# Patient Record
Sex: FEMALE | Race: WHITE | NOT HISPANIC OR LATINO | Employment: FULL TIME | URBAN - METROPOLITAN AREA
[De-identification: names, ages, dates, MRNs, and addresses within clinical notes are randomized per-mention and may not be internally consistent; named-entity substitution may affect disease eponyms.]

---

## 2020-11-16 LAB
EXTERNAL ABO GROUPING: NORMAL
EXTERNAL ANTIBODY SCREEN: NEGATIVE
EXTERNAL HEPATITIS B SURFACE ANTIGEN: NEGATIVE
EXTERNAL HEPATITIS C AB: NEGATIVE
EXTERNAL RH FACTOR: POSITIVE
EXTERNAL RUBELLA QUALITATIVE: NORMAL
EXTERNAL SYPHILIS RPR SCREEN: NORMAL
HIV1 P24 AG SERPL QL IA: NEGATIVE

## 2021-04-29 ENCOUNTER — APPOINTMENT (OUTPATIENT)
Dept: GENERAL RADIOLOGY | Facility: HOSPITAL | Age: 36
End: 2021-04-29

## 2021-04-29 PROCEDURE — 73110 X-RAY EXAM OF WRIST: CPT | Performed by: GENERAL PRACTICE

## 2021-05-07 ENCOUNTER — TRANSCRIBE ORDERS (OUTPATIENT)
Dept: ADMINISTRATIVE | Facility: HOSPITAL | Age: 36
End: 2021-05-07

## 2021-05-07 DIAGNOSIS — D64.9 ANEMIA, UNSPECIFIED TYPE: Primary | ICD-10-CM

## 2021-05-10 PROBLEM — D64.9 ANEMIA: Status: ACTIVE | Noted: 2021-05-10

## 2021-05-12 ENCOUNTER — HOSPITAL ENCOUNTER (OUTPATIENT)
Dept: INFUSION THERAPY | Facility: HOSPITAL | Age: 36
Discharge: HOME | End: 2021-05-12
Admitting: OBSTETRICS & GYNECOLOGY

## 2021-05-12 VITALS
HEART RATE: 105 BPM | RESPIRATION RATE: 20 BRPM | DIASTOLIC BLOOD PRESSURE: 67 MMHG | OXYGEN SATURATION: 98 % | SYSTOLIC BLOOD PRESSURE: 135 MMHG | TEMPERATURE: 97.3 F

## 2021-05-12 DIAGNOSIS — D64.9 ANEMIA, UNSPECIFIED TYPE: Primary | ICD-10-CM

## 2021-05-12 PROCEDURE — 25010000002 IRON SUCROSE PER 1 MG: Performed by: OBSTETRICS & GYNECOLOGY

## 2021-05-12 PROCEDURE — 96366 THER/PROPH/DIAG IV INF ADDON: CPT

## 2021-05-12 PROCEDURE — 96365 THER/PROPH/DIAG IV INF INIT: CPT

## 2021-05-12 RX ADMIN — IRON SUCROSE 300 MG: 20 INJECTION, SOLUTION INTRAVENOUS at 08:04

## 2021-05-17 ENCOUNTER — HOSPITAL ENCOUNTER (OUTPATIENT)
Dept: INFUSION THERAPY | Facility: HOSPITAL | Age: 36
Discharge: HOME | End: 2021-05-17
Admitting: OBSTETRICS & GYNECOLOGY

## 2021-05-17 VITALS
OXYGEN SATURATION: 100 % | TEMPERATURE: 97.4 F | HEART RATE: 109 BPM | DIASTOLIC BLOOD PRESSURE: 77 MMHG | SYSTOLIC BLOOD PRESSURE: 120 MMHG

## 2021-05-17 DIAGNOSIS — D64.9 ANEMIA, UNSPECIFIED TYPE: Primary | ICD-10-CM

## 2021-05-17 PROCEDURE — 96366 THER/PROPH/DIAG IV INF ADDON: CPT

## 2021-05-17 PROCEDURE — 96365 THER/PROPH/DIAG IV INF INIT: CPT

## 2021-05-17 PROCEDURE — 25010000002 IRON SUCROSE PER 1 MG: Performed by: OBSTETRICS & GYNECOLOGY

## 2021-05-17 RX ADMIN — IRON SUCROSE 300 MG: 20 INJECTION, SOLUTION INTRAVENOUS at 10:13

## 2021-05-17 NOTE — PROGRESS NOTES
Pt tolerated infusion well.  Pt monitored for 30 min post infusion without complication.  Pt dc'ed ambulatory.

## 2021-05-27 ENCOUNTER — HOSPITAL ENCOUNTER (OUTPATIENT)
Dept: INFUSION THERAPY | Facility: HOSPITAL | Age: 36
Discharge: HOME | End: 2021-05-27
Admitting: OBSTETRICS & GYNECOLOGY

## 2021-05-27 VITALS
SYSTOLIC BLOOD PRESSURE: 127 MMHG | RESPIRATION RATE: 16 BRPM | TEMPERATURE: 98 F | OXYGEN SATURATION: 99 % | DIASTOLIC BLOOD PRESSURE: 83 MMHG | HEART RATE: 99 BPM

## 2021-05-27 DIAGNOSIS — D64.9 ANEMIA, UNSPECIFIED TYPE: Primary | ICD-10-CM

## 2021-05-27 PROCEDURE — 25010000002 IRON SUCROSE PER 1 MG: Performed by: OBSTETRICS & GYNECOLOGY

## 2021-05-27 PROCEDURE — 96365 THER/PROPH/DIAG IV INF INIT: CPT

## 2021-05-27 PROCEDURE — 96366 THER/PROPH/DIAG IV INF ADDON: CPT

## 2021-05-27 RX ADMIN — IRON SUCROSE 300 MG: 20 INJECTION, SOLUTION INTRAVENOUS at 10:11

## 2021-06-01 LAB — EXTERNAL GROUP B STREP ANTIGEN: NEGATIVE

## 2021-06-30 ENCOUNTER — HOSPITAL ENCOUNTER (OUTPATIENT)
Dept: LABOR AND DELIVERY | Facility: HOSPITAL | Age: 36
Discharge: HOME | End: 2021-06-30

## 2021-06-30 ENCOUNTER — HOSPITAL ENCOUNTER (INPATIENT)
Facility: HOSPITAL | Age: 36
LOS: 4 days | Discharge: HOME | End: 2021-07-04
Attending: OBSTETRICS & GYNECOLOGY | Admitting: OBSTETRICS & GYNECOLOGY

## 2021-06-30 ENCOUNTER — ANESTHESIA EVENT (OUTPATIENT)
Dept: LABOR AND DELIVERY | Facility: HOSPITAL | Age: 36
End: 2021-06-30

## 2021-06-30 ENCOUNTER — ANESTHESIA (OUTPATIENT)
Dept: LABOR AND DELIVERY | Facility: HOSPITAL | Age: 36
End: 2021-06-30

## 2021-06-30 DIAGNOSIS — Z34.90 PREGNANCY, UNSPECIFIED GESTATIONAL AGE: ICD-10-CM

## 2021-06-30 DIAGNOSIS — Z3A.40 40 WEEKS GESTATION OF PREGNANCY: Primary | ICD-10-CM

## 2021-06-30 LAB
ABO GROUP BLD: NORMAL
BASOPHILS # BLD AUTO: 0.02 10*3/MM3 (ref 0–0.2)
BASOPHILS NFR BLD AUTO: 0.1 % (ref 0–1.5)
BLD GP AB SCN SERPL QL: NEGATIVE
DEPRECATED RDW RBC AUTO: 48.2 FL (ref 37–54)
EOSINOPHIL # BLD AUTO: 0.12 10*3/MM3 (ref 0–0.4)
EOSINOPHIL NFR BLD AUTO: 0.8 % (ref 0.3–6.2)
ERYTHROCYTE [DISTWIDTH] IN BLOOD BY AUTOMATED COUNT: 14.5 % (ref 12.3–15.4)
HCT VFR BLD AUTO: 34.2 % (ref 34–46.6)
HGB BLD-MCNC: 11.4 G/DL (ref 12–15.9)
IMM GRANULOCYTES # BLD AUTO: 0.14 10*3/MM3 (ref 0–0.05)
IMM GRANULOCYTES NFR BLD AUTO: 0.9 % (ref 0–0.5)
LYMPHOCYTES # BLD AUTO: 1.98 10*3/MM3 (ref 0.7–3.1)
LYMPHOCYTES NFR BLD AUTO: 13.4 % (ref 19.6–45.3)
MCH RBC QN AUTO: 30.1 PG (ref 26.6–33)
MCHC RBC AUTO-ENTMCNC: 33.3 G/DL (ref 31.5–35.7)
MCV RBC AUTO: 90.2 FL (ref 79–97)
MONOCYTES # BLD AUTO: 1.01 10*3/MM3 (ref 0.1–0.9)
MONOCYTES NFR BLD AUTO: 6.8 % (ref 5–12)
NEUTROPHILS NFR BLD AUTO: 11.53 10*3/MM3 (ref 1.7–7)
NEUTROPHILS NFR BLD AUTO: 78 % (ref 42.7–76)
NRBC BLD AUTO-RTO: 0.1 /100 WBC (ref 0–0.2)
PLATELET # BLD AUTO: 282 10*3/MM3 (ref 140–450)
PMV BLD AUTO: 10.1 FL (ref 6–12)
RBC # BLD AUTO: 3.79 10*6/MM3 (ref 3.77–5.28)
RH BLD: POSITIVE
SARS-COV-2 RNA PNL SPEC NAA+PROBE: NOT DETECTED
T&S EXPIRATION DATE: NORMAL
WBC # BLD AUTO: 14.8 10*3/MM3 (ref 3.4–10.8)

## 2021-06-30 PROCEDURE — 85025 COMPLETE CBC W/AUTO DIFF WBC: CPT | Performed by: OBSTETRICS & GYNECOLOGY

## 2021-06-30 PROCEDURE — 87635 SARS-COV-2 COVID-19 AMP PRB: CPT | Performed by: OBSTETRICS & GYNECOLOGY

## 2021-06-30 PROCEDURE — 86901 BLOOD TYPING SEROLOGIC RH(D): CPT | Performed by: OBSTETRICS & GYNECOLOGY

## 2021-06-30 PROCEDURE — 86900 BLOOD TYPING SEROLOGIC ABO: CPT | Performed by: OBSTETRICS & GYNECOLOGY

## 2021-06-30 PROCEDURE — 86850 RBC ANTIBODY SCREEN: CPT | Performed by: OBSTETRICS & GYNECOLOGY

## 2021-06-30 RX ORDER — PROMETHAZINE HYDROCHLORIDE 25 MG/1
12.5 TABLET ORAL EVERY 6 HOURS PRN
Status: DISCONTINUED | OUTPATIENT
Start: 2021-06-30 | End: 2021-06-30

## 2021-06-30 RX ORDER — ONDANSETRON 2 MG/ML
4 INJECTION INTRAMUSCULAR; INTRAVENOUS ONCE AS NEEDED
Status: COMPLETED | OUTPATIENT
Start: 2021-06-30 | End: 2021-07-01

## 2021-06-30 RX ORDER — DIPHENHYDRAMINE HYDROCHLORIDE 50 MG/ML
25 INJECTION INTRAMUSCULAR; INTRAVENOUS NIGHTLY PRN
Status: DISCONTINUED | OUTPATIENT
Start: 2021-06-30 | End: 2021-07-01 | Stop reason: HOSPADM

## 2021-06-30 RX ORDER — DIPHENHYDRAMINE HYDROCHLORIDE 50 MG/ML
12.5 INJECTION INTRAMUSCULAR; INTRAVENOUS EVERY 8 HOURS PRN
Status: DISCONTINUED | OUTPATIENT
Start: 2021-06-30 | End: 2021-07-01 | Stop reason: HOSPADM

## 2021-06-30 RX ORDER — SODIUM CHLORIDE, SODIUM LACTATE, POTASSIUM CHLORIDE, CALCIUM CHLORIDE 600; 310; 30; 20 MG/100ML; MG/100ML; MG/100ML; MG/100ML
125 INJECTION, SOLUTION INTRAVENOUS CONTINUOUS
Status: DISCONTINUED | OUTPATIENT
Start: 2021-06-30 | End: 2021-06-30

## 2021-06-30 RX ORDER — OXYTOCIN-SODIUM CHLORIDE 0.9% IV SOLN 30 UNIT/500ML 30-0.9/5 UT/ML-%
2-20 SOLUTION INTRAVENOUS
Status: DISCONTINUED | OUTPATIENT
Start: 2021-06-30 | End: 2021-06-30

## 2021-06-30 RX ORDER — OXYTOCIN-SODIUM CHLORIDE 0.9% IV SOLN 30 UNIT/500ML 30-0.9/5 UT/ML-%
999 SOLUTION INTRAVENOUS ONCE
Status: DISCONTINUED | OUTPATIENT
Start: 2021-06-30 | End: 2021-06-30

## 2021-06-30 RX ORDER — SODIUM CHLORIDE 0.9 % (FLUSH) 0.9 %
3-10 SYRINGE (ML) INJECTION AS NEEDED
Status: DISCONTINUED | OUTPATIENT
Start: 2021-06-30 | End: 2021-07-01 | Stop reason: HOSPADM

## 2021-06-30 RX ORDER — TERBUTALINE SULFATE 1 MG/ML
0.25 INJECTION, SOLUTION SUBCUTANEOUS AS NEEDED
Status: DISCONTINUED | OUTPATIENT
Start: 2021-06-30 | End: 2021-07-01 | Stop reason: HOSPADM

## 2021-06-30 RX ORDER — DIPHENHYDRAMINE HCL 50 MG
50 CAPSULE ORAL NIGHTLY PRN
Status: DISCONTINUED | OUTPATIENT
Start: 2021-06-30 | End: 2021-07-01 | Stop reason: HOSPADM

## 2021-06-30 RX ORDER — ONDANSETRON 4 MG/1
4 TABLET, FILM COATED ORAL EVERY 6 HOURS PRN
Status: DISCONTINUED | OUTPATIENT
Start: 2021-06-30 | End: 2021-07-01 | Stop reason: HOSPADM

## 2021-06-30 RX ORDER — ACETAMINOPHEN 500 MG
1000 TABLET ORAL EVERY 4 HOURS PRN
Status: DISCONTINUED | OUTPATIENT
Start: 2021-06-30 | End: 2021-07-01 | Stop reason: HOSPADM

## 2021-06-30 RX ORDER — OXYTOCIN-SODIUM CHLORIDE 0.9% IV SOLN 30 UNIT/500ML 30-0.9/5 UT/ML-%
125 SOLUTION INTRAVENOUS CONTINUOUS PRN
Status: DISCONTINUED | OUTPATIENT
Start: 2021-06-30 | End: 2021-06-30

## 2021-06-30 RX ORDER — OXYTOCIN-SODIUM CHLORIDE 0.9% IV SOLN 30 UNIT/500ML 30-0.9/5 UT/ML-%
250 SOLUTION INTRAVENOUS CONTINUOUS PRN
Status: DISCONTINUED | OUTPATIENT
Start: 2021-06-30 | End: 2021-06-30

## 2021-06-30 RX ORDER — CEFAZOLIN SODIUM 2 G/100ML
2 INJECTION, SOLUTION INTRAVENOUS ONCE
Status: COMPLETED | OUTPATIENT
Start: 2021-06-30 | End: 2021-07-01

## 2021-06-30 RX ORDER — FAMOTIDINE 10 MG/ML
20 INJECTION, SOLUTION INTRAVENOUS 2 TIMES DAILY PRN
Status: DISCONTINUED | OUTPATIENT
Start: 2021-06-30 | End: 2021-06-30

## 2021-06-30 RX ORDER — ONDANSETRON 2 MG/ML
4 INJECTION INTRAMUSCULAR; INTRAVENOUS EVERY 6 HOURS PRN
Status: DISCONTINUED | OUTPATIENT
Start: 2021-06-30 | End: 2021-07-01 | Stop reason: HOSPADM

## 2021-06-30 RX ORDER — EPHEDRINE SULFATE 50 MG/ML
5 INJECTION, SOLUTION INTRAVENOUS AS NEEDED
Status: DISCONTINUED | OUTPATIENT
Start: 2021-06-30 | End: 2021-06-30

## 2021-06-30 RX ORDER — MAGNESIUM CARB/ALUMINUM HYDROX 105-160MG
30 TABLET,CHEWABLE ORAL ONCE
Status: DISCONTINUED | OUTPATIENT
Start: 2021-06-30 | End: 2021-06-30

## 2021-06-30 RX ORDER — FAMOTIDINE 20 MG/1
20 TABLET, FILM COATED ORAL 2 TIMES DAILY PRN
Status: DISCONTINUED | OUTPATIENT
Start: 2021-06-30 | End: 2021-06-30

## 2021-06-30 RX ORDER — DEXTROSE, SODIUM CHLORIDE, SODIUM LACTATE, POTASSIUM CHLORIDE, AND CALCIUM CHLORIDE 5; .6; .31; .03; .02 G/100ML; G/100ML; G/100ML; G/100ML; G/100ML
125 INJECTION, SOLUTION INTRAVENOUS CONTINUOUS
Status: DISCONTINUED | OUTPATIENT
Start: 2021-06-30 | End: 2021-06-30

## 2021-06-30 RX ORDER — SODIUM CHLORIDE, SODIUM LACTATE, POTASSIUM CHLORIDE, CALCIUM CHLORIDE 600; 310; 30; 20 MG/100ML; MG/100ML; MG/100ML; MG/100ML
125 INJECTION, SOLUTION INTRAVENOUS CONTINUOUS
Status: DISCONTINUED | OUTPATIENT
Start: 2021-06-30 | End: 2021-07-01

## 2021-06-30 RX ORDER — BUTORPHANOL TARTRATE 1 MG/ML
1 INJECTION, SOLUTION INTRAMUSCULAR; INTRAVENOUS
Status: DISCONTINUED | OUTPATIENT
Start: 2021-06-30 | End: 2021-07-01 | Stop reason: HOSPADM

## 2021-06-30 RX ORDER — FAMOTIDINE 10 MG/ML
20 INJECTION, SOLUTION INTRAVENOUS ONCE AS NEEDED
Status: COMPLETED | OUTPATIENT
Start: 2021-06-30 | End: 2021-07-01

## 2021-06-30 RX ORDER — SODIUM CHLORIDE 0.9 % (FLUSH) 0.9 %
3 SYRINGE (ML) INJECTION EVERY 12 HOURS SCHEDULED
Status: DISCONTINUED | OUTPATIENT
Start: 2021-06-30 | End: 2021-07-01 | Stop reason: HOSPADM

## 2021-06-30 RX ORDER — LIDOCAINE HYDROCHLORIDE 10 MG/ML
5 INJECTION, SOLUTION EPIDURAL; INFILTRATION; INTRACAUDAL; PERINEURAL AS NEEDED
Status: DISCONTINUED | OUTPATIENT
Start: 2021-06-30 | End: 2021-07-01 | Stop reason: HOSPADM

## 2021-06-30 RX ORDER — PROMETHAZINE HYDROCHLORIDE 12.5 MG/1
12.5 SUPPOSITORY RECTAL EVERY 6 HOURS PRN
Status: DISCONTINUED | OUTPATIENT
Start: 2021-06-30 | End: 2021-06-30

## 2021-06-30 RX ADMIN — SODIUM CHLORIDE, POTASSIUM CHLORIDE, SODIUM LACTATE AND CALCIUM CHLORIDE 125 ML/HR: 600; 310; 30; 20 INJECTION, SOLUTION INTRAVENOUS at 16:42

## 2021-07-01 PROBLEM — O36.60X0 MACROSOMIA AFFECTING MANAGEMENT OF MOTHER: Status: ACTIVE | Noted: 2021-07-01

## 2021-07-01 LAB
ATMOSPHERIC PRESS: 750.5 MMHG
BASE EXCESS BLDCOA CALC-SCNC: -6 MMOL/L
BDY SITE: ABNORMAL
GAS FLOW AIRWAY: 2 LPM
HCO3 BLDCOA-SCNC: 20.9 MMOL/L (ref 22–28)
MODALITY: ABNORMAL
NOTE: ABNORMAL
PCO2 BLDCOA: 45 MMHG (ref 43–63)
PH BLDCOA: 7.28 PH UNITS (ref 7.18–7.34)
PO2 BLDCOA: 20.1 MMHG (ref 12–26)
SAO2 % BLDCOA: 26.1 % (ref 92–99)

## 2021-07-01 PROCEDURE — 25010000002 FENTANYL CITRATE (PF) 50 MCG/ML SOLUTION: Performed by: NURSE ANESTHETIST, CERTIFIED REGISTERED

## 2021-07-01 PROCEDURE — 25010000003 CEFAZOLIN IN DEXTROSE 2-4 GM/100ML-% SOLUTION: Performed by: OBSTETRICS & GYNECOLOGY

## 2021-07-01 PROCEDURE — 25010000002 ROPIVACAINE PER 1 MG: Performed by: STUDENT IN AN ORGANIZED HEALTH CARE EDUCATION/TRAINING PROGRAM

## 2021-07-01 PROCEDURE — 82803 BLOOD GASES ANY COMBINATION: CPT

## 2021-07-01 PROCEDURE — 25010000002 FENTANYL CITRATE (PF) 50 MCG/ML SOLUTION: Performed by: STUDENT IN AN ORGANIZED HEALTH CARE EDUCATION/TRAINING PROGRAM

## 2021-07-01 PROCEDURE — 25010000002 MIDAZOLAM PER 1 MG: Performed by: NURSE ANESTHETIST, CERTIFIED REGISTERED

## 2021-07-01 PROCEDURE — 25010000002 PROPOFOL 10 MG/ML EMULSION: Performed by: NURSE ANESTHETIST, CERTIFIED REGISTERED

## 2021-07-01 PROCEDURE — 25010000002 DIPHENHYDRAMINE PER 50 MG: Performed by: NURSE ANESTHETIST, CERTIFIED REGISTERED

## 2021-07-01 PROCEDURE — 25010000002 KETOROLAC TROMETHAMINE PER 15 MG: Performed by: OBSTETRICS & GYNECOLOGY

## 2021-07-01 PROCEDURE — 25010000002 ONDANSETRON PER 1 MG: Performed by: ANESTHESIOLOGY

## 2021-07-01 PROCEDURE — 25010000002 ONDANSETRON PER 1 MG: Performed by: NURSE ANESTHETIST, CERTIFIED REGISTERED

## 2021-07-01 PROCEDURE — 25010000002 MORPHINE PER 10 MG: Performed by: STUDENT IN AN ORGANIZED HEALTH CARE EDUCATION/TRAINING PROGRAM

## 2021-07-01 PROCEDURE — 88307 TISSUE EXAM BY PATHOLOGIST: CPT

## 2021-07-01 RX ORDER — ONDANSETRON 4 MG/1
4 TABLET, FILM COATED ORAL EVERY 8 HOURS PRN
Status: DISCONTINUED | OUTPATIENT
Start: 2021-07-01 | End: 2021-07-04 | Stop reason: HOSPADM

## 2021-07-01 RX ORDER — LANOLIN
CREAM (ML) TOPICAL AS NEEDED
Status: DISCONTINUED | OUTPATIENT
Start: 2021-07-01 | End: 2021-07-04 | Stop reason: HOSPADM

## 2021-07-01 RX ORDER — ONDANSETRON 4 MG/1
4 TABLET, FILM COATED ORAL EVERY 6 HOURS PRN
Status: DISCONTINUED | OUTPATIENT
Start: 2021-07-01 | End: 2021-07-01 | Stop reason: HOSPADM

## 2021-07-01 RX ORDER — ONDANSETRON 2 MG/ML
4 INJECTION INTRAMUSCULAR; INTRAVENOUS ONCE AS NEEDED
Status: DISCONTINUED | OUTPATIENT
Start: 2021-07-01 | End: 2021-07-01 | Stop reason: HOSPADM

## 2021-07-01 RX ORDER — SODIUM CHLORIDE 0.9 % (FLUSH) 0.9 %
3 SYRINGE (ML) INJECTION EVERY 12 HOURS SCHEDULED
Status: DISCONTINUED | OUTPATIENT
Start: 2021-07-01 | End: 2021-07-03

## 2021-07-01 RX ORDER — PHYTONADIONE 1 MG/.5ML
INJECTION, EMULSION INTRAMUSCULAR; INTRAVENOUS; SUBCUTANEOUS
Status: DISPENSED
Start: 2021-07-01 | End: 2021-07-01

## 2021-07-01 RX ORDER — PROMETHAZINE HYDROCHLORIDE 25 MG/1
25 TABLET ORAL EVERY 6 HOURS PRN
Status: DISCONTINUED | OUTPATIENT
Start: 2021-07-01 | End: 2021-07-04 | Stop reason: HOSPADM

## 2021-07-01 RX ORDER — ACETAMINOPHEN 500 MG
1000 TABLET ORAL ONCE
Status: DISCONTINUED | OUTPATIENT
Start: 2021-07-01 | End: 2021-07-01 | Stop reason: HOSPADM

## 2021-07-01 RX ORDER — LANOLIN
CREAM (ML) TOPICAL
Status: DISCONTINUED | OUTPATIENT
Start: 2021-07-01 | End: 2021-07-04 | Stop reason: HOSPADM

## 2021-07-01 RX ORDER — ACETAMINOPHEN 325 MG/1
650 TABLET ORAL ONCE
Status: COMPLETED | OUTPATIENT
Start: 2021-07-01 | End: 2021-07-01

## 2021-07-01 RX ORDER — PROPOFOL 10 MG/ML
VIAL (ML) INTRAVENOUS AS NEEDED
Status: DISCONTINUED | OUTPATIENT
Start: 2021-07-01 | End: 2021-07-01 | Stop reason: SURG

## 2021-07-01 RX ORDER — ONDANSETRON 2 MG/ML
INJECTION INTRAMUSCULAR; INTRAVENOUS AS NEEDED
Status: DISCONTINUED | OUTPATIENT
Start: 2021-07-01 | End: 2021-07-01 | Stop reason: SURG

## 2021-07-01 RX ORDER — KETOROLAC TROMETHAMINE 30 MG/ML
30 INJECTION, SOLUTION INTRAMUSCULAR; INTRAVENOUS EVERY 8 HOURS
Status: DISCONTINUED | OUTPATIENT
Start: 2021-07-01 | End: 2021-07-02

## 2021-07-01 RX ORDER — DOCUSATE SODIUM 100 MG/1
100 CAPSULE, LIQUID FILLED ORAL 2 TIMES DAILY PRN
Status: DISCONTINUED | OUTPATIENT
Start: 2021-07-01 | End: 2021-07-04 | Stop reason: SDUPTHER

## 2021-07-01 RX ORDER — ONDANSETRON 2 MG/ML
4 INJECTION INTRAMUSCULAR; INTRAVENOUS EVERY 6 HOURS PRN
Status: DISCONTINUED | OUTPATIENT
Start: 2021-07-01 | End: 2021-07-01 | Stop reason: HOSPADM

## 2021-07-01 RX ORDER — DIPHENHYDRAMINE HYDROCHLORIDE 50 MG/ML
25 INJECTION INTRAMUSCULAR; INTRAVENOUS NIGHTLY PRN
Status: DISCONTINUED | OUTPATIENT
Start: 2021-07-01 | End: 2021-07-01 | Stop reason: HOSPADM

## 2021-07-01 RX ORDER — OXYTOCIN-SODIUM CHLORIDE 0.9% IV SOLN 30 UNIT/500ML 30-0.9/5 UT/ML-%
999 SOLUTION INTRAVENOUS ONCE
Status: DISCONTINUED | OUTPATIENT
Start: 2021-07-01 | End: 2021-07-01 | Stop reason: HOSPADM

## 2021-07-01 RX ORDER — MIDAZOLAM HYDROCHLORIDE 1 MG/ML
INJECTION INTRAMUSCULAR; INTRAVENOUS AS NEEDED
Status: DISCONTINUED | OUTPATIENT
Start: 2021-07-01 | End: 2021-07-01 | Stop reason: SURG

## 2021-07-01 RX ORDER — DIPHENHYDRAMINE HCL 25 MG
25 CAPSULE ORAL EVERY 4 HOURS PRN
Status: DISCONTINUED | OUTPATIENT
Start: 2021-07-01 | End: 2021-07-04 | Stop reason: HOSPADM

## 2021-07-01 RX ORDER — FAMOTIDINE 10 MG/ML
20 INJECTION, SOLUTION INTRAVENOUS ONCE AS NEEDED
Status: DISCONTINUED | OUTPATIENT
Start: 2021-07-01 | End: 2021-07-01 | Stop reason: HOSPADM

## 2021-07-01 RX ORDER — PROMETHAZINE HYDROCHLORIDE 12.5 MG/1
12.5 SUPPOSITORY RECTAL EVERY 6 HOURS PRN
Status: DISCONTINUED | OUTPATIENT
Start: 2021-07-01 | End: 2021-07-04 | Stop reason: HOSPADM

## 2021-07-01 RX ORDER — DIPHENHYDRAMINE HYDROCHLORIDE 50 MG/ML
INJECTION INTRAMUSCULAR; INTRAVENOUS AS NEEDED
Status: DISCONTINUED | OUTPATIENT
Start: 2021-07-01 | End: 2021-07-01 | Stop reason: SURG

## 2021-07-01 RX ORDER — MISOPROSTOL 200 UG/1
800 TABLET ORAL AS NEEDED
Status: DISCONTINUED | OUTPATIENT
Start: 2021-07-01 | End: 2021-07-01 | Stop reason: HOSPADM

## 2021-07-01 RX ORDER — DIPHENHYDRAMINE HCL 25 MG
25 CAPSULE ORAL NIGHTLY PRN
Status: DISCONTINUED | OUTPATIENT
Start: 2021-07-01 | End: 2021-07-01 | Stop reason: HOSPADM

## 2021-07-01 RX ORDER — OXYCODONE AND ACETAMINOPHEN 10; 325 MG/1; MG/1
1 TABLET ORAL EVERY 4 HOURS PRN
Status: DISCONTINUED | OUTPATIENT
Start: 2021-07-01 | End: 2021-07-04 | Stop reason: HOSPADM

## 2021-07-01 RX ORDER — OXYCODONE HYDROCHLORIDE AND ACETAMINOPHEN 5; 325 MG/1; MG/1
1 TABLET ORAL EVERY 4 HOURS PRN
Status: DISCONTINUED | OUTPATIENT
Start: 2021-07-01 | End: 2021-07-04 | Stop reason: HOSPADM

## 2021-07-01 RX ORDER — OXYCODONE AND ACETAMINOPHEN 10; 325 MG/1; MG/1
1 TABLET ORAL EVERY 4 HOURS PRN
Status: DISCONTINUED | OUTPATIENT
Start: 2021-07-01 | End: 2021-07-01 | Stop reason: HOSPADM

## 2021-07-01 RX ORDER — MORPHINE SULFATE 1 MG/ML
INJECTION, SOLUTION EPIDURAL; INTRATHECAL; INTRAVENOUS
Status: COMPLETED | OUTPATIENT
Start: 2021-07-01 | End: 2021-07-01

## 2021-07-01 RX ORDER — NALBUPHINE HCL 10 MG/ML
10 AMPUL (ML) INJECTION
Status: DISCONTINUED | OUTPATIENT
Start: 2021-07-01 | End: 2021-07-01 | Stop reason: HOSPADM

## 2021-07-01 RX ORDER — FENTANYL CITRATE 50 UG/ML
INJECTION, SOLUTION INTRAMUSCULAR; INTRAVENOUS
Status: COMPLETED | OUTPATIENT
Start: 2021-07-01 | End: 2021-07-01

## 2021-07-01 RX ORDER — SODIUM CHLORIDE, SODIUM LACTATE, POTASSIUM CHLORIDE, CALCIUM CHLORIDE 600; 310; 30; 20 MG/100ML; MG/100ML; MG/100ML; MG/100ML
125 INJECTION, SOLUTION INTRAVENOUS CONTINUOUS
Status: DISCONTINUED | OUTPATIENT
Start: 2021-07-01 | End: 2021-07-04 | Stop reason: HOSPADM

## 2021-07-01 RX ORDER — PROMETHAZINE HYDROCHLORIDE 25 MG/1
12.5 TABLET ORAL EVERY 6 HOURS PRN
Status: DISCONTINUED | OUTPATIENT
Start: 2021-07-01 | End: 2021-07-01 | Stop reason: HOSPADM

## 2021-07-01 RX ORDER — CARBOPROST TROMETHAMINE 250 UG/ML
250 INJECTION, SOLUTION INTRAMUSCULAR AS NEEDED
Status: DISCONTINUED | OUTPATIENT
Start: 2021-07-01 | End: 2021-07-01 | Stop reason: HOSPADM

## 2021-07-01 RX ORDER — ERYTHROMYCIN 5 MG/G
OINTMENT OPHTHALMIC
Status: DISPENSED
Start: 2021-07-01 | End: 2021-07-01

## 2021-07-01 RX ORDER — HYDROCORTISONE 25 MG/G
CREAM TOPICAL 3 TIMES DAILY PRN
Status: DISCONTINUED | OUTPATIENT
Start: 2021-07-01 | End: 2021-07-04 | Stop reason: HOSPADM

## 2021-07-01 RX ORDER — HYDROMORPHONE HYDROCHLORIDE 1 MG/ML
0.5 INJECTION, SOLUTION INTRAMUSCULAR; INTRAVENOUS; SUBCUTANEOUS
Status: DISCONTINUED | OUTPATIENT
Start: 2021-07-01 | End: 2021-07-01 | Stop reason: HOSPADM

## 2021-07-01 RX ORDER — PROMETHAZINE HYDROCHLORIDE 12.5 MG/1
12.5 SUPPOSITORY RECTAL EVERY 6 HOURS PRN
Status: DISCONTINUED | OUTPATIENT
Start: 2021-07-01 | End: 2021-07-01 | Stop reason: HOSPADM

## 2021-07-01 RX ORDER — ROPIVACAINE HYDROCHLORIDE 2 MG/ML
INJECTION, SOLUTION EPIDURAL; INFILTRATION; PERINEURAL
Status: DISCONTINUED | OUTPATIENT
Start: 2021-07-01 | End: 2021-07-01 | Stop reason: SURG

## 2021-07-01 RX ORDER — OXYTOCIN-SODIUM CHLORIDE 0.9% IV SOLN 30 UNIT/500ML 30-0.9/5 UT/ML-%
250 SOLUTION INTRAVENOUS CONTINUOUS PRN
Status: DISCONTINUED | OUTPATIENT
Start: 2021-07-01 | End: 2021-07-01 | Stop reason: HOSPADM

## 2021-07-01 RX ORDER — BUPIVACAINE HYDROCHLORIDE 7.5 MG/ML
INJECTION, SOLUTION EPIDURAL; RETROBULBAR
Status: COMPLETED | OUTPATIENT
Start: 2021-07-01 | End: 2021-07-01

## 2021-07-01 RX ORDER — FENTANYL CITRATE 50 UG/ML
INJECTION, SOLUTION INTRAMUSCULAR; INTRAVENOUS AS NEEDED
Status: DISCONTINUED | OUTPATIENT
Start: 2021-07-01 | End: 2021-07-01 | Stop reason: SURG

## 2021-07-01 RX ORDER — SIMETHICONE 80 MG
80 TABLET,CHEWABLE ORAL 4 TIMES DAILY PRN
Status: DISCONTINUED | OUTPATIENT
Start: 2021-07-01 | End: 2021-07-04 | Stop reason: HOSPADM

## 2021-07-01 RX ORDER — ONDANSETRON 2 MG/ML
4 INJECTION INTRAMUSCULAR; INTRAVENOUS EVERY 8 HOURS PRN
Status: DISCONTINUED | OUTPATIENT
Start: 2021-07-01 | End: 2021-07-04 | Stop reason: HOSPADM

## 2021-07-01 RX ORDER — HYDROXYZINE 50 MG/1
50 TABLET, FILM COATED ORAL EVERY 6 HOURS PRN
Status: DISCONTINUED | OUTPATIENT
Start: 2021-07-01 | End: 2021-07-04 | Stop reason: HOSPADM

## 2021-07-01 RX ORDER — OXYTOCIN-SODIUM CHLORIDE 0.9% IV SOLN 30 UNIT/500ML 30-0.9/5 UT/ML-%
SOLUTION INTRAVENOUS
Status: COMPLETED
Start: 2021-07-01 | End: 2021-07-01

## 2021-07-01 RX ORDER — KETAMINE HYDROCHLORIDE 10 MG/ML
INJECTION INTRAMUSCULAR; INTRAVENOUS AS NEEDED
Status: DISCONTINUED | OUTPATIENT
Start: 2021-07-01 | End: 2021-07-01 | Stop reason: SURG

## 2021-07-01 RX ORDER — SODIUM CHLORIDE 0.9 % (FLUSH) 0.9 %
3-10 SYRINGE (ML) INJECTION AS NEEDED
Status: DISCONTINUED | OUTPATIENT
Start: 2021-07-01 | End: 2021-07-03

## 2021-07-01 RX ORDER — OXYTOCIN-SODIUM CHLORIDE 0.9% IV SOLN 30 UNIT/500ML 30-0.9/5 UT/ML-%
125 SOLUTION INTRAVENOUS CONTINUOUS PRN
Status: DISCONTINUED | OUTPATIENT
Start: 2021-07-01 | End: 2021-07-01 | Stop reason: HOSPADM

## 2021-07-01 RX ORDER — IBUPROFEN 800 MG/1
800 TABLET ORAL EVERY 8 HOURS PRN
Status: DISCONTINUED | OUTPATIENT
Start: 2021-07-01 | End: 2021-07-04 | Stop reason: HOSPADM

## 2021-07-01 RX ORDER — FAMOTIDINE 20 MG/1
20 TABLET, FILM COATED ORAL ONCE AS NEEDED
Status: DISCONTINUED | OUTPATIENT
Start: 2021-07-01 | End: 2021-07-01 | Stop reason: HOSPADM

## 2021-07-01 RX ORDER — METHYLERGONOVINE MALEATE 0.2 MG/ML
200 INJECTION INTRAVENOUS AS NEEDED
Status: DISCONTINUED | OUTPATIENT
Start: 2021-07-01 | End: 2021-07-01 | Stop reason: HOSPADM

## 2021-07-01 RX ADMIN — KETOROLAC TROMETHAMINE 30 MG: 30 INJECTION, SOLUTION INTRAMUSCULAR at 21:41

## 2021-07-01 RX ADMIN — PROPOFOL 20 MG: 10 INJECTION, EMULSION INTRAVENOUS at 08:59

## 2021-07-01 RX ADMIN — SODIUM CHLORIDE, POTASSIUM CHLORIDE, SODIUM LACTATE AND CALCIUM CHLORIDE 125 ML/HR: 600; 310; 30; 20 INJECTION, SOLUTION INTRAVENOUS at 09:58

## 2021-07-01 RX ADMIN — KETAMINE HYDROCHLORIDE 10 MG: 10 INJECTION INTRAMUSCULAR; INTRAVENOUS at 08:53

## 2021-07-01 RX ADMIN — OXYTOCIN 999 MILLI-UNITS/MIN: 10 INJECTION, SOLUTION INTRAMUSCULAR; INTRAVENOUS at 08:35

## 2021-07-01 RX ADMIN — ONDANSETRON 4 MG: 2 INJECTION INTRAMUSCULAR; INTRAVENOUS at 06:52

## 2021-07-01 RX ADMIN — MORPHINE SULFATE 2 MG: 1 INJECTION, SOLUTION EPIDURAL; INTRATHECAL; INTRAVENOUS at 08:58

## 2021-07-01 RX ADMIN — MORPHINE SULFATE 100 MCG: 1 INJECTION, SOLUTION EPIDURAL; INTRATHECAL; INTRAVENOUS at 08:03

## 2021-07-01 RX ADMIN — SODIUM CHLORIDE, POTASSIUM CHLORIDE, SODIUM LACTATE AND CALCIUM CHLORIDE: 600; 310; 30; 20 INJECTION, SOLUTION INTRAVENOUS at 07:26

## 2021-07-01 RX ADMIN — ROPIVACAINE HYDROCHLORIDE 60 ML: 2 INJECTION, SOLUTION EPIDURAL; INFILTRATION at 09:39

## 2021-07-01 RX ADMIN — DIPHENHYDRAMINE HYDROCHLORIDE 25 MG: 50 INJECTION INTRAMUSCULAR; INTRAVENOUS at 08:53

## 2021-07-01 RX ADMIN — FENTANYL CITRATE 35 MCG: 50 INJECTION INTRAMUSCULAR; INTRAVENOUS at 08:46

## 2021-07-01 RX ADMIN — FENTANYL CITRATE 25 MCG: 50 INJECTION INTRAMUSCULAR; INTRAVENOUS at 08:42

## 2021-07-01 RX ADMIN — MORPHINE SULFATE 2 MG: 1 INJECTION, SOLUTION EPIDURAL; INTRATHECAL; INTRAVENOUS at 09:02

## 2021-07-01 RX ADMIN — ACETAMINOPHEN 650 MG: 325 TABLET, FILM COATED ORAL at 02:59

## 2021-07-01 RX ADMIN — CEFAZOLIN SODIUM 2 G: 2 INJECTION, SOLUTION INTRAVENOUS at 06:52

## 2021-07-01 RX ADMIN — ONDANSETRON HYDROCHLORIDE 4 MG: 2 SOLUTION INTRAMUSCULAR; INTRAVENOUS at 09:04

## 2021-07-01 RX ADMIN — FENTANYL CITRATE 15 MCG: 50 INJECTION INTRAMUSCULAR; INTRAVENOUS at 08:03

## 2021-07-01 RX ADMIN — FENTANYL CITRATE 25 MCG: 50 INJECTION INTRAMUSCULAR; INTRAVENOUS at 08:45

## 2021-07-01 RX ADMIN — MORPHINE SULFATE 2 MG: 1 INJECTION, SOLUTION EPIDURAL; INTRATHECAL; INTRAVENOUS at 09:23

## 2021-07-01 RX ADMIN — FAMOTIDINE 20 MG: 10 INJECTION INTRAVENOUS at 06:52

## 2021-07-01 RX ADMIN — KETOROLAC TROMETHAMINE 30 MG: 30 INJECTION, SOLUTION INTRAMUSCULAR at 11:49

## 2021-07-01 RX ADMIN — MIDAZOLAM 2 MG: 1 INJECTION INTRAMUSCULAR; INTRAVENOUS at 08:41

## 2021-07-01 RX ADMIN — SODIUM CHLORIDE, POTASSIUM CHLORIDE, SODIUM LACTATE AND CALCIUM CHLORIDE 1000 ML: 600; 310; 30; 20 INJECTION, SOLUTION INTRAVENOUS at 07:00

## 2021-07-01 RX ADMIN — OXYTOCIN-SODIUM CHLORIDE 0.9% IV SOLN 30 UNIT/500ML 30 UNITS: 30-0.9/5 SOLUTION at 10:03

## 2021-07-01 RX ADMIN — SODIUM CHLORIDE, POTASSIUM CHLORIDE, SODIUM LACTATE AND CALCIUM CHLORIDE: 600; 310; 30; 20 INJECTION, SOLUTION INTRAVENOUS at 08:54

## 2021-07-01 RX ADMIN — KETAMINE HYDROCHLORIDE 10 MG: 10 INJECTION INTRAMUSCULAR; INTRAVENOUS at 08:45

## 2021-07-01 RX ADMIN — OXYTOCIN 30 UNITS: 10 INJECTION, SOLUTION INTRAMUSCULAR; INTRAVENOUS at 10:03

## 2021-07-01 RX ADMIN — BUPIVACAINE HYDROCHLORIDE 1.4 ML: 7.5 INJECTION, SOLUTION EPIDURAL; RETROBULBAR at 08:03

## 2021-07-01 NOTE — LACTATION NOTE
This note was copied from a baby's chart.  Mother called for latch assist. She reports that last two feeding have been for 30 min on the right, getting tender on that side, mother cannot latch on left. Assisted with football position, demonstrated deep latch technique and hand expression. Infant able to latch with nutritive suckle, mother denies pain or tenderness. Discussed ways to keep infant roused for feeding. Encouraged to call for assist as needed.

## 2021-07-01 NOTE — ADDENDUM NOTE
Addendum  created 07/01/21 1107 by Jona Gramajo MD    Attestation recorded in Intraprocedure, Intraprocedure Attestations filed

## 2021-07-01 NOTE — PLAN OF CARE
Goal Outcome Evaluation:  Plan of Care Reviewed With: patient        Progress: improving  Outcome Summary: Pt delivered of baby via primary c/s.  VSS.  Fundus firm at u/u; scant rubra.  F/C in place and diuresing well.  Plan to transfer to mom/baby.

## 2021-07-01 NOTE — ANESTHESIA PROCEDURE NOTES
Peripheral Block      Patient reassessed immediately prior to procedure    Patient location during procedure: OR  Start time: 7/1/2021 9:25 AM  Stop time: 7/1/2021 9:30 AM  Reason for block: at surgeon's request and post-op pain management  Performed by  Anesthesiologist: Jona Gramajo MD  Preanesthetic Checklist  Completed: patient identified, IV checked, site marked, risks and benefits discussed, surgical consent, monitors and equipment checked, pre-op evaluation and timeout performed  Prep:  Pt Position: supine  Sterile barriers:cap, gloves, sterile barriers and mask  Prep: ChloraPrep  Patient monitoring: blood pressure monitoring, continuous pulse oximetry and EKG  Procedure  Sedation:yes  Performed under: local infiltration  Guidance:ultrasound guided  ULTRASOUND INTERPRETATION. Using ultrasound guidance a 21 G gauge needle was placed in close proximity to the nerve, at which point, under ultrasound guidance anesthetic was injected in the area of the nerve and spread of the anesthesia was seen on ultrasound in close proximity thereto.  There were no abnormalities seen on ultrasound; a digital image was taken; and the patient tolerated the procedure with no complications. Images:still images not obtained    Laterality:Bilateral  Block Type:TAP  Injection Technique:single-shot  Needle Type:echogenic and Tuohy  Needle Gauge:21 G  Resistance on Injection: none    Medications Used: ropivacaine (NAROPIN) 0.2 % injection, 60 mL      Post Assessment  Injection Assessment: negative aspiration for heme, no paresthesia on injection and incremental injection  Patient Tolerance:comfortable throughout block  Complications:no  Additional Notes  Ultrasound guidance used for viewing muscle layers/transverse abdominis plane, and disbursement of local anesthetic within plane.

## 2021-07-01 NOTE — ANESTHESIA PROCEDURE NOTES
Spinal Block      Performed By  Anesthesiologist: Jona Gramajo MD  CRNA: Cady Garnica CRNA  Preanesthetic Checklist  Completed: patient identified, IV checked, site marked, risks and benefits discussed, surgical consent, monitors and equipment checked, pre-op evaluation and timeout performed  Spinal Block Prep:  Patient Position:sitting  Sterile Tech:cap, gloves and sterile barriers  Prep:Chloraprep  Patient Monitoring:EKG, continuous pulse oximetry and blood pressure monitoring  Spinal Block Procedure  Approach:midline  Location:L2-L3  Needle Type:Pencan  Needle Gauge:24 G  Placement of Spinal needle event:cerebrospinal fluid aspirated  Paresthesia: transient  Fluid Appearance:clear  Medications: fentaNYL citrate (PF) (SUBLIMAZE) injection, 15 mcg  Morphine PF injection, 100 mcg  bupivacaine PF (MARCAINE) 0.75 % injection, 1.4 mL  Med Administered at 7/1/2021 8:03 AM   Post Assessment  Patient Tolerance:patient tolerated the procedure well with no apparent complications  Complications no  Additional Notes  Multiple attempts at spinal at different levels; pt transiently complaining of pain in right leg; readjusted as needed; csf aspirated before during and end during injecftion

## 2021-07-01 NOTE — LACTATION NOTE
Lactation Consult Note    Evaluation Completed: 2021 15:25 EDT  Patient Name: Annita Johnson  :  1985  MRN:  [id number]     REFERRAL  INFORMATION:                          Date of Referral: 21   Person Making Referral: lactation consultant  Maternal Reason for Referral: breastfeeding currently, no prior breastfeeding experience  Infant Reason for Referral: other (see comments) (LGA)    DELIVERY HISTORY:        Skin to skin initiation date/time: 2021  9:48 AM   Skin to skin end date/time:           MATERNAL ASSESSMENT:     Breast Shape: round, pendulous (21)  Breast Density: soft (21)  Areola: elastic (21)  Nipples: everted (21)                INFANT ASSESSMENT:  Information for the patient's :  Priscilla Johnson [[id number]]   No past medical history on file.                                                                                                     MATERNAL INFANT FEEDING:     Maternal Emotional State: receptive (21)  Infant Positioning: cradle (21)   Signs of Milk Transfer: audible swallow, deep jaw excursions noted, suck/swallow ratio, transfer present (21)  Pain with Feeding: no (21)           Milk Ejection Reflex: present (21)  Comfort Measures Following Feeding: air-drying encouraged, breast cream/oil applied, expressed milk applied, soap use discouraged (21)        Latch Assistance: none needed (21)                               EQUIPMENT TYPE:  Breast Pump Type: double electric, personal (21)  Breast Pump Flange Type: hard (21)  Breast Pump Flange Size: 24 mm (21)                        BREAST PUMPING:          LACTATION REFERRALS:

## 2021-07-01 NOTE — NON STRESS TEST
Annita Johnson, a  at 40w0d with an FRACISCO of 2021, Date entered prior to episode creation, was seen at [facility] LABOR DELIVERY for a nonstress test.    Chief Complaint   Patient presents with   • Scheduled Induction      at 40+0wks arrives for dee elective IOL for term. Pt reports +FM and denies LOF, VB, ctxs, pain. Abdomen palpates soft       Patient Active Problem List   Diagnosis   • Anemia   • Pregnancy       Start Time:   Stop Time:

## 2021-07-01 NOTE — NON STRESS TEST
Annita Johnson, a  at 40w0d with an FRACISCO of 2021, Date entered prior to episode creation, was seen at [facility] LABOR DELIVERY for a nonstress test.    Chief Complaint   Patient presents with   • Scheduled Induction      at 40+0wks arrives for dee elective IOL for term. Pt reports +FM and denies LOF, VB, ctxs, pain. Abdomen palpates soft       Patient Active Problem List   Diagnosis   • Anemia   • Pregnancy       Start Time:   Stop Time:

## 2021-07-01 NOTE — ANESTHESIA PREPROCEDURE EVALUATION
Anesthesia Evaluation     Patient summary reviewed and Nursing notes reviewed   no history of anesthetic complications:  NPO Solid Status: > 8 hours  NPO Liquid Status: > 2 hours           Airway   Mallampati: II  TM distance: >3 FB  Neck ROM: full  No difficulty expected  Dental - normal exam     Pulmonary    Cardiovascular   Exercise tolerance: good (4-7 METS)        Neuro/Psych  GI/Hepatic/Renal/Endo    (+)   renal disease stones,     Musculoskeletal     Abdominal    Substance History      OB/GYN    (+) Pregnant,         Other                        Anesthesia Plan    ASA 2     spinal   (40w1d)  intravenous induction     Anesthetic plan, all risks, benefits, and alternatives have been provided, discussed and informed consent has been obtained with: patient.    Plan discussed with CRNA.

## 2021-07-01 NOTE — ANESTHESIA POSTPROCEDURE EVALUATION
Patient: nAnita Johnson    Procedure Summary     Date: 21 Room / Location:  [facility]  /  [facility]    Anesthesia Start: 734 Anesthesia Stop: 932    Procedure:  SECTION PRIMARY (N/A Abdomen) Diagnosis:     Surgeons: Zoe Dawson MD Provider: Jona Gramajo MD    Anesthesia Type: spinal ASA Status: 2          Anesthesia Type: spinal    Vitals  Vitals Value Taken Time   /80 21 1006   Temp     Pulse 96 21 1010   Resp 18 21 0945   SpO2 99 % 21 1010   Vitals shown include unvalidated device data.        Post Anesthesia Care and Evaluation    Patient location during evaluation: bedside  Patient participation: complete - patient participated  Level of consciousness: awake and alert  Pain management: adequate  Airway patency: patent  Anesthetic complications: No anesthetic complications  PONV Status: controlled  Cardiovascular status: blood pressure returned to baseline and acceptable  Respiratory status: acceptable  Hydration status: acceptable

## 2021-07-01 NOTE — H&P
.[facility]  Obstetric History and Physical    Chief Complaint   Patient presents with   • Scheduled Induction      at 40+0wks arrives for dee elective IOL for term. Pt reports +FM and denies LOF, VB, ctxs, pain. Abdomen palpates soft       Subjective     Patient is a 35 y.o. female  currently at 40w1d, who presents for pit induction.     Yesterday in office with efw 4400g with ac>99%. Primary csection was recommended yesterday by dr alamo but she was not interested at that time.    Nl 1hr     PROBLEM LIST    Pregnancy      Past OB History:       OB History    Para Term  AB Living   1 0 0 0 0 0   SAB TAB Ectopic Molar Multiple Live Births   0 0 0 0 0 0      # Outcome Date GA Lbr Vinicio/2nd Weight Sex Delivery Anes PTL Lv   1 Current                Past Medical History: Past Medical History:   Diagnosis Date   • Anemia    • Kidney stone    • Kidney stones       Past Surgical History Past Surgical History:   Procedure Laterality Date   • CYSTOSCOPY BLADDER STONE LITHOTRIPSY        Family History: No family history on file.   Social History:  reports that she has quit smoking. She started smoking about 4 months ago. She has never used smokeless tobacco.   reports previous alcohol use.   reports no history of drug use.    Allergies:     Patient has no known allergies.       Objective       Vital Signs Range for the last 24 hours  Temperature: Temp:  [98.3 °F (36.8 °C)-98.9 °F (37.2 °C)] 98.9 °F (37.2 °C)   Temp Source: Temp src: Oral   BP: BP: (121-143)/(75-85) 126/82   Pulse: Heart Rate:  [] 92   Respirations: Resp:  [17-18] 18                   Physical Examination:     General :  Alert in NAD  Abdomen: Gravid, nontender        Cervix: Exam by:     Dilation:     Effacement:     Station:         Fetal Heart Rate Assessment   Method: Fetal HR Assessment Method: external   Beats/min: Fetal HR (beats/min): 150   Baseline: Fetal Heart Baseline Rate: normal range   Varibility: Fetal HR  Variability: moderate (amplitude range 6 to 25 bpm)   Accels: Fetal HR Accelerations: greater than/equal to 15 bpm, lasting at least 15 seconds   Decels: Fetal HR Decelerations: absent   Tracing Category:       Uterine Assessment   Method: Method: palpation, external tocotransducer   Frequency (min): Contraction Frequency (Minutes): 3-4 (pt not feeling)   Ctx Count in 10 min:     Duration:     Intensity: Contraction Intensity: mild by palpation   Intensity by IUPC:     Resting Tone: Uterine Resting Tone: soft by palpation   Resting Tone by IUPC:     Jay Units:         leopolds- 9-10 lbs      Assessment/Plan       Assessment:  1.  Intrauterine pregnancy at 40w1d weeks gestation with reassuring fetal status.    2.  Suspect macrosomia- efw 4400g u/s yesterday. Extensive d/w pt/fob risk of shoulder dystocia. Very robert discussions about risks to baby and perineum with vag delivery. D/w risks/options primary csection. After long discussion, they want primary csection.     Ate dinner so would be middle of night to wait 8 hrs so will schedule in am    Plan:   Plan of care has been reviewed with patient and family,.   All questions answered.          Office prenatal reviewed        Zoe Dawson MD  7/1/2021  00:22 EDT

## 2021-07-01 NOTE — OP NOTE
[facility]   Section Operative Note    Patient Name: Annita Johnson  :  1985  MRN:  [id number]      Date of procedure:  2021        Pre-Operative Dx:   1.  IUP at 40w1d weeks   2. Macrosomia         Postoperative Dx:  Same        Procedure: , Low Transverse primary     Surgeon: Zoe Dawson MD      Assistant: MD scott     Anesthesia: Spinal    EBL:  ml     Specimens: Art ph/ placenta         Findings:                           Amniotic Fluid clear  Placenta Intact, 3 VC  Uterus normal  Tubes and ovaries normal bilaterally              Infant:           Gender: Viable male  infant     Weight: 4650 g (10 lb 4 oz)     Apgars: 9  @ 1 minute /     9  @ 5 minutes                 Indication for C/Section:     Macrosomia          Us efw 4400g. Recommended primary csection        Procedure Details:  The patient was taken to the operating room where spinal anesthesia was found to be adequate. She was prepped and draped in the usual sterile manner in the dorsal supine position with leftward tilt. A Pfannenstiel incision was made and carried down to the fascia. The fascia was incised in the mid-line and extended laterally  with Ashley scissors. The fascia was grasped and elevated and  from the underlying rectus muscles sharply. The peritoneum was identified and entered. Peritoneal incision was extended superiorly and inferiorly with good visualization of the bladder. The bladder flap was created sharply. The bladder blade was reinserted. The  lower uterine segment was incised in a transverse fashion and extended laterally.   The head was elevated and delivered through the incision. The remainder of the infant was delivered without difficulty. The umbilical cord was clamped and cut after delayed cord clamping and the infant was handed off the field. Cord ph and cord bloods were obtained. The placenta was removed intact and appeared normal. The uterine incision was inspected and  found to be without extensions. Uterine incision was closed in 2 layers with O monocryl . Second layer closure was  imbricating the first incorporating bladder flap peritoneum. The pelvic side walls were irrigated and cleared of all clot and debris. Adnexal anatomy was normal. The uterine incision was inspected and noted to be hemostatic. Rectus muscles were reapproximated  with 0 vicryl incorporating peritoneum. Subfacial area irrigated and made hemostatic.   The fascia was closed with 0 PDS in running continuous fashion. The subcutaneous tissue was irrigated and made hemostatic with bovie and closed with 3-0 vicryl. The skin was closed in subcuticular fashion with 4-0 Monocryl.   Instrument, sponge, and needle counts were correct prior the abdominal closure and at the conclusion of the case. Mother  to recovery room in stable condition.     After delivery of baby,started feeling a lot of pain/pressure during csection. Gave iv meds to help with pain.               Complications:     None          Antibiotics: cefazolin (Ancef)         Was much more uncomfortable during csection than normal. S/p post op tap block for post op pain relief        Pregnancy    Macrosomia affecting management of mother         Zoe Dawson MD  7/1/2021  09:30 EDT

## 2021-07-02 LAB
DEPRECATED RDW RBC AUTO: 44.6 FL (ref 37–54)
EOSINOPHIL # BLD MANUAL: 0.17 10*3/MM3 (ref 0–0.4)
EOSINOPHIL NFR BLD MANUAL: 1 % (ref 0.3–6.2)
ERYTHROCYTE [DISTWIDTH] IN BLOOD BY AUTOMATED COUNT: 14.2 % (ref 12.3–15.4)
HCT VFR BLD AUTO: 23.7 % (ref 34–46.6)
HGB BLD-MCNC: 8.1 G/DL (ref 12–15.9)
LYMPHOCYTES # BLD MANUAL: 1.17 10*3/MM3 (ref 0.7–3.1)
LYMPHOCYTES NFR BLD MANUAL: 3 % (ref 5–12)
LYMPHOCYTES NFR BLD MANUAL: 7 % (ref 19.6–45.3)
MCH RBC QN AUTO: 29.9 PG (ref 26.6–33)
MCHC RBC AUTO-ENTMCNC: 34.2 G/DL (ref 31.5–35.7)
MCV RBC AUTO: 87.5 FL (ref 79–97)
MONOCYTES # BLD AUTO: 0.5 10*3/MM3 (ref 0.1–0.9)
NEUTROPHILS # BLD AUTO: 14.89 10*3/MM3 (ref 1.7–7)
NEUTROPHILS NFR BLD MANUAL: 89 % (ref 42.7–76)
NRBC BLD AUTO-RTO: 0 /100 WBC (ref 0–0.2)
PLAT MORPH BLD: NORMAL
PLATELET # BLD AUTO: 263 10*3/MM3 (ref 140–450)
PMV BLD AUTO: 10.1 FL (ref 6–12)
RBC # BLD AUTO: 2.71 10*6/MM3 (ref 3.77–5.28)
RBC MORPH BLD: NORMAL
WBC # BLD AUTO: 16.73 10*3/MM3 (ref 3.4–10.8)
WBC MORPH BLD: NORMAL

## 2021-07-02 PROCEDURE — 85007 BL SMEAR W/DIFF WBC COUNT: CPT | Performed by: OBSTETRICS & GYNECOLOGY

## 2021-07-02 PROCEDURE — 85025 COMPLETE CBC W/AUTO DIFF WBC: CPT | Performed by: OBSTETRICS & GYNECOLOGY

## 2021-07-02 PROCEDURE — 25010000002 KETOROLAC TROMETHAMINE PER 15 MG: Performed by: OBSTETRICS & GYNECOLOGY

## 2021-07-02 RX ORDER — KETOROLAC TROMETHAMINE 15 MG/ML
30 INJECTION, SOLUTION INTRAMUSCULAR; INTRAVENOUS EVERY 8 HOURS
Status: DISPENSED | OUTPATIENT
Start: 2021-07-02 | End: 2021-07-03

## 2021-07-02 RX ADMIN — OXYCODONE HYDROCHLORIDE AND ACETAMINOPHEN 1 TABLET: 10; 325 TABLET ORAL at 01:17

## 2021-07-02 RX ADMIN — KETOROLAC TROMETHAMINE 30 MG: 15 INJECTION, SOLUTION INTRAMUSCULAR; INTRAVENOUS at 15:14

## 2021-07-02 RX ADMIN — OXYCODONE HYDROCHLORIDE AND ACETAMINOPHEN 1 TABLET: 10; 325 TABLET ORAL at 05:29

## 2021-07-02 RX ADMIN — OXYCODONE HYDROCHLORIDE AND ACETAMINOPHEN 1 TABLET: 10; 325 TABLET ORAL at 15:14

## 2021-07-02 RX ADMIN — SIMETHICONE 80 MG: 80 TABLET, CHEWABLE ORAL at 08:27

## 2021-07-02 RX ADMIN — DOCUSATE SODIUM 100 MG: 100 CAPSULE, LIQUID FILLED ORAL at 20:13

## 2021-07-02 RX ADMIN — OXYCODONE HYDROCHLORIDE AND ACETAMINOPHEN 1 TABLET: 10; 325 TABLET ORAL at 10:31

## 2021-07-02 RX ADMIN — KETOROLAC TROMETHAMINE 30 MG: 15 INJECTION, SOLUTION INTRAMUSCULAR; INTRAVENOUS at 05:29

## 2021-07-02 RX ADMIN — DOCUSATE SODIUM 100 MG: 100 CAPSULE, LIQUID FILLED ORAL at 08:27

## 2021-07-02 RX ADMIN — OXYCODONE HYDROCHLORIDE AND ACETAMINOPHEN 1 TABLET: 10; 325 TABLET ORAL at 20:13

## 2021-07-02 NOTE — PROGRESS NOTES
[facility]   PROGRESS NOTE    Patient Name: Annita Johnson  :  1985  MRN:  [id number]      Post-Op Day 1 S/P    Delivered a male infant.  Subjective     Patient reports:    Pain is well controlled. Voiding and ambulating without difficulty.    Tolerating po. Lochia normal.       The patient plans to breastfeed.         Objective       Vitals: Vital Signs Range for the last 24 hours  Temperature: Temp:  [97.6 °F (36.4 °C)-99.1 °F (37.3 °C)] 97.6 °F (36.4 °C)   Temp Source: Temp src: Oral   BP: BP: (108-137)/(68-81) 112/72   Pulse: Heart Rate:  [] 96   Respirations: Resp:  [16-21] 16         Intake/Output Summary (Last 24 hours) at 2021 1054  Last data filed at 2021 0706  Gross per 24 hour   Intake 3320 ml   Output 4475 ml   Net -1155 ml                                              Physical Exam     General Alert and awake, in NAD      CV Regular rate and rhythm      Lungs clear to auscultation bilaterally        Abdomen Soft, non-distended, fundus firm,  1 below umbilicus, appropriately tender      Incision  Dressing clean, dry and intact.      Extremities  tr edema, calves NT               LABS: Results from last 7 days   Lab Units 21  0629 21  1642   WBC 10*3/mm3 16.73* 14.80*   HEMOGLOBIN g/dL 8.1* 11.4*   HEMATOCRIT % 23.7* 34.2   PLATELETS 10*3/mm3 263 282         Prenatal labs results reviewed:  Yes   Rubella:  Non-immune  Rh Status:    RH type   Date Value Ref Range Status   2021 Positive  Final                       Assessment/Plan   : 1. POD 1 S/P C/S: Hemodynamically stable.  Doing well.  Continue routine care.    Desires circ for baby boy-- d/w        Pregnancy    Macrosomia affecting management of mother          Lee Ann Bhat, APRN  2021  10:54 EDT

## 2021-07-02 NOTE — LACTATION NOTE
This note was copied from a baby's chart.  Checked on Mom. She is on phone, spoke with Dad.  Baby has been nursing well and wanted to talk to them about the pump but Dad reports Mom doesn't want to use it at present. Explained that all staff know pump operation and cleaning of it if they decided to use.

## 2021-07-02 NOTE — LACTATION NOTE
Lactation Consult Note  RN reports low BGM. Assisted with latching baby in football and needed a NS the first time with breastmilk visible in shield and baby sleepy after the feeding.. Then baby's BGM was checked again while Mom was pumping with HGP and BGM was low, so relatched baby. Did not need the NS second time. Baby nursed x 10 minutes on left then started falling asleep and switched him to right breast. BGM checked again and was 54. Baby is still nursing on right breast. Mom will call when she is ready for further education on HGP after she gets some rest.  Discussed STS, feeding cues, baby' output, BGM's, nurse every 3 hrs or on demand and pumping. Also explained pumping every 3 hrs, feeding all EBM every 3 hrs if baby is sleepy and not nursing well.    Evaluation Completed: 2021 11:56 EDT  Patient Name: Annita Johnson  :  1985  MRN:  [id number]     REFERRAL  INFORMATION:                          Date of Referral: 21   Person Making Referral: nurse  Maternal Reason for Referral: breastfeeding currently  Infant Reason for Referral: hypoglycemia    DELIVERY HISTORY:        Skin to skin initiation date/time: 2021  9:48 AM   Skin to skin end date/time:           MATERNAL ASSESSMENT:  Breast Size Issue: none (21 1154)  Breast Shape: Bilateral:, pendulous (21 1154)  Breast Density: Bilateral:, soft (21 1154)  Areola: Bilateral:, elastic (21 1154)  Nipples: Bilateral:, everted (21 1154)                INFANT ASSESSMENT:  Information for the patient's :  Priscilla Johnson [[id number]]   No past medical history on file.     Feeding Readiness Cues: energy for feeding (21 1133)      Feeding Tolerance/Success: arousal required (21 1133)                              Breastfeeding: breastfeeding, bilateral (21 1133)   Infant Positioning: clutch/football (21 1133)   Breastfeeding Time, Left (min): 10 (21 1133)      Effective  Latch During Feeding: yes (21)   Suck/Swallow Coordination: present (21)   Signs of Milk Transfer: suck/swallow ratio (21)       Latch: 2-->grasps breast, tongue down, lips flanged, rhythmic sucking (21)   Audible Swallowin-->a few with stimulation (21)   Type of Nipple: 2-->everted (after stimulation) (21)   Comfort (Breast/Nipple): 2-->soft/nontender (21)   Hold (Positioning): 0-->full assist (staff holds infant at breast) (21)   Latch Score: 7 (21)     Infant-Driven Feeding Scales - Readiness: Alert once handled. Some rooting or takes pacifier. Adequate tone. (21)   Infant-Driven Feeding Scales - Quality: Nipples with a strong coordinated SSB but fatigues with progression. (21)            MATERNAL INFANT FEEDING:     Maternal Emotional State: receptive (21 115)  Infant Positioning: clutch/football (21 115)   Signs of Milk Transfer: suck/swallow ratio (21)  Pain with Feeding: no (21)           Milk Ejection Reflex: present (21 115)           Latch Assistance: full assistance needed (21)                               EQUIPMENT TYPE:  Breast Pump Type: double electric, hospital grade (21 115)  Breast Pump Flange Type: hard (21 115)  Breast Pump Flange Size: 24 mm (21 115)                        BREAST PUMPING:  Breast Pumping Interventions: frequent pumping encouraged, post-feed pumping encouraged (21 115)  Breast Pumping: double electric breast pump utilized (21 115)    LACTATION REFERRALS:  Lactation Referrals: outpatient lactation program (21 115)

## 2021-07-03 RX ADMIN — OXYCODONE HYDROCHLORIDE AND ACETAMINOPHEN 1 TABLET: 10; 325 TABLET ORAL at 17:10

## 2021-07-03 RX ADMIN — OXYCODONE HYDROCHLORIDE AND ACETAMINOPHEN 1 TABLET: 10; 325 TABLET ORAL at 00:09

## 2021-07-03 RX ADMIN — MUPIROCIN 1 APPLICATION: 20 OINTMENT TOPICAL at 21:15

## 2021-07-03 RX ADMIN — OXYCODONE HYDROCHLORIDE AND ACETAMINOPHEN 1 TABLET: 10; 325 TABLET ORAL at 08:40

## 2021-07-03 RX ADMIN — OXYCODONE HYDROCHLORIDE AND ACETAMINOPHEN 1 TABLET: 10; 325 TABLET ORAL at 04:20

## 2021-07-03 RX ADMIN — IBUPROFEN 800 MG: 800 TABLET, FILM COATED ORAL at 12:32

## 2021-07-03 RX ADMIN — DOCUSATE SODIUM 100 MG: 100 CAPSULE, LIQUID FILLED ORAL at 20:54

## 2021-07-03 RX ADMIN — OXYCODONE HYDROCHLORIDE AND ACETAMINOPHEN 1 TABLET: 10; 325 TABLET ORAL at 20:53

## 2021-07-03 RX ADMIN — OXYCODONE HYDROCHLORIDE AND ACETAMINOPHEN 1 TABLET: 10; 325 TABLET ORAL at 13:08

## 2021-07-03 RX ADMIN — IBUPROFEN 800 MG: 800 TABLET, FILM COATED ORAL at 20:54

## 2021-07-03 RX ADMIN — IBUPROFEN 800 MG: 800 TABLET, FILM COATED ORAL at 00:09

## 2021-07-03 NOTE — PROGRESS NOTES
[facility]   PROGRESS NOTE    Patient Name: Annita Johnson  :  1985  MRN:  [id number]      Post-Op 2 S/P   Subjective     Patient reports:   Doing well. Pain well controlled. Tolerating regular diet and having flatus.    Lochia normal.       Objective       Vitals: Vital Signs Range for the last 24 hours  Temperature: Temp:  [97.7 °F (36.5 °C)-98.2 °F (36.8 °C)] 97.8 °F (36.6 °C)       BP: BP: (115-126)/(72-84) 118/72   Pulse: Heart Rate:  [] 92   Respirations: Resp:  [16-20] 16                                         Physical Exam     General Alert       Abdomen Soft, non-distended, fundus firm,appropriately tender      Incision  Intact, no erythema or exudate      Extremities Calves NT bilaterally                Assessment/Plan  :   POD 2  -  Doing well.  Continue usual care.           Pregnancy    Macrosomia affecting management of mother               Shahida Kay MD  7/3/2021  11:38 EDT

## 2021-07-03 NOTE — PLAN OF CARE
Goal Outcome Evaluation:  Plan of Care Reviewed With: patient      VS stable. Fundus firm. No excessive lochia. No clots. Abd incision healing and approximated. Ambulates in room. Ambulation encouraged. Voids without difficulty. Pain controlled with prn po meds. + bonding with . Working with lactation.

## 2021-07-04 VITALS
RESPIRATION RATE: 16 BRPM | DIASTOLIC BLOOD PRESSURE: 84 MMHG | HEIGHT: 64 IN | OXYGEN SATURATION: 96 % | TEMPERATURE: 99.1 F | SYSTOLIC BLOOD PRESSURE: 123 MMHG | WEIGHT: 220 LBS | HEART RATE: 91 BPM | BODY MASS INDEX: 37.56 KG/M2

## 2021-07-04 PROBLEM — Z34.90 PREGNANCY: Status: RESOLVED | Noted: 2021-06-30 | Resolved: 2021-07-04

## 2021-07-04 PROBLEM — O36.60X0 MACROSOMIA AFFECTING MANAGEMENT OF MOTHER: Status: RESOLVED | Noted: 2021-07-01 | Resolved: 2021-07-04

## 2021-07-04 PROCEDURE — 90707 MMR VACCINE SC: CPT | Performed by: NURSE PRACTITIONER

## 2021-07-04 PROCEDURE — 90471 IMMUNIZATION ADMIN: CPT | Performed by: NURSE PRACTITIONER

## 2021-07-04 PROCEDURE — 25010000002 MEASLES, MUMPS & RUBELLA VAC RECONSTITUTED SOLUTION: Performed by: NURSE PRACTITIONER

## 2021-07-04 RX ORDER — DOCUSATE SODIUM 100 MG/1
100 CAPSULE, LIQUID FILLED ORAL 2 TIMES DAILY PRN
Status: DISCONTINUED | OUTPATIENT
Start: 2021-07-04 | End: 2021-07-04 | Stop reason: HOSPADM

## 2021-07-04 RX ORDER — OXYCODONE HYDROCHLORIDE AND ACETAMINOPHEN 5; 325 MG/1; MG/1
1 TABLET ORAL EVERY 4 HOURS PRN
Qty: 30 TABLET | Refills: 0 | Status: SHIPPED | OUTPATIENT
Start: 2021-07-04 | End: 2021-07-08

## 2021-07-04 RX ORDER — IBUPROFEN 800 MG/1
800 TABLET ORAL EVERY 8 HOURS PRN
Qty: 50 TABLET | Refills: 3 | Status: SHIPPED | OUTPATIENT
Start: 2021-07-04

## 2021-07-04 RX ADMIN — OXYCODONE HYDROCHLORIDE AND ACETAMINOPHEN 1 TABLET: 10; 325 TABLET ORAL at 02:41

## 2021-07-04 RX ADMIN — OXYCODONE AND ACETAMINOPHEN 1 TABLET: 5; 325 TABLET ORAL at 08:51

## 2021-07-04 RX ADMIN — IBUPROFEN 800 MG: 800 TABLET, FILM COATED ORAL at 08:51

## 2021-07-04 RX ADMIN — MEASLES, MUMPS, AND RUBELLA VIRUS VACCINE LIVE 0.5 ML: 1000; 12500; 1000 INJECTION, POWDER, LYOPHILIZED, FOR SUSPENSION SUBCUTANEOUS at 11:44

## 2021-07-04 RX ADMIN — DOCUSATE SODIUM 100 MG: 100 CAPSULE, LIQUID FILLED ORAL at 08:51

## 2021-07-04 NOTE — DISCHARGE SUMMARY
[facility]   PROGRESS NOTE    Patient Name: Annita Johnson  :  1985  MRN:  [id number]      Post-Op Day 3 S/P   Subjective     Patient reports:    Doing well - ready for discharge. Pain well controlled. Tolerating po and having flatus. Voiding and ambulating without difficulty. Lochia normal.     Objective       Vitals: Vital Signs Range for the last 24 hours  Temperature: Temp:  [98.1 °F (36.7 °C)-99.1 °F (37.3 °C)] 99.1 °F (37.3 °C)       BP: BP: (113-133)/(72-84) 123/84   Pulse: Heart Rate:  [] 91                                                         Physical  Exam     Gen: Alert and awake    Abdomen: Soft, ND,  Fundus firm with minimal tenderness    Incision : Intact, without erythema or exudate    Extremities: Calves NT bilaterally               Assessment/Plan ]   Assessment:  POD 3  -  Doing well. Stable for discharge.     Plan:    Instructions reviewed - no driving for 2 weeks and no heavy lifting for 6 weeks, pelvic rest for 6 weeks.   Follow up in 6 weeks.   Rx on chart.   Circ'd baby this morning.      * No active hospital problems. *            Khalida Schofield MD  2021  09:36 EDT

## 2021-07-04 NOTE — LACTATION NOTE
This note was copied from a baby's chart.  Mother reports that infant can latch and suckle well, but quickly pops off the breast, either falling asleep or becoming fussy. Assisted mother with hand expression/latching, after several attempts infant becoming very frustrated and will not retain latch. Utilized Ribbon system while latching on right side in cross cradle hold, infant retained good strong latch and took 53ml formula. Mother's right nipple intact and not painful, reports that left side is painful and bleeding, feels she got some damage from the pump. Will review pump use later when mother feeling better, beginning to have some pain now. Encouraged mother to call when ready for assist.

## 2021-07-04 NOTE — LACTATION NOTE
This note was copied from a baby's chart.  Assisted mother with latch on right side, utilized SNS to good effect. Discussed use/cleaning of SNS system with parents. Went over HGP use, 24 flange fit appears appropriate, encouraged mother to pump left side every feeding if only latching to right, and both side at least 3-4 times daily in addition to latching. Mother denies pain with pumping now. Encouraged continues stimulation with pump and attempts to latch.

## 2021-07-04 NOTE — PLAN OF CARE
Goal Outcome Evaluation:              Outcome Summary: Stale. Pain controlled with po meds. Breast and SNS feedings. pt and  with numerous questions regarding care of infant and pt. questions answered. no c/o or concerns voiced at present.

## 2021-07-24 ENCOUNTER — TELEPHONE (OUTPATIENT)
Dept: LACTATION | Facility: HOSPITAL | Age: 36
End: 2021-07-24

## 2021-07-24 NOTE — TELEPHONE ENCOUNTER
D/c follow up call: mother reports that breastfeeding is going much better, mostly feeding at the breast and not needing to use SNS. She did have a personal lactation consultant come to see her at home 1 week post discharge. No concerns at this time, encouraged to follow up as needed.

## 2021-08-02 ENCOUNTER — LAB (OUTPATIENT)
Dept: LAB | Facility: HOSPITAL | Age: 36
End: 2021-08-02

## 2021-08-02 ENCOUNTER — TRANSCRIBE ORDERS (OUTPATIENT)
Dept: ADMINISTRATIVE | Facility: HOSPITAL | Age: 36
End: 2021-08-02

## 2021-08-02 DIAGNOSIS — L60.0 INGROWING NAIL: ICD-10-CM

## 2021-08-02 DIAGNOSIS — L60.0 INGROWING NAIL: Primary | ICD-10-CM

## 2021-08-02 LAB
ANION GAP SERPL CALCULATED.3IONS-SCNC: 11.3 MMOL/L (ref 5–15)
BUN SERPL-MCNC: 16 MG/DL (ref 6–20)
BUN/CREAT SERPL: 20.8 (ref 7–25)
CALCIUM SPEC-SCNC: 9.4 MG/DL (ref 8.6–10.5)
CHLORIDE SERPL-SCNC: 104 MMOL/L (ref 98–107)
CO2 SERPL-SCNC: 23.7 MMOL/L (ref 22–29)
CREAT SERPL-MCNC: 0.77 MG/DL (ref 0.57–1)
DEPRECATED RDW RBC AUTO: 41.9 FL (ref 37–54)
ERYTHROCYTE [DISTWIDTH] IN BLOOD BY AUTOMATED COUNT: 13.4 % (ref 12.3–15.4)
GFR SERPL CREATININE-BSD FRML MDRD: 85 ML/MIN/1.73
GLUCOSE SERPL-MCNC: 101 MG/DL (ref 65–99)
HCT VFR BLD AUTO: 35.5 % (ref 34–46.6)
HGB BLD-MCNC: 11.8 G/DL (ref 12–15.9)
MCH RBC QN AUTO: 28.9 PG (ref 26.6–33)
MCHC RBC AUTO-ENTMCNC: 33.2 G/DL (ref 31.5–35.7)
MCV RBC AUTO: 87 FL (ref 79–97)
PLATELET # BLD AUTO: 313 10*3/MM3 (ref 140–450)
PMV BLD AUTO: 8.9 FL (ref 6–12)
POTASSIUM SERPL-SCNC: 4.7 MMOL/L (ref 3.5–5.2)
RBC # BLD AUTO: 4.08 10*6/MM3 (ref 3.77–5.28)
SODIUM SERPL-SCNC: 139 MMOL/L (ref 136–145)
WBC # BLD AUTO: 7.95 10*3/MM3 (ref 3.4–10.8)

## 2021-08-02 PROCEDURE — 85027 COMPLETE CBC AUTOMATED: CPT

## 2021-08-02 PROCEDURE — 36415 COLL VENOUS BLD VENIPUNCTURE: CPT

## 2021-08-02 PROCEDURE — 80048 BASIC METABOLIC PNL TOTAL CA: CPT

## 2021-08-23 ENCOUNTER — TELEPHONE (OUTPATIENT)
Dept: LACTATION | Facility: HOSPITAL | Age: 36
End: 2021-08-23

## 2023-05-26 ENCOUNTER — OFFICE VISIT (OUTPATIENT)
Dept: INTERNAL MEDICINE | Facility: CLINIC | Age: 38
End: 2023-05-26
Payer: COMMERCIAL

## 2023-05-26 VITALS
RESPIRATION RATE: 18 BRPM | OXYGEN SATURATION: 98 % | BODY MASS INDEX: 32.68 KG/M2 | WEIGHT: 191.4 LBS | TEMPERATURE: 98.7 F | DIASTOLIC BLOOD PRESSURE: 78 MMHG | HEIGHT: 64 IN | SYSTOLIC BLOOD PRESSURE: 110 MMHG | HEART RATE: 93 BPM

## 2023-05-26 DIAGNOSIS — Z13.220 SCREENING FOR LIPID DISORDERS: ICD-10-CM

## 2023-05-26 DIAGNOSIS — Z76.89 ENCOUNTER TO ESTABLISH CARE: Primary | ICD-10-CM

## 2023-05-26 DIAGNOSIS — M54.12 CERVICAL RADICULOPATHY: ICD-10-CM

## 2023-05-26 DIAGNOSIS — Z01.89 ROUTINE LAB DRAW: ICD-10-CM

## 2023-05-26 LAB
ALBUMIN SERPL-MCNC: 4.7 G/DL (ref 3.5–5.2)
ALBUMIN/GLOB SERPL: 1.5 G/DL
ALP SERPL-CCNC: 108 U/L (ref 39–117)
ALT SERPL W P-5'-P-CCNC: 37 U/L (ref 1–33)
ANION GAP SERPL CALCULATED.3IONS-SCNC: 11 MMOL/L (ref 5–15)
AST SERPL-CCNC: 27 U/L (ref 1–32)
BASOPHILS # BLD AUTO: 0.05 10*3/MM3 (ref 0–0.2)
BASOPHILS NFR BLD AUTO: 0.4 % (ref 0–1.5)
BILIRUB SERPL-MCNC: 0.2 MG/DL (ref 0–1.2)
BUN SERPL-MCNC: 14 MG/DL (ref 6–20)
BUN/CREAT SERPL: 19.2 (ref 7–25)
CALCIUM SPEC-SCNC: 9.9 MG/DL (ref 8.6–10.5)
CHLORIDE SERPL-SCNC: 104 MMOL/L (ref 98–107)
CHOLEST SERPL-MCNC: 198 MG/DL (ref 0–200)
CO2 SERPL-SCNC: 23 MMOL/L (ref 22–29)
CREAT SERPL-MCNC: 0.73 MG/DL (ref 0.57–1)
DEPRECATED RDW RBC AUTO: 44.3 FL (ref 37–54)
EGFRCR SERPLBLD CKD-EPI 2021: 109.5 ML/MIN/1.73
EOSINOPHIL # BLD AUTO: 0.2 10*3/MM3 (ref 0–0.4)
EOSINOPHIL NFR BLD AUTO: 1.8 % (ref 0.3–6.2)
ERYTHROCYTE [DISTWIDTH] IN BLOOD BY AUTOMATED COUNT: 14.3 % (ref 12.3–15.4)
GLOBULIN UR ELPH-MCNC: 3.2 GM/DL
GLUCOSE SERPL-MCNC: 87 MG/DL (ref 65–99)
HCT VFR BLD AUTO: 40 % (ref 34–46.6)
HDLC SERPL-MCNC: 37 MG/DL (ref 40–60)
HGB BLD-MCNC: 13.7 G/DL (ref 12–15.9)
IMM GRANULOCYTES # BLD AUTO: 0.03 10*3/MM3 (ref 0–0.05)
IMM GRANULOCYTES NFR BLD AUTO: 0.3 % (ref 0–0.5)
LDLC SERPL CALC-MCNC: 143 MG/DL (ref 0–100)
LDLC/HDLC SERPL: 3.82 {RATIO}
LYMPHOCYTES # BLD AUTO: 2.74 10*3/MM3 (ref 0.7–3.1)
LYMPHOCYTES NFR BLD AUTO: 24.4 % (ref 19.6–45.3)
MCH RBC QN AUTO: 29.3 PG (ref 26.6–33)
MCHC RBC AUTO-ENTMCNC: 34.3 G/DL (ref 31.5–35.7)
MCV RBC AUTO: 85.7 FL (ref 79–97)
MONOCYTES # BLD AUTO: 0.93 10*3/MM3 (ref 0.1–0.9)
MONOCYTES NFR BLD AUTO: 8.3 % (ref 5–12)
NEUTROPHILS NFR BLD AUTO: 64.8 % (ref 42.7–76)
NEUTROPHILS NFR BLD AUTO: 7.29 10*3/MM3 (ref 1.7–7)
NRBC BLD AUTO-RTO: 0 /100 WBC (ref 0–0.2)
PLATELET # BLD AUTO: 365 10*3/MM3 (ref 140–450)
PMV BLD AUTO: 11.4 FL (ref 6–12)
POTASSIUM SERPL-SCNC: 5 MMOL/L (ref 3.5–5.2)
PROT SERPL-MCNC: 7.9 G/DL (ref 6–8.5)
RBC # BLD AUTO: 4.67 10*6/MM3 (ref 3.77–5.28)
SODIUM SERPL-SCNC: 138 MMOL/L (ref 136–145)
TRIGL SERPL-MCNC: 98 MG/DL (ref 0–150)
TSH SERPL DL<=0.05 MIU/L-ACNC: 1.29 UIU/ML (ref 0.27–4.2)
VLDLC SERPL-MCNC: 18 MG/DL (ref 5–40)
WBC NRBC COR # BLD: 11.24 10*3/MM3 (ref 3.4–10.8)

## 2023-05-26 PROCEDURE — 80061 LIPID PANEL: CPT

## 2023-05-26 PROCEDURE — 80050 GENERAL HEALTH PANEL: CPT

## 2023-05-26 RX ORDER — METHYLPREDNISOLONE 4 MG/1
TABLET ORAL
Qty: 21 EACH | Refills: 0 | Status: SHIPPED | OUTPATIENT
Start: 2023-05-26 | End: 2023-05-31

## 2023-05-26 RX ORDER — CYCLOBENZAPRINE HCL 10 MG
10 TABLET ORAL 3 TIMES DAILY PRN
Qty: 30 TABLET | Refills: 0 | Status: SHIPPED | OUTPATIENT
Start: 2023-05-26

## 2023-05-26 NOTE — ASSESSMENT & PLAN NOTE
patient states she went out of town in January. She states she had too much in her hands, carrying luggage, she states she turned and then since then she has had significant neck pain.   She went to chiropractor 2 months ago and then PT. She is not getting any relief. She states that her neck is just stiff.  She states that now the pain is going down her arm.  She noticed weakness with lifting anything weighted with her left arm.  Will get C spine xray and will most likely need MRI.  Start medrol dosepak, muscle relaxers and antiinflammatories.

## 2023-05-26 NOTE — PROGRESS NOTES
"Chief Complaint  Establish Care (Pt states that she is being seen to establish care with Lela. ) and Neck Pain (Pt states that she is having neck pain. She is seeing PT that is helping a little, she states that she has been them 3 times. She states that it happened around the end of January.  She states that the pain does run down her left arm. )    History of Present Illness  SUBJECTIVE  Annita Johnson presents to [facility] to establish care.    Patient would like to discuss her neck pain.    Tobacco use-4-5 cigs/day  Alcohol use-occasional  Pap Smear-[employer]. Counseled.    Patient denies any chest pain, soa, palpitations, nausea, vomiting, diarrhea, issues with bowel/bladder function.    , 1 son.         Past Medical History:   Diagnosis Date   • Anemia    • Kidney stone    • Kidney stones       History reviewed. No pertinent family history.   Past Surgical History:   Procedure Laterality Date   •  SECTION N/A 2021    Procedure:  SECTION PRIMARY;  Surgeon: Zoe Dawson MD;  Location: [facility] DELIVERY;  Service: Obstetrics/Gynecology;  Laterality: N/A;   • CYSTOSCOPY BLADDER STONE LITHOTRIPSY          Current Outpatient Medications:   •  cyclobenzaprine (FLEXERIL) 10 MG tablet, Take 1 tablet by mouth 3 (Three) Times a Day As Needed for Muscle Spasms., Disp: 30 tablet, Rfl: 0  •  diclofenac (VOLTAREN) 50 MG EC tablet, Take 1 tablet by mouth 2 (Two) Times a Day As Needed (pain)., Disp: 60 tablet, Rfl: 0  •  methylPREDNISolone (MEDROL) 4 MG dose pack, Take as directed on package instructions., Disp: 21 each, Rfl: 0    OBJECTIVE  Vital Signs:   /78 (BP Location: Right arm)   Pulse 93   Temp 98.7 °F (37.1 °C) (Temporal)   Resp 18   Ht 162.6 cm (64.02\")   Wt 86.8 kg (191 lb 6.4 oz)   LMP 2023 (Within Days)   SpO2 98%   Breastfeeding No   BMI 32.84 kg/m²    Estimated body mass index is 32.84 kg/m² as calculated from the " "following:    Height as of this encounter: 162.6 cm (64.02\").    Weight as of this encounter: 86.8 kg (191 lb 6.4 oz).     Wt Readings from Last 3 Encounters:   05/26/23 86.8 kg (191 lb 6.4 oz)   05/12/23 83.9 kg (185 lb)   06/30/21 99.8 kg (220 lb)     BP Readings from Last 3 Encounters:   05/26/23 110/78   05/12/23 121/81   07/04/21 123/84       Physical Exam  Vitals and nursing note reviewed.   Constitutional:       Appearance: Normal appearance.   HENT:      Head: Normocephalic.   Eyes:      Extraocular Movements: Extraocular movements intact.      Conjunctiva/sclera: Conjunctivae normal.   Cardiovascular:      Rate and Rhythm: Normal rate and regular rhythm.      Heart sounds: Normal heart sounds. No murmur heard.  Pulmonary:      Effort: Pulmonary effort is normal.      Breath sounds: Normal breath sounds. No wheezing or rales.   Abdominal:      General: Bowel sounds are normal.      Palpations: Abdomen is soft.      Tenderness: There is no abdominal tenderness. There is no guarding.   Musculoskeletal:         General: No swelling.      Cervical back: Tenderness present. Pain with movement and muscular tenderness present. Decreased range of motion.   Skin:     General: Skin is warm and dry.   Neurological:      General: No focal deficit present.      Mental Status: She is alert. Mental status is at baseline.      Motor: No weakness.   Psychiatric:         Mood and Affect: Mood normal.         Thought Content: Thought content normal.          Result Review      No Images in the past 120 days found..     The above data has been reviewed by LÁZARO Fonseca 05/26/2023 11:11 EDT.          Patient Care Team:  Provider, No Known as PCP - General      ASSESSMENT & PLAN    Diagnoses and all orders for this visit:    1. Encounter to establish care (Primary)    2. Cervical radiculopathy  Assessment & Plan:  patient states she went out of town in January. She states she had too much in her hands, carrying luggage, she " states she turned and then since then she has had significant neck pain.   She went to chiropractor 2 months ago and then PT. She is not getting any relief. She states that her neck is just stiff.  She states that now the pain is going down her arm.  She noticed weakness with lifting anything weighted with her left arm.  Will get C spine xray and will most likely need MRI.  Start medrol dosepak, muscle relaxers and antiinflammatories.     Orders:  -     XR Spine Cervical Complete 4 or 5 View; Future  -     MRI Cervical Spine Without Contrast; Future  -     methylPREDNISolone (MEDROL) 4 MG dose pack; Take as directed on package instructions.  Dispense: 21 each; Refill: 0  -     diclofenac (VOLTAREN) 50 MG EC tablet; Take 1 tablet by mouth 2 (Two) Times a Day As Needed (pain).  Dispense: 60 tablet; Refill: 0  -     cyclobenzaprine (FLEXERIL) 10 MG tablet; Take 1 tablet by mouth 3 (Three) Times a Day As Needed for Muscle Spasms.  Dispense: 30 tablet; Refill: 0    3. Routine lab draw  -     CBC & Differential; Future  -     Comprehensive Metabolic Panel; Future  -     Urinalysis With Microscopic - Urine, Clean Catch; Future  -     TSH Rfx On Abnormal To Free T4; Future  -     TSH Rfx On Abnormal To Free T4  -     CBC & Differential  -     Comprehensive Metabolic Panel    4. Screening for lipid disorders  -     Lipid Panel; Future  -     Lipid Panel       Tobacco Use: High Risk   • Smoking Tobacco Use: Some Days   • Smokeless Tobacco Use: Never   • Passive Exposure: Not on file       Follow Up     Return in about 3 months (around 8/26/2023) for Annual physical.    Please note that portions of this note were completed with a voice recognition program.    Patient was given instructions and counseling regarding her condition or for health maintenance advice. Please see specific information pulled into the AVS if appropriate.   I have reviewed information obtained and documented by others and I have confirmed the accuracy of  this documented note.    Lela Vanegas, APRN

## 2023-05-30 ENCOUNTER — TELEPHONE (OUTPATIENT)
Dept: INTERNAL MEDICINE | Facility: CLINIC | Age: 38
End: 2023-05-30

## 2023-05-30 NOTE — TELEPHONE ENCOUNTER
Call pt lv to call us back    Scheduled patient for MRI cervical spine at [facility] on 6/6/2023 at 3:00pm arrival time 2:45pm, no prep needed.    Ok for hub to read

## 2023-05-30 NOTE — TELEPHONE ENCOUNTER
Caller: Annita Johnson    Relationship to patient: Self    Best call back number: [phone]    Patient is needing:   PATIENT IS CALLING TO LET PROVIDER KNOW THAT SHE HAS NOT SEEN MUCH IMPROVEMENT AT ALL SINCE SHE TOOK HER MUSCLE RELAXER. SHE IS UNSURE IF SHE NEEDS TO TRY SOMETHING ELSE FOR PAIN OR IF SHE NEEDS TO FINISH. PLEASE CALL TO ADVISE.

## 2023-05-30 NOTE — TELEPHONE ENCOUNTER
Pt called stating she is taking and the steroid and MRI is scheduled for 06/26, Pt is going to call scheduling and try to get in sooner.

## 2023-06-01 ENCOUNTER — LAB (OUTPATIENT)
Dept: INTERNAL MEDICINE | Facility: CLINIC | Age: 38
End: 2023-06-01

## 2023-06-01 ENCOUNTER — TELEPHONE (OUTPATIENT)
Dept: INTERNAL MEDICINE | Facility: CLINIC | Age: 38
End: 2023-06-01

## 2023-06-01 DIAGNOSIS — R93.7 ABNORMAL X-RAY OF CERVICAL SPINE: ICD-10-CM

## 2023-06-01 DIAGNOSIS — M54.12 CERVICAL RADICULOPATHY: Primary | ICD-10-CM

## 2023-06-01 DIAGNOSIS — Z01.89 ROUTINE LAB DRAW: ICD-10-CM

## 2023-06-01 LAB
BACTERIA UR QL AUTO: NORMAL /HPF
BILIRUB UR QL STRIP: NEGATIVE
CLARITY UR: CLEAR
COLOR UR: YELLOW
GLUCOSE UR STRIP-MCNC: NEGATIVE MG/DL
HGB UR QL STRIP.AUTO: NEGATIVE
HYALINE CASTS UR QL AUTO: NORMAL /LPF
KETONES UR QL STRIP: NEGATIVE
LEUKOCYTE ESTERASE UR QL STRIP.AUTO: NEGATIVE
NITRITE UR QL STRIP: NEGATIVE
PH UR STRIP.AUTO: 6 [PH] (ref 5–8)
PROT UR QL STRIP: NEGATIVE
RBC # UR STRIP: NORMAL /HPF
REF LAB TEST METHOD: NORMAL
SP GR UR STRIP: 1.01 (ref 1–1.03)
SQUAMOUS #/AREA URNS HPF: NORMAL /HPF
UROBILINOGEN UR QL STRIP: NORMAL
WBC # UR STRIP: NORMAL /HPF

## 2023-06-01 PROCEDURE — 84155 ASSAY OF PROTEIN SERUM: CPT

## 2023-06-01 PROCEDURE — 84165 PROTEIN E-PHORESIS SERUM: CPT

## 2023-06-01 PROCEDURE — 36415 COLL VENOUS BLD VENIPUNCTURE: CPT

## 2023-06-01 PROCEDURE — 81001 URINALYSIS AUTO W/SCOPE: CPT

## 2023-06-01 RX ORDER — HYDROCODONE BITARTRATE AND ACETAMINOPHEN 5; 325 MG/1; MG/1
1 TABLET ORAL EVERY 6 HOURS PRN
Qty: 20 TABLET | Refills: 0 | Status: SHIPPED | OUTPATIENT
Start: 2023-06-01

## 2023-06-01 RX ORDER — PANTOPRAZOLE SODIUM 40 MG/1
40 TABLET, DELAYED RELEASE ORAL DAILY
Qty: 90 TABLET | Refills: 1 | Status: SHIPPED | OUTPATIENT
Start: 2023-06-01

## 2023-06-01 RX ORDER — DEXAMETHASONE 4 MG/1
4 TABLET ORAL 3 TIMES DAILY
Qty: 30 TABLET | Refills: 0 | Status: SHIPPED | OUTPATIENT
Start: 2023-06-01

## 2023-06-01 NOTE — TELEPHONE ENCOUNTER
Caller: Alex Annita    Relationship: Self    Best call back number: [phone]    What is the best time to reach you: ANY    Who are you requesting to speak with (clinical staff, provider,  specific staff member): CLINICAL STAFF    What was the call regarding: PATIENT CALLED STATING THAT HER NEW MEDICATION IS NOT WORKING AT ALL AND SHE FEELS WORSE THAN BEFORE. SHE WOULD LIKE TO SPEAK TO A NURSE AS SOON AS POSSIBLE.

## 2023-06-01 NOTE — ASSESSMENT & PLAN NOTE
Cervical spine x-ray shows lucency of the posterior elements of C3-C4 and C5.  We will get a stat MRI of the cervical spine with and without contrast.  I discussed with the patient.  We will also start her on steroid, GI prophylaxis, anti-inflammatories and Norco.

## 2023-06-02 ENCOUNTER — TELEPHONE (OUTPATIENT)
Dept: INTERNAL MEDICINE | Facility: CLINIC | Age: 38
End: 2023-06-02

## 2023-06-02 DIAGNOSIS — M89.9 BONE LESION: ICD-10-CM

## 2023-06-02 DIAGNOSIS — R93.7 ABNORMAL X-RAY OF CERVICAL SPINE: Primary | ICD-10-CM

## 2023-06-02 DIAGNOSIS — S12.9XXA CLOSED FRACTURE OF CERVICAL VERTEBRA, UNSPECIFIED CERVICAL VERTEBRAL LEVEL, INITIAL ENCOUNTER: ICD-10-CM

## 2023-06-02 DIAGNOSIS — S14.109A: ICD-10-CM

## 2023-06-02 DIAGNOSIS — S12.9XXA: ICD-10-CM

## 2023-06-02 DIAGNOSIS — D49.2 CERVICAL SPINE TUMOR: Primary | ICD-10-CM

## 2023-06-02 LAB
ALBUMIN SERPL ELPH-MCNC: 3.9 G/DL (ref 2.9–4.4)
ALBUMIN/GLOB SERPL: 1 {RATIO} (ref 0.7–1.7)
ALPHA1 GLOB SERPL ELPH-MCNC: 0.2 G/DL (ref 0–0.4)
ALPHA2 GLOB SERPL ELPH-MCNC: 1 G/DL (ref 0.4–1)
B-GLOBULIN SERPL ELPH-MCNC: 1.2 G/DL (ref 0.7–1.3)
GAMMA GLOB SERPL ELPH-MCNC: 1.4 G/DL (ref 0.4–1.8)
GLOBULIN SER CALC-MCNC: 3.9 G/DL (ref 2.2–3.9)
LABORATORY COMMENT REPORT: NORMAL
M PROTEIN SERPL ELPH-MCNC: NORMAL G/DL
PROT PATTERN SERPL ELPH-IMP: NORMAL
PROT SERPL-MCNC: 7.8 G/DL (ref 6–8.5)

## 2023-06-02 NOTE — TELEPHONE ENCOUNTER
PATIENT STATES THAT SHE NO LONGER NEEDS AN APPOINTMENT FOR AN MRI. THAT SHE HAS ONE SCHEDULED FOR TODAY.

## 2023-06-02 NOTE — TELEPHONE ENCOUNTER
Caller: Annita Johnson    Relationship to patient: Self    Best call back number: [phone]    Chief complaint: N/A    Type of visit: MRI    Requested date: ASAP    If rescheduling, when is the original appointment: N/A    Additional notes:PATIENT STATED THAT SHE WAS TOLD THE MRI NEEDED TO BE SOONER. NO NOTE AS TO WHEN THIS WAS BEING SCHEDULED ON HER CHART. PLEASE CALL PATIENT AND ADVISE SHE WILL NEED TO PLAN ACCORDING.     I WAS UNABLE TO WARM TRANSFER THIS CALL.

## 2023-06-02 NOTE — ASSESSMENT & PLAN NOTE
Spoke with radiologist and neurosurgery due to c-spine MRI. She has C5 spinal fracture that is stable.   Needs metastatic work up  Discussed with patient. She was instructed to go to the ER if she develops weakness or worsening pain. Neck brace ordered. Patient instructed on avoiding any strenuous activity, lifting, driving, etc.

## 2023-06-04 RX ORDER — OXYCODONE AND ACETAMINOPHEN 10; 325 MG/1; MG/1
TABLET ORAL
Qty: 60 TABLET | Refills: 0 | Status: SHIPPED | OUTPATIENT
Start: 2023-06-04

## 2023-06-05 ENCOUNTER — TRANSCRIBE ORDERS (OUTPATIENT)
Dept: ADMINISTRATIVE | Facility: HOSPITAL | Age: 38
End: 2023-06-05
Payer: COMMERCIAL

## 2023-06-05 DIAGNOSIS — Z12.31 SCREENING MAMMOGRAM, ENCOUNTER FOR: Primary | ICD-10-CM

## 2023-06-06 ENCOUNTER — PATIENT OUTREACH (OUTPATIENT)
Dept: ONCOLOGY | Facility: HOSPITAL | Age: 38
End: 2023-06-06
Payer: COMMERCIAL

## 2023-06-06 ENCOUNTER — HOSPITAL ENCOUNTER (OUTPATIENT)
Dept: MAMMOGRAPHY | Facility: HOSPITAL | Age: 38
Discharge: HOME | End: 2023-06-06
Payer: COMMERCIAL

## 2023-06-06 ENCOUNTER — APPOINTMENT (OUTPATIENT)
Dept: MAMMOGRAPHY | Facility: HOSPITAL | Age: 38
End: 2023-06-06
Payer: COMMERCIAL

## 2023-06-06 ENCOUNTER — HOSPITAL ENCOUNTER (OUTPATIENT)
Dept: ULTRASOUND IMAGING | Facility: HOSPITAL | Age: 38
Discharge: HOME | End: 2023-06-06
Payer: COMMERCIAL

## 2023-06-06 ENCOUNTER — HOSPITAL ENCOUNTER (OUTPATIENT)
Dept: CT IMAGING | Facility: HOSPITAL | Age: 38
Discharge: HOME | End: 2023-06-06
Payer: COMMERCIAL

## 2023-06-06 ENCOUNTER — TELEPHONE (OUTPATIENT)
Dept: INTERNAL MEDICINE | Facility: CLINIC | Age: 38
End: 2023-06-06

## 2023-06-06 DIAGNOSIS — N63.0 BREAST MASS IN FEMALE: ICD-10-CM

## 2023-06-06 DIAGNOSIS — S14.109A: ICD-10-CM

## 2023-06-06 DIAGNOSIS — R93.7 ABNORMAL X-RAY OF CERVICAL SPINE: ICD-10-CM

## 2023-06-06 DIAGNOSIS — S12.9XXA: ICD-10-CM

## 2023-06-06 DIAGNOSIS — S12.9XXA CLOSED FRACTURE OF CERVICAL VERTEBRA, UNSPECIFIED CERVICAL VERTEBRAL LEVEL, INITIAL ENCOUNTER: ICD-10-CM

## 2023-06-06 DIAGNOSIS — R92.1 BREAST CALCIFICATIONS: ICD-10-CM

## 2023-06-06 DIAGNOSIS — M89.9 LYTIC BONE LESIONS ON XRAY: ICD-10-CM

## 2023-06-06 DIAGNOSIS — N63.12 MASS OF UPPER INNER QUADRANT OF RIGHT BREAST: Primary | ICD-10-CM

## 2023-06-06 PROCEDURE — 74177 CT ABD & PELVIS W/CONTRAST: CPT

## 2023-06-06 PROCEDURE — 88360 TUMOR IMMUNOHISTOCHEM/MANUAL: CPT

## 2023-06-06 PROCEDURE — 25510000001 IOPAMIDOL PER 1 ML

## 2023-06-06 PROCEDURE — G0279 TOMOSYNTHESIS, MAMMO: HCPCS

## 2023-06-06 PROCEDURE — 0 LIDOCAINE 1 % SOLUTION

## 2023-06-06 PROCEDURE — 71260 CT THORAX DX C+: CPT

## 2023-06-06 PROCEDURE — 88305 TISSUE EXAM BY PATHOLOGIST: CPT

## 2023-06-06 PROCEDURE — A4648 IMPLANTABLE TISSUE MARKER: HCPCS

## 2023-06-06 PROCEDURE — 77066 DX MAMMO INCL CAD BI: CPT

## 2023-06-06 PROCEDURE — 88342 IMHCHEM/IMCYTCHM 1ST ANTB: CPT

## 2023-06-06 PROCEDURE — 76642 ULTRASOUND BREAST LIMITED: CPT

## 2023-06-06 RX ORDER — LIDOCAINE HYDROCHLORIDE 10 MG/ML
10 INJECTION, SOLUTION INFILTRATION; PERINEURAL ONCE
Status: COMPLETED | OUTPATIENT
Start: 2023-06-06 | End: 2023-06-06

## 2023-06-06 RX ORDER — LIDOCAINE HYDROCHLORIDE AND EPINEPHRINE 10; 10 MG/ML; UG/ML
10 INJECTION, SOLUTION INFILTRATION; PERINEURAL ONCE
Status: COMPLETED | OUTPATIENT
Start: 2023-06-06 | End: 2023-06-06

## 2023-06-06 RX ADMIN — LIDOCAINE HYDROCHLORIDE 10 ML: 10 INJECTION, SOLUTION INFILTRATION; PERINEURAL at 15:31

## 2023-06-06 RX ADMIN — LIDOCAINE HYDROCHLORIDE,EPINEPHRINE BITARTRATE 20 ML: 10; .01 INJECTION, SOLUTION INFILTRATION; PERINEURAL at 15:31

## 2023-06-06 RX ADMIN — IOPAMIDOL 100 ML: 755 INJECTION, SOLUTION INTRAVENOUS at 08:39

## 2023-06-06 NOTE — H&P
I first saw patient as a new patient on May 26, 2023.  She came to me with complaints of neck pain.  Prior to establishing with me she was going to physical therapy and was not getting any relief.  I ordered her to have a x-ray done and that was completed at [facility] and there was possible underlying lytic lesions.  We pursued a stat MRI of the cervical spine that was completed June 2..  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  She had a pathological fracture of the C5.  I attempted to get a PET/CT but they were a couple weeks out.  I also attempted to get patient in with oncology but did not have a PET/CT or definitive diagnosis yet at the time.  I ordered a stat CT chest abdomen pelvis which was completed today.  CT scan did show a 27 mm spiculated mass in the upper inner right breast concerning for breast cancer.  There were also multiple lytic skeletal metastasis on CT of chest abdomen and pelvis.  I called the patient and discussed the findings with her.  Discussed with Luna Gaitan at [facility] and they were able to get her diagnostic mammogram with ultrasound scheduled for today.  I went ahead and placed referral to general surgery as well as oncology referral is still active.  I am working on getting patient a hard neck brace.  Right now she is in a soft neck brace.  She does have follow-up appoint with neurosurgery in a couple of days as well.  Patient noted to go to the emergency department with any worsening pain, weakness or numbness.

## 2023-06-07 ENCOUNTER — PATIENT OUTREACH (OUTPATIENT)
Dept: ONCOLOGY | Facility: HOSPITAL | Age: 38
End: 2023-06-07
Payer: COMMERCIAL

## 2023-06-07 ENCOUNTER — TELEPHONE (OUTPATIENT)
Dept: NEUROSURGERY | Facility: CLINIC | Age: 38
End: 2023-06-07
Payer: COMMERCIAL

## 2023-06-07 LAB
CYTO UR: NORMAL
LAB AP CASE REPORT: NORMAL
LAB AP CLINICAL INFORMATION: NORMAL
LAB AP DIAGNOSIS COMMENT: NORMAL
LAB AP SPECIAL STAINS: NORMAL
LAB AP SYNOPTIC CHECKLIST: NORMAL
PATH REPORT.FINAL DX SPEC: NORMAL
PATH REPORT.GROSS SPEC: NORMAL

## 2023-06-07 NOTE — TELEPHONE ENCOUNTER
Luna, nurse navigator with Lela Vanegas's office, called to state they have scheduled patient for several appointments tomorrow (oncology/rad onc & general surgery). They are cancelling her appointment with you tomorrow 6-8-23, and want to know if you need to see patient after her current workup? Do we need to reschedule the appointment with you at this time?

## 2023-06-08 ENCOUNTER — DOCUMENTATION (OUTPATIENT)
Dept: ONCOLOGY | Facility: HOSPITAL | Age: 38
End: 2023-06-08
Payer: COMMERCIAL

## 2023-06-08 ENCOUNTER — OFFICE VISIT (OUTPATIENT)
Dept: OTHER | Facility: HOSPITAL | Age: 38
End: 2023-06-08
Payer: COMMERCIAL

## 2023-06-08 ENCOUNTER — CONSULT (OUTPATIENT)
Dept: ONCOLOGY | Facility: HOSPITAL | Age: 38
End: 2023-06-08
Payer: COMMERCIAL

## 2023-06-08 ENCOUNTER — LAB (OUTPATIENT)
Dept: ONCOLOGY | Facility: HOSPITAL | Age: 38
End: 2023-06-08
Payer: COMMERCIAL

## 2023-06-08 ENCOUNTER — CONSULT (OUTPATIENT)
Dept: RADIATION ONCOLOGY | Facility: HOSPITAL | Age: 38
End: 2023-06-08
Payer: COMMERCIAL

## 2023-06-08 VITALS
OXYGEN SATURATION: 96 % | BODY MASS INDEX: 34.42 KG/M2 | TEMPERATURE: 98.6 F | HEART RATE: 86 BPM | WEIGHT: 200.62 LBS | SYSTOLIC BLOOD PRESSURE: 128 MMHG | DIASTOLIC BLOOD PRESSURE: 77 MMHG | RESPIRATION RATE: 16 BRPM

## 2023-06-08 VITALS — WEIGHT: 199 LBS | RESPIRATION RATE: 16 BRPM | HEIGHT: 64 IN | BODY MASS INDEX: 33.97 KG/M2

## 2023-06-08 VITALS
BODY MASS INDEX: 33.99 KG/M2 | RESPIRATION RATE: 16 BRPM | HEIGHT: 64 IN | WEIGHT: 199.08 LBS | DIASTOLIC BLOOD PRESSURE: 77 MMHG | SYSTOLIC BLOOD PRESSURE: 128 MMHG | OXYGEN SATURATION: 96 % | TEMPERATURE: 98.6 F | HEART RATE: 86 BPM

## 2023-06-08 DIAGNOSIS — C50.211 MALIGNANT NEOPLASM OF UPPER-INNER QUADRANT OF RIGHT BREAST IN FEMALE, ESTROGEN RECEPTOR POSITIVE: ICD-10-CM

## 2023-06-08 DIAGNOSIS — Z17.0 MALIGNANT NEOPLASM OF UPPER-INNER QUADRANT OF RIGHT BREAST IN FEMALE, ESTROGEN RECEPTOR POSITIVE: ICD-10-CM

## 2023-06-08 DIAGNOSIS — Z17.0 MALIGNANT NEOPLASM OF UPPER-INNER QUADRANT OF RIGHT BREAST IN FEMALE, ESTROGEN RECEPTOR POSITIVE: Primary | ICD-10-CM

## 2023-06-08 DIAGNOSIS — S12.9XXA: Primary | ICD-10-CM

## 2023-06-08 DIAGNOSIS — S14.109A: Primary | ICD-10-CM

## 2023-06-08 DIAGNOSIS — N63.12 MASS OF UPPER INNER QUADRANT OF RIGHT BREAST: ICD-10-CM

## 2023-06-08 DIAGNOSIS — C79.51 METASTASIS TO BONE: ICD-10-CM

## 2023-06-08 DIAGNOSIS — C50.211 MALIGNANT NEOPLASM OF UPPER-INNER QUADRANT OF RIGHT BREAST IN FEMALE, ESTROGEN RECEPTOR POSITIVE: Primary | ICD-10-CM

## 2023-06-08 PROBLEM — N13.2 URETERAL STONE WITH HYDRONEPHROSIS: Status: ACTIVE | Noted: 2017-04-20

## 2023-06-08 LAB
ALBUMIN SERPL-MCNC: 4.2 G/DL (ref 3.5–5.2)
ALBUMIN/GLOB SERPL: 1.4 G/DL
ALP SERPL-CCNC: 100 U/L (ref 39–117)
ALT SERPL W P-5'-P-CCNC: 134 U/L (ref 1–33)
ANION GAP SERPL CALCULATED.3IONS-SCNC: 12.5 MMOL/L (ref 5–15)
AST SERPL-CCNC: 40 U/L (ref 1–32)
BASOPHILS # BLD AUTO: 0.14 10*3/MM3 (ref 0–0.2)
BASOPHILS NFR BLD AUTO: 0.5 % (ref 0–1.5)
BILIRUB SERPL-MCNC: 0.3 MG/DL (ref 0–1.2)
BUN SERPL-MCNC: 19 MG/DL (ref 6–20)
BUN/CREAT SERPL: 26 (ref 7–25)
CALCIUM SPEC-SCNC: 9.3 MG/DL (ref 8.6–10.5)
CHLORIDE SERPL-SCNC: 100 MMOL/L (ref 98–107)
CO2 SERPL-SCNC: 24.5 MMOL/L (ref 22–29)
CREAT SERPL-MCNC: 0.73 MG/DL (ref 0.57–1)
DEPRECATED RDW RBC AUTO: 50.4 FL (ref 37–54)
EGFRCR SERPLBLD CKD-EPI 2021: 109.5 ML/MIN/1.73
EOSINOPHIL # BLD AUTO: 0 10*3/MM3 (ref 0–0.4)
EOSINOPHIL NFR BLD AUTO: 0 % (ref 0.3–6.2)
ERYTHROCYTE [DISTWIDTH] IN BLOOD BY AUTOMATED COUNT: 15.2 % (ref 12.3–15.4)
GLOBULIN UR ELPH-MCNC: 2.9 GM/DL
GLUCOSE SERPL-MCNC: 152 MG/DL (ref 65–99)
HCT VFR BLD AUTO: 43.5 % (ref 34–46.6)
HGB BLD-MCNC: 14.2 G/DL (ref 12–15.9)
IMM GRANULOCYTES # BLD AUTO: 1.37 10*3/MM3 (ref 0–0.05)
IMM GRANULOCYTES NFR BLD AUTO: 5 % (ref 0–0.5)
LYMPHOCYTES # BLD AUTO: 3.61 10*3/MM3 (ref 0.7–3.1)
LYMPHOCYTES NFR BLD AUTO: 13.3 % (ref 19.6–45.3)
MAGNESIUM SERPL-MCNC: 2.1 MG/DL (ref 1.6–2.6)
MCH RBC QN AUTO: 29.6 PG (ref 26.6–33)
MCHC RBC AUTO-ENTMCNC: 32.6 G/DL (ref 31.5–35.7)
MCV RBC AUTO: 90.8 FL (ref 79–97)
MONOCYTES # BLD AUTO: 1.57 10*3/MM3 (ref 0.1–0.9)
MONOCYTES NFR BLD AUTO: 5.8 % (ref 5–12)
NEUTROPHILS NFR BLD AUTO: 20.53 10*3/MM3 (ref 1.7–7)
NEUTROPHILS NFR BLD AUTO: 75.4 % (ref 42.7–76)
NRBC BLD AUTO-RTO: 0 /100 WBC (ref 0–0.2)
PHOSPHATE SERPL-MCNC: 3.2 MG/DL (ref 2.5–4.5)
PLATELET # BLD AUTO: 461 10*3/MM3 (ref 140–450)
PMV BLD AUTO: 9.7 FL (ref 6–12)
POTASSIUM SERPL-SCNC: 4.6 MMOL/L (ref 3.5–5.2)
PROT SERPL-MCNC: 7.1 G/DL (ref 6–8.5)
RBC # BLD AUTO: 4.79 10*6/MM3 (ref 3.77–5.28)
RBC MORPH BLD: NORMAL
SMALL PLATELETS BLD QL SMEAR: NORMAL
SODIUM SERPL-SCNC: 137 MMOL/L (ref 136–145)
WBC MORPH BLD: NORMAL
WBC NRBC COR # BLD: 27.22 10*3/MM3 (ref 3.4–10.8)

## 2023-06-08 PROCEDURE — G0463 HOSPITAL OUTPT CLINIC VISIT: HCPCS | Performed by: INTERNAL MEDICINE

## 2023-06-08 PROCEDURE — 85025 COMPLETE CBC W/AUTO DIFF WBC: CPT

## 2023-06-08 PROCEDURE — 36415 COLL VENOUS BLD VENIPUNCTURE: CPT

## 2023-06-08 PROCEDURE — 80053 COMPREHEN METABOLIC PANEL: CPT

## 2023-06-08 PROCEDURE — 85007 BL SMEAR W/DIFF WBC COUNT: CPT

## 2023-06-08 PROCEDURE — G0463 HOSPITAL OUTPT CLINIC VISIT: HCPCS | Performed by: RADIOLOGY

## 2023-06-08 PROCEDURE — 84100 ASSAY OF PHOSPHORUS: CPT

## 2023-06-08 PROCEDURE — 83735 ASSAY OF MAGNESIUM: CPT

## 2023-06-08 NOTE — PROGRESS NOTES
Not seen by me- was only seen by oncology after discussion with them - precharting had been done but in multidisciplinary clinic decision was made that only oncology would see patient today  This document has been electronically signed by Tejal Redmond MD  June 8, 2023 12:37 EDT

## 2023-06-08 NOTE — PROGRESS NOTES
Chief Complaint  Breast Cancer    [place] Lela, LÁZARO  Lela Vanegas, LÁZARO    Subjective          Annita Johnson presents to [facility] for evaluation of recently diagnosed breast cancer.  Patient notes that she has been having neck and upper back pain for the last several months.  She initially thought that she strained her muscles.  She was seen by chiropractic and physical therapy with no improvement in fact the pain worsened.  She also started having radicular symptoms down her arms.  This prompted evaluation by her PCP including x-rays of the C-spine and subsequent MRI demonstrating multiple lytic lesions involving C2-C5, C7 and T1 as well as T4 and T5.  The C5 lesion has compression fracture with 3 mm retropulsion of the posterior vertebral body and severe central canal stenosis.  She has been placed in a hard cervical collar.  She denies other unusual aches or pains, masses or adenopathy.  She states she has been in good health.  She denies skin changes to the breast, masses, nipple retraction, discharge or bleeding.  She had CT of the chest, abdomen, pelvis demonstrating a 3 cm mass in the right upper inner breast as well as multiple lytic lesions including the spine and pelvis.  There were no solid organ abnormalities.  The breast mass was biopsied and positive for invasive ductal carcinoma with associated DCIS.  Patient reports a normal menstrual cycle and is up-to-date on her GYN exams.  She has one 2-year-old son.  She is a smoker-4 to 5 cigarettes/day but is actively trying to quit.  Patient reports her great-grandmother had breast cancer but there is no other cancer in the family of which she is aware.  Because of the new cancer, she is here for evaluation.    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, estrogen receptor positive   6/8/2023 Initial Diagnosis    Malignant neoplasm of upper-inner quadrant of  Monitor: The patient is to obtain a yearly mammogram.  Evaluation: Mammogram ordered, refer to orders.  Assessment/Treatment:  Discussed recommendations for targeted population  Implication of screening discussed  Patient decided to proceed with screening  Mammogram ordered, refer to orders.     right breast in female, estrogen receptor positive     6/8/2023 Cancer Staged    Staging form: Breast, AJCC 8th Edition  - Clinical: Stage IV (cT2, cN0, cM1, ER+, DE+, HER2-) - Signed by Perry Garcia MD on 6/8/2023     6/15/2023 -  Chemotherapy    OP SUPPORTIVE BREAST Leuprolide 3.75 MG Q28D       6/15/2023 -  Chemotherapy    OP SUPPORTIVE Denosumab (Xgeva) Q28D       6/15/2023 -  Chemotherapy    OP BREAST Letrozole / Ribociclib       Metastasis to bone   6/8/2023 Initial Diagnosis    Metastasis to bone     6/15/2023 -  Chemotherapy    OP SUPPORTIVE Denosumab (Xgeva) Q28D           Review of Systems   Constitutional:  Positive for fatigue. Negative for appetite change, diaphoresis, fever, unexpected weight gain and unexpected weight loss.   HENT:  Negative for hearing loss, sore throat and voice change.    Eyes:  Negative for blurred vision, double vision, pain, redness and visual disturbance.   Respiratory:  Negative for cough, shortness of breath and wheezing.    Cardiovascular:  Negative for chest pain, palpitations and leg swelling.   Endocrine: Negative for cold intolerance, heat intolerance, polydipsia and polyuria.   Genitourinary:  Negative for decreased urine volume, difficulty urinating, frequency and urinary incontinence.   Musculoskeletal:  Negative for arthralgias, back pain, joint swelling and myalgias.   Skin:  Negative for color change, rash, skin lesions and wound.   Neurological:  Negative for dizziness, seizures, numbness and headache.   Hematological:  Negative for adenopathy. Does not bruise/bleed easily.   Psychiatric/Behavioral:  Negative for depressed mood. The patient is not nervous/anxious.    Current Outpatient Medications on File Prior to Visit   Medication Sig Dispense Refill    cyclobenzaprine (FLEXERIL) 10 MG tablet Take 1 tablet by mouth 3 (Three) Times a Day As Needed for Muscle Spasms. 30 tablet 0    dexamethasone (DECADRON) 4 MG tablet Take 1 tablet by mouth 3 (Three) Times a  Day. 30 tablet 0    oxyCODONE-acetaminophen (Percocet)  MG per tablet Take one every 4-6 hrs as needed for bone pain and fracture 60 tablet 0    pantoprazole (PROTONIX) 40 MG EC tablet Take 1 tablet by mouth Daily. 90 tablet 1    diclofenac (VOLTAREN) 50 MG EC tablet Take 1 tablet by mouth 2 (Two) Times a Day As Needed (pain). 60 tablet 0    HYDROcodone-acetaminophen (Norco) 5-325 MG per tablet Take 1 tablet by mouth Every 6 (Six) Hours As Needed for Moderate Pain. 20 tablet 0     No current facility-administered medications on file prior to visit.       No Known Allergies  Past Medical History:   Diagnosis Date    Anemia     Breast cancer     Cancer, metastatic to bone     Kidney stone     Kidney stones     Metastasis to bone 2023     Past Surgical History:   Procedure Laterality Date    BREAST BIOPSY       SECTION N/A 2021    Procedure:  SECTION PRIMARY;  Surgeon: Zoe Dawson MD;  Location: [facility] DELIVERY;  Service: Obstetrics/Gynecology;  Laterality: N/A;    CYSTOSCOPY BLADDER STONE LITHOTRIPSY       Social History     Socioeconomic History    Marital status:      Spouse name: Janet   Tobacco Use    Smoking status: Some Days     Packs/day: 0.25     Years: 10.00     Pack years: 2.50     Types: Cigarettes     Start date: 2021     Passive exposure: Current    Smokeless tobacco: Never   Vaping Use    Vaping Use: Never used   Substance and Sexual Activity    Alcohol use: Yes     Alcohol/week: 6.0 standard drinks     Types: 3 Glasses of wine, 3 Drinks containing 0.5 oz of alcohol per week    Drug use: Not Currently     Types: Marijuana     Comment: for pain control    Sexual activity: Yes     Partners: Male     Birth control/protection: Same-sex partner     Family History   Problem Relation Age of Onset    Mental illness Mother     Hypertension Father     Alcohol abuse Father     Ovarian cancer Paternal Aunt     Breast cancer Maternal Great-Grandmother   "      Objective   Physical Exam  Vitals reviewed. Exam conducted with a chaperone present.   Constitutional:       General: She is not in acute distress.     Appearance: Normal appearance.   HENT:      Head: Normocephalic and atraumatic.   Eyes:      Conjunctiva/sclera: Conjunctivae normal.      Pupils: Pupils are equal, round, and reactive to light.   Neck:      Comments: Hard cervical collar in place  Cardiovascular:      Rate and Rhythm: Normal rate and regular rhythm.      Heart sounds: Normal heart sounds. No murmur heard.    No gallop.   Pulmonary:      Effort: Pulmonary effort is normal.      Breath sounds: Normal breath sounds.   Chest:   Breasts:     Right: Mass (See diagram) present.      Left: Normal.       Abdominal:      General: Abdomen is flat. Bowel sounds are normal. There is no distension.      Palpations: Abdomen is soft.      Tenderness: There is no abdominal tenderness.      Comments: No HSM or masses   Musculoskeletal:      Right lower leg: No edema.      Left lower leg: No edema.   Lymphadenopathy:      Upper Body:      Right upper body: No supraclavicular or axillary adenopathy.      Left upper body: No supraclavicular or axillary adenopathy.   Neurological:      Mental Status: She is alert and oriented to person, place, and time.   Psychiatric:         Mood and Affect: Mood normal.         Behavior: Behavior normal.       Vitals:    06/08/23 1410   BP: 128/77   Pulse: 86   Resp: 16   Temp: 98.6 °F (37 °C)   TempSrc: Temporal   SpO2: 96%   Weight: 90.3 kg (199 lb 1.2 oz)   Height: 162.6 cm (64.02\")   PainSc: 0-No pain               PHQ-9 Total Score:                    Result Review :   The following data was reviewed by: Perry Garcia MD on 06/08/2023:  Lab Results   Component Value Date    HGB 13.7 05/26/2023    HCT 40.0 05/26/2023    MCV 85.7 05/26/2023     05/26/2023    WBC 11.24 (H) 05/26/2023    NEUTROABS 7.29 (H) 05/26/2023    LYMPHSABS 2.74 05/26/2023    MONOSABS 0.93 (H) " 05/26/2023    EOSABS 0.20 05/26/2023    BASOSABS 0.05 05/26/2023     Lab Results   Component Value Date    GLUCOSE 87 05/26/2023    BUN 14 05/26/2023    CREATININE 0.73 05/26/2023     05/26/2023    K 5.0 05/26/2023     05/26/2023    CO2 23.0 05/26/2023    CALCIUM 9.9 05/26/2023    PROTEINTOT 7.9 05/26/2023    ALBUMIN 3.9 06/01/2023    BILITOT 0.2 05/26/2023    ALKPHOS 108 05/26/2023    AST 27 05/26/2023    ALT 37 (H) 05/26/2023     Lab Results   Component Value Date    TSH 1.290 05/26/2023     No results found for: IRON, LABIRON, TRANSFERRIN, TIBC  No results found for: LDH, FERRITIN, [id number], FOLATE  No results found for: PSA, CEA, AFP, ,     Data reviewed : Records from Anders Vanegas, PCP reviewed.  Radiation oncology note reviewed from Dr. Schwartz.  Pathology report reviewed.  CT chest, abdomen, pelvis reviewed.  MRI cervical spine reviewed.  X-ray cervical spine reviewed.     Assessment and Plan    Diagnoses and all orders for this visit:    1. Malignant neoplasm of upper-inner quadrant of right breast in female, estrogen receptor positive (Primary)  Assessment & Plan:  Invasive ductal carcinoma of the right breast.  Biopsy-proven.  This is a new diagnosis.  She has metastatic disease on imaging including the spine and pelvis.  She has an upcoming PET scan scheduled.  I will also order MRI of the brain given the cervical involvement.  She has hormone receptor positive, HER2 negative.  We discussed that this is unfortunately not curable but can be treated to offer prolongation of survival as well as decreasing symptoms.  I will request next generation sequencing on her pathology specimen.  Given her young age I will also order genetic testing.  Based on NCCN guidelines, I would recommend antihormone therapy along with CK4 6 inhibitor.  She is premenopausal and we discussed ovarian suppression with leuprolide injection given monthly.  I would recommend letrozole 2.5 mg daily along with  ribociclib 600 mg on days 1-20 1 out of 28.  This will be given until intolerance or progression.  Side effects discussed including vasomotor symptoms, arthralgias, myalgias, changes in bone density, nausea, vomiting, diarrhea, irregular heartbeat, liver dysfunction, kidney dysfunction, low blood counts etc.  Written teaching information provided.  I will plan to start her leuprolide injection as soon as possible with letrozole shortly thereafter.  I will hold the ribociclib until after her surgical evaluation.  I did discuss [facility] clinical trials but she is not interested in travel at this time.  I will plan to see her back in 2 weeks for ongoing treatment.  We discussed that smoking is associated with breast cancer and smoking cessation was recommended.  Patient is actively working toward that goal.      Orders:  -     MRI Brain With & Without Contrast; Future  -     CBC & Differential; Future  -     Comprehensive Metabolic Panel; Future  -     Magnesium; Future  -     Phosphorus; Future  -     ECG 12 Lead; Future  -     ECG 12 Lead; Future    2. Metastasis to bone  Assessment & Plan:  Patient has diffuse metastatic involvement in the bones including the spine and pelvis.  She has a fracture of the C5 vertebral body.  She is in a hard cervical collar and has upcoming evaluation with neurosurgery.  I discussed the case with Dr. Schwartz, radiation oncology who saw her earlier today.  He will plan radiation to the area once the cervical spine has been stabilized.  I discussed the addition of denosumab to her regimen which is an injection given monthly to help decrease the incidence of skeletal events including fracture.  This is given in conjunction with her anticancer therapy.  Side effects discussed including injection site reactions, allergic reactions, decreases in the electrolytes including calcium, magnesium, phosphorus as well as osteonecrosis of the jaw.  Written teaching information provided.   I will defer her initial injection until she has been evaluated by neurosurgery.          I spent 77 minutes caring for Annita on this date of service. This time includes time spent by me in the following activities:preparing for the visit, reviewing tests, obtaining and/or reviewing a separately obtained history, performing a medically appropriate examination and/or evaluation , counseling and educating the patient/family/caregiver, ordering medications, tests, or procedures, referring and communicating with other health care professionals , documenting information in the medical record, independently interpreting results and communicating that information with the patient/family/caregiver, and care coordination    Patient Follow Up: 2 w    Patient was given instructions and counseling regarding her condition or for health maintenance advice. Please see specific information pulled into the AVS if appropriate.     Perry Garcia MD    6/8/2023

## 2023-06-08 NOTE — ASSESSMENT & PLAN NOTE
Invasive ductal carcinoma of the right breast.  Biopsy-proven.  This is a new diagnosis.  She has metastatic disease on imaging including the spine and pelvis.  She has an upcoming PET scan scheduled.  I will also order MRI of the brain given the cervical involvement.  She has hormone receptor positive, HER2 negative.  We discussed that this is unfortunately not curable but can be treated to offer prolongation of survival as well as decreasing symptoms.  I will request next generation sequencing on her pathology specimen.  Given her young age I will also order genetic testing.  Based on NCCN guidelines, I would recommend antihormone therapy along with CK4 6 inhibitor.  She is premenopausal and we discussed ovarian suppression with leuprolide injection given monthly.  I would recommend letrozole 2.5 mg daily along with ribociclib 600 mg on days 1-20 1 out of 28.  This will be given until intolerance or progression.  Side effects discussed including vasomotor symptoms, arthralgias, myalgias, changes in bone density, nausea, vomiting, diarrhea, irregular heartbeat, liver dysfunction, kidney dysfunction, low blood counts etc.  Written teaching information provided.  I will plan to start her leuprolide injection as soon as possible with letrozole shortly thereafter.  I will hold the ribociclib until after her surgical evaluation.  [facility] did discuss [facility] but she is not interested in travel at this time.  I will plan to see her back in 2 weeks for ongoing treatment.  We discussed that smoking is associated with breast cancer and smoking cessation was recommended.  Patient is actively working toward that goal.

## 2023-06-08 NOTE — PROGRESS NOTES
Reason for Referral: Rounding with new patients in [facility]    Diagnosis: Breast Cancer    Distress Score: 8 indicated for pain, sleep, fatigue, tobacco use, substance use, worry or anxiety, loss or change of physical abilities, worry or anxiety, sadness or depression, fear, taking care of others, , and treatment decisions     Location of Distress Screening: [facility]    PHQ Score: 0    Current Treatment Plan: Continuing to develop     Previous Cancer TX: Patient stated this is her first cancer diagnosis.    Mental Status: Patient was very pleasant and easy to engage. Patient was appropriately tearful. Patient was forward thinking. Patient denied any behavioral health treatment. Patient was able to match her feelings to her situation. Patient was able to knowledge the need for estate planning to protect her two year old son. Patient stated she was overwhelmed by all the doctor visits and scans to determine her treatment. OSW provided emotional support for patient and her spouse through active listening, empathizing, normalizing and validating her thoughts and feelings.     Mental Health Treatment: Patient stated she has no history of mental health treatment or concerns. Patient did not disclose any thoughts of suicide or homicide.     Substance Use/Tobacco Use: Patient stated she is using marijuana, alcohol, and tobacco and will be actively working to do better. Patient acknowledged the marijuana has increased her anxiety at times. OSW expressed appreciation for patient's honesty during this psychosocial needs assessment.     Spirituality: Patient stated she expressed her spirituality through music, nature, and videos online.    Hobbies: Patient stated she enjoys talking on the phone,  is hoping their moving in the next month to a new home will help distract her.     Support systems: Patient stated she has her  who is present with her today, her parents, brothers  "and sisters, neighbors, and friends.    Residential status/Who lives in the home: Patient lives with her  and 2 year old son.     Transportation: Patient's  stated the family plans to rotate who takes her to treatment. Patient's  reassured OSW the patient access to transportation is never going to be the issue.     Financial Concerns: Patient stated she has sufficient income and no concerns for how her insurance will cover her health care cost.     Home Care Needs: None identified at this time. Patient stated she is able to bath and care for herself at this time.     Advanced Directive/Living Will: None on file    Insurance: [employer] [facility] PPO     Resources/Referrals: OSW provided her business card and an overview of oncology social work services. Patient stated she does not know of any needs or barriers to care. OSW encouraged her to reach out if anything changed. OSW reviewed FMLA with patient's spouse. Patient's spouse was knowledgeable regarding intermittent FMLA. Patient and her  plan to contact an  to complete estate planning to protect their son. OSW reviewed coping strategies and [employer] steven to provide guided relaxation techniques to control her anxiety. OSW encouraged patient to try to reduce her use of substances, alcohol, and tobacco. Patient declined a referral to behavioral health treatment. Patient stated she just \"want to go home and not have to deal with this until my next appointment. I have been to doctors all day today. I just want to get some rest.\"              "

## 2023-06-08 NOTE — PROGRESS NOTES
New Patient Office Visit      Encounter Date: 06/08/2023   Patient Name: Annita Johnson  YOB: 1985   Medical Record Number: [id number]   Primary Diagnosis: Closed fracture of cervical vertebra with spinal cord lesion, initial encounter [S14.109A, S12.9XXA]       Chief Complaint:    Chief Complaint   Patient presents with    Consult       History of Present Illness: Annita Johnson is a 37-year-old female with apparent stage IV breast cancer with bony metastatic disease who is seen in consultation regarding recently diagnosed cervical spine metastases.  Ms. Johnson has been experiencing neck pain since January 2023.  This pain has become progressive.  She was ultimately evaluated with an MRI of the cervical spine revealing a central compression fracture of C5 with up to 3 mm of retropulsion of the posterior vertebral body.  In conjunction with involvement of the posterior elements at this level there was severe central canal stenosis with complete effacement of CSF surrounding the cord at the C5 level.  Metastases of the dorsal posterior elements were seen at C2, C3, C4 and C5.  There were likely additional metastases at C7, T1, T4 and T5.  Ms. Johnson reports some occasional numbness and tingling in her hands and feet but denies any decrease in motor strength.  She reports essentially normal bowel movements and urination but is somewhat constipated recently after taking Norco for neck pain.  CT scan of the chest on 6/6/2023 revealed a 27 mm spiculated mass in the upper slightly inner right breast concerning for breast carcinoma.  Mammography and ultrasound performed on 6/6/2023 revealed a 2 cm irregular, infiltrative and hypervascular mass at the 1 o'clock position where multifocal and multicentric pleomorphic calcifications were appreciated.  Pathology from biopsy of the right breast 1 o'clock position lesion revealed invasive ductal carcinoma, grade 2, ER positive/UT positive, HER2/reena  negative with Ki-67 of 20%.    Subjective          Review of Systems: Review of Systems   Constitutional:  Negative for appetite change, fatigue (2/10) and unexpected weight change.   HENT:  Negative for ear pain, hearing loss, mouth sores, sore throat, tinnitus and trouble swallowing.    Eyes:  Negative for visual disturbance.   Respiratory:  Negative for cough and shortness of breath.    Cardiovascular:  Negative for chest pain, palpitations and leg swelling.   Gastrointestinal:  Positive for constipation. Negative for abdominal pain, anal bleeding, blood in stool, diarrhea and nausea.   Endocrine: Positive for heat intolerance (states she is always hot).   Genitourinary:  Negative for difficulty urinating, dysuria, frequency and urgency.   Musculoskeletal:  Positive for back pain, neck pain and neck stiffness. Negative for arthralgias.   Skin:  Negative for color change and rash.   Allergic/Immunologic: Negative.    Neurological:  Positive for numbness (occasional numbness in hands in feet when ambulating). Negative for dizziness and headaches.   Hematological: Negative.    Psychiatric/Behavioral:  Negative for sleep disturbance.      Past Medical History:   Past Medical History:   Diagnosis Date    Anemia     Breast cancer     Cancer, metastatic to bone     Kidney stone     Kidney stones        Past Surgical History:   Past Surgical History:   Procedure Laterality Date    BREAST BIOPSY       SECTION N/A 2021    Procedure:  SECTION PRIMARY;  Surgeon: Zoe Dawson MD;  Location: [facility] LABOR DELIVERY;  Service: Obstetrics/Gynecology;  Laterality: N/A;    CYSTOSCOPY BLADDER STONE LITHOTRIPSY         Family History:   Family History   Problem Relation Age of Onset    Ovarian cancer Paternal Aunt     Breast cancer Maternal Great-Grandmother        Social History:   Social History     Socioeconomic History    Marital status:      Spouse name: Janet   Tobacco Use    Smoking status:  Some Days     Packs/day: 0.25     Years: 10.00     Pack years: 2.50     Types: Cigarettes     Start date: 2/1/2021    Smokeless tobacco: Never   Vaping Use    Vaping Use: Never used   Substance and Sexual Activity    Alcohol use: Yes     Alcohol/week: 6.0 standard drinks     Types: 3 Glasses of wine, 3 Drinks containing 0.5 oz of alcohol per week    Drug use: Not Currently     Types: Marijuana     Comment: for pain control    Sexual activity: Yes     Partners: Male     Birth control/protection: Same-sex partner       Medications:     Current Outpatient Medications:     cyclobenzaprine (FLEXERIL) 10 MG tablet, Take 1 tablet by mouth 3 (Three) Times a Day As Needed for Muscle Spasms., Disp: 30 tablet, Rfl: 0    dexamethasone (DECADRON) 4 MG tablet, Take 1 tablet by mouth 3 (Three) Times a Day., Disp: 30 tablet, Rfl: 0    oxyCODONE-acetaminophen (Percocet)  MG per tablet, Take one every 4-6 hrs as needed for bone pain and fracture, Disp: 60 tablet, Rfl: 0    pantoprazole (PROTONIX) 40 MG EC tablet, Take 1 tablet by mouth Daily., Disp: 90 tablet, Rfl: 1    diclofenac (VOLTAREN) 50 MG EC tablet, Take 1 tablet by mouth 2 (Two) Times a Day As Needed (pain). (Patient not taking: Reported on 6/8/2023), Disp: 60 tablet, Rfl: 0    HYDROcodone-acetaminophen (Norco) 5-325 MG per tablet, Take 1 tablet by mouth Every 6 (Six) Hours As Needed for Moderate Pain. (Patient not taking: Reported on 6/8/2023), Disp: 20 tablet, Rfl: 0    Allergies:   No Known Allergies    Pain: (on a scale of 0-10)   Pain Score    06/08/23 0937   PainSc:   9       Advanced Care Plan: N   Quality of Life: 80 - Restricted Physical Activity    Objective     Physical Exam:   Vital Signs:   Vitals:    06/08/23 0937   BP: 128/77   Pulse: 86   Resp: 16   Temp: 98.6 °F (37 °C)   TempSrc: Temporal   SpO2: 96%   Weight: 91 kg (200 lb 9.9 oz)   PainSc:   9     Body mass index is 34.42 kg/m².     Physical Exam  Constitutional:       General: She is not in acute  distress.     Appearance: Normal appearance. She is normal weight. She is not ill-appearing or toxic-appearing.   Pulmonary:      Effort: Pulmonary effort is normal. No respiratory distress.   Skin:     General: Skin is warm and dry.   Neurological:      General: No focal deficit present.      Mental Status: She is alert and oriented to person, place, and time. Mental status is at baseline.      Cranial Nerves: No cranial nerve deficit.      Sensory: No sensory deficit.      Motor: No weakness.      Gait: Gait normal.      Comments: Strength 5/5 in all four extremities   Psychiatric:         Mood and Affect: Mood normal.         Behavior: Behavior normal.         Judgment: Judgment normal.       Results:   Radiographs: CT Chest With Contrast Diagnostic    Result Date: 6/6/2023    1. 27 mm spiculated mass in the upper slightly inner right breast concerning for breast carcinoma.  Recommend correlation with diagnostic mammogram and ultrasound. 2. Multiple lytic skeletal metastasis most pronounced in the cervical and upper thoracic spine where there are C5 and T1 compression fractures.     KATH BOBO MD       Electronically Signed and Approved By: KATH BOBO MD on 6/06/2023 at 9:09             CT Abdomen Pelvis With Contrast    Result Date: 6/6/2023    1. Multiple lytic skeletal metastasis without pathologic fracture. 2. No acute abdominal or pelvic abnormality.     KATH BOBO MD       Electronically Signed and Approved By: KATH BOBO MD on 6/06/2023 at 9:12             Mammo Post Device Placement Right    Result Date: 6/6/2023   Technically successful ultrasound guided core biopsy of the right breast. The patient tolerated the procedure well without immediate complication. Specimens have been sent to pathology for further analysis.  Addendum will be dictated upon receiving final pathology for concordance and recommendations.     Kay Orozco M.D.       Electronically Signed and Approved By: Kay  Ernesto REID on 6/06/2023 at 16:14             US Breast Right Limited    Result Date: 6/6/2023   Right breast:  2 cm irregular, infiltrative and hypervascular mass at the 1 o'clock position 5 cm from the nipple is highly suspicious.  Recommend ultrasound-guided core biopsy.  The patient has been scheduled for ultrasound-guided core biopsy on today's date.  Multifocal and multicentric pleomorphic calcifications within the right breast as detailed above.  A representative sampling under stereotactic biopsy is recommended however, due to reported pathologic cervical fractures , stereotactic biopsy will be deferred at this time.  This was discussed with Dr. Vanegas on 6/6/2023 at 1505 hours.  Left breast:  Benign mammogram.  Recommend routine left breast screening.  RECOMMENDATION(S):  ULTRASOUND-GUIDED CORE BIOPSY: RIGHT BREAST   BIRADS:  DIAGNOSTIC CATEGORY 5--HIGHLY SUGGESTIVE FOR MALIGNANCY.   BREAST COMPOSITION: Heterogeneously dense,which may obscure small masses.  PLEASE NOTE:  A NORMAL MAMMOGRAM DOES NOT EXCLUDE THE POSSIBILITY OF BREAST CANCER. ANY CLINICALLY SUSPICIOUS PALPABLE LUMP SHOULD BE BIOPSIED.      Kay Orozco M.D.       Electronically Signed and Approved By: Kay Orozco M.D. on 6/06/2023 at 15:28             Mammo Diagnostic Digital Tomosynthesis Bilateral With CAD    Result Date: 6/6/2023   Right breast:  2 cm irregular, infiltrative and hypervascular mass at the 1 o'clock position 5 cm from the nipple is highly suspicious.  Recommend ultrasound-guided core biopsy.  The patient has been scheduled for ultrasound-guided core biopsy on today's date.  Multifocal and multicentric pleomorphic calcifications within the right breast as detailed above.  A representative sampling under stereotactic biopsy is recommended however, due to reported pathologic cervical fractures , stereotactic biopsy will be deferred at this time.  This was discussed with Dr. Vanegas on 6/6/2023 at 1505 hours.  Left  breast:  Benign mammogram.  Recommend routine left breast screening.  RECOMMENDATION(S):  ULTRASOUND-GUIDED CORE BIOPSY: RIGHT BREAST   BIRADS:  DIAGNOSTIC CATEGORY 5--HIGHLY SUGGESTIVE FOR MALIGNANCY.   BREAST COMPOSITION: Heterogeneously dense,which may obscure small masses.  PLEASE NOTE:  A NORMAL MAMMOGRAM DOES NOT EXCLUDE THE POSSIBILITY OF BREAST CANCER. ANY CLINICALLY SUSPICIOUS PALPABLE LUMP SHOULD BE BIOPSIED.      Kay Orozco M.D.       Electronically Signed and Approved By: Kay Orozco M.D. on 6/06/2023 at 15:28             US Guided Breast Biopsy With & Without Device initial    Result Date: 6/6/2023   Technically successful ultrasound guided core biopsy of the right breast. The patient tolerated the procedure well without immediate complication. Specimens have been sent to pathology for further analysis.  Addendum will be dictated upon receiving final pathology for concordance and recommendations.     Kay Orozco M.D.       Electronically Signed and Approved By: Kay Orozco M.D. on 6/06/2023 at 16:14            I personally reviewed the CT scan of the chest with contrast performed on 6/6/2023.  The pertinent findings are as above.      Pathology: I personally reviewed the pathology report from the procedure performed on 6/2/2023.  The pertinent findings are as above in HPI.    Labs:   WBC   Date Value Ref Range Status   05/26/2023 11.24 (H) 3.40 - 10.80 10*3/mm3 Final     Hemoglobin   Date Value Ref Range Status   05/26/2023 13.7 12.0 - 15.9 g/dL Final     Hematocrit   Date Value Ref Range Status   05/26/2023 40.0 34.0 - 46.6 % Final     Platelets   Date Value Ref Range Status   05/26/2023 365 140 - 450 10*3/mm3 Final       Assessment / Plan          Assessment/Plan:   Annita Johnson is a 37-year-old female with apparent stage IV ER positive/NJ positive, HER2/reena negative breast cancer.  She has osseous metastatic disease apparent as cervical spine disease with severe central  canal stenosis at the level of C5.  She has no apparent focal neurologic deficits.  ECOG 1    I discussed the clinical, radiographic and pathologic findings today with  and Mrs. Mcgarry.  I explained the role of radiotherapy in the palliation of painful bony metastatic disease.  I recommended prompt evaluation by a neurosurgeon regarding stabilization of the cervical spine.  I explained that without surgical intervention she is at high risk for progression of disease with neurologic compromise.  I explained this in plain terms and both  and Mrs. Johnson voiced their understanding.  I will arrange for prompt neurosurgical evaluation in [facility].  I will see Ms. Johnson in follow-up in 2 weeks.      Leoncio Schwartz MD  Radiation Oncology  [facility]    This document has been signed by Leoncio Schwartz MD on June 8, 2023 11:14 EDT

## 2023-06-08 NOTE — ASSESSMENT & PLAN NOTE
Patient has diffuse metastatic involvement in the bones including the spine and pelvis.  She has a fracture of the C5 vertebral body.  She is in a hard cervical collar and has upcoming evaluation with neurosurgery.  I discussed the case with Dr. Schwartz, radiation oncology who saw her earlier today.  He will plan radiation to the area once the cervical spine has been stabilized.  I discussed the addition of denosumab to her regimen which is an injection given monthly to help decrease the incidence of skeletal events including fracture.  This is given in conjunction with her anticancer therapy.  Side effects discussed including injection site reactions, allergic reactions, decreases in the electrolytes including calcium, magnesium, phosphorus as well as osteonecrosis of the jaw.  Written teaching information provided.  I will defer her initial injection until she has been evaluated by neurosurgery.

## 2023-06-08 NOTE — TELEPHONE ENCOUNTER
Discussed with Dr. Schwartz. Out of town starting next week. Feel patient may need very large surgery. Sent select imaging to Dr. Camara in [place] who is happy to see.

## 2023-06-09 ENCOUNTER — PATIENT OUTREACH (OUTPATIENT)
Dept: ONCOLOGY | Facility: HOSPITAL | Age: 38
End: 2023-06-09
Payer: COMMERCIAL

## 2023-06-09 ENCOUNTER — LAB (OUTPATIENT)
Dept: ONCOLOGY | Facility: HOSPITAL | Age: 38
End: 2023-06-09
Payer: COMMERCIAL

## 2023-06-09 ENCOUNTER — HOSPITAL ENCOUNTER (OUTPATIENT)
Dept: ONCOLOGY | Facility: HOSPITAL | Age: 38
Discharge: HOME | End: 2023-06-09
Payer: COMMERCIAL

## 2023-06-09 VITALS
RESPIRATION RATE: 18 BRPM | TEMPERATURE: 98.6 F | DIASTOLIC BLOOD PRESSURE: 78 MMHG | SYSTOLIC BLOOD PRESSURE: 114 MMHG | HEART RATE: 80 BPM | OXYGEN SATURATION: 100 %

## 2023-06-09 DIAGNOSIS — G47.01 INSOMNIA DUE TO MEDICAL CONDITION: Primary | ICD-10-CM

## 2023-06-09 DIAGNOSIS — C50.211 MALIGNANT NEOPLASM OF UPPER-INNER QUADRANT OF RIGHT BREAST IN FEMALE, ESTROGEN RECEPTOR POSITIVE: ICD-10-CM

## 2023-06-09 DIAGNOSIS — M54.12 CERVICAL RADICULOPATHY: ICD-10-CM

## 2023-06-09 DIAGNOSIS — C50.211 MALIGNANT NEOPLASM OF UPPER-INNER QUADRANT OF RIGHT BREAST IN FEMALE, ESTROGEN RECEPTOR POSITIVE: Primary | ICD-10-CM

## 2023-06-09 DIAGNOSIS — Z17.0 MALIGNANT NEOPLASM OF UPPER-INNER QUADRANT OF RIGHT BREAST IN FEMALE, ESTROGEN RECEPTOR POSITIVE: ICD-10-CM

## 2023-06-09 DIAGNOSIS — Z17.0 MALIGNANT NEOPLASM OF UPPER-INNER QUADRANT OF RIGHT BREAST IN FEMALE, ESTROGEN RECEPTOR POSITIVE: Primary | ICD-10-CM

## 2023-06-09 DIAGNOSIS — M84.58XA: ICD-10-CM

## 2023-06-09 LAB — B-HCG UR QL: NEGATIVE

## 2023-06-09 PROCEDURE — 81025 URINE PREGNANCY TEST: CPT

## 2023-06-09 PROCEDURE — 96402 CHEMO HORMON ANTINEOPL SQ/IM: CPT

## 2023-06-09 PROCEDURE — 25010000002 LEUPROLIDE PER 3.75 MG: Performed by: INTERNAL MEDICINE

## 2023-06-09 RX ORDER — HYDROXYZINE HYDROCHLORIDE 25 MG/1
25 TABLET, FILM COATED ORAL
Qty: 30 TABLET | Refills: 2 | Status: SHIPPED | OUTPATIENT
Start: 2023-06-09

## 2023-06-09 RX ORDER — CYCLOBENZAPRINE HCL 10 MG
10 TABLET ORAL 3 TIMES DAILY PRN
Qty: 90 TABLET | Refills: 5 | Status: SHIPPED | OUTPATIENT
Start: 2023-06-09

## 2023-06-09 RX ADMIN — LEUPROLIDE ACETATE 3.75 MG: KIT at 15:17

## 2023-06-12 ENCOUNTER — SPECIALTY PHARMACY (OUTPATIENT)
Dept: PHARMACY | Facility: HOSPITAL | Age: 38
End: 2023-06-12
Payer: COMMERCIAL

## 2023-06-12 ENCOUNTER — HOSPITAL ENCOUNTER (OUTPATIENT)
Dept: CARDIOLOGY | Facility: HOSPITAL | Age: 38
Discharge: HOME | End: 2023-06-12
Payer: COMMERCIAL

## 2023-06-12 ENCOUNTER — HOSPITAL ENCOUNTER (OUTPATIENT)
Dept: MRI IMAGING | Facility: HOSPITAL | Age: 38
Discharge: HOME | End: 2023-06-12
Payer: COMMERCIAL

## 2023-06-12 DIAGNOSIS — Z17.0 MALIGNANT NEOPLASM OF UPPER-INNER QUADRANT OF RIGHT BREAST IN FEMALE, ESTROGEN RECEPTOR POSITIVE: Primary | ICD-10-CM

## 2023-06-12 DIAGNOSIS — C50.211 MALIGNANT NEOPLASM OF UPPER-INNER QUADRANT OF RIGHT BREAST IN FEMALE, ESTROGEN RECEPTOR POSITIVE: Primary | ICD-10-CM

## 2023-06-12 DIAGNOSIS — Z17.0 MALIGNANT NEOPLASM OF UPPER-INNER QUADRANT OF RIGHT BREAST IN FEMALE, ESTROGEN RECEPTOR POSITIVE: ICD-10-CM

## 2023-06-12 DIAGNOSIS — C50.211 MALIGNANT NEOPLASM OF UPPER-INNER QUADRANT OF RIGHT BREAST IN FEMALE, ESTROGEN RECEPTOR POSITIVE: ICD-10-CM

## 2023-06-12 LAB — QT INTERVAL: 377 MS

## 2023-06-12 PROCEDURE — 93005 ELECTROCARDIOGRAM TRACING: CPT | Performed by: INTERNAL MEDICINE

## 2023-06-12 PROCEDURE — A9577 INJ MULTIHANCE: HCPCS | Performed by: INTERNAL MEDICINE

## 2023-06-12 PROCEDURE — 0 GADOBENATE DIMEGLUMINE 529 MG/ML SOLUTION: Performed by: INTERNAL MEDICINE

## 2023-06-12 PROCEDURE — 70553 MRI BRAIN STEM W/O & W/DYE: CPT

## 2023-06-12 RX ORDER — ONDANSETRON HYDROCHLORIDE 8 MG/1
8 TABLET, FILM COATED ORAL 3 TIMES DAILY PRN
Qty: 30 TABLET | Refills: 5 | Status: SHIPPED | OUTPATIENT
Start: 2023-06-12

## 2023-06-12 RX ORDER — LETROZOLE 2.5 MG/1
2.5 TABLET, FILM COATED ORAL DAILY
Qty: 30 TABLET | Refills: 11 | Status: SHIPPED | OUTPATIENT
Start: 2023-06-12

## 2023-06-12 RX ADMIN — GADOBENATE DIMEGLUMINE 19 ML: 529 INJECTION, SOLUTION INTRAVENOUS at 08:31

## 2023-06-12 NOTE — PROGRESS NOTES
Oral Chemotherapy - Double Check    Drug: Kisqali (ribociclib)  Strength: 200mg tablets  Directions: Take 3 tablets (600mg) by mouth daily on days 1-21, then off for 7 days in a 28-day cycle  QTY: 63  RF: 11    Released to pharmacy: [facility]    Completed independent double check on medication order/RX.    Ann Cowden Mayer, PharmD, [facility]  Oncology Clinical Pharmacist  Phone [phone]    6/12/2023  14:55 EDT

## 2023-06-12 NOTE — PROGRESS NOTES
Oral Chemotherapy - New Referral    Received a referral from Dr. Garcia    Treatment Plan: Femara (letrozole) and Kisqali (ribociclib)  Start date of treatment planned for:  After stabilization of cervical fracture  Indication: HR+ HER2- breast cancer with bone mets  Relevant past treatments: newly diagnosed  Is the therapy appropriate based on treatment guidelines and FDA labeling?: yes  Therapeutic Goals: Continue treatment until progression or intolerable toxicity  Patient can self-administer oral medications: Yes    Drug-Drug Interactions: The current medication list was reviewed and there are no relevant drug-drug interactions with specialty medications.  Patient is on several medications with CNS depressant effects including, cyclobenzaprine, hydroxyzine, hydrocodone, and oxycodone. Will discuss during education.  Medication Allergies: The patient has NKDA  Review of Labs/Dose Adjustments: The patient's most recent labs were reviewed and are appropriate to start treatment at this dose  Patient has elevated AST/ALT, no dose adjustment recommended at Grade 2   WBC and Plts also elevated  6/12 EKG: Qtc 423  Denosumab ordered    A prescription was released to [facility] specialty pharmacy for   Drug: Kisqali (ribociclib)  Strength: 200 mg  Directions: Take 3 tablets by mouth daily ON days 1-21, OFF for 7 days of each 28 day cycle  Quantity: 63  Refills: 11    Pharmacy education is not yet scheduled., CCA and consent will be signed at that time.    Farida Bianchi, PharmD, [facility]  Oncology Clinical Pharmacist  6/12/2023  14:32 EDT

## 2023-06-13 ENCOUNTER — SPECIALTY PHARMACY (OUTPATIENT)
Dept: PHARMACY | Facility: HOSPITAL | Age: 38
End: 2023-06-13
Payer: COMMERCIAL

## 2023-06-14 ENCOUNTER — TELEPHONE (OUTPATIENT)
Dept: INTERNAL MEDICINE | Facility: CLINIC | Age: 38
End: 2023-06-14
Payer: COMMERCIAL

## 2023-06-14 ENCOUNTER — PATIENT OUTREACH (OUTPATIENT)
Dept: ONCOLOGY | Facility: HOSPITAL | Age: 38
End: 2023-06-14
Payer: COMMERCIAL

## 2023-06-14 NOTE — TELEPHONE ENCOUNTER
Nurse Navigator, Luna called regarding patient. Stated the patient saw the neurosurgeon yesterday and didn't care for him. Also, stated that a copy of the MRI from [facility] wasn't sent for review to the neurosurgeon and that the CD would be delivered today. Luna states they wanted to admit her yesterday. Not sure why this didn't happen. Called and spoke with patient. She is in a lot of pain and doesn't feel like anyone is trying to help her. I called the neurosurgeon's office and had to leave a message for a return call. Patient saw the neurosurgeon yesterday but wasn't told what to do next. She is at home in bed, has a brace on her neck. We got disconnected several times. I called her back each time and advised patient I had put in a call to the neurosurgeon's office and I was trying to help her but I had to wait for them to call me back. She's upset and said she would take herself to the ER to be admitted.

## 2023-06-15 ENCOUNTER — PATIENT OUTREACH (OUTPATIENT)
Dept: ONCOLOGY | Facility: HOSPITAL | Age: 38
End: 2023-06-15
Payer: COMMERCIAL

## 2023-06-16 ENCOUNTER — PATIENT OUTREACH (OUTPATIENT)
Dept: ONCOLOGY | Facility: HOSPITAL | Age: 38
End: 2023-06-16
Payer: COMMERCIAL

## 2023-06-19 ENCOUNTER — TELEPHONE (OUTPATIENT)
Dept: INTERNAL MEDICINE | Facility: CLINIC | Age: 38
End: 2023-06-19
Payer: COMMERCIAL

## 2023-06-20 ENCOUNTER — TELEPHONE (OUTPATIENT)
Dept: INTERNAL MEDICINE | Facility: CLINIC | Age: 38
End: 2023-06-20

## 2023-06-20 NOTE — TELEPHONE ENCOUNTER
Caller: Annita Johnson    Relationship to patient: Self    Best call back number: [phone]    Patient is needing: PATIENT CALLED IN AND IS REQUESTING A CALL BACK FROM RAFA. SHE STATED SHE JUST HAD A FEW QUESTIONS FOR HER AND WOULD LIKE A CALL BACK PLEASE

## 2023-06-22 PROBLEM — C50.919 BREAST CANCER: Status: ACTIVE | Noted: 2023-06-14

## 2023-06-22 PROBLEM — D49.2 CERVICAL SPINE TUMOR: Status: ACTIVE | Noted: 2023-06-14

## 2023-06-22 PROBLEM — C50.919 BREAST CANCER: Status: RESOLVED | Noted: 2023-06-14 | Resolved: 2023-06-22

## 2023-06-27 ENCOUNTER — TELEPHONE (OUTPATIENT)
Dept: ONCOLOGY | Facility: HOSPITAL | Age: 38
End: 2023-06-27

## 2023-06-27 NOTE — TELEPHONE ENCOUNTER
Caller: [employer]    Best call back number: [phone] OPTION 1    What is the best time to reach you: ANYTIME    Who are you requesting to speak with (clinical staff, provider,  specific staff member): CLINICAL     Do you know the name of the person who called: WALDEMAR    What was the call regarding: WALDEMAR CALLING FROM [facility] REGARDING THE MEDICAL RECORDS THAT WERE SENT TO HER. THEY NEED THE DISCHARGE DATED FOR THE PROCEDURE ON 6/6/2023 BIOPSY OF THE RIGHT BREAST.    CALL HER TO DISCUSS

## 2023-06-28 ENCOUNTER — TELEPHONE (OUTPATIENT)
Dept: INTERNAL MEDICINE | Facility: CLINIC | Age: 38
End: 2023-06-28

## 2023-06-28 ENCOUNTER — TELEPHONE (OUTPATIENT)
Dept: ONCOLOGY | Facility: OTHER | Age: 38
End: 2023-06-28

## 2023-06-28 NOTE — TELEPHONE ENCOUNTER
FYI FOR THE PATIENT:     VAISHALI A  FOR ERICA JUST REACHING OUT TO LEAVE HER CONTACT INFORMATION TO GIVE TO KEVAN IF SHE NEEDS TO REACH HER FOR ANY HEALTH CARE NEEDS.    CALL HER BACK [phone] EXT. [phone]

## 2023-06-28 NOTE — TELEPHONE ENCOUNTER
Caller: Annita Johnson    Relationship to patient: Self    Best call back number: [phone]    Requested date: AS SOON AS POSSIBLE    If rescheduling, when is the original appointment: 06/29/2023    Additional notes: PATIENT STATED THAT CAT SCAN GOT CANCELLED SINCE THEY DID NOT HAVE ORDER FOR IT. SHE IS NEEDING CALL TO RESCHEDULE THIS AND HAVE THE ORDER SUBMITTED

## 2023-06-30 NOTE — TELEPHONE ENCOUNTER
I am working on this. Waiting for a signature from Lela on the order. Everything is ready to be faxed.

## 2023-07-03 ENCOUNTER — HOSPITAL ENCOUNTER (OUTPATIENT)
Dept: RADIATION ONCOLOGY | Facility: HOSPITAL | Age: 38
Setting detail: RADIATION/ONCOLOGY SERIES
End: 2023-07-03
Payer: COMMERCIAL

## 2023-07-05 PROBLEM — C79.51 SECONDARY MALIGNANT NEOPLASM OF BONE: Status: ACTIVE | Noted: 2023-07-05

## 2023-07-05 PROCEDURE — 77263 THER RADIOLOGY TX PLNG CPLX: CPT | Performed by: RADIOLOGY

## 2023-07-06 PROCEDURE — 77334 RADIATION TREATMENT AID(S): CPT | Performed by: RADIOLOGY

## 2023-07-06 PROCEDURE — 77290 THER RAD SIMULAJ FIELD CPLX: CPT | Performed by: RADIOLOGY

## 2023-07-10 PROBLEM — F41.8 ANXIETY ABOUT HEALTH: Status: ACTIVE | Noted: 2023-07-10

## 2023-07-10 PROBLEM — R45.89 ANXIETY ABOUT HEALTH: Status: ACTIVE | Noted: 2023-07-10

## 2023-07-10 LAB
CYTO UR: NORMAL
LAB AP CASE REPORT: NORMAL
LAB AP CLINICAL INFORMATION: NORMAL
LAB AP DIAGNOSIS COMMENT: NORMAL
LAB AP SPECIAL STAINS: NORMAL
LAB AP SYNOPTIC CHECKLIST: NORMAL
PATH REPORT.ADDENDUM SPEC: NORMAL
PATH REPORT.FINAL DX SPEC: NORMAL
PATH REPORT.GROSS SPEC: NORMAL

## 2023-07-17 PROCEDURE — 77334 RADIATION TREATMENT AID(S): CPT | Performed by: RADIOLOGY

## 2023-07-17 PROCEDURE — 77300 RADIATION THERAPY DOSE PLAN: CPT | Performed by: RADIOLOGY

## 2023-07-17 PROCEDURE — 77295 3-D RADIOTHERAPY PLAN: CPT | Performed by: RADIOLOGY

## 2023-07-18 PROCEDURE — 77280 THER RAD SIMULAJ FIELD SMPL: CPT | Performed by: RADIOLOGY

## 2023-07-18 PROCEDURE — G6002 STEREOSCOPIC X-RAY GUIDANCE: HCPCS | Performed by: RADIOLOGY

## 2023-07-18 PROCEDURE — 77412 RADIATION TX DELIVERY LVL 3: CPT | Performed by: RADIOLOGY

## 2023-07-18 PROCEDURE — 77387 GUIDANCE FOR RADJ TX DLVR: CPT | Performed by: RADIOLOGY

## 2023-07-18 PROCEDURE — 77427 RADIATION TX MANAGEMENT X5: CPT | Performed by: RADIOLOGY

## 2023-07-19 PROCEDURE — 77412 RADIATION TX DELIVERY LVL 3: CPT | Performed by: RADIOLOGY

## 2023-07-19 PROCEDURE — G6002 STEREOSCOPIC X-RAY GUIDANCE: HCPCS | Performed by: RADIOLOGY

## 2023-07-19 PROCEDURE — 77387 GUIDANCE FOR RADJ TX DLVR: CPT | Performed by: RADIOLOGY

## 2023-07-20 PROCEDURE — 77387 GUIDANCE FOR RADJ TX DLVR: CPT | Performed by: RADIOLOGY

## 2023-07-20 PROCEDURE — G6002 STEREOSCOPIC X-RAY GUIDANCE: HCPCS | Performed by: RADIOLOGY

## 2023-07-20 PROCEDURE — 77412 RADIATION TX DELIVERY LVL 3: CPT | Performed by: RADIOLOGY

## 2023-07-21 PROCEDURE — G6002 STEREOSCOPIC X-RAY GUIDANCE: HCPCS | Performed by: RADIOLOGY

## 2023-07-21 PROCEDURE — 77387 GUIDANCE FOR RADJ TX DLVR: CPT | Performed by: RADIOLOGY

## 2023-07-21 PROCEDURE — 77412 RADIATION TX DELIVERY LVL 3: CPT | Performed by: RADIOLOGY

## 2023-07-24 ENCOUNTER — HOSPITAL ENCOUNTER (OUTPATIENT)
Dept: RADIATION ONCOLOGY | Facility: HOSPITAL | Age: 38
Discharge: HOME | End: 2023-07-24
Payer: COMMERCIAL

## 2023-07-24 ENCOUNTER — DOCUMENTATION (OUTPATIENT)
Dept: RADIATION ONCOLOGY | Facility: HOSPITAL | Age: 38
End: 2023-07-24
Payer: COMMERCIAL

## 2023-07-24 LAB
RAD ONC ARIA COURSE ID: NORMAL
RAD ONC ARIA COURSE INTENT: NORMAL
RAD ONC ARIA COURSE LAST TREATMENT DATE: NORMAL
RAD ONC ARIA COURSE START DATE: NORMAL
RAD ONC ARIA COURSE TREATMENT ELAPSED DAYS: 6
RAD ONC ARIA FIRST TREATMENT DATE: NORMAL
RAD ONC ARIA PLAN FRACTIONS TREATED TO DATE: 5
RAD ONC ARIA PLAN ID: NORMAL
RAD ONC ARIA PLAN PRESCRIBED DOSE PER FRACTION: 3 GY
RAD ONC ARIA PLAN PRIMARY REFERENCE POINT: NORMAL
RAD ONC ARIA PLAN TOTAL FRACTIONS PRESCRIBED: 10
RAD ONC ARIA PLAN TOTAL PRESCRIBED DOSE: 3000 CGY
RAD ONC ARIA REFERENCE POINT DOSAGE GIVEN TO DATE: 15 GY
RAD ONC ARIA REFERENCE POINT ID: NORMAL
RAD ONC ARIA REFERENCE POINT SESSION DOSAGE GIVEN: 3 GY

## 2023-07-24 PROCEDURE — 77336 RADIATION PHYSICS CONSULT: CPT | Performed by: RADIOLOGY

## 2023-07-24 PROCEDURE — G6002 STEREOSCOPIC X-RAY GUIDANCE: HCPCS | Performed by: RADIOLOGY

## 2023-07-24 PROCEDURE — 77412 RADIATION TX DELIVERY LVL 3: CPT | Performed by: RADIOLOGY

## 2023-07-24 PROCEDURE — 77387 GUIDANCE FOR RADJ TX DLVR: CPT | Performed by: RADIOLOGY

## 2023-07-24 NOTE — PROGRESS NOTES
Diagnosis: Metastatic breast cancer    Reason for Referral: Gas assistance    Content of Visit: Mrs. Johnson presented to [facility] office this afternoon to request gas assistance. My colleague, Karina PARNELL, [facility] [place] [facility] and Adrienne [facility]ir[facility]. [facility] provided Mrs. Johnson with a $15 gas card today through our Foundation while these other applications are pending. Advised she may receive up to 5 gas cards through our Foundation throughout the year and she v/u and expressed gratitude. [facility] support remains available.    Resources/Referrals Provided: $15 gas card

## 2023-07-25 ENCOUNTER — HOSPITAL ENCOUNTER (OUTPATIENT)
Dept: RADIATION ONCOLOGY | Facility: HOSPITAL | Age: 38
Discharge: HOME | End: 2023-07-25
Payer: COMMERCIAL

## 2023-07-25 ENCOUNTER — HOSPITAL ENCOUNTER (OUTPATIENT)
Dept: PET IMAGING | Facility: HOSPITAL | Age: 38
Discharge: HOME | End: 2023-07-25
Payer: COMMERCIAL

## 2023-07-25 ENCOUNTER — TELEMEDICINE (OUTPATIENT)
Dept: INTERNAL MEDICINE | Facility: CLINIC | Age: 38
End: 2023-07-25
Payer: COMMERCIAL

## 2023-07-25 DIAGNOSIS — S12.9XXA: ICD-10-CM

## 2023-07-25 DIAGNOSIS — S14.109A: ICD-10-CM

## 2023-07-25 DIAGNOSIS — F41.8 ANXIETY ABOUT HEALTH: Primary | ICD-10-CM

## 2023-07-25 DIAGNOSIS — L60.0 INGROWN TOENAIL: ICD-10-CM

## 2023-07-25 DIAGNOSIS — R93.7 ABNORMAL X-RAY OF CERVICAL SPINE: ICD-10-CM

## 2023-07-25 DIAGNOSIS — S12.9XXA CLOSED FRACTURE OF CERVICAL VERTEBRA, UNSPECIFIED CERVICAL VERTEBRAL LEVEL, INITIAL ENCOUNTER: ICD-10-CM

## 2023-07-25 LAB
RAD ONC ARIA COURSE ID: NORMAL
RAD ONC ARIA COURSE INTENT: NORMAL
RAD ONC ARIA COURSE LAST TREATMENT DATE: NORMAL
RAD ONC ARIA COURSE START DATE: NORMAL
RAD ONC ARIA COURSE TREATMENT ELAPSED DAYS: 7
RAD ONC ARIA FIRST TREATMENT DATE: NORMAL
RAD ONC ARIA PLAN FRACTIONS TREATED TO DATE: 6
RAD ONC ARIA PLAN ID: NORMAL
RAD ONC ARIA PLAN PRESCRIBED DOSE PER FRACTION: 3 GY
RAD ONC ARIA PLAN PRIMARY REFERENCE POINT: NORMAL
RAD ONC ARIA PLAN TOTAL FRACTIONS PRESCRIBED: 10
RAD ONC ARIA PLAN TOTAL PRESCRIBED DOSE: 3000 CGY
RAD ONC ARIA REFERENCE POINT DOSAGE GIVEN TO DATE: 18 GY
RAD ONC ARIA REFERENCE POINT ID: NORMAL
RAD ONC ARIA REFERENCE POINT SESSION DOSAGE GIVEN: 3 GY

## 2023-07-25 PROCEDURE — 99213 OFFICE O/P EST LOW 20 MIN: CPT

## 2023-07-25 PROCEDURE — 77387 GUIDANCE FOR RADJ TX DLVR: CPT | Performed by: RADIOLOGY

## 2023-07-25 PROCEDURE — 78815 PET IMAGE W/CT SKULL-THIGH: CPT

## 2023-07-25 PROCEDURE — 77412 RADIATION TX DELIVERY LVL 3: CPT | Performed by: RADIOLOGY

## 2023-07-25 PROCEDURE — A9552 F18 FDG: HCPCS

## 2023-07-25 PROCEDURE — 0 FLUDEOXYGLUCOSE F18 SOLUTION

## 2023-07-25 PROCEDURE — 77427 RADIATION TX MANAGEMENT X5: CPT | Performed by: RADIOLOGY

## 2023-07-25 PROCEDURE — G6002 STEREOSCOPIC X-RAY GUIDANCE: HCPCS | Performed by: RADIOLOGY

## 2023-07-25 RX ORDER — SULFAMETHOXAZOLE AND TRIMETHOPRIM 800; 160 MG/1; MG/1
1 TABLET ORAL 2 TIMES DAILY
Qty: 20 TABLET | Refills: 0 | Status: SHIPPED | OUTPATIENT
Start: 2023-07-25 | End: 2023-08-01

## 2023-07-25 RX ADMIN — FLUDEOXYGLUCOSE F18 1 DOSE: 300 INJECTION INTRAVENOUS at 08:46

## 2023-07-25 NOTE — ASSESSMENT & PLAN NOTE
She states her big toe has been inflamed and tender since the weekend. She states that it seems to have some pus coming out.  No known injury.  She denies any fever, chills or body aches.  States that she has not podiatrist but she would like to see someone here as she is unable to drive right now.  We will go ahead and put her on antibiotics while we get her in with podiatry.  Also recommend Epsom salt soaks.  Patient to let us know with any worsening symptoms.

## 2023-07-25 NOTE — PROGRESS NOTES
Mode of Visit: Video  Location of patient: home  You have chosen to receive care through a telehealth visit.  Does the patient consent to use a video/audio connection for your medical care today? Yes  The visit included audio and video interaction. No technical issues occurred during this visit.     Chief Complaint  Nail Problem (39 yo female has an ingrown toenail that's infected and inflamed. The site is oozing pus.)    Subjective          Annita Johnson presents to [facility]  History of Present Illness  With c  She does go see her neurosurgeon today.  She is hoping to get out of the neck brace.  She is doing well overall.  She is in good spirits.  Objective   Vital Signs:   There were no vitals taken for this visit.    Physical Exam   Constitutional: She appears well-developed and well-nourished.   HENT:   Head: Normocephalic.   Eyes: EOM are normal.   Neck: Neck normal appearance.  Pulmonary/Chest: Effort normal.  No respiratory distress.  Neurological: She is alert.   Skin: Skin is warm and dry.   Psychiatric: She has a normal mood and affect.   Result Review :                 Assessment and Plan    Diagnoses and all orders for this visit:    1. Anxiety about health (Primary)  Assessment & Plan:  She has not had a significant improvement since starting the Lexapro.  She feels like she is at a good dose.  She does not feel near as overwhelmed.  She is taking the Klonopin as needed, mostly prior to her radiation.  We will continue Lexapro 10 mg today.  Continue clonazepam as needed.  Report any worsening symptoms.      2. Ingrown toenail  Assessment & Plan:  She states her big toe has been inflamed and tender since the weekend. She states that it seems to have some pus coming out.  No known injury.  She denies any fever, chills or body aches.  States that she has not podiatrist but she would like to see someone here as she is unable to drive right now.  We will go ahead and  put her on antibiotics while we get her in with podiatry.  Also recommend Epsom salt soaks.  Patient to let us know with any worsening symptoms.    Orders:  -     Ambulatory Referral to Podiatry  -     sulfamethoxazole-trimethoprim (Bactrim DS) 800-160 MG per tablet; Take 1 tablet by mouth 2 (Two) Times a Day for 7 days.  Dispense: 20 tablet; Refill: 0        Follow Up   Return if symptoms worsen or fail to improve.  Patient was given instructions and counseling regarding her condition or for health maintenance advice. Please see specific information pulled into the AVS if appropriate.

## 2023-07-25 NOTE — ASSESSMENT & PLAN NOTE
She has not had a significant improvement since starting the Lexapro.  She feels like she is at a good dose.  She does not feel near as overwhelmed.  She is taking the Klonopin as needed, mostly prior to her radiation.  We will continue Lexapro 10 mg today.  Continue clonazepam as needed.  Report any worsening symptoms.

## 2023-07-27 ENCOUNTER — HOSPITAL ENCOUNTER (OUTPATIENT)
Dept: RADIATION ONCOLOGY | Facility: HOSPITAL | Age: 38
Discharge: HOME | End: 2023-07-27
Payer: COMMERCIAL

## 2023-07-27 ENCOUNTER — HOSPITAL ENCOUNTER (OUTPATIENT)
Dept: RADIATION ONCOLOGY | Facility: HOSPITAL | Age: 38
Discharge: HOME | End: 2023-07-27

## 2023-07-27 ENCOUNTER — TELEPHONE (OUTPATIENT)
Dept: RADIATION ONCOLOGY | Facility: HOSPITAL | Age: 38
End: 2023-07-27
Payer: COMMERCIAL

## 2023-07-27 VITALS
TEMPERATURE: 98.1 F | OXYGEN SATURATION: 98 % | DIASTOLIC BLOOD PRESSURE: 73 MMHG | BODY MASS INDEX: 33.09 KG/M2 | SYSTOLIC BLOOD PRESSURE: 117 MMHG | WEIGHT: 186.73 LBS | HEART RATE: 111 BPM

## 2023-07-27 DIAGNOSIS — C79.51 METASTASIS TO BONE: ICD-10-CM

## 2023-07-27 DIAGNOSIS — J02.9 SORE THROAT: ICD-10-CM

## 2023-07-27 DIAGNOSIS — C79.51 SECONDARY MALIGNANT NEOPLASM OF BONE: Primary | ICD-10-CM

## 2023-07-27 DIAGNOSIS — J02.9 SORE THROAT: Primary | ICD-10-CM

## 2023-07-27 DIAGNOSIS — C79.51 METASTASIS TO BONE: Primary | ICD-10-CM

## 2023-07-27 LAB
RAD ONC ARIA COURSE ID: NORMAL
RAD ONC ARIA COURSE INTENT: NORMAL
RAD ONC ARIA COURSE LAST TREATMENT DATE: NORMAL
RAD ONC ARIA COURSE START DATE: NORMAL
RAD ONC ARIA COURSE TREATMENT ELAPSED DAYS: 9
RAD ONC ARIA FIRST TREATMENT DATE: NORMAL
RAD ONC ARIA PLAN FRACTIONS TREATED TO DATE: 7
RAD ONC ARIA PLAN ID: NORMAL
RAD ONC ARIA PLAN PRESCRIBED DOSE PER FRACTION: 3 GY
RAD ONC ARIA PLAN PRIMARY REFERENCE POINT: NORMAL
RAD ONC ARIA PLAN TOTAL FRACTIONS PRESCRIBED: 10
RAD ONC ARIA PLAN TOTAL PRESCRIBED DOSE: 3000 CGY
RAD ONC ARIA REFERENCE POINT DOSAGE GIVEN TO DATE: 21 GY
RAD ONC ARIA REFERENCE POINT ID: NORMAL
RAD ONC ARIA REFERENCE POINT SESSION DOSAGE GIVEN: 3 GY

## 2023-07-27 PROCEDURE — 77412 RADIATION TX DELIVERY LVL 3: CPT | Performed by: RADIOLOGY

## 2023-07-27 PROCEDURE — G6002 STEREOSCOPIC X-RAY GUIDANCE: HCPCS | Performed by: RADIOLOGY

## 2023-07-27 PROCEDURE — 77387 GUIDANCE FOR RADJ TX DLVR: CPT | Performed by: RADIOLOGY

## 2023-07-27 NOTE — PROGRESS NOTES
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    She is now experiencing esophagitis and dry mouth from radiotherapy.  This is responsive to Benadryl for a brief time    Subjective      Review of Systems: Review of Systems   Constitutional:  Positive for fatigue (7/10). Negative for appetite change and unexpected weight change.   HENT:  Positive for mouth sores (feels like has a sore on the back of tongue), sore throat (sore throat started a few days ago.  Rating a 8/10 on the pain scale) and trouble swallowing (due to sore throat).         C/o dry mouth   Altered taste   Eyes:  Negative for visual disturbance.   Respiratory:  Negative for cough and shortness of breath.    Cardiovascular:  Negative for chest pain, palpitations and leg swelling.   Gastrointestinal:  Positive for nausea (occasional). Negative for anal bleeding, blood in stool, constipation (due to recent surgery, taking stool softener) and diarrhea.   Endocrine: Positive for heat intolerance.   Genitourinary:  Negative for difficulty urinating, dysuria, frequency, hematuria and urgency.   Musculoskeletal:  Positive for back pain and neck pain (controlled with medication).   Skin:  Negative for color change and rash.   Allergic/Immunologic: Negative.    Neurological:  Positive for weakness and numbness (left thigh numb since surgery). Negative for dizziness and headaches.   Hematological:  Negative.    Psychiatric/Behavioral:  Positive for sleep disturbance (trouble sleeping, can't get comfortable).      The following portions of the patient's history were reviewed and updated as appropriate: allergies, current medications, past family history, past medical history, past social history, past surgical history and problem list.    Medications:     Current Outpatient Medications:     Calcium Carbonate-Vitamin D 600-10 MG-MCG per tablet, Take 1 tablet by mouth 2 (Two) Times a Day., Disp: 60 tablet, Rfl: 5    Cholecalciferol (Vitamin D3) 50 MCG (2000 UT) tablet, Take 1 tablet by mouth Daily., Disp: , Rfl:     clonazePAM (KlonoPIN) 0.5 MG tablet, Take 1 tablet by mouth 2 (Two) Times a Day As Needed for Anxiety., Disp: 20 tablet, Rfl: 0    cyclobenzaprine (FLEXERIL) 10 MG tablet, Take 1 tablet by mouth 3 (Three) Times a Day As Needed for Muscle Spasms., Disp: 90 tablet, Rfl: 5    escitalopram (Lexapro) 10 MG tablet, Take 1 tablet by mouth Daily., Disp: 30 tablet, Rfl: 2    hydrOXYzine (ATARAX) 25 MG tablet, Take 1 tablet by mouth every night at bedtime., Disp: 30 tablet, Rfl: 2    letrozole (FEMARA) 2.5 MG tablet, Take 1 tablet by mouth Daily., Disp: 30 tablet, Rfl: 5    methylPREDNISolone (MEDROL) 4 MG dose pack, TAKE 6 TABLETS ON DAY 1 AS DIRECTED ON PACKAGE AND DECREASE BY 1 TAB EACH DAY FOR A TOTAL OF 6 DAYS, Disp: , Rfl:     Nystatin-Diphenhydramine-Hydrocortisone-Lidocaine, Take 5 mL by mouth Every 3 (Three) Hours As Needed (swish and swallow 5 ml every 3 hours as needed for sore throat). Swish and swallow every 3 hours prn pain, Disp: 300 mL, Rfl: 0    ondansetron (ZOFRAN) 8 MG tablet, Take 1 tablet by mouth 3 (Three) Times a Day As Needed for Nausea or Vomiting., Disp: 30 tablet, Rfl: 5    oxyCODONE-acetaminophen (PERCOCET) 7.5-325 MG per tablet, TAKE 1-2 TABS BY MOUTH EVERY 4 HOURS AS NEEDED FOR PAIN, Disp: , Rfl:     pantoprazole (PROTONIX) 40 MG EC tablet, Take 1 tablet by mouth Daily., Disp: 90  tablet, Rfl: 1    sennosides-docusate (PERICOLACE) 8.6-50 MG per tablet, Take 1 tablet by mouth., Disp: , Rfl:     sulfamethoxazole-trimethoprim (Bactrim DS) 800-160 MG per tablet, Take 1 tablet by mouth 2 (Two) Times a Day for 7 days., Disp: 20 tablet, Rfl: 0    TiZANidine (ZANAFLEX) 4 MG capsule, Take 1 capsule by mouth., Disp: , Rfl:     vitamin D (ERGOCALCIFEROL) 1.25 MG (82996 UT) capsule capsule, Take 1 capsule by mouth Every 7 (Seven) Days., Disp: , Rfl:     Allergies:   No Known Allergies    ECOG: (2) Ambulatory and capable of self care, unable to carry out work activity, up and about > 50% or waking hours  Quality of Life: 80 - Restricted Physical Activity     Objective     Physical Exam:   Vital Signs:   Vitals:    07/27/23 1338   BP: 117/73   Pulse: 111   Temp: 98.1 °F (36.7 °C)   TempSrc: Temporal   SpO2: 98%   Weight: 84.7 kg (186 lb 11.7 oz)   PainSc:   8   PainLoc: Throat     Body mass index is 33.09 kg/m².     Physical Exam  Constitutional:       General: She is not in acute distress.     Appearance: Normal appearance. She is normal weight. She is not toxic-appearing.   HENT:      Head: Normocephalic.   Pulmonary:      Effort: Pulmonary effort is normal. No respiratory distress.   Abdominal:      General: Abdomen is flat. There is no distension.   Skin:     General: Skin is warm and dry.   Neurological:      General: No focal deficit present.      Mental Status: She is alert and oriented to person, place, and time.      Cranial Nerves: No cranial nerve deficit.      Gait: Gait normal.   Psychiatric:         Mood and Affect: Mood normal.         Behavior: Behavior normal.         Judgment: Judgment normal.       Radiographs: CT Chest With Contrast Diagnostic    Result Date: 6/6/2023    1. 27 mm spiculated mass in the upper slightly inner right breast concerning for breast carcinoma.  Recommend correlation with diagnostic mammogram and ultrasound. 2. Multiple lytic skeletal metastasis most pronounced  in the cervical and upper thoracic spine where there are C5 and T1 compression fractures.     KATH BOBO MD       Electronically Signed and Approved By: KATH BOBO MD on 6/06/2023 at 9:09             MRI Brain With & Without Contrast    Result Date: 6/12/2023   No acute intracranial abnormality.  No evidence of intracranial metastatic disease.      KATH BOBO MD       Electronically Signed and Approved By: KATH BOBO MD on 6/12/2023 at 8:57             CT Abdomen Pelvis With Contrast    Result Date: 6/6/2023    1. Multiple lytic skeletal metastasis without pathologic fracture. 2. No acute abdominal or pelvic abnormality.     KATH BOBO MD       Electronically Signed and Approved By: KATH BOBO MD on 6/06/2023 at 9:12             Mammo Post Device Placement Right    Addendum Date: 6/8/2023    ADDENDUM:  Pathology result from biopsy performed by Dr. 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  Suspicious calcifications in the right breast are highly concerning for multicentric disease, although stereotactic biopsy was deferred due to pathologic cervical spine fractures.  Recommend oncologic/surgical management.    ERYN WALLACE MD       Electronically Signed and Approved By: ERYN WALLACE MD on 6/08/2023 at 15:09             Result Date: 6/8/2023   Technically successful ultrasound guided core biopsy of the right breast. The patient tolerated the procedure well without immediate complication. Specimens have been sent to pathology for further analysis.  Addendum will be dictated upon receiving final pathology for concordance and recommendations.     Kay Orozco M.D.       Electronically Signed and Approved By: Kay Orozco M.D. on 6/06/2023 at 16:14             NM PET/CT Skull Base to Mid Thigh    Result Date: 7/25/2023    1. FDG  avid metastatic lesions in the cervical spine, thoracic spine and pelvis in keeping with known metastatic breast malignancy.  Some lesions appear to have increased sclerosis compared to prior CT exam and may represent a component of treatment related changes. 2. No suspicious FDG avid lymph nodes or solid organ mass. 1.    LEONARDO BELLA MD       Electronically Signed and Approved By: LEONARDO BELLA MD on 7/25/2023 at 14:44             XR C Spine Ap & Lat    Result Date: 6/21/2023  Chief Complaint: Cervical pain (neck) · Date of Exam: 6/13/23 · Procedure Performed: XR C SPINE AP & LAT · Performing MD: Macario Redman MD · Impression: AP and Lat upright C-spine films in office today were independently reviewed and show wedge deformity and disc collapse at C5-6 and erosion of spinous process C3, C4 and C5. Interpreted By: Macario Redman MD    US Breast Right Limited    Result Date: 6/6/2023   Right breast:  2 cm irregular, infiltrative and hypervascular mass at the 1 o'clock position 5 cm from the nipple is highly suspicious.  Recommend ultrasound-guided core biopsy.  The patient has been scheduled for ultrasound-guided core biopsy on today's date.  Multifocal and multicentric pleomorphic calcifications within the right breast as detailed above.  A representative sampling under stereotactic biopsy is recommended however, due to reported pathologic cervical fractures , stereotactic biopsy will be deferred at this time.  This was discussed with Dr. Vanegas on 6/6/2023 at 1505 hours.  Left breast:  Benign mammogram.  Recommend routine left breast screening.  RECOMMENDATION(S):  ULTRASOUND-GUIDED CORE BIOPSY: RIGHT BREAST   BIRADS:  DIAGNOSTIC CATEGORY 5--HIGHLY SUGGESTIVE FOR MALIGNANCY.   BREAST COMPOSITION: Heterogeneously dense,which may obscure small masses.  PLEASE NOTE:  A NORMAL MAMMOGRAM DOES NOT EXCLUDE THE POSSIBILITY OF BREAST CANCER. ANY CLINICALLY SUSPICIOUS PALPABLE LUMP SHOULD BE BIOPSIED.      Kay  Ernesto REID       Electronically Signed and Approved By: Kay Orozco M.D. on 6/06/2023 at 15:28             Mammo Diagnostic Digital Tomosynthesis Bilateral With CAD    Result Date: 6/6/2023   Right breast:  2 cm irregular, infiltrative and hypervascular mass at the 1 o'clock position 5 cm from the nipple is highly suspicious.  Recommend ultrasound-guided core biopsy.  The patient has been scheduled for ultrasound-guided core biopsy on today's date.  Multifocal and multicentric pleomorphic calcifications within the right breast as detailed above.  A representative sampling under stereotactic biopsy is recommended however, due to reported pathologic cervical fractures , stereotactic biopsy will be deferred at this time.  This was discussed with Dr. Vanegas on 6/6/2023 at 1505 hours.  Left breast:  Benign mammogram.  Recommend routine left breast screening.  RECOMMENDATION(S):  ULTRASOUND-GUIDED CORE BIOPSY: RIGHT BREAST   BIRADS:  DIAGNOSTIC CATEGORY 5--HIGHLY SUGGESTIVE FOR MALIGNANCY.   BREAST COMPOSITION: Heterogeneously dense,which may obscure small masses.  PLEASE NOTE:  A NORMAL MAMMOGRAM DOES NOT EXCLUDE THE POSSIBILITY OF BREAST CANCER. ANY CLINICALLY SUSPICIOUS PALPABLE LUMP SHOULD BE BIOPSIED.      Kay Orozco M.D.       Electronically Signed and Approved By: Kay Orozco M.D. on 6/06/2023 at 15:28             US Guided Breast Biopsy With & Without Device initial    Addendum Date: 6/8/2023    ADDENDUM:  Pathology result from biopsy performed by Dr. 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  Suspicious calcifications in the right breast are highly concerning for multicentric disease, although stereotactic biopsy was deferred due to pathologic cervical spine fractures.  Recommend oncologic/surgical management.    ERYN WALLACE MD        Electronically Signed and Approved By: ERYN WALLACE MD on 6/08/2023 at 15:09             Result Date: 6/8/2023   Technically successful ultrasound guided core biopsy of the right breast. The patient tolerated the procedure well without immediate complication. Specimens have been sent to pathology for further analysis.  Addendum will be dictated upon receiving final pathology for concordance and recommendations.     Kay Orozco M.D.       Electronically Signed and Approved By: Kay Orozco M.D. on 6/06/2023 at 16:14                 Assessment / Plan      I have reviewed treatment setup notes, checked and approved the daily guidance images. I reviewed dose delivery, treatment parameters and deemed them appropriate. We plan to continue radiation therapy as prescribed.     Discussed expectation that esophagitis will progress.  Will hold treatment tomorrow and resume on 7/31/2023 in order to allow some improvement in esophagitis as Ms. Johnson takes a trip to [place].  Shalini's Magic potion prescribed    Leoncio Schwartz MD  Radiation Oncology  [facility]    This document has been signed by Leoncio Schwartz MD on July 27, 2023 14:16 EDT

## 2023-07-27 NOTE — TELEPHONE ENCOUNTER
Pt calling this morning stating that her throat is sore and the sides of her tongue feel swollen.  Pt denies any shortness of breath or trouble breathing.  I instructed pt to go to ER if her tongue and throat feel swollen, she states that it isn't that bad to where she thinks she needs to go to the ER.  I instructed pt to take benadryl po, she v/u and states that she will take a dose of benadryl for this.  Pt is due to be seen in our office today by Dr. Schwartz.  She will take benadryl and she knows to go to the ER if her symptoms do not improve or get any worse.  Pt also states that a family member that she was around this weekend has been diagnosed with Flu B; she is concerned that these symptoms could be related to that.

## 2023-07-30 ENCOUNTER — TELEPHONE (OUTPATIENT)
Dept: ONCOLOGY | Facility: HOSPITAL | Age: 38
End: 2023-07-30
Payer: COMMERCIAL

## 2023-07-30 NOTE — TELEPHONE ENCOUNTER
Patient called [facility]  who connected patient to me, patient reports she is currently in [facility] and reports pain in her mouth that is preventing her from eating as much as she would like, she reports using Shalini's Magic mouthwash prescribed Dr. Schwartz last week and states this has not improved her symptoms.  Advised patient to go to nearest ER for evaluation so someone could examine her throat as well as perform infectious work-up.  Patient reports she will be going to the ER there shortly.  Patient knows to call back if she has other questions or concerns.    Please note that portions of this note were completed with a voice recognition program.    Electronically signed by Thad Mark MD, 07/30/23, 10:10 AM EDT.

## 2023-07-31 ENCOUNTER — TELEPHONE (OUTPATIENT)
Dept: RADIATION ONCOLOGY | Facility: HOSPITAL | Age: 38
End: 2023-07-31
Payer: COMMERCIAL

## 2023-08-01 ENCOUNTER — HOSPITAL ENCOUNTER (OUTPATIENT)
Dept: RADIATION ONCOLOGY | Facility: HOSPITAL | Age: 38
Setting detail: RADIATION/ONCOLOGY SERIES
End: 2023-08-01
Payer: COMMERCIAL

## 2023-08-01 ENCOUNTER — HOSPITAL ENCOUNTER (OUTPATIENT)
Dept: RADIATION ONCOLOGY | Facility: HOSPITAL | Age: 38
Discharge: HOME | End: 2023-08-01

## 2023-08-01 ENCOUNTER — HOSPITAL ENCOUNTER (OUTPATIENT)
Dept: RADIATION ONCOLOGY | Facility: HOSPITAL | Age: 38
Discharge: HOME | End: 2023-08-01
Payer: COMMERCIAL

## 2023-08-01 VITALS
RESPIRATION RATE: 16 BRPM | BODY MASS INDEX: 32.46 KG/M2 | WEIGHT: 183.2 LBS | HEART RATE: 91 BPM | DIASTOLIC BLOOD PRESSURE: 78 MMHG | TEMPERATURE: 97.3 F | SYSTOLIC BLOOD PRESSURE: 120 MMHG | OXYGEN SATURATION: 98 %

## 2023-08-01 DIAGNOSIS — C79.51 SECONDARY MALIGNANT NEOPLASM OF BONE: Primary | ICD-10-CM

## 2023-08-01 DIAGNOSIS — J02.9 SORE THROAT: Primary | ICD-10-CM

## 2023-08-01 LAB
RAD ONC ARIA COURSE ID: NORMAL
RAD ONC ARIA COURSE INTENT: NORMAL
RAD ONC ARIA COURSE LAST TREATMENT DATE: NORMAL
RAD ONC ARIA COURSE START DATE: NORMAL
RAD ONC ARIA COURSE TREATMENT ELAPSED DAYS: 14
RAD ONC ARIA FIRST TREATMENT DATE: NORMAL
RAD ONC ARIA PLAN FRACTIONS TREATED TO DATE: 8
RAD ONC ARIA PLAN ID: NORMAL
RAD ONC ARIA PLAN PRESCRIBED DOSE PER FRACTION: 3 GY
RAD ONC ARIA PLAN PRIMARY REFERENCE POINT: NORMAL
RAD ONC ARIA PLAN TOTAL FRACTIONS PRESCRIBED: 10
RAD ONC ARIA PLAN TOTAL PRESCRIBED DOSE: 3000 CGY
RAD ONC ARIA REFERENCE POINT DOSAGE GIVEN TO DATE: 24 GY
RAD ONC ARIA REFERENCE POINT ID: NORMAL
RAD ONC ARIA REFERENCE POINT SESSION DOSAGE GIVEN: 3 GY

## 2023-08-01 PROCEDURE — 77387 GUIDANCE FOR RADJ TX DLVR: CPT | Performed by: RADIOLOGY

## 2023-08-01 PROCEDURE — 77412 RADIATION TX DELIVERY LVL 3: CPT | Performed by: RADIOLOGY

## 2023-08-01 PROCEDURE — G6002 STEREOSCOPIC X-RAY GUIDANCE: HCPCS | Performed by: RADIOLOGY

## 2023-08-01 RX ORDER — BENZONATATE 100 MG/1
100 CAPSULE ORAL 3 TIMES DAILY PRN
Qty: 40 CAPSULE | Refills: 1 | Status: SHIPPED | OUTPATIENT
Start: 2023-08-01

## 2023-08-02 ENCOUNTER — HOSPITAL ENCOUNTER (OUTPATIENT)
Dept: RADIATION ONCOLOGY | Facility: HOSPITAL | Age: 38
Discharge: HOME | End: 2023-08-02
Payer: COMMERCIAL

## 2023-08-02 DIAGNOSIS — J02.9 SORE THROAT: ICD-10-CM

## 2023-08-02 DIAGNOSIS — C79.51 METASTASIS TO BONE: ICD-10-CM

## 2023-08-02 LAB
RAD ONC ARIA COURSE ID: NORMAL
RAD ONC ARIA COURSE INTENT: NORMAL
RAD ONC ARIA COURSE LAST TREATMENT DATE: NORMAL
RAD ONC ARIA COURSE START DATE: NORMAL
RAD ONC ARIA COURSE TREATMENT ELAPSED DAYS: 15
RAD ONC ARIA FIRST TREATMENT DATE: NORMAL
RAD ONC ARIA PLAN FRACTIONS TREATED TO DATE: 9
RAD ONC ARIA PLAN ID: NORMAL
RAD ONC ARIA PLAN PRESCRIBED DOSE PER FRACTION: 3 GY
RAD ONC ARIA PLAN PRIMARY REFERENCE POINT: NORMAL
RAD ONC ARIA PLAN TOTAL FRACTIONS PRESCRIBED: 10
RAD ONC ARIA PLAN TOTAL PRESCRIBED DOSE: 3000 CGY
RAD ONC ARIA REFERENCE POINT DOSAGE GIVEN TO DATE: 27 GY
RAD ONC ARIA REFERENCE POINT ID: NORMAL
RAD ONC ARIA REFERENCE POINT SESSION DOSAGE GIVEN: 3 GY

## 2023-08-02 PROCEDURE — G6002 STEREOSCOPIC X-RAY GUIDANCE: HCPCS | Performed by: RADIOLOGY

## 2023-08-02 PROCEDURE — 77387 GUIDANCE FOR RADJ TX DLVR: CPT | Performed by: RADIOLOGY

## 2023-08-02 PROCEDURE — 77412 RADIATION TX DELIVERY LVL 3: CPT | Performed by: RADIOLOGY

## 2023-08-03 ENCOUNTER — HOSPITAL ENCOUNTER (OUTPATIENT)
Dept: RADIATION ONCOLOGY | Facility: HOSPITAL | Age: 38
Discharge: HOME | End: 2023-08-03
Payer: COMMERCIAL

## 2023-08-03 ENCOUNTER — HOSPITAL ENCOUNTER (OUTPATIENT)
Dept: ONCOLOGY | Facility: HOSPITAL | Age: 38
Discharge: HOME | End: 2023-08-03
Admitting: INTERNAL MEDICINE
Payer: COMMERCIAL

## 2023-08-03 ENCOUNTER — OFFICE VISIT (OUTPATIENT)
Dept: ONCOLOGY | Facility: HOSPITAL | Age: 38
End: 2023-08-03
Payer: COMMERCIAL

## 2023-08-03 VITALS
BODY MASS INDEX: 32.89 KG/M2 | HEIGHT: 63 IN | RESPIRATION RATE: 16 BRPM | OXYGEN SATURATION: 98 % | SYSTOLIC BLOOD PRESSURE: 114 MMHG | TEMPERATURE: 97.8 F | HEART RATE: 87 BPM | DIASTOLIC BLOOD PRESSURE: 67 MMHG | WEIGHT: 185.63 LBS

## 2023-08-03 VITALS
BODY MASS INDEX: 32.89 KG/M2 | OXYGEN SATURATION: 98 % | HEIGHT: 63 IN | WEIGHT: 185.63 LBS | TEMPERATURE: 97.8 F | DIASTOLIC BLOOD PRESSURE: 67 MMHG | SYSTOLIC BLOOD PRESSURE: 114 MMHG | RESPIRATION RATE: 16 BRPM | HEART RATE: 87 BPM

## 2023-08-03 DIAGNOSIS — C79.51 METASTASIS TO BONE: ICD-10-CM

## 2023-08-03 DIAGNOSIS — J02.9 SORE THROAT: ICD-10-CM

## 2023-08-03 DIAGNOSIS — C50.211 MALIGNANT NEOPLASM OF UPPER-INNER QUADRANT OF RIGHT BREAST IN FEMALE, ESTROGEN RECEPTOR POSITIVE: Primary | ICD-10-CM

## 2023-08-03 DIAGNOSIS — Z17.0 MALIGNANT NEOPLASM OF UPPER-INNER QUADRANT OF RIGHT BREAST IN FEMALE, ESTROGEN RECEPTOR POSITIVE: Primary | ICD-10-CM

## 2023-08-03 LAB
ALBUMIN SERPL-MCNC: 4.3 G/DL (ref 3.5–5.2)
ALBUMIN/GLOB SERPL: 1.3 G/DL
ALP SERPL-CCNC: 122 U/L (ref 39–117)
ALT SERPL W P-5'-P-CCNC: 41 U/L (ref 1–33)
ANION GAP SERPL CALCULATED.3IONS-SCNC: 11.7 MMOL/L (ref 5–15)
AST SERPL-CCNC: 24 U/L (ref 1–32)
BASOPHILS # BLD AUTO: 0.04 10*3/MM3 (ref 0–0.2)
BASOPHILS NFR BLD AUTO: 0.5 % (ref 0–1.5)
BILIRUB SERPL-MCNC: 0.2 MG/DL (ref 0–1.2)
BUN SERPL-MCNC: 15 MG/DL (ref 6–20)
BUN/CREAT SERPL: 21.1 (ref 7–25)
CALCIUM SPEC-SCNC: 9.5 MG/DL (ref 8.6–10.5)
CHLORIDE SERPL-SCNC: 101 MMOL/L (ref 98–107)
CO2 SERPL-SCNC: 25.3 MMOL/L (ref 22–29)
CREAT SERPL-MCNC: 0.71 MG/DL (ref 0.57–1)
DEPRECATED RDW RBC AUTO: 50.7 FL (ref 37–54)
EGFRCR SERPLBLD CKD-EPI 2021: 112.5 ML/MIN/1.73
EOSINOPHIL # BLD AUTO: 0.22 10*3/MM3 (ref 0–0.4)
EOSINOPHIL NFR BLD AUTO: 3 % (ref 0.3–6.2)
ERYTHROCYTE [DISTWIDTH] IN BLOOD BY AUTOMATED COUNT: 15.4 % (ref 12.3–15.4)
GLOBULIN UR ELPH-MCNC: 3.2 GM/DL
GLUCOSE SERPL-MCNC: 91 MG/DL (ref 65–99)
HCT VFR BLD AUTO: 35.5 % (ref 34–46.6)
HGB BLD-MCNC: 11.3 G/DL (ref 12–15.9)
IMM GRANULOCYTES # BLD AUTO: 0.02 10*3/MM3 (ref 0–0.05)
IMM GRANULOCYTES NFR BLD AUTO: 0.3 % (ref 0–0.5)
LYMPHOCYTES # BLD AUTO: 0.72 10*3/MM3 (ref 0.7–3.1)
LYMPHOCYTES NFR BLD AUTO: 9.9 % (ref 19.6–45.3)
MCH RBC QN AUTO: 28.9 PG (ref 26.6–33)
MCHC RBC AUTO-ENTMCNC: 31.8 G/DL (ref 31.5–35.7)
MCV RBC AUTO: 90.8 FL (ref 79–97)
MONOCYTES # BLD AUTO: 0.86 10*3/MM3 (ref 0.1–0.9)
MONOCYTES NFR BLD AUTO: 11.8 % (ref 5–12)
NEUTROPHILS NFR BLD AUTO: 5.42 10*3/MM3 (ref 1.7–7)
NEUTROPHILS NFR BLD AUTO: 74.5 % (ref 42.7–76)
NRBC BLD AUTO-RTO: 0 /100 WBC (ref 0–0.2)
PLATELET # BLD AUTO: 323 10*3/MM3 (ref 140–450)
PMV BLD AUTO: 9.3 FL (ref 6–12)
POTASSIUM SERPL-SCNC: 4.4 MMOL/L (ref 3.5–5.2)
PROT SERPL-MCNC: 7.5 G/DL (ref 6–8.5)
RAD ONC ARIA COURSE ID: NORMAL
RAD ONC ARIA COURSE INTENT: NORMAL
RAD ONC ARIA COURSE LAST TREATMENT DATE: NORMAL
RAD ONC ARIA COURSE START DATE: NORMAL
RAD ONC ARIA COURSE TREATMENT ELAPSED DAYS: 16
RAD ONC ARIA FIRST TREATMENT DATE: NORMAL
RAD ONC ARIA PLAN FRACTIONS TREATED TO DATE: 10
RAD ONC ARIA PLAN ID: NORMAL
RAD ONC ARIA PLAN PRESCRIBED DOSE PER FRACTION: 3 GY
RAD ONC ARIA PLAN PRIMARY REFERENCE POINT: NORMAL
RAD ONC ARIA PLAN TOTAL FRACTIONS PRESCRIBED: 10
RAD ONC ARIA PLAN TOTAL PRESCRIBED DOSE: 3000 CGY
RAD ONC ARIA REFERENCE POINT DOSAGE GIVEN TO DATE: 30 GY
RAD ONC ARIA REFERENCE POINT ID: NORMAL
RAD ONC ARIA REFERENCE POINT SESSION DOSAGE GIVEN: 3 GY
RBC # BLD AUTO: 3.91 10*6/MM3 (ref 3.77–5.28)
SODIUM SERPL-SCNC: 138 MMOL/L (ref 136–145)
WBC NRBC COR # BLD: 7.28 10*3/MM3 (ref 3.4–10.8)

## 2023-08-03 PROCEDURE — 80053 COMPREHEN METABOLIC PANEL: CPT | Performed by: INTERNAL MEDICINE

## 2023-08-03 PROCEDURE — 77387 GUIDANCE FOR RADJ TX DLVR: CPT | Performed by: RADIOLOGY

## 2023-08-03 PROCEDURE — 96375 TX/PRO/DX INJ NEW DRUG ADDON: CPT

## 2023-08-03 PROCEDURE — G6002 STEREOSCOPIC X-RAY GUIDANCE: HCPCS | Performed by: RADIOLOGY

## 2023-08-03 PROCEDURE — 85025 COMPLETE CBC W/AUTO DIFF WBC: CPT | Performed by: INTERNAL MEDICINE

## 2023-08-03 PROCEDURE — 25010000002 DIPHENHYDRAMINE PER 50 MG: Performed by: INTERNAL MEDICINE

## 2023-08-03 PROCEDURE — 77336 RADIATION PHYSICS CONSULT: CPT | Performed by: RADIOLOGY

## 2023-08-03 PROCEDURE — 96413 CHEMO IV INFUSION 1 HR: CPT

## 2023-08-03 PROCEDURE — 25010000002 TRASTUZUMAB PER 10 MG: Performed by: INTERNAL MEDICINE

## 2023-08-03 PROCEDURE — 77412 RADIATION TX DELIVERY LVL 3: CPT | Performed by: RADIOLOGY

## 2023-08-03 RX ORDER — SODIUM CHLORIDE 9 MG/ML
250 INJECTION, SOLUTION INTRAVENOUS ONCE
OUTPATIENT
Start: 2023-08-24

## 2023-08-03 RX ORDER — ACETAMINOPHEN 325 MG/1
650 TABLET ORAL ONCE
OUTPATIENT
Start: 2023-08-24

## 2023-08-03 RX ORDER — SODIUM CHLORIDE 9 MG/ML
250 INJECTION, SOLUTION INTRAVENOUS ONCE
Status: COMPLETED | OUTPATIENT
Start: 2023-08-03 | End: 2023-08-03

## 2023-08-03 RX ORDER — HYDROXYZINE 50 MG/1
50 TABLET, FILM COATED ORAL
Qty: 90 TABLET | Refills: 1 | Status: SHIPPED | OUTPATIENT
Start: 2023-08-03

## 2023-08-03 RX ORDER — ACETAMINOPHEN 325 MG/1
650 TABLET ORAL ONCE
Status: COMPLETED | OUTPATIENT
Start: 2023-08-03 | End: 2023-08-03

## 2023-08-03 RX ADMIN — TRASTUZUMAB 530 MG: 150 INJECTION, POWDER, LYOPHILIZED, FOR SOLUTION INTRAVENOUS at 11:23

## 2023-08-03 RX ADMIN — DIPHENHYDRAMINE HYDROCHLORIDE 25 MG: 50 INJECTION, SOLUTION INTRAMUSCULAR; INTRAVENOUS at 11:02

## 2023-08-03 RX ADMIN — ACETAMINOPHEN 650 MG: 325 TABLET ORAL at 11:01

## 2023-08-03 RX ADMIN — SODIUM CHLORIDE 250 ML: 9 INJECTION, SOLUTION INTRAVENOUS at 10:57

## 2023-08-04 ENCOUNTER — HOSPITAL ENCOUNTER (OUTPATIENT)
Dept: ONCOLOGY | Facility: HOSPITAL | Age: 38
Discharge: HOME | End: 2023-08-04
Admitting: INTERNAL MEDICINE
Payer: COMMERCIAL

## 2023-08-04 VITALS
DIASTOLIC BLOOD PRESSURE: 80 MMHG | OXYGEN SATURATION: 99 % | HEART RATE: 112 BPM | RESPIRATION RATE: 18 BRPM | TEMPERATURE: 97.2 F | SYSTOLIC BLOOD PRESSURE: 117 MMHG

## 2023-08-04 DIAGNOSIS — Z17.0 MALIGNANT NEOPLASM OF UPPER-INNER QUADRANT OF RIGHT BREAST IN FEMALE, ESTROGEN RECEPTOR POSITIVE: Primary | ICD-10-CM

## 2023-08-04 DIAGNOSIS — C50.211 MALIGNANT NEOPLASM OF UPPER-INNER QUADRANT OF RIGHT BREAST IN FEMALE, ESTROGEN RECEPTOR POSITIVE: Primary | ICD-10-CM

## 2023-08-04 PROCEDURE — 25010000002 LEUPROLIDE PER 3.75 MG: Performed by: INTERNAL MEDICINE

## 2023-08-04 PROCEDURE — 96402 CHEMO HORMON ANTINEOPL SQ/IM: CPT

## 2023-08-04 RX ADMIN — LEUPROLIDE ACETATE 3.75 MG: KIT at 13:58

## 2023-08-10 ENCOUNTER — OFFICE VISIT (OUTPATIENT)
Dept: PODIATRY | Facility: CLINIC | Age: 38
End: 2023-08-10
Payer: COMMERCIAL

## 2023-08-10 VITALS
WEIGHT: 176 LBS | DIASTOLIC BLOOD PRESSURE: 82 MMHG | OXYGEN SATURATION: 96 % | SYSTOLIC BLOOD PRESSURE: 132 MMHG | HEART RATE: 120 BPM | BODY MASS INDEX: 31.18 KG/M2 | HEIGHT: 63 IN | TEMPERATURE: 97.8 F

## 2023-08-10 DIAGNOSIS — M79.671 FOOT PAIN, RIGHT: ICD-10-CM

## 2023-08-10 DIAGNOSIS — L60.8 PINCER NAIL DEFORMITY: ICD-10-CM

## 2023-08-10 DIAGNOSIS — L60.0 ONYCHOCRYPTOSIS: Primary | ICD-10-CM

## 2023-08-10 DIAGNOSIS — L03.031 PARONYCHIA OF TOE OF RIGHT FOOT: ICD-10-CM

## 2023-08-10 RX ORDER — OXYCODONE AND ACETAMINOPHEN 10; 325 MG/1; MG/1
TABLET ORAL
COMMUNITY
Start: 2023-08-06

## 2023-08-10 NOTE — PROGRESS NOTES
[facility]    Today's Date: 08/10/23    Patient Name: Annita Johnson  MRN: [id number]  CSN: [id number]  PCP: Lela Vanegas APRN  Referring Provider: Lela Vanegas APRN    SUBJECTIVE     Chief Complaint   Patient presents with    Right Foot - Ingrown Toenail, Establish Care     Onset redness and drainage a few weeks ago  called PCP RX antibiotics and referred here      HPI: Annita Johnson, a 38 y.o.female, comes to clinic.    New, Established, New Problem: New  Location: Lateral borders of right first toe  Duration:   Over the past few weeks  Onset:  Gradual  Nature:  sore with palpation.  Stable, worsening, improving:   Patient states the redness has improved with p.o. antibiotics.  Aggravating factors:  Pain with shoe gear and ambulation.    Patient currently being treated for breast cancer with bone metastasis.    Patient denies any fevers, chills, nausea, vomiting, shortness of breath, nor any other constitutional signs nor symptoms.       Past Medical History:   Diagnosis Date    Anemia     Breast cancer     Cancer, metastatic to bone     Kidney stone     Kidney stones     Metastasis to bone 2023     Past Surgical History:   Procedure Laterality Date    BREAST BIOPSY       SECTION N/A 2021    Procedure:  SECTION PRIMARY;  Surgeon: Zoe Dawson MD;  Location: [facility] LABOR DELIVERY;  Service: Obstetrics/Gynecology;  Laterality: N/A;    CYSTOSCOPY BLADDER STONE LITHOTRIPSY       Family History   Problem Relation Age of Onset    Mental illness Mother     Hypertension Father     Alcohol abuse Father     Ovarian cancer Paternal Aunt     Breast cancer Maternal Great-Grandmother      Social History     Socioeconomic History    Marital status:      Spouse name: Janet   Tobacco Use    Smoking status: Some Days     Packs/day: 0.25     Years: 10.00     Pack years: 2.50     Types: Cigarettes     Start date: 2021     Passive exposure: Current     Smokeless tobacco: Never   Vaping Use    Vaping Use: Never used   Substance and Sexual Activity    Alcohol use: Yes     Alcohol/week: 6.0 standard drinks     Types: 3 Glasses of wine, 3 Drinks containing 0.5 oz of alcohol per week    Drug use: Not Currently     Types: Marijuana     Comment: for pain control    Sexual activity: Yes     Partners: Male     Birth control/protection: Same-sex partner     No Known Allergies  Current Outpatient Medications   Medication Sig Dispense Refill    benzonatate (Tessalon Perles) 100 MG capsule Take 1 capsule by mouth 3 (Three) Times a Day As Needed for Cough. 40 capsule 1    Calcium Carbonate-Vitamin D 600-10 MG-MCG per tablet Take 1 tablet by mouth 2 (Two) Times a Day. 60 tablet 5    Calcium Citrate 333 MG tablet Take 333 mg by mouth 3 (Three) Times a Day.      Cholecalciferol (Vitamin D3) 50 MCG (2000 UT) tablet Take 1 tablet by mouth Daily.      clonazePAM (KlonoPIN) 0.5 MG tablet Take 1 tablet by mouth 2 (Two) Times a Day As Needed for Anxiety. 20 tablet 0    cyclobenzaprine (FLEXERIL) 10 MG tablet Take 1 tablet by mouth 3 (Three) Times a Day As Needed for Muscle Spasms. 90 tablet 5    escitalopram (Lexapro) 10 MG tablet Take 1 tablet by mouth Daily. 30 tablet 2    hydrOXYzine (ATARAX) 50 MG tablet Take 1 tablet by mouth every night at bedtime. 90 tablet 1    letrozole (FEMARA) 2.5 MG tablet Take 1 tablet by mouth Daily. 30 tablet 5    methylPREDNISolone (MEDROL) 4 MG dose pack TAKE 6 TABLETS ON DAY 1 AS DIRECTED ON PACKAGE AND DECREASE BY 1 TAB EACH DAY FOR A TOTAL OF 6 DAYS      nystatin (MYCOSTATIN) 100,000 unit/mL suspension       Nystatin-Diphenhydramine-Hydrocortisone-Lidocaine Take 5 mL by mouth Every 3 (Three) Hours As Needed (swish and swallow 5 ml every 3 hours as needed for sore throat). Swish and swallow every 3 hours prn pain 300 mL 0    ondansetron (ZOFRAN) 8 MG tablet Take 1 tablet by mouth 3 (Three) Times a Day As Needed for Nausea or Vomiting. 30 tablet 5     oxyCODONE-acetaminophen (PERCOCET)  MG per tablet TAKE 1 TABLET BY MOUTH EVERY 4 HOURS AS NEEDED FOR PAIN FOR UP TO 7 DAYS. MAX DAILY AMOUNT:6 TABLETS      oxyCODONE-acetaminophen (PERCOCET) 7.5-325 MG per tablet TAKE 1-2 TABS BY MOUTH EVERY 4 HOURS AS NEEDED FOR PAIN      pantoprazole (PROTONIX) 40 MG EC tablet Take 1 tablet by mouth Daily. 90 tablet 1    ribociclib succinate 200 MG tablet therapy pack tablet       sennosides-docusate (PERICOLACE) 8.6-50 MG per tablet Take 1 tablet by mouth.      TiZANidine (ZANAFLEX) 4 MG capsule Take 1 capsule by mouth.      vitamin D (ERGOCALCIFEROL) 1.25 MG (47236 UT) capsule capsule Take 1 capsule by mouth Every 7 (Seven) Days.       No current facility-administered medications for this visit.     Review of Systems   Constitutional: Negative.    Skin:         Painful Right in-grown toenail   All other systems reviewed and are negative.    OBJECTIVE     Vitals:    08/10/23 1110   BP: 132/82   Pulse: 120   Temp: 97.8 øF (36.6 øC)   SpO2: 96%       PHYSICAL EXAM     Foot/Ankle Exam    GENERAL  Appearance:  appears stated age  Orientation:  AAOx3  Affect:  appropriate  Gait:  unimpaired  Assistance:  independent  Right shoe gear: casual shoe  Left shoe gear: casual shoe    VASCULAR     Right Foot Vascularity   Normal vascular exam    Dorsalis pedis:  2+  Posterior tibial:  2+  Skin temperature:  warm  Edema grading:  None  CFT:  < 3 seconds  Pedal hair growth:  Present  Varicosities:  none     Left Foot Vascularity   Normal vascular exam    Dorsalis pedis:  2+  Posterior tibial:  2+  Skin temperature:  warm  Edema grading:  None  CFT:  < 3 seconds  Pedal hair growth:  Present  Varicosities:  none     NEUROLOGIC     Right Foot Neurologic   Normal sensation    Light touch sensation: normal  Vibratory sensation: normal  Hot/Cold sensation: normal     Left Foot Neurologic   Normal sensation    Light touch sensation: normal  Vibratory sensation: normal  Hot/Cold sensation:   normal    MUSCULOSKELETAL     Right Foot Musculoskeletal   Tenderness:  toe 1 tenderness      MUSCLE STRENGTH     Right Foot Muscle Strength   Foot dorsiflexion:  4  Foot plantar flexion:  4  Foot inversion:  4  Foot eversion:  4     Left Foot Muscle Strength   Foot dorsiflexion:  4  Foot plantar flexion:  4  Foot inversion:  4  Foot eversion:  4    RANGE OF MOTION     Right Foot Range of Motion   Foot and ankle ROM within normal limits       Left Foot Range of Motion   Foot and ankle ROM within normal limits      DERMATOLOGIC      Right Foot Dermatologic   Skin  Right foot skin is intact.   Nails  1.  Positive for ingrown toenail and pincer. (Medial and lateral borders)  2.  Positive for pincer nail.  3.  Positive for pincer.  4.  Positive for pincer.  5.  Positive for pincer.  Nails comment:  Right 1st bilateral nail borders     Left Foot Dermatologic   Skin  Left foot skin is intact.   Nails  1.  Positive for pincer.  2.  Positive for pincer.  3.  Positive for pincer.  4.  Positive for pincer.  5.  Positive for pincer.    ASSESSMENT/PLAN     Diagnoses and all orders for this visit:    1. Onychocryptosis (Primary)    2. Paronychia of toe of right foot    3. Foot pain, right    4. Pincer nail deformity    Other orders  -     [facility]ED - LABS      Comprehensive lower extremity examination and evaluation was performed.    Discussed findings and treatment plan including risks, benefits, and treatment options with patient in detail. Patient agreed with treatment plan.    Phenol and Alcohol Chemical Matrixectomy Procedure - This procedure is indicated for onychocryptosis of the right first medial and lateral nail border(s). Indications, risks and benefits and alternative treatments have been discussed with this patient who has agreed to this procedure. The area was sterilely prepped with a povidone-iodine solution. The affected area was locally anesthetized with 3 ml, of lidocaine 1%. The offending nail plate was  completely excised.  Next 3 applications of 89% phenol were applied to the matrix area x 30 seconds followed by irrigation with copious amounts of isopropyl alcohol.  A sterile dressing was applied. The patient tolerated the procedure well.     Patient was given post procedure care instructions.  The patient states understanding and agreement with this plan.    An After Visit Summary was printed and given to the patient at discharge, including (if requested) any available informative/educational handouts regarding diagnosis, treatment, or medications. All questions were answered to patient/family satisfaction. Should symptoms fail to improve or worsen they agree to call or return to clinic or to go to the Emergency Department. Discussed the importance of following up with any needed screening tests/labs/specialist appointments and any requested follow-up recommended by me today. Importance of maintaining follow-up discussed and patient accepts that missed appointments can delay diagnosis and potentially lead to worsening of conditions.    Return for Post-Procedure., or sooner if acute issues arise.    This document has been electronically signed by Jose Cruz Mcdonough DPM on August 10, 2023 12:58 EDT

## 2023-08-10 NOTE — LETTER
August 10, 2023       No Recipients    Patient: Annita Johnson   YOB: 1985   Date of Visit: 8/10/2023       Dear LÁZARO Fonseca:    Thank you for referring Annita Johnson to me for evaluation. Below are the relevant portions of my assessment and plan of care.    Encounter Diagnosis and Orders:  Diagnoses and all orders for this visit:    1. Onychocryptosis (Primary)    2. Paronychia of toe of right foot    3. Foot pain, right    4. Pincer nail deformity    Other orders  -     [facility] - LABS        If you have questions, please do not hesitate to call me. I look forward to following Annita along with you.         Sincerely,        Jose Cruz Mcdonough DPM        CC:   No Recipients

## 2023-08-18 ENCOUNTER — OFFICE VISIT (OUTPATIENT)
Dept: RADIATION ONCOLOGY | Facility: HOSPITAL | Age: 38
End: 2023-08-18
Payer: COMMERCIAL

## 2023-08-18 VITALS
BODY MASS INDEX: 32.89 KG/M2 | TEMPERATURE: 97.7 F | HEART RATE: 85 BPM | OXYGEN SATURATION: 98 % | WEIGHT: 185.63 LBS | SYSTOLIC BLOOD PRESSURE: 120 MMHG | DIASTOLIC BLOOD PRESSURE: 72 MMHG

## 2023-08-18 DIAGNOSIS — C79.51 SECONDARY MALIGNANT NEOPLASM OF BONE: Primary | ICD-10-CM

## 2023-08-18 NOTE — PROGRESS NOTES
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  She has experienced marked improvement of esophagitis and is now eating foods of all consistencies.  Her energy level is back to normal and she is occasionally working some jobs.  She has some persistent lower extremity numbness that has not progressed.    Subjective      Review of Systems: Review of Systems   Constitutional:  Positive for fatigue (7/10). Negative for appetite change and unexpected weight change.   HENT:  Positive for sore throat (states this is about 95% better; just occasionally.), trouble swallowing (states that this is just occasional, this is improving) and voice change (Ras[place]). Negative for hearing loss and mouth sores.         C/o dry mouth  and Altered taste; states that this is getting worse      Eyes:  Negative for visual disturbance.   Respiratory:  Positive for cough (Noted at night, non productive.). Negative for shortness of breath.    Cardiovascular:  Negative for chest pain, palpitations and leg swelling.   Gastrointestinal:  Negative for anal bleeding, blood in stool, constipation, diarrhea and nausea.   Endocrine: Positive for heat intolerance (since starting letrazole).   Genitourinary:  Negative for difficulty urinating, dysuria, frequency, hematuria  and urgency.   Musculoskeletal:  Positive for back pain (Lower back, occasional, ongoing) and neck stiffness. Negative for neck pain.   Skin:  Negative for color change and rash.   Allergic/Immunologic: Negative.    Neurological:  Positive for numbness (left thigh numb since surgery). Negative for dizziness, weakness and headaches.   Psychiatric/Behavioral:  Positive for sleep disturbance (trouble sleeping, can't get comfortable.  Improved).      The following portions of the patient's history were reviewed and updated as appropriate: allergies, current medications, past family history, past medical history, past social history, past surgical history and problem list.    Medications:     Current Outpatient Medications:     benzonatate (Tessalon Perles) 100 MG capsule, Take 1 capsule by mouth 3 (Three) Times a Day As Needed for Cough., Disp: 40 capsule, Rfl: 1    Calcium Carbonate-Vitamin D 600-10 MG-MCG per tablet, Take 1 tablet by mouth 2 (Two) Times a Day., Disp: 60 tablet, Rfl: 5    clonazePAM (KlonoPIN) 0.5 MG tablet, Take 1 tablet by mouth 2 (Two) Times a Day As Needed for Anxiety., Disp: 20 tablet, Rfl: 0    cyclobenzaprine (FLEXERIL) 10 MG tablet, Take 1 tablet by mouth 3 (Three) Times a Day As Needed for Muscle Spasms., Disp: 90 tablet, Rfl: 5    escitalopram (Lexapro) 10 MG tablet, Take 1 tablet by mouth Daily., Disp: 30 tablet, Rfl: 2    hydrOXYzine (ATARAX) 50 MG tablet, Take 1 tablet by mouth every night at bedtime., Disp: 90 tablet, Rfl: 1    letrozole (FEMARA) 2.5 MG tablet, Take 1 tablet by mouth Daily., Disp: 30 tablet, Rfl: 5    ondansetron (ZOFRAN) 8 MG tablet, Take 1 tablet by mouth 3 (Three) Times a Day As Needed for Nausea or Vomiting., Disp: 30 tablet, Rfl: 5    oxyCODONE-acetaminophen (PERCOCET)  MG per tablet, TAKE 1 TABLET BY MOUTH EVERY 4 HOURS AS NEEDED FOR PAIN FOR UP TO 7 DAYS. MAX DAILY AMOUNT:6 TABLETS, Disp: , Rfl:     oxyCODONE-acetaminophen (PERCOCET) 7.5-325 MG per tablet,  TAKE 1-2 TABS BY MOUTH EVERY 4 HOURS AS NEEDED FOR PAIN, Disp: , Rfl:     pantoprazole (PROTONIX) 40 MG EC tablet, Take 1 tablet by mouth Daily., Disp: 90 tablet, Rfl: 1    sennosides-docusate (PERICOLACE) 8.6-50 MG per tablet, Take 1 tablet by mouth., Disp: , Rfl:     Calcium Citrate 333 MG tablet, Take 333 mg by mouth 3 (Three) Times a Day. (Patient not taking: Reported on 8/18/2023), Disp: , Rfl:     Cholecalciferol (Vitamin D3) 50 MCG (2000 UT) tablet, Take 1 tablet by mouth Daily. (Patient not taking: Reported on 8/18/2023), Disp: , Rfl:     methylPREDNISolone (MEDROL) 4 MG dose pack, TAKE 6 TABLETS ON DAY 1 AS DIRECTED ON PACKAGE AND DECREASE BY 1 TAB EACH DAY FOR A TOTAL OF 6 DAYS (Patient not taking: Reported on 8/18/2023), Disp: , Rfl:     nystatin (MYCOSTATIN) 100,000 unit/mL suspension, , Disp: , Rfl:     Nystatin-Diphenhydramine-Hydrocortisone-Lidocaine, Take 5 mL by mouth Every 3 (Three) Hours As Needed (swish and swallow 5 ml every 3 hours as needed for sore throat). Swish and swallow every 3 hours prn pain (Patient not taking: Reported on 8/18/2023), Disp: 300 mL, Rfl: 0    ribociclib succinate 200 MG tablet therapy pack tablet, , Disp: , Rfl:     TiZANidine (ZANAFLEX) 4 MG capsule, Take 1 capsule by mouth. (Patient not taking: Reported on 8/18/2023), Disp: , Rfl:     vitamin D (ERGOCALCIFEROL) 1.25 MG (00340 UT) capsule capsule, Take 1 capsule by mouth Every 7 (Seven) Days. (Patient not taking: Reported on 8/18/2023), Disp: , Rfl:     Allergies:   No Known Allergies    ECOG: (0) Fully active, able to carry on all predisease performance without restriction  Quality of Life: 100 - Full Activity     Objective     Physical Exam:   Vital Signs:   Vitals:    08/18/23 1442   BP: 120/72   Pulse: 85   Temp: 97.7 øF (36.5 øC)   TempSrc: Temporal   SpO2: 98%   Weight: 84.2 kg (185 lb 10 oz)   PainSc:   4   PainLoc: Back     Body mass index is 32.89 kg/mý.     Physical Exam  Constitutional:       General: She is  not in acute distress.     Appearance: Normal appearance. She is not ill-appearing, toxic-appearing or diaphoretic.   HENT:      Head: Normocephalic and atraumatic.   Pulmonary:      Effort: Pulmonary effort is normal. No respiratory distress.   Skin:     General: Skin is warm and dry.   Neurological:      General: No focal deficit present.      Mental Status: She is alert and oriented to person, place, and time.      Cranial Nerves: No cranial nerve deficit.      Gait: Gait normal.   Psychiatric:         Mood and Affect: Mood normal.         Behavior: Behavior normal.         Judgment: Judgment normal.       Radiographs: CT Chest With Contrast Diagnostic    Result Date: 6/6/2023    1. 27 mm spiculated mass in the upper slightly inner right breast concerning for breast carcinoma.  Recommend correlation with diagnostic mammogram and ultrasound. 2. Multiple lytic skeletal metastasis most pronounced in the cervical and upper thoracic spine where there are C5 and T1 compression fractures.     KATH BOBO MD       Electronically Signed and Approved By: KATH BOBO MD on 6/06/2023 at 9:09             MRI Brain With & Without Contrast    Result Date: 6/12/2023   No acute intracranial abnormality.  No evidence of intracranial metastatic disease.      KATH BOBO MD       Electronically Signed and Approved By: KATH BOBO MD on 6/12/2023 at 8:57             CT Abdomen Pelvis With Contrast    Result Date: 6/6/2023    1. Multiple lytic skeletal metastasis without pathologic fracture. 2. No acute abdominal or pelvic abnormality.     KATH BOBO MD       Electronically Signed and Approved By: KATH BOBO MD on 6/06/2023 at 9:12             Mammo Post Device Placement Right    Addendum Date: 6/8/2023    ADDENDUM:  Pathology result from biopsy performed by Dr. Orozco is available and shows the following: Invasive carcinoma of no special type (ductal) Intermediate grade DCIS  This result is concordant with  imaging finding of suspicious solid mass.  Suspicious calcifications in the right breast are highly concerning for multicentric disease, although stereotactic biopsy was deferred due to pathologic cervical spine fractures.  Recommend oncologic/surgical management.    ERYN WALLACE MD       Electronically Signed and Approved By: ERYN WALLACE MD on 6/08/2023 at 15:09             Result Date: 6/8/2023   Technically successful ultrasound guided core biopsy of the right breast. The patient tolerated the procedure well without immediate complication. Specimens have been sent to pathology for further analysis.  Addendum will be dictated upon receiving final pathology for concordance and recommendations.     Kay Orozco M.D.       Electronically Signed and Approved By: Kay Orozco M.D. on 6/06/2023 at 16:14             NM PET/CT Skull Base to Mid Thigh    Result Date: 7/25/2023    1. FDG avid metastatic lesions in the cervical spine, thoracic spine and pelvis in keeping with known metastatic breast malignancy.  Some lesions appear to have increased sclerosis compared to prior CT exam and may represent a component of treatment related changes. 2. No suspicious FDG avid lymph nodes or solid organ mass. 1.    LEONARDO BLELA MD       Electronically Signed and Approved By: LEONARDO BELLA MD on 7/25/2023 at 14:44             XR C Spine Ap & Lat    Result Date: 6/21/2023  Chief Complaint: Cervical pain (neck) ú Date of Exam: 6/13/23 ú Procedure Performed: XR C SPINE AP & LAT ú Performing MD: Macario Redman MD ú Impression: AP and Lat upright C-spine films in office today were independently reviewed and show wedge deformity and disc collapse at C5-6 and erosion of spinous process C3, C4 and C5. Interpreted By: Macario Redman MD    US Breast Right Limited    Result Date: 6/6/2023   Right breast:  2 cm irregular, infiltrative and hypervascular mass at the 1 o'clock position 5 cm from the nipple is highly  suspicious.  Recommend ultrasound-guided core biopsy.  The patient has been scheduled for ultrasound-guided core biopsy on today's date.  Multifocal and multicentric pleomorphic calcifications within the right breast as detailed above.  A representative sampling under stereotactic biopsy is recommended however, due to reported pathologic cervical fractures , stereotactic biopsy will be deferred at this time.  This was discussed with Dr. Vanegas on 6/6/2023 at 1505 hours.  Left breast:  Benign mammogram.  Recommend routine left breast screening.  RECOMMENDATION(S):  ULTRASOUND-GUIDED CORE BIOPSY: RIGHT BREAST   BIRADS:  DIAGNOSTIC CATEGORY 5--HIGHLY SUGGESTIVE FOR MALIGNANCY.   BREAST COMPOSITION: Heterogeneously dense,which may obscure small masses.  PLEASE NOTE:  A NORMAL MAMMOGRAM DOES NOT EXCLUDE THE POSSIBILITY OF BREAST CANCER. ANY CLINICALLY SUSPICIOUS PALPABLE LUMP SHOULD BE BIOPSIED.      Kay Orozco M.D.       Electronically Signed and Approved By: Kay Orozco M.D. on 6/06/2023 at 15:28             Mammo Diagnostic Digital Tomosynthesis Bilateral With CAD    Result Date: 6/6/2023   Right breast:  2 cm irregular, infiltrative and hypervascular mass at the 1 o'clock position 5 cm from the nipple is highly suspicious.  Recommend ultrasound-guided core biopsy.  The patient has been scheduled for ultrasound-guided core biopsy on today's date.  Multifocal and multicentric pleomorphic calcifications within the right breast as detailed above.  A representative sampling under stereotactic biopsy is recommended however, due to reported pathologic cervical fractures , stereotactic biopsy will be deferred at this time.  This was discussed with Dr. Vanegas on 6/6/2023 at 1505 hours.  Left breast:  Benign mammogram.  Recommend routine left breast screening.  RECOMMENDATION(S):  ULTRASOUND-GUIDED CORE BIOPSY: RIGHT BREAST   BIRADS:  DIAGNOSTIC CATEGORY 5--HIGHLY SUGGESTIVE FOR MALIGNANCY.   BREAST COMPOSITION:  Heterogeneously dense,which may obscure small masses.  PLEASE NOTE:  A NORMAL MAMMOGRAM DOES NOT EXCLUDE THE POSSIBILITY OF BREAST CANCER. ANY CLINICALLY SUSPICIOUS PALPABLE LUMP SHOULD BE BIOPSIED.      Kay Orozco M.D.       Electronically Signed and Approved By: Kay Orozco M.D. on 6/06/2023 at 15:28             US Guided Breast Biopsy With & Without Device initial    Addendum Date: 6/8/2023    ADDENDUM:  Pathology result from biopsy performed by Dr. 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  Suspicious calcifications in the right breast are highly concerning for multicentric disease, although stereotactic biopsy was deferred due to pathologic cervical spine fractures.  Recommend oncologic/surgical management.    ERYN WALLACE MD       Electronically Signed and Approved By: ERYN WALLACE MD on 6/08/2023 at 15:09             Result Date: 6/8/2023   Technically successful ultrasound guided core biopsy of the right breast. The patient tolerated the procedure well without immediate complication. Specimens have been sent to pathology for further analysis.  Addendum will be dictated upon receiving final pathology for concordance and recommendations.     Kay Orozco M.D.       Electronically Signed and Approved By: Kay Orozco M.D. on 6/06/2023 at 16:14                 Assessment / Plan          Assessment/Plan:   Annita Johnson is a 38-year-old female with apparent stage IV ER positive/TN positive, HER2/reena negative breast cancer. She has osseous metastatic disease apparent as cervical spine disease with severe central canal stenosis at the level of C5.  She is now status post posterior cervical decompression and fusion with instrumentation of C2 to T3.  She received subsequent external beam radiotherapy to the cervical and thoracic spine.   ECOG 0      We reviewed her PET/CT and I discussed the risk for progression of disease as well as the risk for progression of disease resulting in fractures of long bones.  I advised her to contact me if she has any worsening of pain at any site.  I will see Ms. Johnson in follow-up in 6 months.  She was encouraged to contact me in the interim with any questions or concerns regarding her care.    Leoncio Schwartz MD  Radiation Oncology  [facility]    This document has been signed by Leoncio Schwartz MD on August 18, 2023 15:43 EDT

## 2023-08-24 ENCOUNTER — OFFICE VISIT (OUTPATIENT)
Dept: ONCOLOGY | Facility: HOSPITAL | Age: 38
End: 2023-08-24
Payer: COMMERCIAL

## 2023-08-24 ENCOUNTER — HOSPITAL ENCOUNTER (OUTPATIENT)
Dept: ONCOLOGY | Facility: HOSPITAL | Age: 38
Discharge: HOME | End: 2023-08-24
Admitting: INTERNAL MEDICINE
Payer: COMMERCIAL

## 2023-08-24 VITALS
WEIGHT: 181 LBS | DIASTOLIC BLOOD PRESSURE: 77 MMHG | TEMPERATURE: 98.4 F | BODY MASS INDEX: 32.07 KG/M2 | SYSTOLIC BLOOD PRESSURE: 122 MMHG | HEART RATE: 113 BPM | OXYGEN SATURATION: 97 % | RESPIRATION RATE: 16 BRPM

## 2023-08-24 VITALS — BODY MASS INDEX: 32.07 KG/M2 | HEIGHT: 63 IN | WEIGHT: 181 LBS

## 2023-08-24 DIAGNOSIS — C50.211 MALIGNANT NEOPLASM OF UPPER-INNER QUADRANT OF RIGHT BREAST IN FEMALE, ESTROGEN RECEPTOR POSITIVE: Primary | ICD-10-CM

## 2023-08-24 DIAGNOSIS — C50.211 MALIGNANT NEOPLASM OF UPPER-INNER QUADRANT OF RIGHT BREAST IN FEMALE, ESTROGEN RECEPTOR POSITIVE: ICD-10-CM

## 2023-08-24 DIAGNOSIS — Z17.0 MALIGNANT NEOPLASM OF UPPER-INNER QUADRANT OF RIGHT BREAST IN FEMALE, ESTROGEN RECEPTOR POSITIVE: ICD-10-CM

## 2023-08-24 DIAGNOSIS — Z17.0 MALIGNANT NEOPLASM OF UPPER-INNER QUADRANT OF RIGHT BREAST IN FEMALE, ESTROGEN RECEPTOR POSITIVE: Primary | ICD-10-CM

## 2023-08-24 DIAGNOSIS — C79.51 METASTASIS TO BONE: ICD-10-CM

## 2023-08-24 DIAGNOSIS — C79.51 METASTASIS TO BONE: Primary | ICD-10-CM

## 2023-08-24 LAB
ALBUMIN SERPL-MCNC: 4.6 G/DL (ref 3.5–5.2)
ALBUMIN/GLOB SERPL: 1.5 G/DL
ALP SERPL-CCNC: 158 U/L (ref 39–117)
ALT SERPL W P-5'-P-CCNC: 66 U/L (ref 1–33)
ANION GAP SERPL CALCULATED.3IONS-SCNC: 11.2 MMOL/L (ref 5–15)
AST SERPL-CCNC: 42 U/L (ref 1–32)
BASOPHILS # BLD AUTO: 0.01 10*3/MM3 (ref 0–0.2)
BASOPHILS NFR BLD AUTO: 0.2 % (ref 0–1.5)
BILIRUB SERPL-MCNC: 0.5 MG/DL (ref 0–1.2)
BUN SERPL-MCNC: 7 MG/DL (ref 6–20)
BUN/CREAT SERPL: 9.9 (ref 7–25)
CALCIUM SPEC-SCNC: 10.1 MG/DL (ref 8.6–10.5)
CHLORIDE SERPL-SCNC: 101 MMOL/L (ref 98–107)
CO2 SERPL-SCNC: 27.8 MMOL/L (ref 22–29)
CREAT SERPL-MCNC: 0.71 MG/DL (ref 0.57–1)
DEPRECATED RDW RBC AUTO: 51 FL (ref 37–54)
EGFRCR SERPLBLD CKD-EPI 2021: 112.5 ML/MIN/1.73
EOSINOPHIL # BLD AUTO: 0.14 10*3/MM3 (ref 0–0.4)
EOSINOPHIL NFR BLD AUTO: 2.6 % (ref 0.3–6.2)
ERYTHROCYTE [DISTWIDTH] IN BLOOD BY AUTOMATED COUNT: 14.9 % (ref 12.3–15.4)
GLOBULIN UR ELPH-MCNC: 3.1 GM/DL
GLUCOSE SERPL-MCNC: 93 MG/DL (ref 65–99)
HCT VFR BLD AUTO: 39.6 % (ref 34–46.6)
HGB BLD-MCNC: 12.3 G/DL (ref 12–15.9)
IMM GRANULOCYTES # BLD AUTO: 0 10*3/MM3 (ref 0–0.05)
IMM GRANULOCYTES NFR BLD AUTO: 0 % (ref 0–0.5)
LYMPHOCYTES # BLD AUTO: 0.71 10*3/MM3 (ref 0.7–3.1)
LYMPHOCYTES NFR BLD AUTO: 13.1 % (ref 19.6–45.3)
MAGNESIUM SERPL-MCNC: 2.2 MG/DL (ref 1.6–2.6)
MCH RBC QN AUTO: 28.3 PG (ref 26.6–33)
MCHC RBC AUTO-ENTMCNC: 31.1 G/DL (ref 31.5–35.7)
MCV RBC AUTO: 91.2 FL (ref 79–97)
MONOCYTES # BLD AUTO: 0.55 10*3/MM3 (ref 0.1–0.9)
MONOCYTES NFR BLD AUTO: 10.2 % (ref 5–12)
NEUTROPHILS NFR BLD AUTO: 4 10*3/MM3 (ref 1.7–7)
NEUTROPHILS NFR BLD AUTO: 73.9 % (ref 42.7–76)
PHOSPHATE SERPL-MCNC: 3.8 MG/DL (ref 2.5–4.5)
PLATELET # BLD AUTO: 336 10*3/MM3 (ref 140–450)
PMV BLD AUTO: 9.5 FL (ref 6–12)
POTASSIUM SERPL-SCNC: 4.5 MMOL/L (ref 3.5–5.2)
PROT SERPL-MCNC: 7.7 G/DL (ref 6–8.5)
RBC # BLD AUTO: 4.34 10*6/MM3 (ref 3.77–5.28)
SODIUM SERPL-SCNC: 140 MMOL/L (ref 136–145)
WBC NRBC COR # BLD: 5.41 10*3/MM3 (ref 3.4–10.8)

## 2023-08-24 PROCEDURE — 96375 TX/PRO/DX INJ NEW DRUG ADDON: CPT

## 2023-08-24 PROCEDURE — 25010000002 DENOSUMAB 120 MG/1.7ML SOLUTION: Performed by: INTERNAL MEDICINE

## 2023-08-24 PROCEDURE — 96413 CHEMO IV INFUSION 1 HR: CPT

## 2023-08-24 PROCEDURE — 84100 ASSAY OF PHOSPHORUS: CPT | Performed by: INTERNAL MEDICINE

## 2023-08-24 PROCEDURE — 25010000002 TRASTUZUMAB PER 10 MG: Performed by: INTERNAL MEDICINE

## 2023-08-24 PROCEDURE — 83735 ASSAY OF MAGNESIUM: CPT | Performed by: INTERNAL MEDICINE

## 2023-08-24 PROCEDURE — 85025 COMPLETE CBC W/AUTO DIFF WBC: CPT | Performed by: INTERNAL MEDICINE

## 2023-08-24 PROCEDURE — 80053 COMPREHEN METABOLIC PANEL: CPT | Performed by: INTERNAL MEDICINE

## 2023-08-24 PROCEDURE — 25010000002 DIPHENHYDRAMINE PER 50 MG: Performed by: INTERNAL MEDICINE

## 2023-08-24 PROCEDURE — 96372 THER/PROPH/DIAG INJ SC/IM: CPT

## 2023-08-24 RX ORDER — SODIUM CHLORIDE 9 MG/ML
250 INJECTION, SOLUTION INTRAVENOUS ONCE
Status: COMPLETED | OUTPATIENT
Start: 2023-08-24 | End: 2023-08-24

## 2023-08-24 RX ORDER — ACETAMINOPHEN 325 MG/1
650 TABLET ORAL ONCE
Status: COMPLETED | OUTPATIENT
Start: 2023-08-24 | End: 2023-08-24

## 2023-08-24 RX ORDER — SODIUM FLUORIDE 6.1 MG/ML
PASTE, DENTIFRICE DENTAL SEE ADMIN INSTRUCTIONS
COMMUNITY
Start: 2023-08-14

## 2023-08-24 RX ADMIN — SODIUM CHLORIDE 250 ML: 9 INJECTION, SOLUTION INTRAVENOUS at 11:18

## 2023-08-24 RX ADMIN — TRASTUZUMAB 530 MG: 150 INJECTION, POWDER, LYOPHILIZED, FOR SOLUTION INTRAVENOUS at 11:38

## 2023-08-24 RX ADMIN — DENOSUMAB 120 MG: 120 INJECTION SUBCUTANEOUS at 12:06

## 2023-08-24 RX ADMIN — ACETAMINOPHEN 650 MG: 325 TABLET ORAL at 11:19

## 2023-08-24 RX ADMIN — DIPHENHYDRAMINE HYDROCHLORIDE 25 MG: 50 INJECTION, SOLUTION INTRAMUSCULAR; INTRAVENOUS at 11:19

## 2023-08-24 NOTE — PROGRESS NOTES
Chief Complaint  Chemotherapy    Lela Vanegas, APRN  Romina, Lela, APRN    Subjective          Annita Johnson presents to [facility] for ongoing treatment of her metastatic breast cancer.  She is on letrozole, trastuzumab, leuprolide for ovarian suppression.  She recently finished radiation to the neck.  She had some radiation esophagitis but that has now resolved.  She is feeling much better.  She notes some tingling down the arm and reported this to her neurosurgeon-she was told this is related to her cervical spine surgery healing and to monitor.  She denies new masses or adenopathy.  No unusual aches or pains.  She notes adequate energy level and is working part-time.  She notes good appetite.    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, estrogen receptor positive   6/8/2023 Initial Diagnosis    Malignant neoplasm of upper-inner quadrant of right breast in female, estrogen receptor positive     6/8/2023 Cancer Staged    Staging form: Breast, AJCC 8th Edition  - Clinical: Stage IV (cT2, cN0, cM1, ER+, OK+, HER2-) - Signed by Perry Garcia MD on 6/8/2023 6/9/2023 -  Chemotherapy    OP SUPPORTIVE BREAST Leuprolide 3.75 MG Q28D       6/15/2023 - 6/15/2023 Chemotherapy    OP BREAST Letrozole / Ribociclib       7/13/2023 -  Chemotherapy    OP BREAST Letrozole / Trastuzumab       8/24/2023 -  Chemotherapy    OP SUPPORTIVE Denosumab (Xgeva) Q28D       Metastasis to bone   6/8/2023 Initial Diagnosis    Metastasis to bone     8/24/2023 -  Chemotherapy    OP SUPPORTIVE Denosumab (Xgeva) Q28D           Review of Systems   Constitutional:  Positive for fatigue. Negative for appetite change, diaphoresis, fever, unexpected weight gain and unexpected weight loss.   HENT:  Negative for hearing loss, mouth sores, sore throat, swollen glands, trouble swallowing and voice change.    Eyes:  Negative for blurred vision.    Respiratory:  Negative for cough, shortness of breath and wheezing.    Cardiovascular:  Negative for chest pain and palpitations.   Gastrointestinal:  Negative for abdominal pain, blood in stool, constipation, diarrhea, nausea and vomiting.   Endocrine: Negative for cold intolerance and heat intolerance.   Genitourinary:  Negative for difficulty urinating, dysuria, frequency, hematuria and urinary incontinence.   Musculoskeletal:  Negative for arthralgias, back pain and myalgias.   Skin:  Negative for rash, skin lesions and wound.   Neurological:  Positive for numbness (TINGLING RIGHT ARM). Negative for dizziness, seizures, weakness and headache.   Hematological:  Does not bruise/bleed easily.   Psychiatric/Behavioral:  Negative for depressed mood. The patient is not nervous/anxious.    Current Outpatient Medications on File Prior to Visit   Medication Sig Dispense Refill    benzonatate (Tessalon Perles) 100 MG capsule Take 1 capsule by mouth 3 (Three) Times a Day As Needed for Cough. 40 capsule 1    Calcium Carbonate-Vitamin D 600-10 MG-MCG per tablet Take 1 tablet by mouth 2 (Two) Times a Day. 60 tablet 5    clonazePAM (KlonoPIN) 0.5 MG tablet Take 1 tablet by mouth 2 (Two) Times a Day As Needed for Anxiety. 20 tablet 0    cyclobenzaprine (FLEXERIL) 10 MG tablet Take 1 tablet by mouth 3 (Three) Times a Day As Needed for Muscle Spasms. 90 tablet 5    escitalopram (Lexapro) 10 MG tablet Take 1 tablet by mouth Daily. 30 tablet 2    hydrOXYzine (ATARAX) 50 MG tablet Take 1 tablet by mouth every night at bedtime. 90 tablet 1    letrozole (FEMARA) 2.5 MG tablet Take 1 tablet by mouth Daily. 30 tablet 5    methylPREDNISolone (MEDROL) 4 MG dose pack       nystatin (MYCOSTATIN) 100,000 unit/mL suspension       Nystatin-Diphenhydramine-Hydrocortisone-Lidocaine Take 5 mL by mouth Every 3 (Three) Hours As Needed (swish and swallow 5 ml every 3 hours as needed for sore throat). Swish and swallow every 3 hours prn pain 300 mL  0    ondansetron (ZOFRAN) 8 MG tablet Take 1 tablet by mouth 3 (Three) Times a Day As Needed for Nausea or Vomiting. 30 tablet 5    oxyCODONE-acetaminophen (PERCOCET)  MG per tablet TAKE 1 TABLET BY MOUTH EVERY 4 HOURS AS NEEDED FOR PAIN FOR UP TO 7 DAYS. MAX DAILY AMOUNT:6 TABLETS      oxyCODONE-acetaminophen (PERCOCET) 7.5-325 MG per tablet TAKE 1-2 TABS BY MOUTH EVERY 4 HOURS AS NEEDED FOR PAIN      pantoprazole (PROTONIX) 40 MG EC tablet Take 1 tablet by mouth Daily. 90 tablet 1    ribociclib succinate 200 MG tablet therapy pack tablet       sennosides-docusate (PERICOLACE) 8.6-50 MG per tablet Take 1 tablet by mouth.      Sodium Fluoride 5000 PPM 1.1 % gel See Admin Instructions.      TiZANidine (ZANAFLEX) 4 MG capsule Take 1 capsule by mouth.      vitamin D (ERGOCALCIFEROL) 1.25 MG (18770 UT) capsule capsule Take 1 capsule by mouth Every 7 (Seven) Days.      Calcium Citrate 333 MG tablet Take 333 mg by mouth 3 (Three) Times a Day. (Patient not taking: Reported on 8/18/2023)      Cholecalciferol (Vitamin D3) 50 MCG (2000 UT) tablet Take 1 tablet by mouth Daily. (Patient not taking: Reported on 8/18/2023)       Current Facility-Administered Medications on File Prior to Visit   Medication Dose Route Frequency Provider Last Rate Last Admin    [COMPLETED] acetaminophen (TYLENOL) tablet 650 mg  650 mg Oral Once Juana Vinson MD PhD   650 mg at 08/24/23 1119    [COMPLETED] denosumab (XGEVA) injection 120 mg  120 mg Subcutaneous Once Juana Vinson MD PhD   120 mg at 08/24/23 1206    [COMPLETED] diphenhydrAMINE (BENADRYL) IVPB 25 mg  25 mg Intravenous Once Juana Vinson MD PhD   Stopped at 08/24/23 1131    [COMPLETED] sodium chloride 0.9 % infusion 250 mL  250 mL Intravenous Once Juana Vinson MD PhD   Stopped at 08/24/23 1215    [COMPLETED] Trastuzumab (HERCEPTIN) 530 mg in sodium chloride 0.9 % 300.2 mL chemo IVPB  6 mg/kg (Treatment Plan Recorded) Intravenous Once Juana Vinson MD PhD    Stopped at 23 1208       No Known Allergies  Past Medical History:   Diagnosis Date    Anemia     Breast cancer     Cancer, metastatic to bone     Kidney stone     Kidney stones     Metastasis to bone 2023     Past Surgical History:   Procedure Laterality Date    BREAST BIOPSY       SECTION N/A 2021    Procedure:  SECTION PRIMARY;  Surgeon: Zoe Dawson MD;  Location: [facility] LABOR DELIVERY;  Service: Obstetrics/Gynecology;  Laterality: N/A;    CYSTOSCOPY BLADDER STONE LITHOTRIPSY       Social History     Socioeconomic History    Marital status:      Spouse name: Janet   Tobacco Use    Smoking status: Some Days     Packs/day: 0.25     Years: 10.00     Pack years: 2.50     Types: Cigarettes     Start date: 2021     Passive exposure: Current    Smokeless tobacco: Never   Vaping Use    Vaping Use: Never used   Substance and Sexual Activity    Alcohol use: Yes     Alcohol/week: 6.0 standard drinks     Types: 3 Glasses of wine, 3 Drinks containing 0.5 oz of alcohol per week    Drug use: Not Currently     Types: Marijuana     Comment: for pain control    Sexual activity: Yes     Partners: Male     Birth control/protection: Same-sex partner     Family History   Problem Relation Age of Onset    Mental illness Mother     Hypertension Father     Alcohol abuse Father     Ovarian cancer Paternal Aunt     Breast cancer Maternal Great-Grandmother        Objective   Physical Exam  Vitals reviewed. Exam conducted with a chaperone present.   Constitutional:       General: She is not in acute distress.     Appearance: Normal appearance.   Neck:     Cardiovascular:      Rate and Rhythm: Normal rate and regular rhythm.      Heart sounds: Normal heart sounds. No murmur heard.    No gallop.   Pulmonary:      Effort: Pulmonary effort is normal.      Breath sounds: Normal breath sounds.   Abdominal:      General: Abdomen is flat. Bowel sounds are normal.      Palpations: Abdomen is soft.    Musculoskeletal:      Cervical back: Neck supple.      Right lower leg: No edema.      Left lower leg: No edema.   Lymphadenopathy:      Cervical: No cervical adenopathy.   Neurological:      Mental Status: She is alert and oriented to person, place, and time.   Psychiatric:         Mood and Affect: Mood normal.         Behavior: Behavior normal.       Vitals:    08/24/23 0958   BP: 122/77   Pulse: 113   Resp: 16   Temp: 98.4 øF (36.9 øC)   TempSrc: Temporal   SpO2: 97%   Weight: 82.1 kg (181 lb)   PainSc:   5   PainLoc: Arm  Comment: RIGHT     ECOG score: 0         PHQ-9 Total Score:                    Result Review :   The following data was reviewed by: Perry Garcia MD on 08/24/2023:  Lab Results   Component Value Date    HGB 12.3 08/24/2023    HCT 39.6 08/24/2023    MCV 91.2 08/24/2023     08/24/2023    WBC 5.41 08/24/2023    NEUTROABS 4.00 08/24/2023    LYMPHSABS 0.71 08/24/2023    MONOSABS 0.55 08/24/2023    EOSABS 0.14 08/24/2023    BASOSABS 0.01 08/24/2023     Lab Results   Component Value Date    GLUCOSE 93 08/24/2023    BUN 7 08/24/2023    CREATININE 0.71 08/24/2023     08/24/2023    K 4.5 08/24/2023     08/24/2023    CO2 27.8 08/24/2023    CALCIUM 10.1 08/24/2023    PROTEINTOT 7.7 08/24/2023    ALBUMIN 4.6 08/24/2023    BILITOT 0.5 08/24/2023    ALKPHOS 158 (H) 08/24/2023    AST 42 (H) 08/24/2023    ALT 66 (H) 08/24/2023     Lab Results   Component Value Date    MG 2.2 08/24/2023    PHOS 3.8 08/24/2023    TSH 1.290 05/26/2023     No results found for: IRON, LABIRON, TRANSFERRIN, TIBC  No results found for: LDH, FERRITIN, [id number], FOLATE  No results found for: PSA, CEA, AFP, ,           Assessment and Plan    Diagnoses and all orders for this visit:    1. Malignant neoplasm of upper-inner quadrant of right breast in female, estrogen receptor positive (Primary)  Assessment & Plan:  Triple positive.  Patient is on treatment with letrozole plus trastuzumab along with  leuprolide for ovarian suppression.  Patient reports tolerating her treatments well.  She denies new masses or adenopathy.  She is up-to-date on cardiac imaging.  Proceed with trastuzumab today as planned.  Continue letrozole by mouth daily.  Continue monthly leuprolide injections.  I will see her back in 3 weeks for OV, trastuzumab with lab work prior to monitor for toxicities.      2. Metastasis to bone  Assessment & Plan:  Patient has been cleared by her dentist and neurosurgeon for bone modifying agents.  I will begin denosumab monthly.              Patient Follow Up: 3 weeks    Patient was given instructions and counseling regarding her condition or for health maintenance advice. Please see specific information pulled into the AVS if appropriate.     Perry Garcia MD    8/24/2023

## 2023-08-24 NOTE — ASSESSMENT & PLAN NOTE
Triple positive.  Patient is on treatment with letrozole plus trastuzumab along with leuprolide for ovarian suppression.  Patient reports tolerating her treatments well.  She denies new masses or adenopathy.  She is up-to-date on cardiac imaging.  Proceed with trastuzumab today as planned.  Continue letrozole by mouth daily.  Continue monthly leuprolide injections.  I will see her back in 3 weeks for OV, trastuzumab with lab work prior to monitor for toxicities.

## 2023-08-24 NOTE — ASSESSMENT & PLAN NOTE
Patient has been cleared by her dentist and neurosurgeon for bone modifying agents.  I will begin denosumab monthly.

## 2023-08-29 ENCOUNTER — OFFICE VISIT (OUTPATIENT)
Dept: INTERNAL MEDICINE | Facility: CLINIC | Age: 38
End: 2023-08-29
Payer: COMMERCIAL

## 2023-08-29 ENCOUNTER — PATIENT OUTREACH (OUTPATIENT)
Dept: ONCOLOGY | Facility: HOSPITAL | Age: 38
End: 2023-08-29
Payer: COMMERCIAL

## 2023-08-29 VITALS
DIASTOLIC BLOOD PRESSURE: 64 MMHG | OXYGEN SATURATION: 98 % | TEMPERATURE: 97.6 F | HEIGHT: 63 IN | WEIGHT: 179.6 LBS | SYSTOLIC BLOOD PRESSURE: 106 MMHG | BODY MASS INDEX: 31.82 KG/M2 | RESPIRATION RATE: 18 BRPM | HEART RATE: 112 BPM

## 2023-08-29 DIAGNOSIS — Z12.4 SCREENING FOR CERVICAL CANCER: ICD-10-CM

## 2023-08-29 DIAGNOSIS — F41.8 ANXIETY ABOUT HEALTH: ICD-10-CM

## 2023-08-29 DIAGNOSIS — Z17.0 MALIGNANT NEOPLASM OF UPPER-INNER QUADRANT OF RIGHT BREAST IN FEMALE, ESTROGEN RECEPTOR POSITIVE: ICD-10-CM

## 2023-08-29 DIAGNOSIS — Z01.419 WELL WOMAN EXAM: Primary | ICD-10-CM

## 2023-08-29 DIAGNOSIS — C50.211 MALIGNANT NEOPLASM OF UPPER-INNER QUADRANT OF RIGHT BREAST IN FEMALE, ESTROGEN RECEPTOR POSITIVE: ICD-10-CM

## 2023-08-29 PROCEDURE — G0123 SCREEN CERV/VAG THIN LAYER: HCPCS

## 2023-08-29 PROCEDURE — 87624 HPV HI-RISK TYP POOLED RSLT: CPT

## 2023-08-29 PROCEDURE — 87625 HPV TYPES 16 & 18 ONLY: CPT

## 2023-08-29 RX ORDER — ESCITALOPRAM OXALATE 10 MG/1
10 TABLET ORAL DAILY
Qty: 90 TABLET | Refills: 1 | Status: SHIPPED | OUTPATIENT
Start: 2023-08-29

## 2023-08-29 NOTE — ASSESSMENT & PLAN NOTE
Age related preventatives discussed with the patient.  Recommend regular dental/eye exams, healthy diet and regular exercise.

## 2023-08-29 NOTE — PROGRESS NOTES
Subjective   History of Present Illness    Annita Johnson is a 38 y.o. female who presents for annual exam.    Obstetric History:  OB History          1    Para   1    Term   1            AB        Living   1         SAB        IAB        Ectopic        Molar        Multiple   0    Live Births   1               Menstrual History:  She no longer has menstrual cycles. She was on chemo    Sexual History:     Sexually active-1 partner       No results found for: HPVAPTIMA    Current contraception: none  History of abnormal Pap smear: no  Received Gardasil immunization: no  Perform regular self breast exam: patient is being treated for breast cancer.  Family history of uterine or ovarian cancer: no  Family History of cervical cancer: no  Family History of colon cancer/colon polyps: no  History of abnormal mammogram:         Objective   Physical Exam  Vitals reviewed. Exam conducted with a chaperone present.   Constitutional:       General: She is not in acute distress.     Appearance: Normal appearance. She is well-developed. She is not diaphoretic.   HENT:      Head: Normocephalic and atraumatic. Hair is normal.      Right Ear: Hearing, tympanic membrane, ear canal and external ear normal. No decreased hearing noted. No drainage.      Left Ear: Hearing, tympanic membrane, ear canal and external ear normal. No decreased hearing noted.      Nose: Nose normal. No nasal deformity.      Mouth/Throat:      Mouth: Mucous membranes are moist.   Eyes:      General: Lids are normal.         Right eye: No discharge.         Left eye: No discharge.      Extraocular Movements: Extraocular movements intact.      Conjunctiva/sclera: Conjunctivae normal.      Pupils: Pupils are equal, round, and reactive to light.   Neck:      Thyroid: No thyromegaly.      Vascular: No JVD.   Cardiovascular:      Rate and Rhythm: Normal rate and regular rhythm.      Pulses: Normal pulses.      Heart sounds: Normal heart sounds. No  murmur heard.    No friction rub. No gallop.   Pulmonary:      Effort: Pulmonary effort is normal. No respiratory distress.      Breath sounds: Normal breath sounds. No wheezing or rales.   Chest:      Chest wall: No tenderness.   Breasts:     Breasts are symmetrical.      Right: Mass present. No nipple discharge, skin change or tenderness.      Left: Normal. No mass, nipple discharge, skin change or tenderness.   Abdominal:      General: Bowel sounds are normal. There is no distension.      Palpations: Abdomen is soft. There is no mass.      Tenderness: There is no abdominal tenderness. There is no guarding or rebound.      Hernia: No hernia is present.   Genitourinary:     General: Normal vulva.      Urethra: No urethral swelling.      Vagina: Normal. No vaginal discharge or lesions.      Cervix: Normal.      Uterus: Normal. Not tender.       Adnexa: Right adnexa normal and left adnexa normal.        Right: No tenderness.          Left: No mass.     Musculoskeletal:         General: No tenderness or deformity. Normal range of motion.      Cervical back: Normal range of motion and neck supple.   Lymphadenopathy:      Cervical: No cervical adenopathy.      Upper Body:      Right upper body: No supraclavicular or axillary adenopathy.      Left upper body: No supraclavicular or axillary adenopathy.   Skin:     General: Skin is warm and dry.      Findings: No erythema or rash.   Neurological:      Mental Status: She is alert and oriented to person, place, and time.      Cranial Nerves: No cranial nerve deficit.      Motor: No abnormal muscle tone.      Coordination: Coordination normal.      Deep Tendon Reflexes: Reflexes are normal and symmetric. Reflexes normal.   Psychiatric:         Mood and Affect: Mood normal.         Behavior: Behavior normal.         Thought Content: Thought content normal.         Judgment: Judgment normal.       /64 (BP Location: Left arm, Patient Position: Sitting)   Pulse 112   Temp  "97.6 øF (36.4 øC) (Temporal)   Resp 18   Ht 160 cm (62.99\")   Wt 81.5 kg (179 lb 9.6 oz)   SpO2 98%   BMI 31.82 kg/mý   Wt Readings from Last 3 Encounters:   08/29/23 81.5 kg (179 lb 9.6 oz)   08/24/23 82.1 kg (181 lb)   08/24/23 82.1 kg (181 lb)      BP Readings from Last 3 Encounters:   08/29/23 106/64   08/24/23 122/77   08/18/23 120/72          Assessment & Plan   Diagnoses and all orders for this visit:    1. Well woman exam (Primary)  Assessment & Plan:  Age related preventatives discussed with the patient.  Recommend regular dental/eye exams, healthy diet and regular exercise.    Orders:  -     IGP, Aptima HPV, Rfx 16 / 18,45; Future  -     IGP, Aptima HPV, Rfx 16 / 18,45  -     Vitamin D,25-Hydroxy; Future    2. Anxiety about health  Assessment & Plan:  Much improvement with Lexapro 10 mg daily.  Continue current regimen.   Continue klonopin PRN.    Orders:  -     Vitamin D,25-Hydroxy; Future  -     Vitamin B12; Future  -     Folate; Future    3. Malignant neoplasm of upper-inner quadrant of right breast in female, estrogen receptor positive  -     Lipid Panel; Future  -     Urinalysis With Microscopic - Urine, Clean Catch; Future  -     TSH Rfx On Abnormal To Free T4; Future  -     Hemoglobin A1c; Future  -     Vitamin D,25-Hydroxy; Future    4. Screening for cervical cancer    Other orders  -     escitalopram (Lexapro) 10 MG tablet; Take 1 tablet by mouth Daily.  Dispense: 90 tablet; Refill: 1        All questions answered.  Await pap smear results.  Breast self exam technique reviewed and patient encouraged to perform self-exam monthly.  Discussed healthy lifestyle modifications.  Educational material distributed.  Pap smear.             "

## 2023-08-31 RX ORDER — HYDROXYZINE HYDROCHLORIDE 25 MG/1
TABLET, FILM COATED ORAL
Qty: 90 TABLET | OUTPATIENT
Start: 2023-08-31

## 2023-09-01 ENCOUNTER — TELEPHONE (OUTPATIENT)
Dept: ONCOLOGY | Facility: HOSPITAL | Age: 38
End: 2023-09-01
Payer: COMMERCIAL

## 2023-09-01 RX ORDER — PANTOPRAZOLE SODIUM 40 MG/1
40 TABLET, DELAYED RELEASE ORAL DAILY
Qty: 90 TABLET | Refills: 0 | Status: SHIPPED | OUTPATIENT
Start: 2023-09-01

## 2023-09-01 NOTE — TELEPHONE ENCOUNTER
Rx Refill Note  Requested Prescriptions      No prescriptions requested or ordered in this encounter      Last office visit with prescribing clinician: 8/29/2023   Last telemedicine visit with prescribing clinician: 7/25/2023   Next office visit with prescribing clinician: 2/19/2024                         Would you like a call back once the refill request has been completed: [] Yes [] No    If the office needs to give you a call back, can they leave a voicemail: [] Yes [] No    Brittani Jefferson MA  09/01/23, 12:24 EDT

## 2023-09-01 NOTE — TELEPHONE ENCOUNTER
OSW contacted patient. Due to no answer, OSW left a message to inform patient OSW had contacted [employer] [facility] to check on her application status. The representative stated the application is still under review and an email was sent to patient on 8/22/23 from [email or web address] notifying her the application has been received and under review.

## 2023-09-02 LAB
CYTOLOGIST CVX/VAG CYTO: ABNORMAL
CYTOLOGY CVX/VAG DOC CYTO: ABNORMAL
CYTOLOGY CVX/VAG DOC THIN PREP: ABNORMAL
DX ICD CODE: ABNORMAL
HIV 1 & 2 AB SER-IMP: ABNORMAL
HPV GENOTYPE REFLEX: ABNORMAL
HPV I/H RISK 4 DNA CVX QL PROBE+SIG AMP: POSITIVE
HPV16 DNA CVX QL PROBE+SIG AMP: NEGATIVE
HPV18+45 E6+E7 MRNA CVX QL NAA+PROBE: NEGATIVE
OTHER STN SPEC: ABNORMAL
STAT OF ADQ CVX/VAG CYTO-IMP: ABNORMAL

## 2023-09-05 ENCOUNTER — TELEPHONE (OUTPATIENT)
Dept: ONCOLOGY | Facility: HOSPITAL | Age: 38
End: 2023-09-05
Payer: COMMERCIAL

## 2023-09-05 NOTE — TELEPHONE ENCOUNTER
OSW received a call from patient regarding following up on Adrienne SALDAÑAKimberly Carla financial assistance.  Patient reported she has not received any emails in her junk or at the email address that was provided to the foundation.  Patient was advised to contact OSW if she does not receive anything by the end of September.  Patient stated that she was doing as well as could be expected; she is trying to work part-time to help pay for rent for her family.  Patient stated she did not have any doctor appointments this week and decided that she would like to take her little boy and go visit a cousin in [place] to relax.  Patient stated she has started driving again but tries to stick to [address].  Patient stated she has to turn her full body to look over her shoulder.  Patient stated it felt good to get some independence.

## 2023-09-08 ENCOUNTER — TELEPHONE (OUTPATIENT)
Dept: RADIATION ONCOLOGY | Facility: HOSPITAL | Age: 38
End: 2023-09-08
Payer: COMMERCIAL

## 2023-09-08 DIAGNOSIS — C79.51 METASTASIS TO BONE: Primary | ICD-10-CM

## 2023-09-08 NOTE — TELEPHONE ENCOUNTER
LALO Shah had received a call from the patient regarding pain in her numbness in left upper thigh since surgery; states that his started to be painful today.    Contacted patient and spoke with her regarding this. She explained the above, that her left knee, thigh, and hip started to become painful today upon waking.  States that the pain goes inward towards her knee.    Reviewed with Dr. Schwartz, he recommends patient seeing an orthopedic surgeon.    I called patient back and explained this to her.  She v/u.   Referral placed and will have our medical assistant f/u on this today.   Batsheva MAY

## 2023-09-12 ENCOUNTER — TELEPHONE (OUTPATIENT)
Dept: ONCOLOGY | Facility: HOSPITAL | Age: 38
End: 2023-09-12

## 2023-09-12 ENCOUNTER — OFFICE VISIT (OUTPATIENT)
Dept: INTERNAL MEDICINE | Facility: CLINIC | Age: 38
End: 2023-09-12
Payer: COMMERCIAL

## 2023-09-12 VITALS
BODY MASS INDEX: 31.89 KG/M2 | TEMPERATURE: 97.8 F | OXYGEN SATURATION: 97 % | WEIGHT: 180 LBS | SYSTOLIC BLOOD PRESSURE: 112 MMHG | HEIGHT: 63 IN | RESPIRATION RATE: 18 BRPM | HEART RATE: 68 BPM | DIASTOLIC BLOOD PRESSURE: 68 MMHG

## 2023-09-12 DIAGNOSIS — J06.9 ACUTE URI: Primary | ICD-10-CM

## 2023-09-12 DIAGNOSIS — J02.9 SORE THROAT: ICD-10-CM

## 2023-09-12 DIAGNOSIS — H93.8X3 CONGESTION OF BOTH EARS: ICD-10-CM

## 2023-09-12 LAB
EXPIRATION DATE: NORMAL
FLUAV AG UPPER RESP QL IA.RAPID: NOT DETECTED
FLUBV AG UPPER RESP QL IA.RAPID: NOT DETECTED
INTERNAL CONTROL: NORMAL
Lab: NORMAL
SARS-COV-2 AG UPPER RESP QL IA.RAPID: NOT DETECTED

## 2023-09-12 PROCEDURE — 87428 SARSCOV & INF VIR A&B AG IA: CPT

## 2023-09-12 PROCEDURE — 99213 OFFICE O/P EST LOW 20 MIN: CPT

## 2023-09-12 RX ORDER — METHYLPREDNISOLONE 4 MG/1
TABLET ORAL
Qty: 21 EACH | Refills: 0 | Status: SHIPPED | OUTPATIENT
Start: 2023-09-12 | End: 2023-09-17

## 2023-09-12 NOTE — PROGRESS NOTES
Chief Complaint  Sore Throat (38 year old female here today complaining of a sore throat and congestion, right ear fullness X 2 days. States her son was diagnosed with Pneumonia)    History of Present Illness  SUBJECTIVE  Annita Johnson presents to [facility] with URI symptoms.    Patient complains of sore throat, right ear fullness, congestion, and chills  Patient denies pain with swelling, nausea, vomiting, diarrhea, myalgias.    Symptom onset-2 days    She states her son is sick as well.    She denies any known exposure to COVID.      Past Medical History:   Diagnosis Date    Anemia     Breast cancer     Cancer, metastatic to bone     Kidney stone     Kidney stones     Metastasis to bone 2023      Family History   Problem Relation Age of Onset    Mental illness Mother     Hypertension Father     Alcohol abuse Father     Ovarian cancer Paternal Aunt     Breast cancer Maternal Great-Grandmother       Past Surgical History:   Procedure Laterality Date    BREAST BIOPSY       SECTION N/A 2021    Procedure:  SECTION PRIMARY;  Surgeon: Zoe Dawson MD;  Location: [facility] LABOR DELIVERY;  Service: Obstetrics/Gynecology;  Laterality: N/A;    CYSTOSCOPY BLADDER STONE LITHOTRIPSY          Current Outpatient Medications:     benzonatate (Tessalon Perles) 100 MG capsule, Take 1 capsule by mouth 3 (Three) Times a Day As Needed for Cough., Disp: 40 capsule, Rfl: 1    Calcium Carbonate-Vitamin D 600-10 MG-MCG per tablet, Take 1 tablet by mouth 2 (Two) Times a Day., Disp: 60 tablet, Rfl: 5    Calcium Citrate 333 MG tablet, Take 333 mg by mouth 3 (Three) Times a Day., Disp: , Rfl:     clonazePAM (KlonoPIN) 0.5 MG tablet, Take 1 tablet by mouth 2 (Two) Times a Day As Needed for Anxiety., Disp: 20 tablet, Rfl: 0    cyclobenzaprine (FLEXERIL) 10 MG tablet, Take 1 tablet by mouth 3 (Three) Times a Day As Needed for Muscle Spasms., Disp: 90 tablet, Rfl: 5     "escitalopram (Lexapro) 10 MG tablet, Take 1 tablet by mouth Daily., Disp: 90 tablet, Rfl: 1    hydrOXYzine (ATARAX) 50 MG tablet, Take 1 tablet by mouth every night at bedtime., Disp: 90 tablet, Rfl: 1    letrozole (FEMARA) 2.5 MG tablet, Take 1 tablet by mouth Daily., Disp: 30 tablet, Rfl: 5    ondansetron (ZOFRAN) 8 MG tablet, Take 1 tablet by mouth 3 (Three) Times a Day As Needed for Nausea or Vomiting., Disp: 30 tablet, Rfl: 5    oxyCODONE-acetaminophen (PERCOCET)  MG per tablet, TAKE 1 TABLET BY MOUTH EVERY 4 HOURS AS NEEDED FOR PAIN FOR UP TO 7 DAYS. MAX DAILY AMOUNT:6 TABLETS, Disp: , Rfl:     oxyCODONE-acetaminophen (PERCOCET) 7.5-325 MG per tablet, TAKE 1-2 TABS BY MOUTH EVERY 4 HOURS AS NEEDED FOR PAIN, Disp: , Rfl:     Sodium Fluoride 5000 PPM 1.1 % gel, See Admin Instructions., Disp: , Rfl:     methylPREDNISolone (MEDROL) 4 MG dose pack, Take as directed on package instructions., Disp: 21 each, Rfl: 0    metroNIDAZOLE (Flagyl) 500 MG tablet, Take 1 tablet by mouth 3 (Three) Times a Day. (Patient not taking: Reported on 9/12/2023), Disp: 21 tablet, Rfl: 0    pantoprazole (PROTONIX) 40 MG EC tablet, Take 1 tablet by mouth Daily. (Patient not taking: Reported on 9/12/2023), Disp: 90 tablet, Rfl: 0    ribociclib succinate 200 MG tablet therapy pack tablet, , Disp: , Rfl:     OBJECTIVE  Vital Signs:   /68 (BP Location: Left arm, Patient Position: Sitting)   Pulse 68   Temp 97.8 °F (36.6 °C) (Temporal)   Resp 18   Ht 160 cm (62.99\")   Wt 81.6 kg (180 lb)   SpO2 97%   BMI 31.90 kg/m²    Estimated body mass index is 31.9 kg/m² as calculated from the following:    Height as of this encounter: 160 cm (62.99\").    Weight as of this encounter: 81.6 kg (180 lb).     Wt Readings from Last 3 Encounters:   09/12/23 81.6 kg (180 lb)   08/29/23 81.5 kg (179 lb 9.6 oz)   08/24/23 82.1 kg (181 lb)     BP Readings from Last 3 Encounters:   09/12/23 112/68   08/29/23 106/64   08/24/23 122/77       Physical " Exam  Vitals and nursing note reviewed.   Constitutional:       Appearance: Normal appearance.   HENT:      Head: Normocephalic.      Right Ear: A middle ear effusion is present.      Left Ear: A middle ear effusion is present.      Nose: Congestion present.   Eyes:      Extraocular Movements: Extraocular movements intact.      Conjunctiva/sclera: Conjunctivae normal.   Cardiovascular:      Rate and Rhythm: Normal rate and regular rhythm.      Heart sounds: Normal heart sounds. No murmur heard.  Pulmonary:      Effort: Pulmonary effort is normal.      Breath sounds: Normal breath sounds. No wheezing or rales.   Abdominal:      General: Bowel sounds are normal.      Palpations: Abdomen is soft.      Tenderness: There is no abdominal tenderness. There is no guarding.   Musculoskeletal:         General: No swelling. Normal range of motion.   Skin:     General: Skin is warm and dry.   Neurological:      General: No focal deficit present.      Mental Status: She is alert. Mental status is at baseline.   Psychiatric:         Mood and Affect: Mood normal.         Thought Content: Thought content normal.        Result Review        CT Chest With Contrast Diagnostic    Result Date: 6/6/2023    1. 27 mm spiculated mass in the upper slightly inner right breast concerning for breast carcinoma.  Recommend correlation with diagnostic mammogram and ultrasound. 2. Multiple lytic skeletal metastasis most pronounced in the cervical and upper thoracic spine where there are C5 and T1 compression fractures.     KATH BOBO MD       Electronically Signed and Approved By: KATH BOBO MD on 6/06/2023 at 9:09             MRI Brain With & Without Contrast    Result Date: 6/12/2023   No acute intracranial abnormality.  No evidence of intracranial metastatic disease.      KATH BOBO MD       Electronically Signed and Approved By: KATH BOBO MD on 6/12/2023 at 8:57             CT Abdomen Pelvis With Contrast    Result Date:  6/6/2023    1. Multiple lytic skeletal metastasis without pathologic fracture. 2. No acute abdominal or pelvic abnormality.     KATH BOBO MD       Electronically Signed and Approved By: KATH BOBO MD on 6/06/2023 at 9:12             Mammo Post Device Placement Right    Addendum Date: 6/8/2023    ADDENDUM:  Pathology result from biopsy performed by Dr. 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  Suspicious calcifications in the right breast are highly concerning for multicentric disease, although stereotactic biopsy was deferred due to pathologic cervical spine fractures.  Recommend oncologic/surgical management.    ERYN WALLACE MD       Electronically Signed and Approved By: ERYN WALLACE MD on 6/08/2023 at 15:09             Result Date: 6/8/2023   Technically successful ultrasound guided core biopsy of the right breast. The patient tolerated the procedure well without immediate complication. Specimens have been sent to pathology for further analysis.  Addendum will be dictated upon receiving final pathology for concordance and recommendations.     Kay Orozco M.D.       Electronically Signed and Approved By: Kay Orozco M.D. on 6/06/2023 at 16:14             NM PET/CT Skull Base to Mid Thigh    Result Date: 7/25/2023    1. FDG avid metastatic lesions in the cervical spine, thoracic spine and pelvis in keeping with known metastatic breast malignancy.  Some lesions appear to have increased sclerosis compared to prior CT exam and may represent a component of treatment related changes. 2. No suspicious FDG avid lymph nodes or solid organ mass. 1.    LEONARDO BELLA MD       Electronically Signed and Approved By: LEONARDO BELLA MD on 7/25/2023 at 14:44             XR C Spine Ap & Lat    Result Date: 6/21/2023  Chief Complaint: Cervical pain  (neck) · Date of Exam: 6/13/23 · Procedure Performed: XR C SPINE AP & LAT · Performing MD: Macario Redman MD · Impression: AP and Lat upright C-spine films in office today were independently reviewed and show wedge deformity and disc collapse at C5-6 and erosion of spinous process C3, C4 and C5. Interpreted By: Macario Redman MD    US Breast Right Limited    Result Date: 6/6/2023   Right breast:  2 cm irregular, infiltrative and hypervascular mass at the 1 o'clock position 5 cm from the nipple is highly suspicious.  Recommend ultrasound-guided core biopsy.  The patient has been scheduled for ultrasound-guided core biopsy on today's date.  Multifocal and multicentric pleomorphic calcifications within the right breast as detailed above.  A representative sampling under stereotactic biopsy is recommended however, due to reported pathologic cervical fractures , stereotactic biopsy will be deferred at this time.  This was discussed with Dr. Vanegas on 6/6/2023 at 1505 hours.  Left breast:  Benign mammogram.  Recommend routine left breast screening.  RECOMMENDATION(S):  ULTRASOUND-GUIDED CORE BIOPSY: RIGHT BREAST   BIRADS:  DIAGNOSTIC CATEGORY 5--HIGHLY SUGGESTIVE FOR MALIGNANCY.   BREAST COMPOSITION: Heterogeneously dense,which may obscure small masses.  PLEASE NOTE:  A NORMAL MAMMOGRAM DOES NOT EXCLUDE THE POSSIBILITY OF BREAST CANCER. ANY CLINICALLY SUSPICIOUS PALPABLE LUMP SHOULD BE BIOPSIED.      Kay Orozco M.D.       Electronically Signed and Approved By: Kay Orozco M.D. on 6/06/2023 at 15:28             Mammo Diagnostic Digital Tomosynthesis Bilateral With CAD    Result Date: 6/6/2023   Right breast:  2 cm irregular, infiltrative and hypervascular mass at the 1 o'clock position 5 cm from the nipple is highly suspicious.  Recommend ultrasound-guided core biopsy.  The patient has been scheduled for ultrasound-guided core biopsy on today's date.  Multifocal and multicentric pleomorphic calcifications  within the right breast as detailed above.  A representative sampling under stereotactic biopsy is recommended however, due to reported pathologic cervical fractures , stereotactic biopsy will be deferred at this time.  This was discussed with Dr. Vanegas on 6/6/2023 at 1505 hours.  Left breast:  Benign mammogram.  Recommend routine left breast screening.  RECOMMENDATION(S):  ULTRASOUND-GUIDED CORE BIOPSY: RIGHT BREAST   BIRADS:  DIAGNOSTIC CATEGORY 5--HIGHLY SUGGESTIVE FOR MALIGNANCY.   BREAST COMPOSITION: Heterogeneously dense,which may obscure small masses.  PLEASE NOTE:  A NORMAL MAMMOGRAM DOES NOT EXCLUDE THE POSSIBILITY OF BREAST CANCER. ANY CLINICALLY SUSPICIOUS PALPABLE LUMP SHOULD BE BIOPSIED.      Kay Orozoc M.D.       Electronically Signed and Approved By: Kay Orozco M.D. on 6/06/2023 at 15:28             US Guided Breast Biopsy With & Without Device initial    Addendum Date: 6/8/2023    ADDENDUM:  Pathology result from biopsy performed by Dr. 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  Suspicious calcifications in the right breast are highly concerning for multicentric disease, although stereotactic biopsy was deferred due to pathologic cervical spine fractures.  Recommend oncologic/surgical management.    ERYN WALLACE MD       Electronically Signed and Approved By: ERYN WALLACE MD on 6/08/2023 at 15:09             Result Date: 6/8/2023   Technically successful ultrasound guided core biopsy of the right breast. The patient tolerated the procedure well without immediate complication. Specimens have been sent to pathology for further analysis.  Addendum will be dictated upon receiving final pathology for concordance and recommendations.     Kay Orozco M.D.       Electronically Signed and Approved By: Kay Orozco M.D. on  6/06/2023 at 16:14                The above data has been reviewed by LÁZARO Fonseca 09/12/2023 12:00 EDT.          Patient Care Team:  Lela Vanegas APRN as PCP - General (Internal Medicine)  Luna Gaitan, RN as Nurse Navigator        ASSESSMENT & PLAN    Diagnoses and all orders for this visit:    1. Acute URI (Primary)  Assessment & Plan:  Complaints of sore throat, right ear fullness, congestion, and chills.  Patient denies any pain with swallowing, nausea, vomiting, diarrhea fever, or body aches.  Son is also sick-diagnosed with PNA.  She denies any shortness of breath/exposure to COVID>  Covid/flu negative in the office.  Symptoms present for 2 days.  Will give her a medrol ariane to help with the congestion.  If no improvement, pt to return to the clinic.      2. Congestion of both ears  -     POCT SARS-CoV-2 Antigen [place] + Flu    3. Sore throat  -     POCT SARS-CoV-2 Antigen [place] + Flu    Other orders  -     methylPREDNISolone (MEDROL) 4 MG dose pack; Take as directed on package instructions.  Dispense: 21 each; Refill: 0         Tobacco Use: High Risk    Smoking Tobacco Use: Some Days    Smokeless Tobacco Use: Never    Passive Exposure: Current       Follow Up     Return if symptoms worsen or fail to improve.    Please note that portions of this note were completed with a voice recognition program.    Patient was given instructions and counseling regarding her condition or for health maintenance advice. Please see specific information pulled into the AVS if appropriate.   I have reviewed information obtained and documented by others and I have confirmed the accuracy of this documented note.    LÁZARO Fonseca

## 2023-09-12 NOTE — ASSESSMENT & PLAN NOTE
Complaints of sore throat, right ear fullness, congestion, and chills.  Patient denies any pain with swallowing, nausea, vomiting, diarrhea fever, or body aches.  Son is also sick-diagnosed with PNA.  She denies any shortness of breath/exposure to COVID>  Covid/flu negative in the office.  Symptoms present for 2 days.  Will give her a medrol ariane to help with the congestion.  If no improvement, pt to return to the clinic.

## 2023-09-12 NOTE — TELEPHONE ENCOUNTER
Caller: Annita Johnson    Relationship to patient: Self    Best call back number: [phone]    ANNITA HAS AN UPPER RESPIRATORY INFECTION AND IS NOT SURE IF SHE SHOULD COME IN ON 9-14-23 FOR HER INFUSION. HER SON ALSO HAS WALKING PNEUMONIA.

## 2023-09-19 ENCOUNTER — DOCUMENTATION (OUTPATIENT)
Dept: NUTRITION | Facility: HOSPITAL | Age: 38
End: 2023-09-19
Payer: COMMERCIAL

## 2023-09-19 NOTE — PROGRESS NOTES
Outpatient Nutrition Oncology Assessment    Patient Name: Annita Johnson  YOB: 1985  MRN: [id number]  Assessment Date: 9/19/2023    CLINICAL NUTRITION ASSESSMENT    Dx:  R breast Ca      Type of Cancer Treatment Letrozole, Trastuzumab, Xgeva         Reason for Assessment  Assessment   H&P:    Past Medical History:   Diagnosis Date    Anemia     Breast cancer     Cancer, metastatic to bone     Kidney stone     Kidney stones     Metastasis to bone 06/08/2023             Anthropometrics       Row Name 09/19/23 1428          Anthropometrics    Weight for Calculation 81.6 kg (179 lb 14.3 oz)                         Weight Hx  Wt Readings from Last 30 Encounters:   09/12/23 1153 81.6 kg (180 lb)   08/29/23 1104 81.5 kg (179 lb 9.6 oz)   08/24/23 0954 82.1 kg (181 lb)   08/24/23 0958 82.1 kg (181 lb)   08/18/23 1442 84.2 kg (185 lb 10 oz)   08/10/23 1110 79.8 kg (176 lb)   08/03/23 0847 84.2 kg (185 lb 10 oz)   08/03/23 0916 84.2 kg (185 lb 10 oz)   08/01/23 1308 83.1 kg (183 lb 3.2 oz)   07/27/23 1338 84.7 kg (186 lb 11.7 oz)   07/18/23 1405 84.9 kg (187 lb 2.7 oz)   07/13/23 0832 84.7 kg (186 lb 11.7 oz)   07/05/23 0842 87.7 kg (193 lb 5.5 oz)   06/22/23 1023 86.8 kg (191 lb 5.8 oz)   06/08/23 1410 90.3 kg (199 lb 1.2 oz)   06/08/23 1405 90.3 kg (199 lb)   06/08/23 0937 91 kg (200 lb 9.9 oz)   05/26/23 1054 86.8 kg (191 lb 6.4 oz)   05/12/23 1942 83.9 kg (185 lb)   06/30/21 1638 99.8 kg (220 lb)   04/29/21 1016 85.7 kg (189 lb)   04/14/19 1629 65.8 kg (145 lb)         Estimated/Assessed Needs - Anthropometrics       Row Name 09/19/23 1428          Anthropometrics    Weight for Calculation 81.6 kg (179 lb 14.3 oz)        Estimated/Assessed Needs    Additional Documentation Fluid Requirements (Group);KCAL/KG (Group);Protein Requirements (Group)        KCAL/KG    KCAL/KG 25 Kcal/Kg (kcal)     25 Kcal/Kg (kcal) 2040        Protein Requirements    Weight Used For Protein Calculations 52.3 kg (115 lb 4.8 oz)      Est Protein Requirement Amount (gms/kg) 1.2 gm protein     Estimated Protein Requirements (gms/day) 62.76        Fluid Requirements    Fluid Requirements (mL/day) 2040     RDA Method (mL) 2040                      Labs/Medications        Pertinent Labs Reviewed.       Pertinent Medications Calcium Carbonate-Vitamin D, Calcium Citrate, Sodium Fluoride, benzonatate, clonazePAM, cyclobenzaprine, escitalopram, hydrOXYzine, letrozole, metroNIDAZOLE, ondansetron, oxyCODONE-acetaminophen, pantoprazole, and ribociclib succinate     Nutrition Diagnosis        Nutrition Dx Problem 1 Increased nutrient needs related to increased nutrient needs due to catabolic disease as evidenced by physiological causes increasing nutrient needs.       Nutrition Intervention       RD Action Nutrition assessment (limited to review of medical record)    Written materials to be provided to pt at her next infusion 9/20/23:  High Protein Food Sources  Nausea & Vomiting  Diarrhea     Monitor/Evaluation       Monitor Per oncology nutrition protocol.     Comments:    Unable to meet with pt at her 1st infusion 9/20/23.  Will give infusion staff written information to provide to pt, including Oncology RD business card.    Electronically signed by:  Juana Moncada RD  09/19/23 14:29 EDT

## 2023-09-20 ENCOUNTER — OFFICE VISIT (OUTPATIENT)
Dept: ONCOLOGY | Facility: HOSPITAL | Age: 38
End: 2023-09-20
Payer: COMMERCIAL

## 2023-09-20 ENCOUNTER — HOSPITAL ENCOUNTER (OUTPATIENT)
Dept: ONCOLOGY | Facility: HOSPITAL | Age: 38
Discharge: HOME | End: 2023-09-20
Admitting: INTERNAL MEDICINE
Payer: COMMERCIAL

## 2023-09-20 ENCOUNTER — DOCUMENTATION (OUTPATIENT)
Dept: ONCOLOGY | Facility: HOSPITAL | Age: 38
End: 2023-09-20
Payer: COMMERCIAL

## 2023-09-20 VITALS
WEIGHT: 185.19 LBS | RESPIRATION RATE: 18 BRPM | BODY MASS INDEX: 32.81 KG/M2 | TEMPERATURE: 98.9 F | SYSTOLIC BLOOD PRESSURE: 126 MMHG | HEART RATE: 96 BPM | DIASTOLIC BLOOD PRESSURE: 57 MMHG | OXYGEN SATURATION: 97 %

## 2023-09-20 VITALS
DIASTOLIC BLOOD PRESSURE: 57 MMHG | SYSTOLIC BLOOD PRESSURE: 126 MMHG | HEART RATE: 96 BPM | WEIGHT: 185.19 LBS | BODY MASS INDEX: 32.81 KG/M2 | OXYGEN SATURATION: 97 % | HEIGHT: 63 IN | RESPIRATION RATE: 18 BRPM | TEMPERATURE: 98.9 F

## 2023-09-20 DIAGNOSIS — C50.211 MALIGNANT NEOPLASM OF UPPER-INNER QUADRANT OF RIGHT BREAST IN FEMALE, ESTROGEN RECEPTOR POSITIVE: Primary | ICD-10-CM

## 2023-09-20 DIAGNOSIS — Z17.0 MALIGNANT NEOPLASM OF UPPER-INNER QUADRANT OF RIGHT BREAST IN FEMALE, ESTROGEN RECEPTOR POSITIVE: Primary | ICD-10-CM

## 2023-09-20 DIAGNOSIS — C79.51 METASTASIS TO BONE: ICD-10-CM

## 2023-09-20 DIAGNOSIS — G89.3 CANCER RELATED PAIN: ICD-10-CM

## 2023-09-20 LAB
ALBUMIN SERPL-MCNC: 4.5 G/DL (ref 3.5–5.2)
ALBUMIN/GLOB SERPL: 1.5 G/DL
ALP SERPL-CCNC: 114 U/L (ref 39–117)
ALT SERPL W P-5'-P-CCNC: 47 U/L (ref 1–33)
ANION GAP SERPL CALCULATED.3IONS-SCNC: 13.3 MMOL/L (ref 5–15)
AST SERPL-CCNC: 30 U/L (ref 1–32)
BASOPHILS # BLD AUTO: 0.03 10*3/MM3 (ref 0–0.2)
BASOPHILS NFR BLD AUTO: 0.4 % (ref 0–1.5)
BILIRUB SERPL-MCNC: 0.2 MG/DL (ref 0–1.2)
BUN SERPL-MCNC: 14 MG/DL (ref 6–20)
BUN/CREAT SERPL: 20 (ref 7–25)
CALCIUM SPEC-SCNC: 9.5 MG/DL (ref 8.6–10.5)
CHLORIDE SERPL-SCNC: 101 MMOL/L (ref 98–107)
CO2 SERPL-SCNC: 24.7 MMOL/L (ref 22–29)
CREAT SERPL-MCNC: 0.7 MG/DL (ref 0.57–1)
DEPRECATED RDW RBC AUTO: 50.8 FL (ref 37–54)
EGFRCR SERPLBLD CKD-EPI 2021: 114.4 ML/MIN/1.73
EOSINOPHIL # BLD AUTO: 0.21 10*3/MM3 (ref 0–0.4)
EOSINOPHIL NFR BLD AUTO: 2.5 % (ref 0.3–6.2)
ERYTHROCYTE [DISTWIDTH] IN BLOOD BY AUTOMATED COUNT: 15 % (ref 12.3–15.4)
GLOBULIN UR ELPH-MCNC: 3.1 GM/DL
GLUCOSE SERPL-MCNC: 100 MG/DL (ref 65–99)
HCT VFR BLD AUTO: 40.9 % (ref 34–46.6)
HGB BLD-MCNC: 12.8 G/DL (ref 12–15.9)
IMM GRANULOCYTES # BLD AUTO: 0.01 10*3/MM3 (ref 0–0.05)
IMM GRANULOCYTES NFR BLD AUTO: 0.1 % (ref 0–0.5)
LYMPHOCYTES # BLD AUTO: 2.07 10*3/MM3 (ref 0.7–3.1)
LYMPHOCYTES NFR BLD AUTO: 24.7 % (ref 19.6–45.3)
MCH RBC QN AUTO: 28.6 PG (ref 26.6–33)
MCHC RBC AUTO-ENTMCNC: 31.3 G/DL (ref 31.5–35.7)
MCV RBC AUTO: 91.5 FL (ref 79–97)
MONOCYTES # BLD AUTO: 0.81 10*3/MM3 (ref 0.1–0.9)
MONOCYTES NFR BLD AUTO: 9.7 % (ref 5–12)
NEUTROPHILS NFR BLD AUTO: 5.26 10*3/MM3 (ref 1.7–7)
NEUTROPHILS NFR BLD AUTO: 62.6 % (ref 42.7–76)
PHOSPHATE SERPL-MCNC: 3.2 MG/DL (ref 2.5–4.5)
PLATELET # BLD AUTO: 355 10*3/MM3 (ref 140–450)
PMV BLD AUTO: 9.6 FL (ref 6–12)
POTASSIUM SERPL-SCNC: 3.8 MMOL/L (ref 3.5–5.2)
PROT SERPL-MCNC: 7.6 G/DL (ref 6–8.5)
RBC # BLD AUTO: 4.47 10*6/MM3 (ref 3.77–5.28)
SODIUM SERPL-SCNC: 139 MMOL/L (ref 136–145)
WBC NRBC COR # BLD: 8.39 10*3/MM3 (ref 3.4–10.8)

## 2023-09-20 PROCEDURE — 96413 CHEMO IV INFUSION 1 HR: CPT

## 2023-09-20 PROCEDURE — 25010000002 TRASTUZUMAB PER 10 MG: Performed by: INTERNAL MEDICINE

## 2023-09-20 PROCEDURE — 25010000002 LEUPROLIDE PER 3.75 MG: Performed by: INTERNAL MEDICINE

## 2023-09-20 PROCEDURE — 25010000002 DIPHENHYDRAMINE PER 50 MG: Performed by: INTERNAL MEDICINE

## 2023-09-20 PROCEDURE — 80053 COMPREHEN METABOLIC PANEL: CPT | Performed by: INTERNAL MEDICINE

## 2023-09-20 PROCEDURE — 96375 TX/PRO/DX INJ NEW DRUG ADDON: CPT

## 2023-09-20 PROCEDURE — 84100 ASSAY OF PHOSPHORUS: CPT | Performed by: INTERNAL MEDICINE

## 2023-09-20 PROCEDURE — 96402 CHEMO HORMON ANTINEOPL SQ/IM: CPT

## 2023-09-20 PROCEDURE — 85025 COMPLETE CBC W/AUTO DIFF WBC: CPT | Performed by: INTERNAL MEDICINE

## 2023-09-20 RX ORDER — SODIUM CHLORIDE 9 MG/ML
250 INJECTION, SOLUTION INTRAVENOUS ONCE
Status: COMPLETED | OUTPATIENT
Start: 2023-09-20 | End: 2023-09-20

## 2023-09-20 RX ORDER — ACETAMINOPHEN 325 MG/1
650 TABLET ORAL ONCE
Status: COMPLETED | OUTPATIENT
Start: 2023-09-20 | End: 2023-09-20

## 2023-09-20 RX ORDER — SODIUM CHLORIDE 9 MG/ML
250 INJECTION, SOLUTION INTRAVENOUS ONCE
Status: CANCELLED | OUTPATIENT
Start: 2023-09-20

## 2023-09-20 RX ORDER — ACETAMINOPHEN 325 MG/1
650 TABLET ORAL ONCE
Status: CANCELLED | OUTPATIENT
Start: 2023-09-20

## 2023-09-20 RX ORDER — OXYCODONE AND ACETAMINOPHEN 7.5; 325 MG/1; MG/1
1 TABLET ORAL EVERY 8 HOURS PRN
Qty: 30 TABLET | Refills: 0 | Status: SHIPPED | OUTPATIENT
Start: 2023-09-20

## 2023-09-20 RX ORDER — LETROZOLE 2.5 MG/1
2.5 TABLET, FILM COATED ORAL DAILY
Qty: 30 TABLET | Refills: 5
Start: 2023-09-20

## 2023-09-20 RX ADMIN — ACETAMINOPHEN 325 MG: 325 TABLET ORAL at 10:07

## 2023-09-20 RX ADMIN — SODIUM CHLORIDE 250 ML: 9 INJECTION, SOLUTION INTRAVENOUS at 10:00

## 2023-09-20 RX ADMIN — TRASTUZUMAB 530 MG: 150 INJECTION, POWDER, LYOPHILIZED, FOR SOLUTION INTRAVENOUS at 10:37

## 2023-09-20 RX ADMIN — LEUPROLIDE ACETATE 3.75 MG: KIT at 11:18

## 2023-09-20 RX ADMIN — DIPHENHYDRAMINE HYDROCHLORIDE 25 MG: 50 INJECTION, SOLUTION INTRAMUSCULAR; INTRAVENOUS at 10:06

## 2023-09-20 NOTE — ASSESSMENT & PLAN NOTE
Patient notes pain in the bones from her known breast cancer involvement.  Oxycodone is helpful when needed.  Aquiles reviewed and no discrepancies.  Refill provided today.

## 2023-09-20 NOTE — ASSESSMENT & PLAN NOTE
Patient is on monthly Xgeva.  Tolerating the injections well.  No dental or jaw pain.  Electrolytes have been adequate for treatment.  Xgeva today monthly.  Bone scan before next visit.

## 2023-09-20 NOTE — PROGRESS NOTES
Diagnosis: Breast cancer    Reason for Referral: Patient requested to meet with OSW    Content of Visit: OSW met with patient at her infusion chair to assist with her [employer] application.  Patient had completed the application but needed assistance with the physician's verification form and email in the completed application to [address].  Patient requested a gas card to assist with returning for her follow-up visit tomorrow.  Patient stated she received an approval email from [employer] [facility].  Patient plans to email OSW the landlord receipt, electric bill, water pill, and Internet bill to be submitted along with the application to Antonia's wife.    Resources/Referrals Provided: No additional resources or referrals requested by patient.

## 2023-09-20 NOTE — ASSESSMENT & PLAN NOTE
Metastatic.  Triple positive.  Patient is on treatment with letrozole, trastuzumab, leuprolide for ovarian suppression and denosumab for bony involvement.  Tolerating her treatments well.  She denies issues or side effects.  She will receive leuprolide injection today.  Continue letrozole by mouth daily.  Trastuzumab today.  She will be due for echocardiogram next month-order placed.  I will see her back in 3 weeks time for her next treatment with labs and restaging scans prior

## 2023-09-20 NOTE — PROGRESS NOTES
Chief Complaint  Chemotherapy    Lela Vanegas, Lela Reveles, LÁZARO    Subjective          Annita Johnson presents to [facility]EMATOLOGY [facility]OGY for ongoing treatment of her triple positive breast cancer.  She is on palliative therapy with letrozole, leuprolide for ovarian suppression, trastuzumab for the HER2 positivity and denosumab for bony involvement.  She states that she is tolerating all treatments well.  She denies any masses or adenopathy.  She reports occasional pain in the bones from her disease but overall she is feeling better.  She does use oxycodone as needed.  She notes good appetite.  Her energy level has improved to the point where she is back to working part-time.  She denies chest pain, shortness of breath, lower extremity swelling.    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, estrogen receptor positive   6/8/2023 Initial Diagnosis    Malignant neoplasm of upper-inner quadrant of right breast in female, estrogen receptor positive     6/8/2023 Cancer Staged    Staging form: Breast, AJCC 8th Edition  - Clinical: Stage IV (cT2, cN0, cM1, ER+, MN+, HER2-) - Signed by Perry Garcia MD on 6/8/2023 6/9/2023 -  Chemotherapy    OP SUPPORTIVE BREAST Leuprolide 3.75 MG Q28D       6/15/2023 - 6/15/2023 Chemotherapy    OP BREAST Letrozole / Ribociclib       7/13/2023 -  Chemotherapy    OP BREAST Letrozole / Trastuzumab       8/24/2023 -  Chemotherapy    OP SUPPORTIVE Denosumab (Xgeva) Q28D       Metastasis to bone   6/8/2023 Initial Diagnosis    Metastasis to bone     8/24/2023 -  Chemotherapy    OP SUPPORTIVE Denosumab (Xgeva) Q28D           Review of Systems   Constitutional:  Positive for fatigue. Negative for appetite change, diaphoresis, fever, unexpected weight gain and unexpected weight loss.   HENT:  Negative for hearing loss, mouth sores, sore throat, swollen glands, trouble swallowing and voice change.     Eyes:  Negative for blurred vision.   Respiratory:  Negative for cough, shortness of breath and wheezing.    Cardiovascular:  Negative for chest pain and palpitations.   Gastrointestinal:  Negative for abdominal pain, blood in stool, constipation, diarrhea, nausea and vomiting.   Endocrine: Negative for cold intolerance and heat intolerance.   Genitourinary:  Negative for difficulty urinating, dysuria, frequency, hematuria and urinary incontinence.   Musculoskeletal:  Negative for arthralgias, back pain and myalgias.   Skin:  Negative for rash, skin lesions and wound.   Neurological:  Negative for dizziness, seizures, weakness, numbness and headache.   Hematological:  Does not bruise/bleed easily.   Psychiatric/Behavioral:  Negative for depressed mood. The patient is not nervous/anxious.    Current Outpatient Medications on File Prior to Visit   Medication Sig Dispense Refill    benzonatate (Tessalon Perles) 100 MG capsule Take 1 capsule by mouth 3 (Three) Times a Day As Needed for Cough. 40 capsule 1    Calcium Carbonate-Vitamin D 600-10 MG-MCG per tablet Take 1 tablet by mouth 2 (Two) Times a Day. 60 tablet 5    clonazePAM (KlonoPIN) 0.5 MG tablet Take 1 tablet by mouth 2 (Two) Times a Day As Needed for Anxiety. 20 tablet 0    cyclobenzaprine (FLEXERIL) 10 MG tablet Take 1 tablet by mouth 3 (Three) Times a Day As Needed for Muscle Spasms. 90 tablet 5    escitalopram (Lexapro) 10 MG tablet Take 1 tablet by mouth Daily. 90 tablet 1    hydrOXYzine (ATARAX) 50 MG tablet Take 1 tablet by mouth every night at bedtime. 90 tablet 1    letrozole (FEMARA) 2.5 MG tablet Take 1 tablet by mouth Daily. 30 tablet 5    ondansetron (ZOFRAN) 8 MG tablet Take 1 tablet by mouth 3 (Three) Times a Day As Needed for Nausea or Vomiting. 30 tablet 5    oxyCODONE-acetaminophen (PERCOCET)  MG per tablet TAKE 1 TABLET BY MOUTH EVERY 4 HOURS AS NEEDED FOR PAIN FOR UP TO 7 DAYS. MAX DAILY AMOUNT:6 TABLETS      Sodium Fluoride 5000 PPM  1.1 % gel See Admin Instructions.      [DISCONTINUED] oxyCODONE-acetaminophen (PERCOCET) 7.5-325 MG per tablet TAKE 1-2 TABS BY MOUTH EVERY 4 HOURS AS NEEDED FOR PAIN      letrozole (FEMARA) 2.5 MG tablet Take 1 tablet by mouth Daily. 30 tablet 5    metroNIDAZOLE (Flagyl) 500 MG tablet Take 1 tablet by mouth 3 (Three) Times a Day. (Patient not taking: Reported on 2023) 21 tablet 0    pantoprazole (PROTONIX) 40 MG EC tablet Take 1 tablet by mouth Daily. (Patient not taking: Reported on 2023) 90 tablet 0    [DISCONTINUED] Calcium Citrate 333 MG tablet Take 333 mg by mouth 3 (Three) Times a Day. (Patient not taking: Reported on 2023)      [DISCONTINUED] ribociclib succinate 200 MG tablet therapy pack tablet  (Patient not taking: Reported on 2023)       Current Facility-Administered Medications on File Prior to Visit   Medication Dose Route Frequency Provider Last Rate Last Admin    [COMPLETED] acetaminophen (TYLENOL) tablet 650 mg  650 mg Oral Once Perry Garcia MD   325 mg at 23 1007    [COMPLETED] diphenhydrAMINE (BENADRYL) IVPB 25 mg  25 mg Intravenous Once Perry Garcia MD   Stopped at 23 1018    [COMPLETED] leuprolide (LUPRON) injection 3.75 mg  3.75 mg Intramuscular Once Perry Garcia MD   3.75 mg at 23 1118    [COMPLETED] sodium chloride 0.9 % infusion 250 mL  250 mL Intravenous Once Perry Garcia MD   Stopped at 23 1112    [COMPLETED] Trastuzumab (HERCEPTIN) 530 mg in sodium chloride 0.9 % 300.2 mL chemo IVPB  6 mg/kg (Treatment Plan Recorded) Intravenous Once Perry Garcia MD   Stopped at 23 1107       No Known Allergies  Past Medical History:   Diagnosis Date    Anemia     Breast cancer     Cancer, metastatic to bone     Kidney stone     Kidney stones     Metastasis to bone 2023     Past Surgical History:   Procedure Laterality Date    BREAST BIOPSY       SECTION N/A 2021    Procedure:  SECTION PRIMARY;  Surgeon:  Zoe Dawson MD;  Location: [facility] LABOR DELIVERY;  Service: Obstetrics/Gynecology;  Laterality: N/A;    CYSTOSCOPY BLADDER STONE LITHOTRIPSY       Social History     Socioeconomic History    Marital status:      Spouse name: Janet   Tobacco Use    Smoking status: Some Days     Packs/day: 0.25     Years: 10.00     Pack years: 2.50     Types: Cigarettes     Start date: 2/1/2021     Passive exposure: Current    Smokeless tobacco: Never   Vaping Use    Vaping Use: Never used   Substance and Sexual Activity    Alcohol use: Yes     Alcohol/week: 6.0 standard drinks     Types: 3 Glasses of wine, 3 Drinks containing 0.5 oz of alcohol per week    Drug use: Not Currently     Types: Marijuana     Comment: for pain control    Sexual activity: Yes     Partners: Male     Birth control/protection: Same-sex partner     Family History   Problem Relation Age of Onset    Mental illness Mother     Hypertension Father     Alcohol abuse Father     Ovarian cancer Paternal Aunt     Breast cancer Maternal Great-Grandmother        Objective   Physical Exam  Vitals reviewed. Exam conducted with a chaperone present.   Constitutional:       General: She is not in acute distress.     Appearance: Normal appearance.   Cardiovascular:      Rate and Rhythm: Normal rate and regular rhythm.      Heart sounds: Normal heart sounds. No murmur heard.    No gallop.   Pulmonary:      Effort: Pulmonary effort is normal.      Breath sounds: Normal breath sounds.   Abdominal:      General: Abdomen is flat. Bowel sounds are normal.      Palpations: Abdomen is soft.   Musculoskeletal:      Right lower leg: No edema.      Left lower leg: No edema.   Neurological:      Mental Status: She is alert and oriented to person, place, and time.   Psychiatric:         Mood and Affect: Mood normal.         Behavior: Behavior normal.       Vitals:    09/20/23 0915   BP: 126/57   Pulse: 96   Resp: 18   Temp: 98.9 °F (37.2 °C)   TempSrc: Temporal   SpO2: 97%    Weight: 84 kg (185 lb 3 oz)   PainSc:   7   PainLoc: Shoulder  Comment: RIGHT     ECOG score: 0         PHQ-9 Total Score:                    Result Review :   The following data was reviewed by: Perry Garcia MD on 09/20/2023:  Lab Results   Component Value Date    HGB 12.8 09/20/2023    HCT 40.9 09/20/2023    MCV 91.5 09/20/2023     09/20/2023    WBC 8.39 09/20/2023    NEUTROABS 5.26 09/20/2023    LYMPHSABS 2.07 09/20/2023    MONOSABS 0.81 09/20/2023    EOSABS 0.21 09/20/2023    BASOSABS 0.03 09/20/2023     Lab Results   Component Value Date    GLUCOSE 100 (H) 09/20/2023    BUN 14 09/20/2023    CREATININE 0.70 09/20/2023     09/20/2023    K 3.8 09/20/2023     09/20/2023    CO2 24.7 09/20/2023    CALCIUM 9.5 09/20/2023    PROTEINTOT 7.6 09/20/2023    ALBUMIN 4.5 09/20/2023    BILITOT 0.2 09/20/2023    ALKPHOS 114 09/20/2023    AST 30 09/20/2023    ALT 47 (H) 09/20/2023     Lab Results   Component Value Date    MG 2.2 08/24/2023    PHOS 3.2 09/20/2023    TSH 1.290 05/26/2023     No results found for: IRON, LABIRON, TRANSFERRIN, TIBC  No results found for: LDH, FERRITIN, [id number], FOLATE  No results found for: PSA, CEA, AFP, ,           Assessment and Plan    Diagnoses and all orders for this visit:    1. 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  Triple positive.  Patient is on treatment with letrozole, trastuzumab, leuprolide for ovarian suppression and denosumab for bony involvement.  Tolerating her treatments well.  She denies issues or side effects.  She will receive leuprolide injection today.  Continue letrozole by mouth daily.  Trastuzumab today.  She will be due for echocardiogram next month-order placed.  I will see her back in 3 weeks time for her next treatment with labs and restaging scans prior    Orders:  -     CT chest w contrast; Future  -     CT abdomen pelvis w  contrast; Future  -     CBC & Differential; Future  -     Comprehensive Metabolic Panel; Future  -     NM Bone Scan Whole Body; Future  -     Adult Transthoracic Echo Complete W/ Cont if Necessary Per Protocol; Future  -     CBC and Differential; Future  -     Comprehensive metabolic panel; Future    2. Metastasis to bone  Assessment & Plan:  Patient is on monthly Xgeva.  Tolerating the injections well.  No dental or jaw pain.  Electrolytes have been adequate for treatment.  Xgeva today monthly.  Bone scan before next visit.    Orders:  -     NM Bone Scan Whole Body; Future  -     CBC and Differential; Future  -     Comprehensive metabolic panel; Future    3. Cancer related pain  Assessment & Plan:  Patient notes pain in the bones from her known breast cancer involvement.  Oxycodone is helpful when needed.  Aquiles reviewed and no discrepancies.  Refill provided today.    Orders:  -     oxyCODONE-acetaminophen (PERCOCET) 7.5-325 MG per tablet; Take 1 tablet by mouth Every 8 (Eight) Hours As Needed (pain).  Dispense: 30 tablet; Refill: 0    Other orders  -     Cancel: sodium chloride 0.9 % infusion 250 mL  -     Cancel: acetaminophen (TYLENOL) tablet 650 mg  -     Cancel: diphenhydrAMINE (BENADRYL) IVPB 25 mg  -     Cancel: Trastuzumab (HERCEPTIN) 530 mg in sodium chloride 0.9 % 250 mL chemo IVPB  -     Cancel: leuprolide (LUPRON) injection 3.75 mg  -     denosumab (XGEVA) injection 120 mg            Patient Follow Up: 3 weeks    Patient was given instructions and counseling regarding her condition or for health maintenance advice. Please see specific information pulled into the AVS if appropriate.     Perry Garcia MD    9/20/2023

## 2023-09-21 ENCOUNTER — HOSPITAL ENCOUNTER (OUTPATIENT)
Dept: ONCOLOGY | Facility: HOSPITAL | Age: 38
Discharge: HOME | End: 2023-09-21
Admitting: INTERNAL MEDICINE
Payer: COMMERCIAL

## 2023-09-21 VITALS
OXYGEN SATURATION: 98 % | RESPIRATION RATE: 16 BRPM | HEART RATE: 89 BPM | DIASTOLIC BLOOD PRESSURE: 81 MMHG | TEMPERATURE: 98.3 F | SYSTOLIC BLOOD PRESSURE: 124 MMHG

## 2023-09-21 DIAGNOSIS — Z01.419 WELL WOMAN EXAM: ICD-10-CM

## 2023-09-21 DIAGNOSIS — C79.51 METASTASIS TO BONE: Primary | ICD-10-CM

## 2023-09-21 DIAGNOSIS — C50.211 MALIGNANT NEOPLASM OF UPPER-INNER QUADRANT OF RIGHT BREAST IN FEMALE, ESTROGEN RECEPTOR POSITIVE: ICD-10-CM

## 2023-09-21 DIAGNOSIS — F41.8 ANXIETY ABOUT HEALTH: ICD-10-CM

## 2023-09-21 DIAGNOSIS — Z17.0 MALIGNANT NEOPLASM OF UPPER-INNER QUADRANT OF RIGHT BREAST IN FEMALE, ESTROGEN RECEPTOR POSITIVE: ICD-10-CM

## 2023-09-21 LAB
25(OH)D3 SERPL-MCNC: 41.4 NG/ML (ref 30–100)
BACTERIA UR QL AUTO: ABNORMAL /HPF
BILIRUB UR QL STRIP: NEGATIVE
CHOLEST SERPL-MCNC: 191 MG/DL (ref 0–200)
CLARITY UR: CLEAR
COLOR UR: YELLOW
FOLATE SERPL-MCNC: 2.95 NG/ML (ref 4.78–24.2)
GLUCOSE UR STRIP-MCNC: NEGATIVE MG/DL
HBA1C MFR BLD: 5.4 % (ref 4.8–5.6)
HDLC SERPL-MCNC: 47 MG/DL (ref 40–60)
HGB UR QL STRIP.AUTO: NEGATIVE
HYALINE CASTS UR QL AUTO: ABNORMAL /LPF
KETONES UR QL STRIP: NEGATIVE
LDLC SERPL CALC-MCNC: 121 MG/DL (ref 0–100)
LDLC/HDLC SERPL: 2.52 {RATIO}
LEUKOCYTE ESTERASE UR QL STRIP.AUTO: ABNORMAL
NITRITE UR QL STRIP: NEGATIVE
PH UR STRIP.AUTO: 6.5 [PH] (ref 5–8)
PROT UR QL STRIP: NEGATIVE
RBC # UR STRIP: ABNORMAL /HPF
REF LAB TEST METHOD: ABNORMAL
SP GR UR STRIP: 1.01 (ref 1–1.03)
SQUAMOUS #/AREA URNS HPF: ABNORMAL /HPF
TRIGL SERPL-MCNC: 128 MG/DL (ref 0–150)
TSH SERPL DL<=0.05 MIU/L-ACNC: 0.89 UIU/ML (ref 0.27–4.2)
UROBILINOGEN UR QL STRIP: ABNORMAL
VIT B12 BLD-MCNC: 479 PG/ML (ref 211–946)
VLDLC SERPL-MCNC: 23 MG/DL (ref 5–40)
WBC # UR STRIP: ABNORMAL /HPF

## 2023-09-21 PROCEDURE — 82607 VITAMIN B-12: CPT

## 2023-09-21 PROCEDURE — 83036 HEMOGLOBIN GLYCOSYLATED A1C: CPT

## 2023-09-21 PROCEDURE — 80061 LIPID PANEL: CPT

## 2023-09-21 PROCEDURE — 82306 VITAMIN D 25 HYDROXY: CPT

## 2023-09-21 PROCEDURE — 84443 ASSAY THYROID STIM HORMONE: CPT

## 2023-09-21 PROCEDURE — 96372 THER/PROPH/DIAG INJ SC/IM: CPT

## 2023-09-21 PROCEDURE — 36415 COLL VENOUS BLD VENIPUNCTURE: CPT

## 2023-09-21 PROCEDURE — 25010000002 DENOSUMAB 120 MG/1.7ML SOLUTION: Performed by: INTERNAL MEDICINE

## 2023-09-21 PROCEDURE — 81001 URINALYSIS AUTO W/SCOPE: CPT

## 2023-09-21 PROCEDURE — 82746 ASSAY OF FOLIC ACID SERUM: CPT

## 2023-09-21 RX ADMIN — DENOSUMAB 120 MG: 120 INJECTION SUBCUTANEOUS at 10:28

## 2023-10-05 ENCOUNTER — HOSPITAL ENCOUNTER (OUTPATIENT)
Dept: CARDIOLOGY | Facility: HOSPITAL | Age: 38
Discharge: HOME | End: 2023-10-05
Payer: COMMERCIAL

## 2023-10-05 ENCOUNTER — HOSPITAL ENCOUNTER (OUTPATIENT)
Dept: NUCLEAR MEDICINE | Facility: HOSPITAL | Age: 38
Discharge: HOME | End: 2023-10-05
Payer: COMMERCIAL

## 2023-10-05 ENCOUNTER — HOSPITAL ENCOUNTER (OUTPATIENT)
Dept: CT IMAGING | Facility: HOSPITAL | Age: 38
Discharge: HOME | End: 2023-10-05
Payer: COMMERCIAL

## 2023-10-05 ENCOUNTER — TELEMEDICINE (OUTPATIENT)
Dept: INTERNAL MEDICINE | Facility: CLINIC | Age: 38
End: 2023-10-05
Payer: COMMERCIAL

## 2023-10-05 ENCOUNTER — TELEPHONE (OUTPATIENT)
Dept: INTERNAL MEDICINE | Facility: CLINIC | Age: 38
End: 2023-10-05
Payer: COMMERCIAL

## 2023-10-05 DIAGNOSIS — C50.211 MALIGNANT NEOPLASM OF UPPER-INNER QUADRANT OF RIGHT BREAST IN FEMALE, ESTROGEN RECEPTOR POSITIVE: ICD-10-CM

## 2023-10-05 DIAGNOSIS — Z17.0 MALIGNANT NEOPLASM OF UPPER-INNER QUADRANT OF RIGHT BREAST IN FEMALE, ESTROGEN RECEPTOR POSITIVE: ICD-10-CM

## 2023-10-05 DIAGNOSIS — R51.9 UNILATERAL OCCIPITAL HEADACHE: Primary | ICD-10-CM

## 2023-10-05 DIAGNOSIS — C79.51 METASTASIS TO BONE: ICD-10-CM

## 2023-10-05 LAB
BH CV ECHO MEAS - AO MAX PG: 6 MMHG
BH CV ECHO MEAS - AO MEAN PG: 4 MMHG
BH CV ECHO MEAS - AO ROOT DIAM: 2.8 CM
BH CV ECHO MEAS - AO V2 MAX: 124 CM/SEC
BH CV ECHO MEAS - AO V2 VTI: 26.3 CM
BH CV ECHO MEAS - AVA(I,D): 2.6 CM2
BH CV ECHO MEAS - EDV(CUBED): 79.5 ML
BH CV ECHO MEAS - EDV(MOD-SP2): 89.9 ML
BH CV ECHO MEAS - EDV(MOD-SP4): 80.9 ML
BH CV ECHO MEAS - EF(MOD-BP): 57.8 %
BH CV ECHO MEAS - EF(MOD-SP2): 57.6 %
BH CV ECHO MEAS - EF(MOD-SP4): 57.7 %
BH CV ECHO MEAS - ESV(CUBED): 27 ML
BH CV ECHO MEAS - ESV(MOD-SP2): 38.1 ML
BH CV ECHO MEAS - ESV(MOD-SP4): 34.2 ML
BH CV ECHO MEAS - FS: 30.2 %
BH CV ECHO MEAS - IVS/LVPW: 1 CM
BH CV ECHO MEAS - IVSD: 0.9 CM
BH CV ECHO MEAS - LA DIMENSION: 3 CM
BH CV ECHO MEAS - LAT PEAK E' VEL: 14 CM/SEC
BH CV ECHO MEAS - LV MASS(C)D: 123.3 GRAMS
BH CV ECHO MEAS - LV MAX PG: 4.2 MMHG
BH CV ECHO MEAS - LV MEAN PG: 2 MMHG
BH CV ECHO MEAS - LV V1 MAX: 102 CM/SEC
BH CV ECHO MEAS - LV V1 VTI: 21.9 CM
BH CV ECHO MEAS - LVIDD: 4.3 CM
BH CV ECHO MEAS - LVIDS: 3 CM
BH CV ECHO MEAS - LVOT AREA: 3.1 CM2
BH CV ECHO MEAS - LVOT DIAM: 2 CM
BH CV ECHO MEAS - LVPWD: 0.9 CM
BH CV ECHO MEAS - MED PEAK E' VEL: 13.1 CM/SEC
BH CV ECHO MEAS - MR MAX PG: 121.9 MMHG
BH CV ECHO MEAS - MR MAX VEL: 552 CM/SEC
BH CV ECHO MEAS - MV A MAX VEL: 90.1 CM/SEC
BH CV ECHO MEAS - MV DEC SLOPE: 577 CM/SEC2
BH CV ECHO MEAS - MV DEC TIME: 0.23 SEC
BH CV ECHO MEAS - MV E MAX VEL: 93.3 CM/SEC
BH CV ECHO MEAS - MV E/A: 1.04
BH CV ECHO MEAS - MV MAX PG: 4 MMHG
BH CV ECHO MEAS - MV MEAN PG: 2 MMHG
BH CV ECHO MEAS - MV P1/2T: 54.8 MSEC
BH CV ECHO MEAS - MV V2 VTI: 33.9 CM
BH CV ECHO MEAS - MVA(P1/2T): 4 CM2
BH CV ECHO MEAS - MVA(VTI): 2.03 CM2
BH CV ECHO MEAS - RVDD: 2.9 CM
BH CV ECHO MEAS - SV(LVOT): 68.8 ML
BH CV ECHO MEAS - SV(MOD-SP2): 51.8 ML
BH CV ECHO MEAS - SV(MOD-SP4): 46.7 ML
BH CV ECHO MEASUREMENTS AVERAGE E/E' RATIO: 6.89
IVRT: 61 MS
LEFT ATRIUM VOLUME INDEX: 18.4 ML/M2
LEFT ATRIUM VOLUME: 34.4 ML

## 2023-10-05 PROCEDURE — 99214 OFFICE O/P EST MOD 30 MIN: CPT

## 2023-10-05 PROCEDURE — 0 TECHNETIUM MEDRONATE KIT: Performed by: INTERNAL MEDICINE

## 2023-10-05 PROCEDURE — 25510000001 IOPAMIDOL PER 1 ML: Performed by: INTERNAL MEDICINE

## 2023-10-05 PROCEDURE — 74177 CT ABD & PELVIS W/CONTRAST: CPT

## 2023-10-05 PROCEDURE — A9503 TC99M MEDRONATE: HCPCS | Performed by: INTERNAL MEDICINE

## 2023-10-05 PROCEDURE — 93306 TTE W/DOPPLER COMPLETE: CPT

## 2023-10-05 PROCEDURE — 78306 BONE IMAGING WHOLE BODY: CPT

## 2023-10-05 PROCEDURE — 71260 CT THORAX DX C+: CPT

## 2023-10-05 RX ORDER — RIMEGEPANT SULFATE 75 MG/75MG
75 TABLET, ORALLY DISINTEGRATING ORAL DAILY PRN
Qty: 8 TABLET | Refills: 5 | Status: SHIPPED | OUTPATIENT
Start: 2023-10-05

## 2023-10-05 RX ORDER — TOPIRAMATE 25 MG/1
25 TABLET ORAL
Qty: 30 TABLET | Refills: 2 | Status: SHIPPED | OUTPATIENT
Start: 2023-10-05

## 2023-10-05 RX ORDER — TC 99M MEDRONATE 20 MG/10ML
21.5 INJECTION, POWDER, LYOPHILIZED, FOR SOLUTION INTRAVENOUS
Status: COMPLETED | OUTPATIENT
Start: 2023-10-05 | End: 2023-10-05

## 2023-10-05 RX ADMIN — TC 99M MEDRONATE 21.5 MILLICURIE: 20 INJECTION, POWDER, LYOPHILIZED, FOR SOLUTION INTRAVENOUS at 12:30

## 2023-10-05 RX ADMIN — IOPAMIDOL 100 ML: 755 INJECTION, SOLUTION INTRAVENOUS at 12:53

## 2023-10-05 NOTE — TELEPHONE ENCOUNTER
Call patient back to scheduled her for appt, patient stated she does not wants to wait until next week for this, I advise patient we will send a message to pcp and will call her back,

## 2023-10-05 NOTE — PROGRESS NOTES
Mode of Visit: Video  Location of patient: home  You have chosen to receive care through a telehealth visit.  Does the patient consent to use a video/audio connection for your medical care today? Yes  The visit included audio and video interaction. No technical issues occurred during this visit.     Chief Complaint  Headache (37 YO FEMALE IS DOING A VIDEO VISIT. PATIENT HAS HAD A HEADACHE GOING ON FOR OVER A WEEK  AND A HALF. STOPPED FOLIC ACID AND IT DIDN'T SEEM TO HELP. TAKES ASPRIN AND BC POWDER 1,000 MG EVERYDAY TO HELP THE PAIN. THROBBING AT THE BACK OF HER HEAD ON THE RIGHT SIDE NEAR HER HAIRLINE. )    Subjective          Annita Johnson presents to [facility] INTERNAL MEDICINE  History of Present Illness  With complaints of constant headaches. She is undergoing chemotherapy for stage 4 breast cancer.   She states that her headaches started last week. She thought it was the new rx of folic acid so she stopped that but headaches continued. She states that her headaches are in the right lower occipital region.  She denies any neck pain. She states that the pain is constant. She is taking ASA, BC powder and that does seem to help. She also is taking percocet. She is on flexeril for her neck. She is s/p fusion due to cervical spinal fracture from metastases.     She denies any vision changes or light sensitivity.    Objective   Vital Signs:   There were no vitals taken for this visit.    Physical Exam   Constitutional: She appears well-developed and well-nourished.   HENT:   Head: Normocephalic.   Eyes: EOM are normal.   Neck: Neck normal appearance.  Pulmonary/Chest: Effort normal.  No respiratory distress.  Neurological: She is alert.   Skin: Skin is warm and dry.   Psychiatric: She has a normal mood and affect.   Result Review :                 Assessment and Plan    Diagnoses and all orders for this visit:    1. Unilateral occipital headache (Primary)  Assessment & Plan:  With complaints of  constant headaches. She states that her headaches started last week. She thought it was the new rx of folic acid so she stopped that but headaches continued. She states that her headaches are in the right lower occipital region.  She denies any neck pain. She states that the pain is constant. She is taking ASA, BC powder and that does seem to help. She also is taking percocet. She is on flexeril for her neck. She is s/p fusion.   She denies any vision changes or light sensitivity. Denies nausea or vomiting.  Start Topamax 25 mg qhs. Nurtec PRN migraine.  I sent message to Dr. Garcia regarding pt symptoms to see if he recommends CT of the head or will want to wait to see her.  She did recently have c spine xray and was cleared by neurosurgery.  She will increase her flexeril to twice daily.    Orders:  -     topiramate (Topamax) 25 MG tablet; Take 1 tablet by mouth every night at bedtime.  Dispense: 30 tablet; Refill: 2  -     Rimegepant Sulfate (Nurtec) 75 MG tablet dispersible tablet; Take 1 tablet by mouth Daily As Needed (migraines).  Dispense: 8 tablet; Refill: 5  -     CT Head With & Without Contrast; Future    2. Malignant neoplasm of upper-inner quadrant of right breast in female, estrogen receptor positive  -     CT Head With & Without Contrast; Future        Follow Up   Return if symptoms worsen or fail to improve.  Patient was given instructions and counseling regarding her condition or for health maintenance advice. Please see specific information pulled into the AVS if appropriate.

## 2023-10-05 NOTE — TELEPHONE ENCOUNTER
Caller: Annita Johnson    Relationship: Self    Best call back number: [phone]    What are your current symptoms: HEADACHES     Additional notes:  PATIENT CONTACTED ONCOLOGY IN REGARDS TO HER HEADACHES THINKING IT WAS FROM HER MEDICATIONS FROM THEM, BUT SHE SPOKE WITH TRIAGE NURSE THERE AND THEY SAID IT WOULD NOT CAUSE HEADACHES. PATIENT IS WANTING TO LET MARGARITA KNOW THAT SHE IS HAVING THIS ISSUE AND WOULD LIKE TO KNOW WHAT THE NEXT STEP WOULD BEFORE MAKING APPOINTMENT. SHE DOES PREFER TELEHEALTH IF APPOINTMENT IS NEEDED BUT WOULD LIKE TO KNOW FIRST.

## 2023-10-05 NOTE — ASSESSMENT & PLAN NOTE
With complaints of constant headaches. She states that her headaches started last week. She thought it was the new rx of folic acid so she stopped that but headaches continued. She states that her headaches are in the right lower occipital region.  She denies any neck pain. She states that the pain is constant. She is taking ASA, BC powder and that does seem to help. She also is taking percocet. She is on flexeril for her neck. She is s/p fusion.   She denies any vision changes or light sensitivity. Denies nausea or vomiting.  Start Topamax 25 mg qhs. Nurtec PRN migraine.  I sent message to Dr. Garcia regarding pt symptoms to see if he recommends CT of the head or will want to wait to see her.  She did recently have c spine xray and was cleared by neurosurgery.  She will increase her flexeril to twice daily.

## 2023-10-05 NOTE — TELEPHONE ENCOUNTER
Caller: Annita Johnson    Relationship to patient: Self        Patient is needing: INFORMED PATIENT OF MESSAGE, PATIENT DECLINED APPOINTMENT

## 2023-10-06 ENCOUNTER — PATIENT OUTREACH (OUTPATIENT)
Dept: ONCOLOGY | Facility: HOSPITAL | Age: 38
End: 2023-10-06
Payer: COMMERCIAL

## 2023-10-10 DIAGNOSIS — R51.9 NEW ONSET OF HEADACHES: ICD-10-CM

## 2023-10-10 DIAGNOSIS — C50.211 MALIGNANT NEOPLASM OF UPPER-INNER QUADRANT OF RIGHT BREAST IN FEMALE, ESTROGEN RECEPTOR POSITIVE: ICD-10-CM

## 2023-10-10 DIAGNOSIS — C79.51 METASTASIS TO BONE: Primary | ICD-10-CM

## 2023-10-10 DIAGNOSIS — Z17.0 MALIGNANT NEOPLASM OF UPPER-INNER QUADRANT OF RIGHT BREAST IN FEMALE, ESTROGEN RECEPTOR POSITIVE: ICD-10-CM

## 2023-10-11 ENCOUNTER — HOSPITAL ENCOUNTER (OUTPATIENT)
Dept: MRI IMAGING | Facility: HOSPITAL | Age: 38
Discharge: HOME | End: 2023-10-11
Admitting: RADIOLOGY
Payer: COMMERCIAL

## 2023-10-11 DIAGNOSIS — R51.9 NEW ONSET OF HEADACHES: ICD-10-CM

## 2023-10-11 DIAGNOSIS — C79.51 METASTASIS TO BONE: ICD-10-CM

## 2023-10-11 DIAGNOSIS — Z17.0 MALIGNANT NEOPLASM OF UPPER-INNER QUADRANT OF RIGHT BREAST IN FEMALE, ESTROGEN RECEPTOR POSITIVE: ICD-10-CM

## 2023-10-11 DIAGNOSIS — C50.211 MALIGNANT NEOPLASM OF UPPER-INNER QUADRANT OF RIGHT BREAST IN FEMALE, ESTROGEN RECEPTOR POSITIVE: ICD-10-CM

## 2023-10-11 PROCEDURE — 70553 MRI BRAIN STEM W/O & W/DYE: CPT

## 2023-10-11 PROCEDURE — A9577 INJ MULTIHANCE: HCPCS | Performed by: RADIOLOGY

## 2023-10-11 PROCEDURE — 0 GADOBENATE DIMEGLUMINE 529 MG/ML SOLUTION: Performed by: RADIOLOGY

## 2023-10-11 RX ADMIN — GADOBENATE DIMEGLUMINE 15 ML: 529 INJECTION, SOLUTION INTRAVENOUS at 13:02

## 2023-10-12 ENCOUNTER — OFFICE VISIT (OUTPATIENT)
Dept: ONCOLOGY | Facility: HOSPITAL | Age: 38
End: 2023-10-12
Payer: COMMERCIAL

## 2023-10-12 ENCOUNTER — HOSPITAL ENCOUNTER (OUTPATIENT)
Dept: ONCOLOGY | Facility: HOSPITAL | Age: 38
Discharge: HOME | End: 2023-10-12
Admitting: INTERNAL MEDICINE
Payer: COMMERCIAL

## 2023-10-12 ENCOUNTER — DOCUMENTATION (OUTPATIENT)
Dept: ONCOLOGY | Facility: HOSPITAL | Age: 38
End: 2023-10-12
Payer: COMMERCIAL

## 2023-10-12 VITALS
BODY MASS INDEX: 32.54 KG/M2 | SYSTOLIC BLOOD PRESSURE: 123 MMHG | OXYGEN SATURATION: 100 % | HEART RATE: 98 BPM | RESPIRATION RATE: 18 BRPM | HEIGHT: 63 IN | WEIGHT: 183.64 LBS | DIASTOLIC BLOOD PRESSURE: 84 MMHG | TEMPERATURE: 98 F

## 2023-10-12 VITALS
OXYGEN SATURATION: 100 % | BODY MASS INDEX: 32.54 KG/M2 | RESPIRATION RATE: 18 BRPM | HEIGHT: 63 IN | SYSTOLIC BLOOD PRESSURE: 123 MMHG | TEMPERATURE: 98 F | WEIGHT: 183.64 LBS | HEART RATE: 98 BPM | DIASTOLIC BLOOD PRESSURE: 84 MMHG

## 2023-10-12 DIAGNOSIS — Z17.0 MALIGNANT NEOPLASM OF UPPER-INNER QUADRANT OF RIGHT BREAST IN FEMALE, ESTROGEN RECEPTOR POSITIVE: Primary | ICD-10-CM

## 2023-10-12 DIAGNOSIS — C50.211 MALIGNANT NEOPLASM OF UPPER-INNER QUADRANT OF RIGHT BREAST IN FEMALE, ESTROGEN RECEPTOR POSITIVE: Primary | ICD-10-CM

## 2023-10-12 DIAGNOSIS — C79.51 METASTASIS TO BONE: ICD-10-CM

## 2023-10-12 LAB
ALBUMIN SERPL-MCNC: 4.7 G/DL (ref 3.5–5.2)
ALBUMIN/GLOB SERPL: 1.3 G/DL
ALP SERPL-CCNC: 143 U/L (ref 39–117)
ALT SERPL W P-5'-P-CCNC: 55 U/L (ref 1–33)
ANION GAP SERPL CALCULATED.3IONS-SCNC: 7.7 MMOL/L (ref 5–15)
AST SERPL-CCNC: 36 U/L (ref 1–32)
BASOPHILS # BLD AUTO: 0.04 10*3/MM3 (ref 0–0.2)
BASOPHILS NFR BLD AUTO: 0.5 % (ref 0–1.5)
BILIRUB SERPL-MCNC: 0.2 MG/DL (ref 0–1.2)
BUN SERPL-MCNC: 14 MG/DL (ref 6–20)
BUN/CREAT SERPL: 19.7 (ref 7–25)
CALCIUM SPEC-SCNC: 10.8 MG/DL (ref 8.6–10.5)
CHLORIDE SERPL-SCNC: 103 MMOL/L (ref 98–107)
CO2 SERPL-SCNC: 26.3 MMOL/L (ref 22–29)
CREAT SERPL-MCNC: 0.71 MG/DL (ref 0.57–1)
DEPRECATED RDW RBC AUTO: 50.5 FL (ref 37–54)
EGFRCR SERPLBLD CKD-EPI 2021: 112.5 ML/MIN/1.73
EOSINOPHIL # BLD AUTO: 0.13 10*3/MM3 (ref 0–0.4)
EOSINOPHIL NFR BLD AUTO: 1.5 % (ref 0.3–6.2)
ERYTHROCYTE [DISTWIDTH] IN BLOOD BY AUTOMATED COUNT: 14.9 % (ref 12.3–15.4)
GLOBULIN UR ELPH-MCNC: 3.5 GM/DL
GLUCOSE SERPL-MCNC: 101 MG/DL (ref 65–99)
HCT VFR BLD AUTO: 45.1 % (ref 34–46.6)
HGB BLD-MCNC: 14 G/DL (ref 12–15.9)
IMM GRANULOCYTES # BLD AUTO: 0 10*3/MM3 (ref 0–0.05)
IMM GRANULOCYTES NFR BLD AUTO: 0 % (ref 0–0.5)
LYMPHOCYTES # BLD AUTO: 1.84 10*3/MM3 (ref 0.7–3.1)
LYMPHOCYTES NFR BLD AUTO: 21.7 % (ref 19.6–45.3)
MCH RBC QN AUTO: 28.2 PG (ref 26.6–33)
MCHC RBC AUTO-ENTMCNC: 31 G/DL (ref 31.5–35.7)
MCV RBC AUTO: 90.9 FL (ref 79–97)
MONOCYTES # BLD AUTO: 1.16 10*3/MM3 (ref 0.1–0.9)
MONOCYTES NFR BLD AUTO: 13.7 % (ref 5–12)
NEUTROPHILS NFR BLD AUTO: 5.29 10*3/MM3 (ref 1.7–7)
NEUTROPHILS NFR BLD AUTO: 62.6 % (ref 42.7–76)
PLATELET # BLD AUTO: 428 10*3/MM3 (ref 140–450)
PMV BLD AUTO: 9.5 FL (ref 6–12)
POTASSIUM SERPL-SCNC: 4.5 MMOL/L (ref 3.5–5.2)
PROT SERPL-MCNC: 8.2 G/DL (ref 6–8.5)
RBC # BLD AUTO: 4.96 10*6/MM3 (ref 3.77–5.28)
SODIUM SERPL-SCNC: 137 MMOL/L (ref 136–145)
WBC NRBC COR # BLD: 8.46 10*3/MM3 (ref 3.4–10.8)

## 2023-10-12 PROCEDURE — 80053 COMPREHEN METABOLIC PANEL: CPT | Performed by: INTERNAL MEDICINE

## 2023-10-12 PROCEDURE — 96375 TX/PRO/DX INJ NEW DRUG ADDON: CPT

## 2023-10-12 PROCEDURE — 25810000003 SODIUM CHLORIDE 0.9 % SOLUTION: Performed by: INTERNAL MEDICINE

## 2023-10-12 PROCEDURE — 25010000002 TRASTUZUMAB PER 10 MG: Performed by: INTERNAL MEDICINE

## 2023-10-12 PROCEDURE — 25810000003 SODIUM CHLORIDE 0.9 % SOLUTION 250 ML FLEX CONT: Performed by: INTERNAL MEDICINE

## 2023-10-12 PROCEDURE — 85025 COMPLETE CBC W/AUTO DIFF WBC: CPT | Performed by: INTERNAL MEDICINE

## 2023-10-12 PROCEDURE — 96413 CHEMO IV INFUSION 1 HR: CPT

## 2023-10-12 PROCEDURE — 25010000002 DIPHENHYDRAMINE PER 50 MG: Performed by: INTERNAL MEDICINE

## 2023-10-12 PROCEDURE — 96367 TX/PROPH/DG ADDL SEQ IV INF: CPT

## 2023-10-12 RX ORDER — ACETAMINOPHEN 325 MG/1
650 TABLET ORAL ONCE
Status: CANCELLED | OUTPATIENT
Start: 2023-10-12

## 2023-10-12 RX ORDER — ACETAMINOPHEN 325 MG/1
650 TABLET ORAL ONCE
Status: COMPLETED | OUTPATIENT
Start: 2023-10-12 | End: 2023-10-12

## 2023-10-12 RX ORDER — SODIUM CHLORIDE 9 MG/ML
250 INJECTION, SOLUTION INTRAVENOUS ONCE
Status: CANCELLED | OUTPATIENT
Start: 2023-10-12

## 2023-10-12 RX ORDER — SODIUM CHLORIDE 9 MG/ML
250 INJECTION, SOLUTION INTRAVENOUS ONCE
Status: COMPLETED | OUTPATIENT
Start: 2023-10-12 | End: 2023-10-12

## 2023-10-12 RX ADMIN — TRASTUZUMAB 530 MG: 150 INJECTION, POWDER, LYOPHILIZED, FOR SOLUTION INTRAVENOUS at 12:00

## 2023-10-12 RX ADMIN — DIPHENHYDRAMINE HYDROCHLORIDE 25 MG: 50 INJECTION, SOLUTION INTRAMUSCULAR; INTRAVENOUS at 11:36

## 2023-10-12 RX ADMIN — ACETAMINOPHEN 650 MG: 325 TABLET ORAL at 11:35

## 2023-10-12 RX ADMIN — SODIUM CHLORIDE 250 ML: 9 INJECTION, SOLUTION INTRAVENOUS at 11:27

## 2023-10-12 NOTE — PROGRESS NOTES
Diagnosis: Breast cancer    Reason for Referral: Assistance with following for financial help with housing    Content of Visit: OSW provided patient with a written statement verifying her breast cancer diagnosis for her [employer] application.  Patient did not have her letter from her landlord with her therefore OSW returned to patient the rest of her application that she had left with OSW at her last visit and provided her with instructions on where to email her application and all supporting documents.  Patient stated she would take care of completing her [employer] application.  Patient stated she was going out of town this weekend and was going to have to contact her son's pediatrician to make sure he was healthy and treated before leaving town.  Patient seemed to be upbeat and focused on enjoying her life and her family.    Resources/Referrals Provided: OSW returned to patient her [employer] application and supporting documents.  Patient will need to attach a copy of a letter from her landlord so that [employer] may be able to pay back due rent.

## 2023-10-12 NOTE — PROGRESS NOTES
Chief Complaint   Breast Cancer    Mono, Lela, APRN  Mono, Lela, APRN    Subjective          Annita Johnson presents to [facility]EMATOLOGY [facility] ONCOLOGY for ongoing treatment of her triple positive breast cancer.  She is on palliative therapy with letrozole, leuprolide for ovarian suppression, trastuzumab for the HER2 positivity and denosumab for bony involvement.  She states that she is tolerating all treatments well.  She denies any bone pain today. She does use oxycodone as needed.  Pt without any report of SOA, LE edema, fever, n/v/d today. Pt underwent restaging imaging scans and here to discuss those results and receive next infusion of trastuzumab.     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, estrogen receptor positive   6/8/2023 Initial Diagnosis    Malignant neoplasm of upper-inner quadrant of right breast in female, estrogen receptor positive     6/8/2023 Cancer Staged    Staging form: Breast, AJCC 8th Edition  - Clinical: Stage IV (cT2, cN0, cM1, ER+, [place]+, HER2-) - Signed by Perry Garcia MD on 6/8/2023 6/9/2023 -  Chemotherapy    OP SUPPORTIVE BREAST Leuprolide 3.75 MG Q28D     6/15/2023 - 6/15/2023 Chemotherapy    OP BREAST Letrozole / Ribociclib     7/13/2023 -  Chemotherapy    OP BREAST Letrozole / Trastuzumab     8/24/2023 -  Chemotherapy    OP SUPPORTIVE Denosumab (Xgeva) Q28D     Metastasis to bone   6/8/2023 Initial Diagnosis    Metastasis to bone     8/24/2023 -  Chemotherapy    OP SUPPORTIVE Denosumab (Xgeva) Q28D         Review of Systems   Constitutional:  Positive for fatigue. Negative for appetite change, diaphoresis, fever, unexpected weight gain and unexpected weight loss.   HENT:  Negative for hearing loss, mouth sores, sore throat, swollen glands, trouble swallowing and voice change.    Eyes:  Negative for blurred vision.   Respiratory:  Negative for cough, shortness of breath and wheezing.     Cardiovascular:  Negative for chest pain and palpitations.   Gastrointestinal:  Negative for abdominal pain, blood in stool, constipation, diarrhea, nausea and vomiting.   Endocrine: Negative for cold intolerance and heat intolerance.   Genitourinary:  Negative for difficulty urinating, dysuria, frequency, hematuria and urinary incontinence.   Musculoskeletal:  Negative for arthralgias, back pain and myalgias.   Skin:  Negative for rash, skin lesions and wound.   Neurological:  Negative for dizziness, seizures, weakness, numbness and headache.   Hematological:  Does not bruise/bleed easily.   Psychiatric/Behavioral:  Negative for depressed mood. The patient is not nervous/anxious.    All other systems reviewed and are negative.    Current Outpatient Medications on File Prior to Visit   Medication Sig Dispense Refill    benzonatate (Tessalon Perles) 100 MG capsule Take 1 capsule by mouth 3 (Three) Times a Day As Needed for Cough. 40 capsule 1    Calcium Carbonate-Vitamin D 600-10 MG-MCG per tablet Take 1 tablet by mouth 2 (Two) Times a Day. 60 tablet 5    clonazePAM (KlonoPIN) 0.5 MG tablet Take 1 tablet by mouth 2 (Two) Times a Day As Needed for Anxiety. 20 tablet 0    cyclobenzaprine (FLEXERIL) 10 MG tablet Take 1 tablet by mouth 3 (Three) Times a Day As Needed for Muscle Spasms. 90 tablet 5    escitalopram (Lexapro) 10 MG tablet Take 1 tablet by mouth Daily. 90 tablet 1    folic acid (FOLVITE) 1 MG tablet Take 1 tablet by mouth Daily. 90 tablet 1    hydrOXYzine (ATARAX) 50 MG tablet Take 1 tablet by mouth every night at bedtime. 90 tablet 1    letrozole (FEMARA) 2.5 MG tablet Take 1 tablet by mouth Daily. 30 tablet 5    letrozole (FEMARA) 2.5 MG tablet Take 1 tablet by mouth Daily. 30 tablet 5    ondansetron (ZOFRAN) 8 MG tablet Take 1 tablet by mouth 3 (Three) Times a Day As Needed for Nausea or Vomiting. 30 tablet 5    oxyCODONE-acetaminophen (PERCOCET) 7.5-325 MG per tablet Take 1 tablet by mouth Every 8  (Eight) Hours As Needed (pain). 30 tablet 0    Rimegepant Sulfate (Nurtec) 75 MG tablet dispersible tablet Take 1 tablet by mouth Daily As Needed (migraines). 8 tablet 5    Sodium Fluoride 5000 PPM 1.1 % gel See Admin Instructions.      topiramate (Topamax) 25 MG tablet Take 1 tablet by mouth every night at bedtime. 30 tablet 2    pantoprazole (PROTONIX) 40 MG EC tablet Take 1 tablet by mouth Daily. (Patient not taking: Reported on 2023) 90 tablet 0     Current Facility-Administered Medications on File Prior to Visit   Medication Dose Route Frequency Provider Last Rate Last Admin    [COMPLETED] acetaminophen (TYLENOL) tablet 650 mg  650 mg Oral Once Thad Mark MD   650 mg at 10/12/23 1135    [COMPLETED] diphenhydrAMINE (BENADRYL) IVPB 25 mg  25 mg Intravenous Once Thad Mark MD   Stopped at 10/12/23 1152    [COMPLETED] sodium chloride 0.9 % infusion 250 mL  250 mL Intravenous Once Thad Mark MD   Stopped at 10/12/23 1234    [COMPLETED] Trastuzumab (HERCEPTIN) 530 mg in sodium chloride 0.9 % 300.2 mL chemo IVPB  6 mg/kg (Treatment Plan Recorded) Intravenous Once Thad Mark MD   Stopped at 10/12/23 1230       No Known Allergies  Past Medical History:   Diagnosis Date    Anemia     Breast cancer     Cancer, metastatic to bone     Kidney stone     Kidney stones     Metastasis to bone 2023     Past Surgical History:   Procedure Laterality Date    BREAST BIOPSY       SECTION N/A 2021    Procedure:  SECTION PRIMARY;  Surgeon: Zoe Dawson MD;  [facility] LABOR DELIVERY;  Service: Obstetrics/Gynecology;  Laterality: N/A;    CYSTOSCOPY BLADDER STONE LITHOTRIPSY       Social History     Socioeconomic History    Marital status:      Spouse name: Janet   Tobacco Use    Smoking status: Some Days     Packs/day: 0.25     Years: 10.00     Additional pack years: 0.00     Total pack years: 2.50     Types: Cigarettes     Start date: 2021     Passive exposure:  "Current    Smokeless tobacco: Never   Vaping Use    Vaping Use: Never used   Substance and Sexual Activity    Alcohol use: Yes     Alcohol/week: 6.0 standard drinks of alcohol     Types: 3 Glasses of wine, 3 Drinks containing 0.5 oz of alcohol per week    Drug use: Not Currently     Types: Marijuana     Comment: for pain control    Sexual activity: Yes     Partners: Male     Birth control/protection: Same-sex partner     Family History   Problem Relation Age of Onset    Mental illness Mother     Hypertension Father     Alcohol abuse Father     Ovarian cancer Paternal Aunt     Breast cancer Maternal Great-Grandmother        Objective   Physical Exam  Vitals reviewed. Exam conducted with a chaperone present.   Constitutional:       General: She is not in acute distress.     Appearance: Normal appearance.   HENT:      Head: Normocephalic and atraumatic.   Eyes:      Extraocular Movements: Extraocular movements intact.      Conjunctiva/sclera: Conjunctivae normal.   Pulmonary:      Effort: Pulmonary effort is normal.   Musculoskeletal:      Cervical back: Normal range of motion and neck supple.   Skin:     General: Skin is warm and dry.      Findings: No bruising.   Neurological:      Mental Status: She is oriented to person, place, and time.         Vitals:    10/12/23 0953   BP: 123/84   Pulse: 98   Resp: 18   Temp: 98 øF (36.7 øC)   TempSrc: Temporal   SpO2: 100%   Weight: 83.3 kg (183 lb 10.3 oz)   Height: 160 cm (62.99\")   PainSc: 0-No pain       ECOG score: 0         PHQ-9 Total Score:                    Result Review :   The following data was reviewed by: Thad Mark MD on 09/20/2023:  Lab Results   Component Value Date    HGB 14.0 10/12/2023    HCT 45.1 10/12/2023    MCV 90.9 10/12/2023     10/12/2023    WBC 8.46 10/12/2023    NEUTROABS 5.29 10/12/2023    LYMPHSABS 1.84 10/12/2023    MONOSABS 1.16 (H) 10/12/2023    EOSABS 0.13 10/12/2023    BASOSABS 0.04 10/12/2023     Lab Results   Component Value " "Date    GLUCOSE 101 (H) 10/12/2023    BUN 14 10/12/2023    CREATININE 0.71 10/12/2023     10/12/2023    K 4.5 10/12/2023     10/12/2023    CO2 26.3 10/12/2023    CALCIUM 10.8 (H) 10/12/2023    PROTEINTOT 8.2 10/12/2023    ALBUMIN 4.7 10/12/2023    BILITOT 0.2 10/12/2023    ALKPHOS 143 (H) 10/12/2023    AST 36 (H) 10/12/2023    ALT 55 (H) 10/12/2023     Lab Results   Component Value Date    MG 2.2 08/24/2023    PHOS 3.2 09/20/2023    TSH 0.885 09/21/2023     No results found for: \"IRON\", \"LABIRON\", \"TRANSFERRIN\", \"TIBC\"  Lab Results   Component Value Date    REXGRYHC22 479 09/21/2023    FOLATE 2.95 (L) 09/21/2023     No results found for: \"PSA\", \"CEA\", \"AFP\", \"\", \"\"          Assessment and Plan    Diagnoses and all orders for this visit:    1. Malignant neoplasm of upper-inner quadrant of right breast in female, estrogen receptor positive (Primary)    2. Metastasis to bone    Other orders  -     Cancel: sodium chloride 0.9 % infusion 250 mL  -     Cancel: acetaminophen (TYLENOL) tablet 650 mg  -     Cancel: diphenhydrAMINE (BENADRYL) IVPB 25 mg  -     Cancel: Trastuzumab (HERCEPTIN) 530 mg in sodium chloride 0.9 % 250 mL chemo IVPB  -     denosumab (XGEVA) injection 120 mg        Annita Johnson presents to [facility]EMATOLOGY [facility] ONCOLOGY for ongoing treatment of her triple positive breast cancer.  She is on palliative therapy with letrozole, leuprolide for ovarian suppression, trastuzumab for the HER2 positivity and denosumab for bony involvement.  Pt underwent restaging imaging scans in 10/2023 which revealed:  MRI Brain on 10/11/23- normal, no evidence of intracranial metastatic disease.  NM Bone Scan showed multifocal osseous metastatic disease stable from previous imaging.  CTCAP shows lytic bone lesions that now appear sclerotic, some have increased in size, incidental right ovarian cyst.    -Pt seen by this physician today as her primary medical oncologist, Dr. Garcia " was on vacation this week.  Discussed imaging and lab results with patient today, shared plan to proceed with next scheduled trastuzumab infusion, shared there was evidence that there was progression of disease in bones and recommend she follow-up with Dr. Garcia next week when he is back in office to discuss proceeding and repeating imaging at a close interval versus switching therapies.  Patient did share she is just beginning Xgeva infusions for bone lesions, reports tolerating it well.    -Echo from 10/5/2023, revealed normal EF of 56-60%, discussed these results with patient today.    -Recommend patient continue letrozole and Lupron.    Plan for pt follow up in 1 week with Dr. Garcia     Patient Follow Up: 1 week    Patient was given instructions and counseling regarding her condition or for health maintenance advice. Please see specific information pulled into the AVS if appropriate.     Thad Mark MD    10/12/2023    I spent 45 minutes caring for Annita wilcox, which involve review of medical record, reviewing lab work, reviewing imaging, performing medically appropriate physical exam, medical documentation, and care coordination.    Please note that portions of this note were completed with a voice recognition program.  Electronically signed by Thad Mark MD, 10/12/23, 1:11 PM EDT.

## 2023-10-19 ENCOUNTER — OFFICE VISIT (OUTPATIENT)
Dept: ONCOLOGY | Facility: HOSPITAL | Age: 38
End: 2023-10-19
Payer: COMMERCIAL

## 2023-10-19 ENCOUNTER — HOSPITAL ENCOUNTER (OUTPATIENT)
Dept: ONCOLOGY | Facility: HOSPITAL | Age: 38
Discharge: HOME | End: 2023-10-19
Admitting: INTERNAL MEDICINE
Payer: COMMERCIAL

## 2023-10-19 VITALS
OXYGEN SATURATION: 100 % | DIASTOLIC BLOOD PRESSURE: 83 MMHG | BODY MASS INDEX: 33.4 KG/M2 | WEIGHT: 188.49 LBS | TEMPERATURE: 97.5 F | SYSTOLIC BLOOD PRESSURE: 138 MMHG | HEART RATE: 105 BPM | RESPIRATION RATE: 16 BRPM

## 2023-10-19 DIAGNOSIS — Z17.0 MALIGNANT NEOPLASM OF UPPER-INNER QUADRANT OF RIGHT BREAST IN FEMALE, ESTROGEN RECEPTOR POSITIVE: Primary | ICD-10-CM

## 2023-10-19 DIAGNOSIS — C79.51 METASTASIS TO BONE: ICD-10-CM

## 2023-10-19 DIAGNOSIS — C50.211 MALIGNANT NEOPLASM OF UPPER-INNER QUADRANT OF RIGHT BREAST IN FEMALE, ESTROGEN RECEPTOR POSITIVE: Primary | ICD-10-CM

## 2023-10-19 PROCEDURE — 25010000002 DENOSUMAB 120 MG/1.7ML SOLUTION: Performed by: INTERNAL MEDICINE

## 2023-10-19 PROCEDURE — G0463 HOSPITAL OUTPT CLINIC VISIT: HCPCS

## 2023-10-19 PROCEDURE — 96402 CHEMO HORMON ANTINEOPL SQ/IM: CPT

## 2023-10-19 PROCEDURE — 25010000002 LEUPROLIDE PER 3.75 MG: Performed by: INTERNAL MEDICINE

## 2023-10-19 PROCEDURE — 96372 THER/PROPH/DIAG INJ SC/IM: CPT

## 2023-10-19 RX ADMIN — LEUPROLIDE ACETATE 3.75 MG: KIT at 11:35

## 2023-10-19 RX ADMIN — DENOSUMAB 120 MG: 120 INJECTION SUBCUTANEOUS at 11:32

## 2023-10-19 NOTE — ASSESSMENT & PLAN NOTE
Metastatic.  Patient is on treatment with letrozole, trastuzumab, leuprolide for ovarian suppression and denosumab for bony involvement.  Tolerating her treatments well.  Recent echocardiogram reviewed showing normal ejection fraction.  Restaging scans essentially stable.  At this point I would continue current therapy.  She will receive leuprolide today.  Continue letrozole daily.  Continue trastuzumab every 3 weeks-recent echocardiogram shows normal ejection fraction.  I will see her back in 1 month for ongoing treatment.

## 2023-10-19 NOTE — PROGRESS NOTES
Chief Complaint  Breast Cancer    Romina, Lela, APRN  Romina, Lela, APRN    Subjective          Annita Johnson presents to [facility] HEMATOLOGY & ONCOLOGY for ongoing treatment of her metastatic breast cancer.  She is on letrozole, trastuzumab, leuprolide and denosumab for bony involvement.  Tolerating well.  She denies new masses or adenopathy.  Her pain is improved and she is down to around 1 hydrocodone per day on average.  She notes good appetite and energy level.  She denies new masses or adenopathy.  No unusual aches or pains.  She denies shortness of breath, chest pain, exertional dyspnea or lower extremity swelling.    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, estrogen receptor positive   6/8/2023 Initial Diagnosis    Malignant neoplasm of upper-inner quadrant of right breast in female, estrogen receptor positive     6/8/2023 Cancer Staged    Staging form: Breast, AJCC 8th Edition  - Clinical: Stage IV (cT2, cN0, cM1, ER+, [place]+, HER2-) - Signed by Perry Garcia MD on 6/8/2023 6/9/2023 -  Chemotherapy    OP SUPPORTIVE BREAST Leuprolide 3.75 MG Q28D     6/15/2023 - 6/15/2023 Chemotherapy    OP BREAST Letrozole / Ribociclib     7/13/2023 -  Chemotherapy    OP BREAST Letrozole / Trastuzumab     8/24/2023 -  Chemotherapy    OP SUPPORTIVE Denosumab (Xgeva) Q28D     Metastasis to bone   6/8/2023 Initial Diagnosis    Metastasis to bone     8/24/2023 -  Chemotherapy    OP SUPPORTIVE Denosumab (Xgeva) Q28D         Review of Systems   Constitutional:  Negative for appetite change, diaphoresis, fatigue, fever, unexpected weight gain and unexpected weight loss.   HENT:  Negative for hearing loss, mouth sores, sore throat, swollen glands, trouble swallowing and voice change.    Eyes:  Negative for blurred vision.   Respiratory:  Negative for cough, shortness of breath and wheezing.    Cardiovascular:  Negative for chest pain and  palpitations.   Gastrointestinal:  Negative for abdominal pain, blood in stool, constipation, diarrhea, nausea and vomiting.   Endocrine: Negative for cold intolerance and heat intolerance.   Genitourinary:  Negative for difficulty urinating, dysuria, frequency, hematuria and urinary incontinence.   Musculoskeletal:  Negative for arthralgias, back pain and myalgias.   Skin:  Negative for rash, skin lesions and wound.   Neurological:  Negative for dizziness, seizures, weakness, numbness and headache.   Hematological:  Does not bruise/bleed easily.   Psychiatric/Behavioral:  Negative for depressed mood. The patient is not nervous/anxious.      Current Outpatient Medications on File Prior to Visit   Medication Sig Dispense Refill    benzonatate (Tessalon Perles) 100 MG capsule Take 1 capsule by mouth 3 (Three) Times a Day As Needed for Cough. 40 capsule 1    Calcium Carbonate-Vitamin D 600-10 MG-MCG per tablet Take 1 tablet by mouth 2 (Two) Times a Day. 60 tablet 5    clonazePAM (KlonoPIN) 0.5 MG tablet Take 1 tablet by mouth 2 (Two) Times a Day As Needed for Anxiety. 20 tablet 0    cyclobenzaprine (FLEXERIL) 10 MG tablet Take 1 tablet by mouth 3 (Three) Times a Day As Needed for Muscle Spasms. 90 tablet 5    escitalopram (Lexapro) 10 MG tablet Take 1 tablet by mouth Daily. 90 tablet 1    folic acid (FOLVITE) 1 MG tablet Take 1 tablet by mouth Daily. 90 tablet 1    hydrOXYzine (ATARAX) 50 MG tablet Take 1 tablet by mouth every night at bedtime. 90 tablet 1    letrozole (FEMARA) 2.5 MG tablet Take 1 tablet by mouth Daily. 30 tablet 5    letrozole (FEMARA) 2.5 MG tablet Take 1 tablet by mouth Daily. 30 tablet 5    ondansetron (ZOFRAN) 8 MG tablet Take 1 tablet by mouth 3 (Three) Times a Day As Needed for Nausea or Vomiting. 30 tablet 5    oxyCODONE-acetaminophen (PERCOCET) 7.5-325 MG per tablet Take 1 tablet by mouth Every 8 (Eight) Hours As Needed (pain). 30 tablet 0    pantoprazole (PROTONIX) 40 MG EC tablet Take 1  tablet by mouth Daily. 90 tablet 0    Rimegepant Sulfate (Nurtec) 75 MG tablet dispersible tablet Take 1 tablet by mouth Daily As Needed (migraines). 8 tablet 5    Sodium Fluoride 5000 PPM 1.1 % gel See Admin Instructions.      topiramate (Topamax) 25 MG tablet Take 1 tablet by mouth every night at bedtime. 30 tablet 2     Current Facility-Administered Medications on File Prior to Visit   Medication Dose Route Frequency Provider Last Rate Last Admin    [COMPLETED] denosumab (XGEVA) injection 120 mg  120 mg Subcutaneous Once Thad Mark MD   120 mg at 10/19/23 1132    [COMPLETED] leuprolide (LUPRON) injection 3.75 mg  3.75 mg Intramuscular Once Perry Garcia MD   3.75 mg at 10/19/23 1135       No Known Allergies  Past Medical History:   Diagnosis Date    Anemia     Breast cancer     Cancer, metastatic to bone     Kidney stone     Kidney stones     Metastasis to bone 2023     Past Surgical History:   Procedure Laterality Date    BREAST BIOPSY       SECTION N/A 2021    Procedure:  SECTION PRIMARY;  Surgeon: Zoe Dawson MD;  Location: [facility] LABOR DELIVERY;  Service: Obstetrics/Gynecology;  Laterality: N/A;    CYSTOSCOPY BLADDER STONE LITHOTRIPSY       Social History     Socioeconomic History    Marital status:      Spouse name: Janet   Tobacco Use    Smoking status: Some Days     Packs/day: 0.25     Years: 10.00     Additional pack years: 0.00     Total pack years: 2.50     Types: Cigarettes     Start date: 2021     Passive exposure: Current    Smokeless tobacco: Never   Vaping Use    Vaping Use: Never used   Substance and Sexual Activity    Alcohol use: Yes     Alcohol/week: 6.0 standard drinks of alcohol     Types: 3 Glasses of wine, 3 Drinks containing 0.5 oz of alcohol per week    Drug use: Not Currently     Types: Marijuana     Comment: for pain control    Sexual activity: Yes     Partners: Male     Birth control/protection: Same-sex partner     Family History    Problem Relation Age of Onset    Mental illness Mother     Hypertension Father     Alcohol abuse Father     Ovarian cancer Paternal Aunt     Breast cancer Maternal Great-Grandmother        Objective   Physical Exam  Vitals reviewed. Exam conducted with a chaperone present.   Constitutional:       General: She is not in acute distress.     Appearance: Normal appearance.   Neck:      Comments: Well-healed posterior cervical spine incision  Cardiovascular:      Rate and Rhythm: Normal rate and regular rhythm.      Heart sounds: Normal heart sounds. No murmur heard.     No gallop.   Pulmonary:      Effort: Pulmonary effort is normal.      Breath sounds: Normal breath sounds.   Abdominal:      General: Abdomen is flat. Bowel sounds are normal.      Palpations: Abdomen is soft.   Musculoskeletal:      Right lower leg: No edema.      Left lower leg: No edema.   Neurological:      Mental Status: She is alert and oriented to person, place, and time.   Psychiatric:         Mood and Affect: Mood normal.         Behavior: Behavior normal.         Vitals:    10/19/23 1037   BP: 138/83   Pulse: 105   Resp: 16   Temp: 97.5 °F (36.4 °C)   TempSrc: Temporal   SpO2: 100%   Weight: 85.5 kg (188 lb 7.9 oz)   PainSc: 0-No pain     ECOG score: 0         PHQ-9 Total Score:                    Result Review :   The following data was reviewed by: Perry Garcia MD on 10/19/2023:  Lab Results   Component Value Date    HGB 14.0 10/12/2023    HCT 45.1 10/12/2023    MCV 90.9 10/12/2023     10/12/2023    WBC 8.46 10/12/2023    NEUTROABS 5.29 10/12/2023    LYMPHSABS 1.84 10/12/2023    MONOSABS 1.16 (H) 10/12/2023    EOSABS 0.13 10/12/2023    BASOSABS 0.04 10/12/2023     Lab Results   Component Value Date    GLUCOSE 101 (H) 10/12/2023    BUN 14 10/12/2023    CREATININE 0.71 10/12/2023     10/12/2023    K 4.5 10/12/2023     10/12/2023    CO2 26.3 10/12/2023    CALCIUM 10.8 (H) 10/12/2023    PROTEINTOT 8.2 10/12/2023     "ALBUMIN 4.7 10/12/2023    BILITOT 0.2 10/12/2023    ALKPHOS 143 (H) 10/12/2023    AST 36 (H) 10/12/2023    ALT 55 (H) 10/12/2023     Lab Results   Component Value Date    MG 2.2 08/24/2023    PHOS 3.2 09/20/2023    TSH 0.885 09/21/2023     No results found for: \"IRON\", \"LABIRON\", \"TRANSFERRIN\", \"TIBC\"  Lab Results   Component Value Date    [id number] 479 09/21/2023    FOLATE 2.95 (L) 09/21/2023     No results found for: \"PSA\", \"CEA\", \"AFP\", \"\", \"\"          Assessment and Plan    Diagnoses and all orders for this visit:    1. 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  Patient is on treatment with letrozole, trastuzumab, leuprolide for ovarian suppression and denosumab for bony involvement.  Tolerating her treatments well.  Recent echocardiogram reviewed showing normal ejection fraction.  Restaging scans essentially stable.  At this point I would continue current therapy.  She will receive leuprolide today.  Continue letrozole daily.  Continue trastuzumab every 3 weeks-recent echocardiogram shows normal ejection fraction.  I will see her back in 1 month for ongoing treatment.      2. Metastasis to bone  Assessment & Plan:  Recent bone scan shows no worsening findings.  CT imaging shows more sclerotic lesions consistent with healing bone.  Radiology comments on 2 new small lesions but they are very difficult to see and I would not change therapy at this point based on those.  Continue Xgeva today and monthly.      Other orders  -     Cancel: leuprolide (LUPRON) injection 3.75 mg            Patient Follow Up: 1 month    Patient was given instructions and counseling regarding her condition or for health maintenance advice. Please see specific information pulled into the AVS if appropriate.     Perry Garcia MD    10/19/2023            "

## 2023-10-19 NOTE — ASSESSMENT & PLAN NOTE
Recent bone scan shows no worsening findings.  CT imaging shows more sclerotic lesions consistent with healing bone.  Radiology comments on 2 new small lesions but they are very difficult to see and I would not change therapy at this point based on those.  Continue Xgeva today and monthly.

## 2023-10-20 ENCOUNTER — HOSPITAL ENCOUNTER (OUTPATIENT)
Dept: CARDIOLOGY | Facility: HOSPITAL | Age: 38
Discharge: HOME | End: 2023-10-20
Payer: COMMERCIAL

## 2023-10-20 ENCOUNTER — HOSPITAL ENCOUNTER (OUTPATIENT)
Dept: GENERAL RADIOLOGY | Facility: HOSPITAL | Age: 38
Discharge: HOME | End: 2023-10-20
Payer: COMMERCIAL

## 2023-10-20 ENCOUNTER — TRANSCRIBE ORDERS (OUTPATIENT)
Dept: ADMINISTRATIVE | Facility: HOSPITAL | Age: 38
End: 2023-10-20
Payer: COMMERCIAL

## 2023-10-20 ENCOUNTER — LAB (OUTPATIENT)
Dept: LAB | Facility: HOSPITAL | Age: 38
End: 2023-10-20
Payer: COMMERCIAL

## 2023-10-20 DIAGNOSIS — I10 HYPERTENSION, UNSPECIFIED TYPE: ICD-10-CM

## 2023-10-20 DIAGNOSIS — I10 HYPERTENSION, UNSPECIFIED TYPE: Primary | ICD-10-CM

## 2023-10-20 LAB
ANION GAP SERPL CALCULATED.3IONS-SCNC: 9 MMOL/L (ref 5–15)
BASOPHILS # BLD AUTO: 0.03 10*3/MM3 (ref 0–0.2)
BASOPHILS NFR BLD AUTO: 0.4 % (ref 0–1.5)
BUN SERPL-MCNC: 11 MG/DL (ref 6–20)
BUN/CREAT SERPL: 15.3 (ref 7–25)
CALCIUM SPEC-SCNC: 10.2 MG/DL (ref 8.6–10.5)
CHLORIDE SERPL-SCNC: 105 MMOL/L (ref 98–107)
CO2 SERPL-SCNC: 25 MMOL/L (ref 22–29)
CREAT SERPL-MCNC: 0.72 MG/DL (ref 0.57–1)
DEPRECATED RDW RBC AUTO: 42.8 FL (ref 37–54)
EGFRCR SERPLBLD CKD-EPI 2021: 110.6 ML/MIN/1.73
EOSINOPHIL # BLD AUTO: 0.06 10*3/MM3 (ref 0–0.4)
EOSINOPHIL NFR BLD AUTO: 0.7 % (ref 0.3–6.2)
ERYTHROCYTE [DISTWIDTH] IN BLOOD BY AUTOMATED COUNT: 14.3 % (ref 12.3–15.4)
GLUCOSE SERPL-MCNC: 97 MG/DL (ref 65–99)
HCT VFR BLD AUTO: 37.7 % (ref 34–46.6)
HGB BLD-MCNC: 12.7 G/DL (ref 12–15.9)
IMM GRANULOCYTES # BLD AUTO: 0.04 10*3/MM3 (ref 0–0.05)
IMM GRANULOCYTES NFR BLD AUTO: 0.5 % (ref 0–0.5)
LYMPHOCYTES # BLD AUTO: 1.14 10*3/MM3 (ref 0.7–3.1)
LYMPHOCYTES NFR BLD AUTO: 13.7 % (ref 19.6–45.3)
MCH RBC QN AUTO: 28.4 PG (ref 26.6–33)
MCHC RBC AUTO-ENTMCNC: 33.7 G/DL (ref 31.5–35.7)
MCV RBC AUTO: 84.3 FL (ref 79–97)
MONOCYTES # BLD AUTO: 0.66 10*3/MM3 (ref 0.1–0.9)
MONOCYTES NFR BLD AUTO: 7.9 % (ref 5–12)
NEUTROPHILS NFR BLD AUTO: 6.42 10*3/MM3 (ref 1.7–7)
NEUTROPHILS NFR BLD AUTO: 76.8 % (ref 42.7–76)
NRBC BLD AUTO-RTO: 0 /100 WBC (ref 0–0.2)
PLATELET # BLD AUTO: 375 10*3/MM3 (ref 140–450)
PMV BLD AUTO: 10.7 FL (ref 6–12)
POTASSIUM SERPL-SCNC: 4.1 MMOL/L (ref 3.5–5.2)
QT INTERVAL: 385 MS
QTC INTERVAL: 433 MS
RBC # BLD AUTO: 4.47 10*6/MM3 (ref 3.77–5.28)
SODIUM SERPL-SCNC: 139 MMOL/L (ref 136–145)
WBC NRBC COR # BLD: 8.35 10*3/MM3 (ref 3.4–10.8)

## 2023-10-20 PROCEDURE — 85025 COMPLETE CBC W/AUTO DIFF WBC: CPT

## 2023-10-20 PROCEDURE — 71046 X-RAY EXAM CHEST 2 VIEWS: CPT

## 2023-10-20 PROCEDURE — 93005 ELECTROCARDIOGRAM TRACING: CPT | Performed by: PODIATRIST

## 2023-10-20 PROCEDURE — 80048 BASIC METABOLIC PNL TOTAL CA: CPT

## 2023-10-20 PROCEDURE — 36415 COLL VENOUS BLD VENIPUNCTURE: CPT

## 2023-11-02 ENCOUNTER — HOSPITAL ENCOUNTER (OUTPATIENT)
Dept: ONCOLOGY | Facility: HOSPITAL | Age: 38
Discharge: HOME | End: 2023-11-02
Admitting: INTERNAL MEDICINE
Payer: COMMERCIAL

## 2023-11-02 VITALS
HEART RATE: 88 BPM | SYSTOLIC BLOOD PRESSURE: 106 MMHG | OXYGEN SATURATION: 96 % | TEMPERATURE: 98.5 F | WEIGHT: 187.39 LBS | BODY MASS INDEX: 33.2 KG/M2 | RESPIRATION RATE: 18 BRPM | HEIGHT: 63 IN | DIASTOLIC BLOOD PRESSURE: 67 MMHG

## 2023-11-02 DIAGNOSIS — C79.51 METASTASIS TO BONE: ICD-10-CM

## 2023-11-02 DIAGNOSIS — Z17.0 MALIGNANT NEOPLASM OF UPPER-INNER QUADRANT OF RIGHT BREAST IN FEMALE, ESTROGEN RECEPTOR POSITIVE: Primary | ICD-10-CM

## 2023-11-02 DIAGNOSIS — C50.211 MALIGNANT NEOPLASM OF UPPER-INNER QUADRANT OF RIGHT BREAST IN FEMALE, ESTROGEN RECEPTOR POSITIVE: Primary | ICD-10-CM

## 2023-11-02 LAB
ALBUMIN SERPL-MCNC: 4.5 G/DL (ref 3.5–5.2)
ALBUMIN/GLOB SERPL: 1.3 G/DL
ALP SERPL-CCNC: 138 U/L (ref 39–117)
ALT SERPL W P-5'-P-CCNC: 101 U/L (ref 1–33)
ANION GAP SERPL CALCULATED.3IONS-SCNC: 5 MMOL/L (ref 5–15)
AST SERPL-CCNC: 32 U/L (ref 1–32)
BASOPHILS # BLD AUTO: 0.03 10*3/MM3 (ref 0–0.2)
BASOPHILS NFR BLD AUTO: 0.4 % (ref 0–1.5)
BILIRUB SERPL-MCNC: 0.3 MG/DL (ref 0–1.2)
BUN SERPL-MCNC: 9 MG/DL (ref 6–20)
BUN/CREAT SERPL: 15.8 (ref 7–25)
CALCIUM SPEC-SCNC: 9.6 MG/DL (ref 8.6–10.5)
CHLORIDE SERPL-SCNC: 102 MMOL/L (ref 98–107)
CO2 SERPL-SCNC: 28 MMOL/L (ref 22–29)
CREAT SERPL-MCNC: 0.57 MG/DL (ref 0.57–1)
DEPRECATED RDW RBC AUTO: 48.1 FL (ref 37–54)
EGFRCR SERPLBLD CKD-EPI 2021: 120.2 ML/MIN/1.73
EOSINOPHIL # BLD AUTO: 0.14 10*3/MM3 (ref 0–0.4)
EOSINOPHIL NFR BLD AUTO: 1.9 % (ref 0.3–6.2)
ERYTHROCYTE [DISTWIDTH] IN BLOOD BY AUTOMATED COUNT: 14.3 % (ref 12.3–15.4)
GLOBULIN UR ELPH-MCNC: 3.4 GM/DL
GLUCOSE SERPL-MCNC: 71 MG/DL (ref 65–99)
HCT VFR BLD AUTO: 40.9 % (ref 34–46.6)
HGB BLD-MCNC: 12.8 G/DL (ref 12–15.9)
IMM GRANULOCYTES # BLD AUTO: 0.01 10*3/MM3 (ref 0–0.05)
IMM GRANULOCYTES NFR BLD AUTO: 0.1 % (ref 0–0.5)
LYMPHOCYTES # BLD AUTO: 1.33 10*3/MM3 (ref 0.7–3.1)
LYMPHOCYTES NFR BLD AUTO: 18.1 % (ref 19.6–45.3)
MAGNESIUM SERPL-MCNC: 2.2 MG/DL (ref 1.6–2.6)
MCH RBC QN AUTO: 28.2 PG (ref 26.6–33)
MCHC RBC AUTO-ENTMCNC: 31.3 G/DL (ref 31.5–35.7)
MCV RBC AUTO: 90.1 FL (ref 79–97)
MONOCYTES # BLD AUTO: 0.77 10*3/MM3 (ref 0.1–0.9)
MONOCYTES NFR BLD AUTO: 10.5 % (ref 5–12)
NEUTROPHILS NFR BLD AUTO: 5.06 10*3/MM3 (ref 1.7–7)
NEUTROPHILS NFR BLD AUTO: 69 % (ref 42.7–76)
PHOSPHATE SERPL-MCNC: 3.4 MG/DL (ref 2.5–4.5)
PLATELET # BLD AUTO: 331 10*3/MM3 (ref 140–450)
PMV BLD AUTO: 9.6 FL (ref 6–12)
POTASSIUM SERPL-SCNC: 4.6 MMOL/L (ref 3.5–5.2)
PROT SERPL-MCNC: 7.9 G/DL (ref 6–8.5)
RBC # BLD AUTO: 4.54 10*6/MM3 (ref 3.77–5.28)
SODIUM SERPL-SCNC: 135 MMOL/L (ref 136–145)
WBC NRBC COR # BLD: 7.34 10*3/MM3 (ref 3.4–10.8)

## 2023-11-02 PROCEDURE — 84100 ASSAY OF PHOSPHORUS: CPT | Performed by: INTERNAL MEDICINE

## 2023-11-02 PROCEDURE — 25010000002 DIPHENHYDRAMINE PER 50 MG: Performed by: INTERNAL MEDICINE

## 2023-11-02 PROCEDURE — 25010000002 TRASTUZUMAB PER 10 MG: Performed by: INTERNAL MEDICINE

## 2023-11-02 PROCEDURE — 85025 COMPLETE CBC W/AUTO DIFF WBC: CPT | Performed by: INTERNAL MEDICINE

## 2023-11-02 PROCEDURE — 96375 TX/PRO/DX INJ NEW DRUG ADDON: CPT

## 2023-11-02 PROCEDURE — 25810000003 SODIUM CHLORIDE 0.9 % SOLUTION: Performed by: INTERNAL MEDICINE

## 2023-11-02 PROCEDURE — 83735 ASSAY OF MAGNESIUM: CPT | Performed by: INTERNAL MEDICINE

## 2023-11-02 PROCEDURE — 25810000003 SODIUM CHLORIDE 0.9 % SOLUTION 250 ML FLEX CONT: Performed by: INTERNAL MEDICINE

## 2023-11-02 PROCEDURE — 96413 CHEMO IV INFUSION 1 HR: CPT

## 2023-11-02 PROCEDURE — 80053 COMPREHEN METABOLIC PANEL: CPT | Performed by: INTERNAL MEDICINE

## 2023-11-02 RX ORDER — SODIUM CHLORIDE 9 MG/ML
250 INJECTION, SOLUTION INTRAVENOUS ONCE
Status: COMPLETED | OUTPATIENT
Start: 2023-11-02 | End: 2023-11-02

## 2023-11-02 RX ORDER — SODIUM CHLORIDE 9 MG/ML
250 INJECTION, SOLUTION INTRAVENOUS ONCE
Status: CANCELLED | OUTPATIENT
Start: 2023-11-02

## 2023-11-02 RX ORDER — ACETAMINOPHEN 325 MG/1
650 TABLET ORAL ONCE
Status: CANCELLED | OUTPATIENT
Start: 2023-11-02

## 2023-11-02 RX ORDER — ACETAMINOPHEN 325 MG/1
650 TABLET ORAL ONCE
Status: COMPLETED | OUTPATIENT
Start: 2023-11-02 | End: 2023-11-02

## 2023-11-02 RX ADMIN — ACETAMINOPHEN 650 MG: 325 TABLET ORAL at 14:21

## 2023-11-02 RX ADMIN — TRASTUZUMAB 530 MG: 150 INJECTION, POWDER, LYOPHILIZED, FOR SOLUTION INTRAVENOUS at 14:40

## 2023-11-02 RX ADMIN — DIPHENHYDRAMINE HYDROCHLORIDE 25 MG: 50 INJECTION, SOLUTION INTRAMUSCULAR; INTRAVENOUS at 14:25

## 2023-11-02 RX ADMIN — SODIUM CHLORIDE 250 ML: 9 INJECTION, SOLUTION INTRAVENOUS at 14:23

## 2023-11-03 ENCOUNTER — TELEMEDICINE (OUTPATIENT)
Dept: INTERNAL MEDICINE | Facility: CLINIC | Age: 38
End: 2023-11-03
Payer: COMMERCIAL

## 2023-11-03 DIAGNOSIS — K92.1 BLOOD IN STOOL: ICD-10-CM

## 2023-11-03 DIAGNOSIS — F41.8 ANXIETY ABOUT HEALTH: Primary | ICD-10-CM

## 2023-11-03 PROCEDURE — 99213 OFFICE O/P EST LOW 20 MIN: CPT

## 2023-11-03 RX ORDER — CLONAZEPAM 0.5 MG/1
0.5 TABLET ORAL 2 TIMES DAILY PRN
Qty: 30 TABLET | Refills: 1 | Status: SHIPPED | OUTPATIENT
Start: 2023-11-03

## 2023-11-03 NOTE — PROGRESS NOTES
Mode of Visit: Video  Location of patient: home  You have chosen to receive care through a telehealth visit.  Does the patient consent to use a video/audio connection for your medical care today? Yes  The visit included audio and video interaction. No technical issues occurred during this visit.     Chief Complaint  Follow-up and Labs Only (37 YO FEMALE IS HERE FOR A TELEHEALTH TO ASK SOME MEDICAL QUESTIONS, AND QUESTION ON HER  CLONAZEPAM. )    Subjective          Annita Johnson presents to [facility]  History of Present Illness  Follow up on labs.  She is doing better. She is no longer having headaches.     She states that she is doing well overall.     She has noticed blood on the toilet paper when she wipes after bowel movements and sometimes it is in the toilet.     Recommended flu vaccine.    She is doing well otherwise.    She denies any chest pain, soa, palpitations, nausea, vomiting, diarrhea, changes to bowel/bladder habits.     Objective   Vital Signs:   There were no vitals taken for this visit.    Physical Exam   Constitutional: She appears well-developed and well-nourished.   HENT:   Head: Normocephalic.   Eyes: EOM are normal.   Neck: Neck normal appearance.  Pulmonary/Chest: Effort normal.  No respiratory distress.  Abdominal: Abdomen appears normal.   Neurological: She is alert.   Skin: Skin is warm and dry.   Psychiatric: She has a normal mood and affect.     Result Review :                 Assessment and Plan    Diagnoses and all orders for this visit:    1. Anxiety about health (Primary)  Assessment & Plan:  She is on lexapro 10 mg daily and doing well.   Patient takes klonopin PRN and it does help.  She would like a refill today.   Continue lexapro daily and klonopin PRN.     Orders:  -     clonazePAM (KlonoPIN) 0.5 MG tablet; Take 1 tablet by mouth 2 (Two) Times a Day As Needed for Anxiety.  Dispense: 30 tablet; Refill: 1    2. Blood in stool  Assessment &  Plan:  She has noticed blood on the toilet paper when she wipes after bowel movements and sometimes it is in the toilet.   Denies pain with bowel movements; however, she is on chronic narcotics.  Discussed with her that those can cause constipation. She may take a stool softener PRN.  Will get a stool sample as well.       Orders:  -     Occult Blood, Fecal By Immunoassay - Stool, Per Rectum; Future        Follow Up   Return for Next scheduled follow up.  Patient was given instructions and counseling regarding her condition or for health maintenance advice. Please see specific information pulled into the AVS if appropriate.

## 2023-11-03 NOTE — ASSESSMENT & PLAN NOTE
She has noticed blood on the toilet paper when she wipes after bowel movements and sometimes it is in the toilet.   Denies pain with bowel movements; however, she is on chronic narcotics.  Discussed with her that those can cause constipation. She may take a stool softener PRN.  Will get a stool sample as well.

## 2023-11-03 NOTE — ASSESSMENT & PLAN NOTE
She is on lexapro 10 mg daily and doing well.   Patient takes klonopin PRN and it does help.  She would like a refill today.   Continue lexapro daily and klonopin PRN.

## 2023-11-16 ENCOUNTER — HOSPITAL ENCOUNTER (OUTPATIENT)
Dept: ONCOLOGY | Facility: HOSPITAL | Age: 38
Discharge: HOME | End: 2023-11-16
Payer: COMMERCIAL

## 2023-11-16 ENCOUNTER — OFFICE VISIT (OUTPATIENT)
Dept: ONCOLOGY | Facility: HOSPITAL | Age: 38
End: 2023-11-16
Payer: COMMERCIAL

## 2023-11-16 ENCOUNTER — DOCUMENTATION (OUTPATIENT)
Dept: ONCOLOGY | Facility: HOSPITAL | Age: 38
End: 2023-11-16
Payer: COMMERCIAL

## 2023-11-16 VITALS
DIASTOLIC BLOOD PRESSURE: 86 MMHG | OXYGEN SATURATION: 99 % | RESPIRATION RATE: 18 BRPM | HEART RATE: 100 BPM | SYSTOLIC BLOOD PRESSURE: 121 MMHG | WEIGHT: 194.45 LBS | HEIGHT: 63 IN | TEMPERATURE: 97.8 F | BODY MASS INDEX: 34.45 KG/M2

## 2023-11-16 DIAGNOSIS — Z17.0 MALIGNANT NEOPLASM OF UPPER-INNER QUADRANT OF RIGHT BREAST IN FEMALE, ESTROGEN RECEPTOR POSITIVE: Primary | ICD-10-CM

## 2023-11-16 DIAGNOSIS — C79.51 METASTASIS TO BONE: ICD-10-CM

## 2023-11-16 DIAGNOSIS — C50.211 MALIGNANT NEOPLASM OF UPPER-INNER QUADRANT OF RIGHT BREAST IN FEMALE, ESTROGEN RECEPTOR POSITIVE: Primary | ICD-10-CM

## 2023-11-16 PROCEDURE — 96402 CHEMO HORMON ANTINEOPL SQ/IM: CPT

## 2023-11-16 PROCEDURE — 25010000002 DENOSUMAB 120 MG/1.7ML SOLUTION: Performed by: INTERNAL MEDICINE

## 2023-11-16 PROCEDURE — 96372 THER/PROPH/DIAG INJ SC/IM: CPT

## 2023-11-16 PROCEDURE — 25010000002 LEUPROLIDE PER 3.75 MG: Performed by: INTERNAL MEDICINE

## 2023-11-16 RX ORDER — HYDROCODONE BITARTRATE AND ACETAMINOPHEN 5; 325 MG/1; MG/1
TABLET ORAL
COMMUNITY
Start: 2023-10-27

## 2023-11-16 RX ADMIN — LEUPROLIDE ACETATE 3.75 MG: KIT at 11:55

## 2023-11-16 RX ADMIN — DENOSUMAB 120 MG: 120 INJECTION SUBCUTANEOUS at 11:54

## 2023-11-16 NOTE — PROGRESS NOTES
Chief Complaint  Malignant neoplasm of upper-inner quadrant of right breast     Romina, Lela, APRN  Manitou, Lela, APRN    Subjective          Annita Johnson presents to [facility] for treatment of her metastatic breast cancer.  She is on letrozole, trastuzumab, leuprolide for ovarian suppression and denosumab for bony involvement.  She states that her treatments are going well.  She denies any masses or adenopathy.  No unusual aches or pains.  She notes good energy level, adequate for all of her ADLs.  She has good appetite.    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, estrogen receptor positive   6/8/2023 Initial Diagnosis    Malignant neoplasm of upper-inner quadrant of right breast in female, estrogen receptor positive     6/8/2023 Cancer Staged    Staging form: Breast, AJCC 8th Edition  - Clinical: Stage IV (cT2, cN0, cM1, ER+, OR+, HER2-) - Signed by Perry Garcia MD on 6/8/2023 6/9/2023 -  Chemotherapy    OP SUPPORTIVE BREAST Leuprolide 3.75 MG Q28D     6/15/2023 - 6/15/2023 Chemotherapy    OP BREAST Letrozole / Ribociclib     7/13/2023 -  Chemotherapy    OP BREAST Letrozole / Trastuzumab     8/24/2023 -  Chemotherapy    OP SUPPORTIVE Denosumab (Xgeva) Q28D     Metastasis to bone   6/8/2023 Initial Diagnosis    Metastasis to bone     8/24/2023 -  Chemotherapy    OP SUPPORTIVE Denosumab (Xgeva) Q28D         Review of Systems   Constitutional:  Negative for appetite change, diaphoresis, fatigue, fever, unexpected weight gain and unexpected weight loss.   HENT:  Negative for hearing loss, mouth sores, sore throat, swollen glands, trouble swallowing and voice change.    Eyes:  Negative for blurred vision.   Respiratory:  Negative for cough, shortness of breath and wheezing.    Cardiovascular:  Negative for chest pain and palpitations.   Gastrointestinal:  Negative for abdominal pain, blood in stool,  constipation, diarrhea, nausea and vomiting.   Endocrine: Negative for cold intolerance and heat intolerance.   Genitourinary:  Negative for difficulty urinating, dysuria, frequency, hematuria and urinary incontinence.   Musculoskeletal:  Negative for arthralgias, back pain and myalgias.   Skin:  Negative for rash, skin lesions and wound.   Neurological:  Negative for dizziness, seizures, weakness, numbness and headache.   Hematological:  Does not bruise/bleed easily.   Psychiatric/Behavioral:  Negative for depressed mood. The patient is not nervous/anxious.      Current Outpatient Medications on File Prior to Visit   Medication Sig Dispense Refill    clonazePAM (KlonoPIN) 0.5 MG tablet Take 1 tablet by mouth 2 (Two) Times a Day As Needed for Anxiety. 30 tablet 1    cyclobenzaprine (FLEXERIL) 10 MG tablet Take 1 tablet by mouth 3 (Three) Times a Day As Needed for Muscle Spasms. 90 tablet 5    escitalopram (Lexapro) 10 MG tablet Take 1 tablet by mouth Daily. 90 tablet 1    folic acid (FOLVITE) 1 MG tablet Take 1 tablet by mouth Daily. 90 tablet 1    HYDROcodone-acetaminophen (NORCO) 5-325 MG per tablet take 1 tablet by mouth every 4 to 6 hours      hydrOXYzine (ATARAX) 50 MG tablet Take 1 tablet by mouth every night at bedtime. 90 tablet 1    letrozole (FEMARA) 2.5 MG tablet Take 1 tablet by mouth Daily. 30 tablet 5    Rimegepant Sulfate (Nurtec) 75 MG tablet dispersible tablet Take 1 tablet by mouth Daily As Needed (migraines). 8 tablet 5    Sodium Fluoride 5000 PPM 1.1 % gel See Admin Instructions.      benzonatate (Tessalon Perles) 100 MG capsule Take 1 capsule by mouth 3 (Three) Times a Day As Needed for Cough. (Patient not taking: Reported on 11/16/2023) 40 capsule 1    Calcium Carbonate-Vitamin D 600-10 MG-MCG per tablet Take 1 tablet by mouth 2 (Two) Times a Day. (Patient not taking: Reported on 11/16/2023) 60 tablet 5    ondansetron (ZOFRAN) 8 MG tablet Take 1 tablet by mouth 3 (Three) Times a Day As Needed  for Nausea or Vomiting. (Patient not taking: Reported on 2023) 30 tablet 5    oxyCODONE-acetaminophen (PERCOCET) 7.5-325 MG per tablet Take 1 tablet by mouth Every 8 (Eight) Hours As Needed (pain). (Patient not taking: Reported on 2023) 30 tablet 0    pantoprazole (PROTONIX) 40 MG EC tablet Take 1 tablet by mouth Daily. (Patient not taking: Reported on 2023) 90 tablet 0    [DISCONTINUED] letrozole (FEMARA) 2.5 MG tablet Take 1 tablet by mouth Daily. (Patient not taking: Reported on 2023) 30 tablet 5     No current facility-administered medications on file prior to visit.       No Known Allergies  Past Medical History:   Diagnosis Date    Anemia     Breast cancer     Cancer, metastatic to bone     Kidney stone     Kidney stones     Metastasis to bone 2023     Past Surgical History:   Procedure Laterality Date    BREAST BIOPSY       SECTION N/A 2021    Procedure:  SECTION PRIMARY;  Surgeon: Zoe Dawson MD;  Location: [facility] DELIVERY;  Service: Obstetrics/Gynecology;  Laterality: N/A;    CYSTOSCOPY BLADDER STONE LITHOTRIPSY       Social History     Socioeconomic History    Marital status:      Spouse name: Janet   Tobacco Use    Smoking status: Some Days     Packs/day: 0.25     Years: 10.00     Additional pack years: 0.00     Total pack years: 2.50     Types: Cigarettes     Start date: 2021     Passive exposure: Current    Smokeless tobacco: Never   Vaping Use    Vaping Use: Never used   Substance and Sexual Activity    Alcohol use: Yes     Alcohol/week: 6.0 standard drinks of alcohol     Types: 3 Glasses of wine, 3 Drinks containing 0.5 oz of alcohol per week    Drug use: Not Currently     Types: Marijuana     Comment: for pain control    Sexual activity: Yes     Partners: Male     Birth control/protection: Same-sex partner     Family History   Problem Relation Age of Onset    Mental illness Mother     Hypertension Father     Alcohol abuse  "Father     Ovarian cancer Paternal Aunt     Breast cancer Maternal Great-Grandmother        Objective   Physical Exam  Vitals reviewed. Exam conducted with a chaperone present.   Constitutional:       General: She is not in acute distress.     Appearance: Normal appearance.   Cardiovascular:      Rate and Rhythm: Normal rate and regular rhythm.      Heart sounds: Normal heart sounds. No murmur heard.     No gallop.   Pulmonary:      Effort: Pulmonary effort is normal.   Abdominal:      General: Abdomen is flat. Bowel sounds are normal.      Palpations: Abdomen is soft.   Musculoskeletal:      Right lower leg: No edema.      Left lower leg: No edema.   Neurological:      Mental Status: She is oriented to person, place, and time.   Psychiatric:         Mood and Affect: Mood normal.         Behavior: Behavior normal.         Vitals:    11/16/23 1122   BP: 121/86   Pulse: 100   Resp: 18   Temp: 97.8 °F (36.6 °C)   SpO2: 99%   Weight: 88.2 kg (194 lb 7.1 oz)   Height: 160 cm (62.99\")   PainSc: 0-No pain     ECOG score: 0         PHQ-9 Total Score:                    Result Review :   The following data was reviewed by: Perry Garcia MD on 11/16/2023:  Lab Results   Component Value Date    HGB 12.8 11/02/2023    HCT 40.9 11/02/2023    MCV 90.1 11/02/2023     11/02/2023    WBC 7.34 11/02/2023    NEUTROABS 5.06 11/02/2023    LYMPHSABS 1.33 11/02/2023    MONOSABS 0.77 11/02/2023    EOSABS 0.14 11/02/2023    BASOSABS 0.03 11/02/2023     Lab Results   Component Value Date    GLUCOSE 71 11/02/2023    BUN 9 11/02/2023    CREATININE 0.57 11/02/2023     (L) 11/02/2023    K 4.6 11/02/2023     11/02/2023    CO2 28.0 11/02/2023    CALCIUM 9.6 11/02/2023    PROTEINTOT 7.9 11/02/2023    ALBUMIN 4.5 11/02/2023    BILITOT 0.3 11/02/2023    ALKPHOS 138 (H) 11/02/2023    AST 32 11/02/2023     (H) 11/02/2023     Lab Results   Component Value Date    MG 2.2 11/02/2023    PHOS 3.4 11/02/2023    TSH 0.885 " "09/21/2023     No results found for: \"IRON\", \"LABIRON\", \"TRANSFERRIN\", \"TIBC\"  Lab Results   Component Value Date    EMCLDUGM05 479 09/21/2023    FOLATE 2.95 (L) 09/21/2023     No results found for: \"PSA\", \"CEA\", \"AFP\", \"\", \"\"          Assessment and Plan    Diagnoses and all orders for this visit:    1. 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  Metastatic.  Patient is on treatment with letrozole for ER positivity and trastuzumab for HER2 positivity.  Tolerating her treatment well.  She is up-to-date on cardiac monitoring.  Continue letrozole daily.  She is due for leuprolide injection for ovarian suppression.  She is considering oophorectomy.  I will see her back for cycle 8-day 1 of trastuzumab with lab work prior to monitor for toxicities.      2. Metastasis to bone  Assessment & Plan:  Patient is on monthly Xgeva.  Tolerating well.  No dental or jaw pain.  Electrolytes are adequate.  Xgeva today monthly.              Patient Follow Up: Cycle 8-day 1    Patient was given instructions and counseling regarding her condition or for health maintenance advice. Please see specific information pulled into the AVS if appropriate.     Perry Garcia MD    11/17/2023            "

## 2023-11-16 NOTE — PROGRESS NOTES
Diagnosis: Metastatic breast cancer     Reason for Referral: Gas assistance    Content of Visit: OSW received a notification from registrar, Karina SALDAÑA, that Mrs. Johnson is requesting gas assistance today. OSW met with Mrs. Johnson in the medical oncology infusion center after receiving her injection. She presented in a pleasant mood in good spirits today. Provided her with a $15 fuel voucher through [employer] to be utilized at [facility]. She expressed appreciation. OSW support remains available.    Resources/Referrals Provided: $15 fuel card through [employer]

## 2023-11-27 ENCOUNTER — HOSPITAL ENCOUNTER (OUTPATIENT)
Dept: ONCOLOGY | Facility: HOSPITAL | Age: 38
Discharge: HOME | End: 2023-11-27
Admitting: INTERNAL MEDICINE
Payer: COMMERCIAL

## 2023-11-27 VITALS
HEART RATE: 101 BPM | BODY MASS INDEX: 34.38 KG/M2 | RESPIRATION RATE: 18 BRPM | TEMPERATURE: 98.4 F | SYSTOLIC BLOOD PRESSURE: 110 MMHG | OXYGEN SATURATION: 99 % | HEIGHT: 63 IN | DIASTOLIC BLOOD PRESSURE: 77 MMHG | WEIGHT: 194 LBS

## 2023-11-27 DIAGNOSIS — Z17.0 MALIGNANT NEOPLASM OF UPPER-INNER QUADRANT OF RIGHT BREAST IN FEMALE, ESTROGEN RECEPTOR POSITIVE: Primary | ICD-10-CM

## 2023-11-27 DIAGNOSIS — C50.211 MALIGNANT NEOPLASM OF UPPER-INNER QUADRANT OF RIGHT BREAST IN FEMALE, ESTROGEN RECEPTOR POSITIVE: Primary | ICD-10-CM

## 2023-11-27 LAB
ALBUMIN SERPL-MCNC: 4.7 G/DL (ref 3.5–5.2)
ALBUMIN/GLOB SERPL: 1.3 G/DL
ALP SERPL-CCNC: 124 U/L (ref 39–117)
ALT SERPL W P-5'-P-CCNC: 71 U/L (ref 1–33)
ANION GAP SERPL CALCULATED.3IONS-SCNC: 9.6 MMOL/L (ref 5–15)
AST SERPL-CCNC: 36 U/L (ref 1–32)
BASOPHILS # BLD AUTO: 0.02 10*3/MM3 (ref 0–0.2)
BASOPHILS NFR BLD AUTO: 0.2 % (ref 0–1.5)
BILIRUB SERPL-MCNC: 0.3 MG/DL (ref 0–1.2)
BUN SERPL-MCNC: 11 MG/DL (ref 6–20)
BUN/CREAT SERPL: 13.4 (ref 7–25)
CALCIUM SPEC-SCNC: 10.5 MG/DL (ref 8.6–10.5)
CHLORIDE SERPL-SCNC: 102 MMOL/L (ref 98–107)
CO2 SERPL-SCNC: 24.4 MMOL/L (ref 22–29)
CREAT SERPL-MCNC: 0.82 MG/DL (ref 0.57–1)
DEPRECATED RDW RBC AUTO: 48 FL (ref 37–54)
EGFRCR SERPLBLD CKD-EPI 2021: 94.6 ML/MIN/1.73
EOSINOPHIL # BLD AUTO: 0.13 10*3/MM3 (ref 0–0.4)
EOSINOPHIL NFR BLD AUTO: 1.6 % (ref 0.3–6.2)
ERYTHROCYTE [DISTWIDTH] IN BLOOD BY AUTOMATED COUNT: 14.3 % (ref 12.3–15.4)
GLOBULIN UR ELPH-MCNC: 3.6 GM/DL
GLUCOSE SERPL-MCNC: 89 MG/DL (ref 65–99)
HCT VFR BLD AUTO: 41.1 % (ref 34–46.6)
HGB BLD-MCNC: 12.7 G/DL (ref 12–15.9)
IMM GRANULOCYTES # BLD AUTO: 0.01 10*3/MM3 (ref 0–0.05)
IMM GRANULOCYTES NFR BLD AUTO: 0.1 % (ref 0–0.5)
LYMPHOCYTES # BLD AUTO: 1.27 10*3/MM3 (ref 0.7–3.1)
LYMPHOCYTES NFR BLD AUTO: 15.6 % (ref 19.6–45.3)
MCH RBC QN AUTO: 28.1 PG (ref 26.6–33)
MCHC RBC AUTO-ENTMCNC: 30.9 G/DL (ref 31.5–35.7)
MCV RBC AUTO: 90.9 FL (ref 79–97)
MONOCYTES # BLD AUTO: 0.62 10*3/MM3 (ref 0.1–0.9)
MONOCYTES NFR BLD AUTO: 7.6 % (ref 5–12)
NEUTROPHILS NFR BLD AUTO: 6.08 10*3/MM3 (ref 1.7–7)
NEUTROPHILS NFR BLD AUTO: 74.9 % (ref 42.7–76)
PLATELET # BLD AUTO: 364 10*3/MM3 (ref 140–450)
PMV BLD AUTO: 9.5 FL (ref 6–12)
POTASSIUM SERPL-SCNC: 4.1 MMOL/L (ref 3.5–5.2)
PROT SERPL-MCNC: 8.3 G/DL (ref 6–8.5)
RBC # BLD AUTO: 4.52 10*6/MM3 (ref 3.77–5.28)
SODIUM SERPL-SCNC: 136 MMOL/L (ref 136–145)
WBC NRBC COR # BLD AUTO: 8.13 10*3/MM3 (ref 3.4–10.8)

## 2023-11-27 PROCEDURE — 25810000003 SODIUM CHLORIDE 0.9 % SOLUTION 250 ML FLEX CONT: Performed by: INTERNAL MEDICINE

## 2023-11-27 PROCEDURE — 25010000002 TRASTUZUMAB PER 10 MG: Performed by: INTERNAL MEDICINE

## 2023-11-27 PROCEDURE — 25810000003 SODIUM CHLORIDE 0.9 % SOLUTION: Performed by: INTERNAL MEDICINE

## 2023-11-27 PROCEDURE — 80053 COMPREHEN METABOLIC PANEL: CPT | Performed by: INTERNAL MEDICINE

## 2023-11-27 PROCEDURE — 96413 CHEMO IV INFUSION 1 HR: CPT

## 2023-11-27 PROCEDURE — 85025 COMPLETE CBC W/AUTO DIFF WBC: CPT | Performed by: INTERNAL MEDICINE

## 2023-11-27 RX ORDER — ACETAMINOPHEN 325 MG/1
650 TABLET ORAL ONCE
Status: COMPLETED | OUTPATIENT
Start: 2023-11-27 | End: 2023-11-27

## 2023-11-27 RX ORDER — ACETAMINOPHEN 325 MG/1
650 TABLET ORAL ONCE
Status: CANCELLED | OUTPATIENT
Start: 2023-11-27

## 2023-11-27 RX ORDER — SODIUM CHLORIDE 9 MG/ML
250 INJECTION, SOLUTION INTRAVENOUS ONCE
Status: CANCELLED | OUTPATIENT
Start: 2023-11-27

## 2023-11-27 RX ORDER — SODIUM CHLORIDE 9 MG/ML
250 INJECTION, SOLUTION INTRAVENOUS ONCE
Status: COMPLETED | OUTPATIENT
Start: 2023-11-27 | End: 2023-11-27

## 2023-11-27 RX ADMIN — TRASTUZUMAB 530 MG: 150 INJECTION, POWDER, LYOPHILIZED, FOR SOLUTION INTRAVENOUS at 15:03

## 2023-11-27 RX ADMIN — SODIUM CHLORIDE 250 ML: 9 INJECTION, SOLUTION INTRAVENOUS at 14:42

## 2023-11-27 RX ADMIN — ACETAMINOPHEN 650 MG: 325 TABLET ORAL at 14:42

## 2023-12-07 ENCOUNTER — TELEPHONE (OUTPATIENT)
Dept: ONCOLOGY | Facility: HOSPITAL | Age: 38
End: 2023-12-07
Payer: COMMERCIAL

## 2023-12-07 NOTE — TELEPHONE ENCOUNTER
Caller: Annita Johnson    Relationship: Self    Best call back number: [phone]    What is the best time to reach you: ANYTIME    Who are you requesting to speak with (clinical staff, provider,  specific staff member):         What was the call regarding:     ASK IF CAN GIVE A CALL BACK TO SPEAK WITH     GENERAL QUESTIONS FOR ASSISTANCE WITH SOME THINGS AT HOME     Is it okay if the provider responds through MyChart: YES

## 2023-12-07 NOTE — TELEPHONE ENCOUNTER
OSW contacted patient to assess her need.  Patient stated that she had a water leak and needed to know if there was any assistance to pay for having the water leak fix.  Patient stated the water company had already been there and did their part.  OSW provided patient with the phone number to community action and educated patient that she can apply through water company for reduction in her water bill due to a leak.  Patient stated that she knew to address the water leak increased on her bill but did not know if there was any assistance for paying for the repairs to the water leak.  Patient expressed appreciation for [employer]'s phone number as a possible resource.

## 2023-12-14 ENCOUNTER — HOSPITAL ENCOUNTER (OUTPATIENT)
Dept: ONCOLOGY | Facility: HOSPITAL | Age: 38
Discharge: HOME | End: 2023-12-14
Admitting: INTERNAL MEDICINE
Payer: COMMERCIAL

## 2023-12-14 DIAGNOSIS — Z17.0 MALIGNANT NEOPLASM OF UPPER-INNER QUADRANT OF RIGHT BREAST IN FEMALE, ESTROGEN RECEPTOR POSITIVE: ICD-10-CM

## 2023-12-14 DIAGNOSIS — C50.211 MALIGNANT NEOPLASM OF UPPER-INNER QUADRANT OF RIGHT BREAST IN FEMALE, ESTROGEN RECEPTOR POSITIVE: ICD-10-CM

## 2023-12-14 DIAGNOSIS — C50.211 MALIGNANT NEOPLASM OF UPPER-INNER QUADRANT OF RIGHT BREAST IN FEMALE, ESTROGEN RECEPTOR POSITIVE: Primary | ICD-10-CM

## 2023-12-14 DIAGNOSIS — C79.51 METASTASIS TO BONE: Primary | ICD-10-CM

## 2023-12-14 DIAGNOSIS — Z17.0 MALIGNANT NEOPLASM OF UPPER-INNER QUADRANT OF RIGHT BREAST IN FEMALE, ESTROGEN RECEPTOR POSITIVE: Primary | ICD-10-CM

## 2023-12-14 DIAGNOSIS — C79.51 METASTASIS TO BONE: ICD-10-CM

## 2023-12-14 PROCEDURE — 96372 THER/PROPH/DIAG INJ SC/IM: CPT

## 2023-12-14 PROCEDURE — 25010000002 LEUPROLIDE PER 3.75 MG: Performed by: INTERNAL MEDICINE

## 2023-12-14 PROCEDURE — 25010000002 DENOSUMAB 120 MG/1.7ML SOLUTION: Performed by: INTERNAL MEDICINE

## 2023-12-14 PROCEDURE — 96402 CHEMO HORMON ANTINEOPL SQ/IM: CPT

## 2023-12-14 RX ADMIN — DENOSUMAB 120 MG: 120 INJECTION SUBCUTANEOUS at 13:12

## 2023-12-14 RX ADMIN — LEUPROLIDE ACETATE 3.75 MG: KIT at 13:12

## 2023-12-21 ENCOUNTER — OFFICE VISIT (OUTPATIENT)
Dept: ONCOLOGY | Facility: HOSPITAL | Age: 38
End: 2023-12-21
Payer: COMMERCIAL

## 2023-12-21 ENCOUNTER — HOSPITAL ENCOUNTER (OUTPATIENT)
Dept: ONCOLOGY | Facility: HOSPITAL | Age: 38
Discharge: HOME | End: 2023-12-21
Admitting: INTERNAL MEDICINE
Payer: COMMERCIAL

## 2023-12-21 VITALS
WEIGHT: 194 LBS | SYSTOLIC BLOOD PRESSURE: 111 MMHG | DIASTOLIC BLOOD PRESSURE: 77 MMHG | TEMPERATURE: 98.9 F | HEART RATE: 91 BPM | HEIGHT: 63 IN | RESPIRATION RATE: 18 BRPM | OXYGEN SATURATION: 100 % | BODY MASS INDEX: 34.38 KG/M2

## 2023-12-21 VITALS
HEIGHT: 63 IN | TEMPERATURE: 98.9 F | BODY MASS INDEX: 34.38 KG/M2 | WEIGHT: 194 LBS | DIASTOLIC BLOOD PRESSURE: 77 MMHG | OXYGEN SATURATION: 100 % | SYSTOLIC BLOOD PRESSURE: 111 MMHG | HEART RATE: 91 BPM | RESPIRATION RATE: 18 BRPM

## 2023-12-21 DIAGNOSIS — C79.51 METASTASIS TO BONE: ICD-10-CM

## 2023-12-21 DIAGNOSIS — C50.211 MALIGNANT NEOPLASM OF UPPER-INNER QUADRANT OF RIGHT BREAST IN FEMALE, ESTROGEN RECEPTOR POSITIVE: Primary | ICD-10-CM

## 2023-12-21 DIAGNOSIS — Z17.0 MALIGNANT NEOPLASM OF UPPER-INNER QUADRANT OF RIGHT BREAST IN FEMALE, ESTROGEN RECEPTOR POSITIVE: Primary | ICD-10-CM

## 2023-12-21 LAB
ALBUMIN SERPL-MCNC: 4.3 G/DL (ref 3.5–5.2)
ALBUMIN/GLOB SERPL: 1.3 G/DL
ALP SERPL-CCNC: 124 U/L (ref 39–117)
ALT SERPL W P-5'-P-CCNC: 92 U/L (ref 1–33)
ANION GAP SERPL CALCULATED.3IONS-SCNC: 6.4 MMOL/L (ref 5–15)
AST SERPL-CCNC: 45 U/L (ref 1–32)
BASOPHILS # BLD AUTO: 0.03 10*3/MM3 (ref 0–0.2)
BASOPHILS NFR BLD AUTO: 0.4 % (ref 0–1.5)
BILIRUB SERPL-MCNC: 0.4 MG/DL (ref 0–1.2)
BUN SERPL-MCNC: 12 MG/DL (ref 6–20)
BUN/CREAT SERPL: 17.9 (ref 7–25)
CALCIUM SPEC-SCNC: 9.4 MG/DL (ref 8.6–10.5)
CHLORIDE SERPL-SCNC: 103 MMOL/L (ref 98–107)
CO2 SERPL-SCNC: 26.6 MMOL/L (ref 22–29)
CREAT SERPL-MCNC: 0.67 MG/DL (ref 0.57–1)
DEPRECATED RDW RBC AUTO: 47.9 FL (ref 37–54)
EGFRCR SERPLBLD CKD-EPI 2021: 115.6 ML/MIN/1.73
EOSINOPHIL # BLD AUTO: 0.12 10*3/MM3 (ref 0–0.4)
EOSINOPHIL NFR BLD AUTO: 1.7 % (ref 0.3–6.2)
ERYTHROCYTE [DISTWIDTH] IN BLOOD BY AUTOMATED COUNT: 14.4 % (ref 12.3–15.4)
GLOBULIN UR ELPH-MCNC: 3.3 GM/DL
GLUCOSE SERPL-MCNC: 101 MG/DL (ref 65–99)
HCT VFR BLD AUTO: 37.3 % (ref 34–46.6)
HGB BLD-MCNC: 11.9 G/DL (ref 12–15.9)
IMM GRANULOCYTES # BLD AUTO: 0.01 10*3/MM3 (ref 0–0.05)
IMM GRANULOCYTES NFR BLD AUTO: 0.1 % (ref 0–0.5)
LYMPHOCYTES # BLD AUTO: 1.18 10*3/MM3 (ref 0.7–3.1)
LYMPHOCYTES NFR BLD AUTO: 16.8 % (ref 19.6–45.3)
MCH RBC QN AUTO: 29 PG (ref 26.6–33)
MCHC RBC AUTO-ENTMCNC: 31.9 G/DL (ref 31.5–35.7)
MCV RBC AUTO: 90.8 FL (ref 79–97)
MONOCYTES # BLD AUTO: 0.56 10*3/MM3 (ref 0.1–0.9)
MONOCYTES NFR BLD AUTO: 8 % (ref 5–12)
NEUTROPHILS NFR BLD AUTO: 5.11 10*3/MM3 (ref 1.7–7)
NEUTROPHILS NFR BLD AUTO: 73 % (ref 42.7–76)
PLATELET # BLD AUTO: 333 10*3/MM3 (ref 140–450)
PMV BLD AUTO: 9.6 FL (ref 6–12)
POTASSIUM SERPL-SCNC: 4.2 MMOL/L (ref 3.5–5.2)
PROT SERPL-MCNC: 7.6 G/DL (ref 6–8.5)
RBC # BLD AUTO: 4.11 10*6/MM3 (ref 3.77–5.28)
SODIUM SERPL-SCNC: 136 MMOL/L (ref 136–145)
WBC NRBC COR # BLD AUTO: 7.01 10*3/MM3 (ref 3.4–10.8)

## 2023-12-21 PROCEDURE — 25010000002 TRASTUZUMAB PER 10 MG: Performed by: INTERNAL MEDICINE

## 2023-12-21 PROCEDURE — 80053 COMPREHEN METABOLIC PANEL: CPT | Performed by: INTERNAL MEDICINE

## 2023-12-21 PROCEDURE — 96413 CHEMO IV INFUSION 1 HR: CPT

## 2023-12-21 PROCEDURE — 25810000003 SODIUM CHLORIDE 0.9 % SOLUTION 250 ML FLEX CONT: Performed by: INTERNAL MEDICINE

## 2023-12-21 PROCEDURE — 25810000003 SODIUM CHLORIDE 0.9 % SOLUTION: Performed by: INTERNAL MEDICINE

## 2023-12-21 PROCEDURE — 85025 COMPLETE CBC W/AUTO DIFF WBC: CPT | Performed by: INTERNAL MEDICINE

## 2023-12-21 RX ORDER — ACETAMINOPHEN 325 MG/1
650 TABLET ORAL ONCE
Status: CANCELLED | OUTPATIENT
Start: 2023-12-21

## 2023-12-21 RX ORDER — SODIUM CHLORIDE 9 MG/ML
250 INJECTION, SOLUTION INTRAVENOUS ONCE
Status: COMPLETED | OUTPATIENT
Start: 2023-12-21 | End: 2023-12-21

## 2023-12-21 RX ORDER — SODIUM CHLORIDE 9 MG/ML
250 INJECTION, SOLUTION INTRAVENOUS ONCE
Status: CANCELLED | OUTPATIENT
Start: 2023-12-21

## 2023-12-21 RX ORDER — ACETAMINOPHEN 325 MG/1
650 TABLET ORAL ONCE
Status: COMPLETED | OUTPATIENT
Start: 2023-12-21 | End: 2023-12-21

## 2023-12-21 RX ADMIN — TRASTUZUMAB 530 MG: 150 INJECTION, POWDER, LYOPHILIZED, FOR SOLUTION INTRAVENOUS at 14:35

## 2023-12-21 RX ADMIN — SODIUM CHLORIDE 250 ML: 9 INJECTION, SOLUTION INTRAVENOUS at 14:20

## 2023-12-21 RX ADMIN — ACETAMINOPHEN 650 MG: 325 TABLET ORAL at 14:19

## 2023-12-21 NOTE — ASSESSMENT & PLAN NOTE
Metastatic.  Hormone receptor positive, HER2 positive.  Patient is on palliative treatment with letrozole, trastuzumab, ovarian suppression with leuprolide and denosumab for bony involvement.  She is tolerating her regimen well.  Lab work is notable for mild elevation in the transaminases.  No dose adjustment needed at this time.  Proceed with trastuzumab as planned.  Continue letrozole daily.  I will see her back in 3 weeks with her next trastuzumab with lab work prior to monitor for toxicities, restaging CT and bone scans to assess response to therapy and echocardiogram for HER2 based treatment.

## 2023-12-21 NOTE — PROGRESS NOTES
Chief Complaint  Chemotherapy    Lela Vanegas, APRN  Romina, Lela, APRN    Subjective          Annita Johnson presents to [facility] HEMATOLOGY & ONCOLOGY for ongoing treatment of her metastatic breast cancer.  She is on letrozole, trastuzumab, ovarian suppression with leuprolide and Xgeva for bony involvement.  She states she is tolerating her treatments well.  She denies new masses or adenopathy.  No unusual aches or pains.  She notes good appetite and her weight is maintained.  She denies issues from Port-A-Cath.  She denies chest pain, shortness of breath, lower extremity swelling.    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, estrogen receptor positive   6/8/2023 Initial Diagnosis    Malignant neoplasm of upper-inner quadrant of right breast in female, estrogen receptor positive     6/8/2023 Cancer Staged    Staging form: Breast, AJCC 8th Edition  - Clinical: Stage IV (cT2, cN0, cM1, ER+, MT+, HER2-) - Signed by Perry Garcia MD on 6/8/2023 6/9/2023 -  Chemotherapy    OP SUPPORTIVE BREAST Leuprolide 3.75 MG Q28D     6/15/2023 - 6/15/2023 Chemotherapy    OP BREAST Letrozole / Ribociclib     7/13/2023 -  Chemotherapy    OP BREAST Letrozole / Trastuzumab     8/24/2023 -  Chemotherapy    OP SUPPORTIVE Denosumab (Xgeva) Q28D     Metastasis to bone   6/8/2023 Initial Diagnosis    Metastasis to bone     8/24/2023 -  Chemotherapy    OP SUPPORTIVE Denosumab (Xgeva) Q28D         Review of Systems   Constitutional:  Positive for fatigue. Negative for appetite change, diaphoresis, fever, unexpected weight gain and unexpected weight loss.   HENT:  Negative for hearing loss, mouth sores, sore throat, swollen glands, trouble swallowing and voice change.    Eyes:  Negative for blurred vision.   Respiratory:  Negative for cough, shortness of breath and wheezing.    Cardiovascular:  Negative for chest pain and palpitations.   Gastrointestinal:   Negative for abdominal pain, blood in stool, constipation, diarrhea, nausea and vomiting.   Endocrine: Negative for cold intolerance and heat intolerance.   Genitourinary:  Negative for difficulty urinating, dysuria, frequency, hematuria and urinary incontinence.   Musculoskeletal:  Negative for arthralgias, back pain and myalgias.   Skin:  Negative for rash, skin lesions and wound.   Neurological:  Negative for dizziness, seizures, weakness, numbness and headache.   Hematological:  Does not bruise/bleed easily.   Psychiatric/Behavioral:  Negative for depressed mood. The patient is not nervous/anxious.      Current Outpatient Medications on File Prior to Visit   Medication Sig Dispense Refill    clonazePAM (KlonoPIN) 0.5 MG tablet Take 1 tablet by mouth 2 (Two) Times a Day As Needed for Anxiety. 30 tablet 1    cyclobenzaprine (FLEXERIL) 10 MG tablet Take 1 tablet by mouth 3 (Three) Times a Day As Needed for Muscle Spasms. 90 tablet 5    escitalopram (Lexapro) 10 MG tablet Take 1 tablet by mouth Daily. 90 tablet 1    folic acid (FOLVITE) 1 MG tablet Take 1 tablet by mouth Daily. 90 tablet 1    HYDROcodone-acetaminophen (NORCO) 5-325 MG per tablet take 1 tablet by mouth every 4 to 6 hours      hydrOXYzine (ATARAX) 50 MG tablet Take 1 tablet by mouth every night at bedtime. 90 tablet 1    letrozole (FEMARA) 2.5 MG tablet Take 1 tablet by mouth Daily. 30 tablet 5    Rimegepant Sulfate (Nurtec) 75 MG tablet dispersible tablet Take 1 tablet by mouth Daily As Needed (migraines). 8 tablet 5    Sodium Fluoride 5000 PPM 1.1 % gel See Admin Instructions.      benzonatate (Tessalon Perles) 100 MG capsule Take 1 capsule by mouth 3 (Three) Times a Day As Needed for Cough. (Patient not taking: Reported on 11/16/2023) 40 capsule 1    Calcium Carbonate-Vitamin D 600-10 MG-MCG per tablet Take 1 tablet by mouth 2 (Two) Times a Day. (Patient not taking: Reported on 11/16/2023) 60 tablet 5    ondansetron (ZOFRAN) 8 MG tablet Take 1  tablet by mouth 3 (Three) Times a Day As Needed for Nausea or Vomiting. (Patient not taking: Reported on 2023) 30 tablet 5    oxyCODONE-acetaminophen (PERCOCET) 7.5-325 MG per tablet Take 1 tablet by mouth Every 8 (Eight) Hours As Needed (pain). (Patient not taking: Reported on 2023) 30 tablet 0    pantoprazole (PROTONIX) 40 MG EC tablet Take 1 tablet by mouth Daily. (Patient not taking: Reported on 2023) 90 tablet 0     Current Facility-Administered Medications on File Prior to Visit   Medication Dose Route Frequency Provider Last Rate Last Admin    [COMPLETED] acetaminophen (TYLENOL) tablet 650 mg  650 mg Oral Once Perry Garcia MD   650 mg at 23 1419    [COMPLETED] sodium chloride 0.9 % infusion 250 mL  250 mL Intravenous Once Perry Garcia MD 20 mL/hr at 23 1420 250 mL at 23 1420    Trastuzumab (HERCEPTIN) 530 mg in sodium chloride 0.9 % 300.2 mL chemo IVPB  6 mg/kg (Treatment Plan Recorded) Intravenous Once Perry Garcia MD           No Known Allergies  Past Medical History:   Diagnosis Date    Anemia     Breast cancer     Cancer, metastatic to bone     Kidney stone     Kidney stones     Metastasis to bone 2023     Past Surgical History:   Procedure Laterality Date    BREAST BIOPSY       SECTION N/A 2021    Procedure:  SECTION PRIMARY;  Surgeon: Zoe Dawson MD;  Location: [facility] DELIVERY;  Service: Obstetrics/Gynecology;  Laterality: N/A;    CYSTOSCOPY BLADDER STONE LITHOTRIPSY       Social History     Socioeconomic History    Marital status:      Spouse name: Janet   Tobacco Use    Smoking status: Some Days     Packs/day: 0.25     Years: 10.00     Additional pack years: 0.00     Total pack years: 2.50     Types: Cigarettes     Start date: 2021     Passive exposure: Current    Smokeless tobacco: Never   Vaping Use    Vaping Use: Never used   Substance and Sexual Activity    Alcohol use: Yes     Alcohol/week: 6.0  "standard drinks of alcohol     Types: 3 Glasses of wine, 3 Drinks containing 0.5 oz of alcohol per week    Drug use: Not Currently     Types: Marijuana     Comment: for pain control    Sexual activity: Yes     Partners: Male     Birth control/protection: Same-sex partner     Family History   Problem Relation Age of Onset    Mental illness Mother     Hypertension Father     Alcohol abuse Father     Ovarian cancer Paternal Aunt     Breast cancer Maternal Great-Grandmother        Objective   Physical Exam  Vitals reviewed. Exam conducted with a chaperone present.   Constitutional:       General: She is not in acute distress.     Appearance: Normal appearance.   Cardiovascular:      Rate and Rhythm: Normal rate and regular rhythm.      Heart sounds: Normal heart sounds. No murmur heard.     No gallop.   Pulmonary:      Effort: Pulmonary effort is normal.      Breath sounds: Normal breath sounds.      Comments: Port-A-Cath  Abdominal:      General: Abdomen is flat. Bowel sounds are normal.      Palpations: Abdomen is soft.   Musculoskeletal:      Right lower leg: No edema.      Left lower leg: No edema.   Neurological:      Mental Status: She is alert.   Psychiatric:         Mood and Affect: Mood normal.         Behavior: Behavior normal.         Vitals:    12/21/23 1331   BP: 111/77   Pulse: 91   Resp: 18   Temp: 98.9 °F (37.2 °C)   SpO2: 100%   Weight: 88 kg (194 lb 0.1 oz)   Height: 160 cm (62.99\")   PainSc: 0-No pain     ECOG score: 0         PHQ-9 Total Score:                    Result Review :   The following data was reviewed by: Perry Garcia MD on 12/21/2023:  Lab Results   Component Value Date    HGB 11.9 (L) 12/21/2023    HCT 37.3 12/21/2023    MCV 90.8 12/21/2023     12/21/2023    WBC 7.01 12/21/2023    NEUTROABS 5.11 12/21/2023    LYMPHSABS 1.18 12/21/2023    MONOSABS 0.56 12/21/2023    EOSABS 0.12 12/21/2023    BASOSABS 0.03 12/21/2023     Lab Results   Component Value Date    GLUCOSE 101 (H) " "12/21/2023    BUN 12 12/21/2023    CREATININE 0.67 12/21/2023     12/21/2023    K 4.2 12/21/2023     12/21/2023    CO2 26.6 12/21/2023    CALCIUM 9.4 12/21/2023    PROTEINTOT 7.6 12/21/2023    ALBUMIN 4.3 12/21/2023    BILITOT 0.4 12/21/2023    ALKPHOS 124 (H) 12/21/2023    AST 45 (H) 12/21/2023    ALT 92 (H) 12/21/2023     Lab Results   Component Value Date    MG 2.2 11/02/2023    PHOS 3.4 11/02/2023    TSH 0.885 09/21/2023     No results found for: \"IRON\", \"LABIRON\", \"TRANSFERRIN\", \"TIBC\"  Lab Results   Component Value Date    [id number] 479 09/21/2023    FOLATE 2.95 (L) 09/21/2023     No results found for: \"PSA\", \"CEA\", \"AFP\", \"\", \"\"          Assessment and Plan    Diagnoses and all orders for this visit:    1. 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  Hormone receptor positive, HER2 positive.  Patient is on palliative treatment with letrozole, trastuzumab, ovarian suppression with leuprolide and denosumab for bony involvement.  She is tolerating her regimen well.  Lab work is notable for mild elevation in the transaminases.  No dose adjustment needed at this time.  Proceed with trastuzumab as planned.  Continue letrozole daily.  I will see her back in 3 weeks with her next trastuzumab with lab work prior to monitor for toxicities, restaging CT and bone scans to assess response to therapy and echocardiogram for HER2 based treatment.    Orders:  -     CT chest w contrast; Future  -     CT abdomen pelvis w contrast; Future  -     Adult Transthoracic Echo Complete W/ Cont if Necessary Per Protocol; Future  -     NM Bone Scan Whole Body; Future    2. Metastasis to bone  Assessment & Plan:  Patient is on monthly Xgeva.  Tolerating well.  Bone scan before next visit    Orders:  -     NM Bone Scan Whole Body; Future    Other orders  -     Cancel: sodium chloride 0.9 % infusion 250 mL  -     Cancel: " acetaminophen (TYLENOL) tablet 650 mg  -     Cancel: Trastuzumab (HERCEPTIN) 530 mg in sodium chloride 0.9 % 250 mL chemo IVPB            Patient Follow Up: 3 weeks    Patient was given instructions and counseling regarding her condition or for health maintenance advice. Please see specific information pulled into the AVS if appropriate.     Perry Garcia MD    12/21/2023

## 2024-01-08 ENCOUNTER — HOSPITAL ENCOUNTER (OUTPATIENT)
Dept: CT IMAGING | Facility: HOSPITAL | Age: 39
Discharge: HOME | End: 2024-01-08
Admitting: INTERNAL MEDICINE
Payer: COMMERCIAL

## 2024-01-08 DIAGNOSIS — Z17.0 MALIGNANT NEOPLASM OF UPPER-INNER QUADRANT OF RIGHT BREAST IN FEMALE, ESTROGEN RECEPTOR POSITIVE: ICD-10-CM

## 2024-01-08 DIAGNOSIS — C50.211 MALIGNANT NEOPLASM OF UPPER-INNER QUADRANT OF RIGHT BREAST IN FEMALE, ESTROGEN RECEPTOR POSITIVE: ICD-10-CM

## 2024-01-08 PROCEDURE — 74177 CT ABD & PELVIS W/CONTRAST: CPT

## 2024-01-08 PROCEDURE — 71260 CT THORAX DX C+: CPT

## 2024-01-08 PROCEDURE — 25510000001 IOPAMIDOL PER 1 ML: Performed by: INTERNAL MEDICINE

## 2024-01-08 RX ADMIN — IOPAMIDOL 100 ML: 755 INJECTION, SOLUTION INTRAVENOUS at 17:44

## 2024-01-09 ENCOUNTER — HOSPITAL ENCOUNTER (OUTPATIENT)
Dept: NUCLEAR MEDICINE | Facility: HOSPITAL | Age: 39
Discharge: HOME | End: 2024-01-09
Payer: COMMERCIAL

## 2024-01-09 ENCOUNTER — HOSPITAL ENCOUNTER (OUTPATIENT)
Dept: CARDIOLOGY | Facility: HOSPITAL | Age: 39
Discharge: HOME | End: 2024-01-09
Payer: COMMERCIAL

## 2024-01-09 DIAGNOSIS — C50.211 MALIGNANT NEOPLASM OF UPPER-INNER QUADRANT OF RIGHT BREAST IN FEMALE, ESTROGEN RECEPTOR POSITIVE: ICD-10-CM

## 2024-01-09 DIAGNOSIS — C79.51 METASTASIS TO BONE: ICD-10-CM

## 2024-01-09 DIAGNOSIS — Z17.0 MALIGNANT NEOPLASM OF UPPER-INNER QUADRANT OF RIGHT BREAST IN FEMALE, ESTROGEN RECEPTOR POSITIVE: ICD-10-CM

## 2024-01-09 PROCEDURE — 93306 TTE W/DOPPLER COMPLETE: CPT

## 2024-01-09 PROCEDURE — 0 TECHNETIUM MEDRONATE KIT: Performed by: INTERNAL MEDICINE

## 2024-01-09 PROCEDURE — A9503 TC99M MEDRONATE: HCPCS | Performed by: INTERNAL MEDICINE

## 2024-01-09 PROCEDURE — 78306 BONE IMAGING WHOLE BODY: CPT

## 2024-01-09 RX ORDER — TC 99M MEDRONATE 20 MG/10ML
23.5 INJECTION, POWDER, LYOPHILIZED, FOR SOLUTION INTRAVENOUS
Status: COMPLETED | OUTPATIENT
Start: 2024-01-09 | End: 2024-01-09

## 2024-01-09 RX ADMIN — TC 99M MEDRONATE 23.5 MILLICURIE: 20 INJECTION, POWDER, LYOPHILIZED, FOR SOLUTION INTRAVENOUS at 10:50

## 2024-01-11 ENCOUNTER — OFFICE VISIT (OUTPATIENT)
Dept: ONCOLOGY | Facility: HOSPITAL | Age: 39
End: 2024-01-11
Payer: COMMERCIAL

## 2024-01-11 ENCOUNTER — HOSPITAL ENCOUNTER (OUTPATIENT)
Dept: ONCOLOGY | Facility: HOSPITAL | Age: 39
Discharge: HOME | End: 2024-01-11
Admitting: INTERNAL MEDICINE
Payer: COMMERCIAL

## 2024-01-11 ENCOUNTER — DOCUMENTATION (OUTPATIENT)
Dept: ONCOLOGY | Facility: HOSPITAL | Age: 39
End: 2024-01-11
Payer: COMMERCIAL

## 2024-01-11 VITALS
RESPIRATION RATE: 18 BRPM | HEART RATE: 110 BPM | DIASTOLIC BLOOD PRESSURE: 78 MMHG | BODY MASS INDEX: 34.96 KG/M2 | OXYGEN SATURATION: 100 % | WEIGHT: 197.31 LBS | TEMPERATURE: 98.8 F | SYSTOLIC BLOOD PRESSURE: 124 MMHG | HEIGHT: 63 IN

## 2024-01-11 VITALS
TEMPERATURE: 98.8 F | HEIGHT: 63 IN | SYSTOLIC BLOOD PRESSURE: 124 MMHG | OXYGEN SATURATION: 100 % | WEIGHT: 197.31 LBS | BODY MASS INDEX: 34.96 KG/M2 | DIASTOLIC BLOOD PRESSURE: 78 MMHG | RESPIRATION RATE: 18 BRPM | HEART RATE: 110 BPM

## 2024-01-11 DIAGNOSIS — C50.211 MALIGNANT NEOPLASM OF UPPER-INNER QUADRANT OF RIGHT BREAST IN FEMALE, ESTROGEN RECEPTOR POSITIVE: Primary | ICD-10-CM

## 2024-01-11 DIAGNOSIS — C79.51 METASTASIS TO BONE: ICD-10-CM

## 2024-01-11 DIAGNOSIS — Z17.0 MALIGNANT NEOPLASM OF UPPER-INNER QUADRANT OF RIGHT BREAST IN FEMALE, ESTROGEN RECEPTOR POSITIVE: Primary | ICD-10-CM

## 2024-01-11 LAB
ALBUMIN SERPL-MCNC: 4.3 G/DL (ref 3.5–5.2)
ALBUMIN/GLOB SERPL: 1.3 G/DL
ALP SERPL-CCNC: 133 U/L (ref 39–117)
ALT SERPL W P-5'-P-CCNC: 88 U/L (ref 1–33)
ANION GAP SERPL CALCULATED.3IONS-SCNC: 7.4 MMOL/L (ref 5–15)
AST SERPL-CCNC: 42 U/L (ref 1–32)
BASOPHILS # BLD AUTO: 0.01 10*3/MM3 (ref 0–0.2)
BASOPHILS NFR BLD AUTO: 0.1 % (ref 0–1.5)
BILIRUB SERPL-MCNC: 0.3 MG/DL (ref 0–1.2)
BUN SERPL-MCNC: 11 MG/DL (ref 6–20)
BUN/CREAT SERPL: 16.4 (ref 7–25)
CALCIUM SPEC-SCNC: 9.2 MG/DL (ref 8.6–10.5)
CHLORIDE SERPL-SCNC: 102 MMOL/L (ref 98–107)
CO2 SERPL-SCNC: 26.6 MMOL/L (ref 22–29)
CREAT SERPL-MCNC: 0.67 MG/DL (ref 0.57–1)
DEPRECATED RDW RBC AUTO: 50.1 FL (ref 37–54)
EGFRCR SERPLBLD CKD-EPI 2021: 115.6 ML/MIN/1.73
EOSINOPHIL # BLD AUTO: 0.11 10*3/MM3 (ref 0–0.4)
EOSINOPHIL NFR BLD AUTO: 1.5 % (ref 0.3–6.2)
ERYTHROCYTE [DISTWIDTH] IN BLOOD BY AUTOMATED COUNT: 14.9 % (ref 12.3–15.4)
GLOBULIN UR ELPH-MCNC: 3.2 GM/DL
GLUCOSE SERPL-MCNC: 103 MG/DL (ref 65–99)
HCT VFR BLD AUTO: 37.5 % (ref 34–46.6)
HGB BLD-MCNC: 11.8 G/DL (ref 12–15.9)
IMM GRANULOCYTES # BLD AUTO: 0.01 10*3/MM3 (ref 0–0.05)
IMM GRANULOCYTES NFR BLD AUTO: 0.1 % (ref 0–0.5)
LYMPHOCYTES # BLD AUTO: 1.24 10*3/MM3 (ref 0.7–3.1)
LYMPHOCYTES NFR BLD AUTO: 17.1 % (ref 19.6–45.3)
MAGNESIUM SERPL-MCNC: 1.9 MG/DL (ref 1.6–2.6)
MCH RBC QN AUTO: 28.6 PG (ref 26.6–33)
MCHC RBC AUTO-ENTMCNC: 31.5 G/DL (ref 31.5–35.7)
MCV RBC AUTO: 90.8 FL (ref 79–97)
MONOCYTES # BLD AUTO: 0.63 10*3/MM3 (ref 0.1–0.9)
MONOCYTES NFR BLD AUTO: 8.7 % (ref 5–12)
NEUTROPHILS NFR BLD AUTO: 5.24 10*3/MM3 (ref 1.7–7)
NEUTROPHILS NFR BLD AUTO: 72.5 % (ref 42.7–76)
PHOSPHATE SERPL-MCNC: 3 MG/DL (ref 2.5–4.5)
PLATELET # BLD AUTO: 315 10*3/MM3 (ref 140–450)
PMV BLD AUTO: 9.7 FL (ref 6–12)
POTASSIUM SERPL-SCNC: 4 MMOL/L (ref 3.5–5.2)
PROT SERPL-MCNC: 7.5 G/DL (ref 6–8.5)
RBC # BLD AUTO: 4.13 10*6/MM3 (ref 3.77–5.28)
SODIUM SERPL-SCNC: 136 MMOL/L (ref 136–145)
WBC NRBC COR # BLD AUTO: 7.24 10*3/MM3 (ref 3.4–10.8)

## 2024-01-11 PROCEDURE — 84100 ASSAY OF PHOSPHORUS: CPT | Performed by: INTERNAL MEDICINE

## 2024-01-11 PROCEDURE — 96372 THER/PROPH/DIAG INJ SC/IM: CPT

## 2024-01-11 PROCEDURE — 80053 COMPREHEN METABOLIC PANEL: CPT | Performed by: INTERNAL MEDICINE

## 2024-01-11 PROCEDURE — 25810000003 SODIUM CHLORIDE 0.9 % SOLUTION 250 ML FLEX CONT: Performed by: INTERNAL MEDICINE

## 2024-01-11 PROCEDURE — 85025 COMPLETE CBC W/AUTO DIFF WBC: CPT | Performed by: INTERNAL MEDICINE

## 2024-01-11 PROCEDURE — 96413 CHEMO IV INFUSION 1 HR: CPT

## 2024-01-11 PROCEDURE — 25010000002 DENOSUMAB 120 MG/1.7ML SOLUTION: Performed by: INTERNAL MEDICINE

## 2024-01-11 PROCEDURE — 96402 CHEMO HORMON ANTINEOPL SQ/IM: CPT

## 2024-01-11 PROCEDURE — 25010000002 LEUPROLIDE PER 3.75 MG: Performed by: INTERNAL MEDICINE

## 2024-01-11 PROCEDURE — 25810000003 SODIUM CHLORIDE 0.9 % SOLUTION: Performed by: INTERNAL MEDICINE

## 2024-01-11 PROCEDURE — 83735 ASSAY OF MAGNESIUM: CPT | Performed by: INTERNAL MEDICINE

## 2024-01-11 PROCEDURE — 25010000002 TRASTUZUMAB PER 10 MG: Performed by: INTERNAL MEDICINE

## 2024-01-11 RX ORDER — ACETAMINOPHEN 325 MG/1
650 TABLET ORAL ONCE
Status: COMPLETED | OUTPATIENT
Start: 2024-01-11 | End: 2024-01-11

## 2024-01-11 RX ORDER — SODIUM CHLORIDE 9 MG/ML
250 INJECTION, SOLUTION INTRAVENOUS ONCE
Status: COMPLETED | OUTPATIENT
Start: 2024-01-11 | End: 2024-01-11

## 2024-01-11 RX ORDER — SODIUM CHLORIDE 9 MG/ML
250 INJECTION, SOLUTION INTRAVENOUS ONCE
Status: CANCELLED | OUTPATIENT
Start: 2024-01-11

## 2024-01-11 RX ORDER — ACETAMINOPHEN 325 MG/1
650 TABLET ORAL ONCE
Status: CANCELLED | OUTPATIENT
Start: 2024-01-11

## 2024-01-11 RX ADMIN — DENOSUMAB 120 MG: 120 INJECTION SUBCUTANEOUS at 15:01

## 2024-01-11 RX ADMIN — SODIUM CHLORIDE 250 ML: 9 INJECTION, SOLUTION INTRAVENOUS at 14:05

## 2024-01-11 RX ADMIN — ACETAMINOPHEN 650 MG: 325 TABLET ORAL at 14:25

## 2024-01-11 RX ADMIN — LEUPROLIDE ACETATE 3.75 MG: KIT at 15:12

## 2024-01-11 RX ADMIN — TRASTUZUMAB 530 MG: 150 INJECTION, POWDER, LYOPHILIZED, FOR SOLUTION INTRAVENOUS at 14:30

## 2024-01-11 NOTE — PROGRESS NOTES
Diagnosis: Breast Cancer    Reason for Referral: gas assistance needed    Content of Visit: OSW provided patient with a $15 gas voucher through [facility]. Patient expressed appreciation.    Resources/Referrals Provided: $15 gas voucher through [facility]

## 2024-01-11 NOTE — PROGRESS NOTES
Chief Complaint  Chemotherapy    Lela Vanegas, APRN  Romina, Lela, APRN    Subjective          Annita Johnson presents to [facility] HEMATOLOGY & ONCOLOGY for ongoing treatment of her breast cancer.  She is on palliative treatment with trastuzumab, letrozole, leuprolide for ovarian suppression and denosumab for bony involvement.  She is tolerating her treatments well.  She denies new masses or adenopathy.  No unusual EXTR pains.  She notes good appetite and energy level.  She denies shortness of breath, chest pain, lower extremity swelling.     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, estrogen receptor positive   6/8/2023 Initial Diagnosis    Malignant neoplasm of upper-inner quadrant of right breast in female, estrogen receptor positive     6/8/2023 Cancer Staged    Staging form: Breast, AJCC 8th Edition  - Clinical: Stage IV (cT2, cN0, cM1, ER+, TX+, HER2-) - Signed by Perry Garcia MD on 6/8/2023 6/9/2023 -  Chemotherapy    OP SUPPORTIVE BREAST Leuprolide 3.75 MG Q28D     6/15/2023 - 6/15/2023 Chemotherapy    OP BREAST Letrozole / Ribociclib     7/13/2023 -  Chemotherapy    OP BREAST Letrozole / Trastuzumab     8/24/2023 -  Chemotherapy    OP SUPPORTIVE Denosumab (Xgeva) Q28D     Metastasis to bone   6/8/2023 Initial Diagnosis    Metastasis to bone     8/24/2023 -  Chemotherapy    OP SUPPORTIVE Denosumab (Xgeva) Q28D         Review of Systems   Constitutional:  Negative for appetite change, diaphoresis, fatigue, fever, unexpected weight gain and unexpected weight loss.   HENT:  Negative for hearing loss, mouth sores, sore throat, swollen glands, trouble swallowing and voice change.    Eyes:  Negative for blurred vision.   Respiratory:  Negative for cough, shortness of breath and wheezing.    Cardiovascular:  Negative for chest pain and palpitations.   Gastrointestinal:  Negative for abdominal pain, blood in stool, constipation,  diarrhea, nausea and vomiting.   Endocrine: Negative for cold intolerance and heat intolerance.   Genitourinary:  Negative for difficulty urinating, dysuria, frequency, hematuria and urinary incontinence.   Musculoskeletal:  Negative for arthralgias, back pain and myalgias.   Skin:  Negative for rash, skin lesions and wound.   Neurological:  Negative for dizziness, seizures, weakness, numbness and headache.   Hematological:  Does not bruise/bleed easily.   Psychiatric/Behavioral:  Negative for depressed mood. The patient is not nervous/anxious.      Current Outpatient Medications on File Prior to Visit   Medication Sig Dispense Refill    clonazePAM (KlonoPIN) 0.5 MG tablet Take 1 tablet by mouth 2 (Two) Times a Day As Needed for Anxiety. 30 tablet 1    cyclobenzaprine (FLEXERIL) 10 MG tablet Take 1 tablet by mouth 3 (Three) Times a Day As Needed for Muscle Spasms. 90 tablet 5    escitalopram (Lexapro) 10 MG tablet Take 1 tablet by mouth Daily. 90 tablet 1    folic acid (FOLVITE) 1 MG tablet Take 1 tablet by mouth Daily. 90 tablet 1    hydrOXYzine (ATARAX) 50 MG tablet Take 1 tablet by mouth every night at bedtime. 90 tablet 1    letrozole (FEMARA) 2.5 MG tablet Take 1 tablet by mouth Daily. 30 tablet 5    Rimegepant Sulfate (Nurtec) 75 MG tablet dispersible tablet Take 1 tablet by mouth Daily As Needed (migraines). 8 tablet 5    Sodium Fluoride 5000 PPM 1.1 % gel See Admin Instructions.      benzonatate (Tessalon Perles) 100 MG capsule Take 1 capsule by mouth 3 (Three) Times a Day As Needed for Cough. (Patient not taking: Reported on 11/16/2023) 40 capsule 1    Calcium Carbonate-Vitamin D 600-10 MG-MCG per tablet Take 1 tablet by mouth 2 (Two) Times a Day. (Patient not taking: Reported on 11/16/2023) 60 tablet 5    HYDROcodone-acetaminophen (NORCO) 5-325 MG per tablet take 1 tablet by mouth every 4 to 6 hours      ondansetron (ZOFRAN) 8 MG tablet Take 1 tablet by mouth 3 (Three) Times a Day As Needed for Nausea or  Vomiting. (Patient not taking: Reported on 2023) 30 tablet 5    oxyCODONE-acetaminophen (PERCOCET) 7.5-325 MG per tablet Take 1 tablet by mouth Every 8 (Eight) Hours As Needed (pain). (Patient not taking: Reported on 2023) 30 tablet 0    pantoprazole (PROTONIX) 40 MG EC tablet Take 1 tablet by mouth Daily. (Patient not taking: Reported on 2023) 90 tablet 0     Current Facility-Administered Medications on File Prior to Visit   Medication Dose Route Frequency Provider Last Rate Last Admin    [COMPLETED] acetaminophen (TYLENOL) tablet 650 mg  650 mg Oral Once Perry Garcia MD   650 mg at 24 1425    [COMPLETED] denosumab (XGEVA) injection 120 mg  120 mg Subcutaneous Once Perry Garcia MD   120 mg at 24 1501    [COMPLETED] leuprolide (LUPRON) injection 3.75 mg  3.75 mg Intramuscular Once Perry Garcia MD   3.75 mg at 24 1512    [COMPLETED] sodium chloride 0.9 % infusion 250 mL  250 mL Intravenous Once Perry Garcia MD   Stopped at 24 1505    [COMPLETED] Trastuzumab (HERCEPTIN) 530 mg in sodium chloride 0.9 % 300.2 mL chemo IVPB  6 mg/kg (Treatment Plan Recorded) Intravenous Once Perry Garcia MD   Stopped at 24 1500       No Known Allergies  Past Medical History:   Diagnosis Date    Anemia     Breast cancer     Cancer, metastatic to bone     Kidney stone     Kidney stones     Metastasis to bone 2023     Past Surgical History:   Procedure Laterality Date    BREAST BIOPSY       SECTION N/A 2021    Procedure:  SECTION PRIMARY;  Surgeon: Zoe Dawson MD;  Location: [facility] LABOR DELIVERY;  Service: Obstetrics/Gynecology;  Laterality: N/A;    CYSTOSCOPY BLADDER STONE LITHOTRIPSY       Social History     Socioeconomic History    Marital status:      Spouse name: Janet   Tobacco Use    Smoking status: Some Days     Packs/day: 0.25     Years: 10.00     Additional pack years: 0.00     Total pack years: 2.50     Types:  "Cigarettes     Start date: 2/1/2021     Passive exposure: Current    Smokeless tobacco: Never   Vaping Use    Vaping Use: Never used   Substance and Sexual Activity    Alcohol use: Yes     Alcohol/week: 6.0 standard drinks of alcohol     Types: 3 Glasses of wine, 3 Drinks containing 0.5 oz of alcohol per week    Drug use: Not Currently     Types: Marijuana     Comment: for pain control    Sexual activity: Yes     Partners: Male     Birth control/protection: Same-sex partner     Family History   Problem Relation Age of Onset    Mental illness Mother     Hypertension Father     Alcohol abuse Father     Ovarian cancer Paternal Aunt     Breast cancer Maternal Great-Grandmother        Objective   Physical Exam  Vitals reviewed. Exam conducted with a chaperone present.   Constitutional:       General: She is not in acute distress.     Appearance: Normal appearance.   Cardiovascular:      Rate and Rhythm: Normal rate and regular rhythm.      Heart sounds: Normal heart sounds. No murmur heard.     No gallop.   Pulmonary:      Effort: Pulmonary effort is normal.      Breath sounds: Normal breath sounds.   Abdominal:      General: Abdomen is flat. Bowel sounds are normal.      Palpations: Abdomen is soft.   Musculoskeletal:      Right lower leg: No edema.      Left lower leg: No edema.   Neurological:      Mental Status: She is alert and oriented to person, place, and time.   Psychiatric:         Mood and Affect: Mood normal.         Behavior: Behavior normal.         Vitals:    01/11/24 1335   BP: 124/78   Pulse: 110   Resp: 18   Temp: 98.8 °F (37.1 °C)   SpO2: 100%   Weight: 89.5 kg (197 lb 5 oz)   Height: 160 cm (62.99\")   PainSc: 0-No pain     ECOG score: 0         PHQ-9 Total Score:                    Result Review :   The following data was reviewed by: Perry Garcia MD on 01/11/2024:  Lab Results   Component Value Date    HGB 11.8 (L) 01/11/2024    HCT 37.5 01/11/2024    MCV 90.8 01/11/2024     01/11/2024    " "WBC 7.24 01/11/2024    NEUTROABS 5.24 01/11/2024    LYMPHSABS 1.24 01/11/2024    MONOSABS 0.63 01/11/2024    EOSABS 0.11 01/11/2024    BASOSABS 0.01 01/11/2024     Lab Results   Component Value Date    GLUCOSE 103 (H) 01/11/2024    BUN 11 01/11/2024    CREATININE 0.67 01/11/2024     01/11/2024    K 4.0 01/11/2024     01/11/2024    CO2 26.6 01/11/2024    CALCIUM 9.2 01/11/2024    PROTEINTOT 7.5 01/11/2024    ALBUMIN 4.3 01/11/2024    BILITOT 0.3 01/11/2024    ALKPHOS 133 (H) 01/11/2024    AST 42 (H) 01/11/2024    ALT 88 (H) 01/11/2024     Lab Results   Component Value Date    MG 1.9 01/11/2024    PHOS 3.0 01/11/2024    TSH 0.885 09/21/2023     No results found for: \"IRON\", \"LABIRON\", \"TRANSFERRIN\", \"TIBC\"  Lab Results   Component Value Date    [id number] 479 09/21/2023    FOLATE 2.95 (L) 09/21/2023     No results found for: \"PSA\", \"CEA\", \"AFP\", \"\", \"\"    Data reviewed : Radiologic studies CT chest, abdomen, pelvis, bone scan reviewed       Assessment and Plan    Diagnoses and all orders for this visit:    1. 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  Hormone receptor positive.  HER2 positive.  Patient is on palliative therapy with letrozole, trastuzumab, ovarian suppression with leuprolide and denosumab for bony involvement.  She is tolerating all modalities well.  She is feeling good.  Restaging CT scan shows stable to improved disease with no evidence of new or worsened cancer.  Lab work today is adequate for treatment.  Echocardiogram has been obtained but currently pending.  Proceed with trastuzumab as planned.  Continue leuprolide monthly.  Continue letrozole by mouth daily.  I will see her back in 3 weeks for ongoing treatment with lab work prior to monitor for toxicities.    Orders:  -     Cancel: CBC and Differential; Future  -     Cancel: Comprehensive metabolic panel; Future    2. " Metastasis to bone  Assessment & Plan:  Patient is on monthly denosumab.  Tolerating well.  No dental or jaw pain.  Electrolytes are adequate for treatment.  Xgeva today monthly.    Orders:  -     Cancel: CBC and Differential; Future  -     Cancel: Comprehensive metabolic panel; Future    Other orders  -     Cancel: sodium chloride 0.9 % infusion 250 mL  -     Cancel: acetaminophen (TYLENOL) tablet 650 mg  -     Cancel: Trastuzumab (HERCEPTIN) 530 mg in sodium chloride 0.9 % 250 mL chemo IVPB  -     Cancel: leuprolide (LUPRON) injection 3.75 mg  -     Cancel: denosumab (XGEVA) injection 120 mg            Patient Follow Up: 3 weeks    Patient was given instructions and counseling regarding her condition or for health maintenance advice. Please see specific information pulled into the AVS if appropriate.     Perry Garcia MD    1/12/2024

## 2024-01-12 NOTE — ASSESSMENT & PLAN NOTE
Patient is on monthly denosumab.  Tolerating well.  No dental or jaw pain.  Electrolytes are adequate for treatment.  Xgeva today monthly.

## 2024-01-12 NOTE — ASSESSMENT & PLAN NOTE
Metastatic.  Hormone receptor positive.  HER2 positive.  Patient is on palliative therapy with letrozole, trastuzumab, ovarian suppression with leuprolide and denosumab for bony involvement.  She is tolerating all modalities well.  She is feeling good.  Restaging CT scan shows stable to improved disease with no evidence of new or worsened cancer.  Lab work today is adequate for treatment.  Echocardiogram has been obtained but currently pending.  Proceed with trastuzumab as planned.  Continue leuprolide monthly.  Continue letrozole by mouth daily.  I will see her back in 3 weeks for ongoing treatment with lab work prior to monitor for toxicities.

## 2024-01-15 LAB
BH CV ECHO MEAS - AO ROOT DIAM: 2.8 CM
BH CV ECHO MEAS - EDV(CUBED): 101.2 ML
BH CV ECHO MEAS - EDV(MOD-SP2): 39.7 ML
BH CV ECHO MEAS - EDV(MOD-SP4): 44.6 ML
BH CV ECHO MEAS - EF(MOD-BP): 67.5 %
BH CV ECHO MEAS - EF(MOD-SP2): 69.3 %
BH CV ECHO MEAS - EF(MOD-SP4): 66.6 %
BH CV ECHO MEAS - ESV(CUBED): 21.7 ML
BH CV ECHO MEAS - ESV(MOD-SP2): 12.2 ML
BH CV ECHO MEAS - ESV(MOD-SP4): 14.9 ML
BH CV ECHO MEAS - FS: 40.1 %
BH CV ECHO MEAS - IVS/LVPW: 0.94 CM
BH CV ECHO MEAS - IVSD: 1.03 CM
BH CV ECHO MEAS - LA DIMENSION: 3 CM
BH CV ECHO MEAS - LAT PEAK E' VEL: 9.6 CM/SEC
BH CV ECHO MEAS - LV DIASTOLIC VOL/BSA (35-75): 23.1 CM2
BH CV ECHO MEAS - LV MASS(C)D: 175.7 GRAMS
BH CV ECHO MEAS - LV SYSTOLIC VOL/BSA (12-30): 7.7 CM2
BH CV ECHO MEAS - LVIDD: 4.7 CM
BH CV ECHO MEAS - LVIDS: 2.8 CM
BH CV ECHO MEAS - LVPWD: 1.09 CM
BH CV ECHO MEAS - MED PEAK E' VEL: 6.9 CM/SEC
BH CV ECHO MEAS - MV A MAX VEL: 62.6 CM/SEC
BH CV ECHO MEAS - MV DEC SLOPE: 426 CM/SEC2
BH CV ECHO MEAS - MV DEC TIME: 0.14 SEC
BH CV ECHO MEAS - MV E MAX VEL: 61.7 CM/SEC
BH CV ECHO MEAS - MV E/A: 0.99
BH CV ECHO MEAS - RVDD: 2.9 CM
BH CV ECHO MEAS - SI(MOD-SP2): 14.3 ML/M2
BH CV ECHO MEAS - SI(MOD-SP4): 15.4 ML/M2
BH CV ECHO MEAS - SV(MOD-SP2): 27.5 ML
BH CV ECHO MEAS - SV(MOD-SP4): 29.7 ML
BH CV ECHO MEASUREMENTS AVERAGE E/E' RATIO: 7.48
LEFT ATRIUM VOLUME INDEX: 17.3 ML/M2

## 2024-01-16 ENCOUNTER — TELEPHONE (OUTPATIENT)
Dept: ONCOLOGY | Facility: HOSPITAL | Age: 39
End: 2024-01-16

## 2024-01-22 ENCOUNTER — PATIENT OUTREACH (OUTPATIENT)
Dept: ONCOLOGY | Facility: HOSPITAL | Age: 39
End: 2024-01-22
Payer: COMMERCIAL

## 2024-02-01 ENCOUNTER — HOSPITAL ENCOUNTER (OUTPATIENT)
Dept: ONCOLOGY | Facility: HOSPITAL | Age: 39
Discharge: HOME | End: 2024-02-01
Payer: COMMERCIAL

## 2024-02-01 ENCOUNTER — OFFICE VISIT (OUTPATIENT)
Dept: ONCOLOGY | Facility: HOSPITAL | Age: 39
End: 2024-02-01
Payer: COMMERCIAL

## 2024-02-01 VITALS
BODY MASS INDEX: 35.23 KG/M2 | SYSTOLIC BLOOD PRESSURE: 132 MMHG | HEIGHT: 63 IN | WEIGHT: 198.85 LBS | RESPIRATION RATE: 18 BRPM | DIASTOLIC BLOOD PRESSURE: 98 MMHG | OXYGEN SATURATION: 100 % | HEART RATE: 111 BPM

## 2024-02-01 VITALS
SYSTOLIC BLOOD PRESSURE: 132 MMHG | TEMPERATURE: 98.2 F | BODY MASS INDEX: 35.23 KG/M2 | WEIGHT: 198.85 LBS | DIASTOLIC BLOOD PRESSURE: 98 MMHG | OXYGEN SATURATION: 100 % | HEART RATE: 111 BPM

## 2024-02-01 DIAGNOSIS — C79.51 METASTASIS TO BONE: ICD-10-CM

## 2024-02-01 DIAGNOSIS — Z17.0 MALIGNANT NEOPLASM OF UPPER-INNER QUADRANT OF RIGHT BREAST IN FEMALE, ESTROGEN RECEPTOR POSITIVE: Primary | ICD-10-CM

## 2024-02-01 DIAGNOSIS — C50.211 MALIGNANT NEOPLASM OF UPPER-INNER QUADRANT OF RIGHT BREAST IN FEMALE, ESTROGEN RECEPTOR POSITIVE: Primary | ICD-10-CM

## 2024-02-01 LAB
ALBUMIN SERPL-MCNC: 4.5 G/DL (ref 3.5–5.2)
ALBUMIN/GLOB SERPL: 1.3 G/DL
ALP SERPL-CCNC: 118 U/L (ref 39–117)
ALT SERPL W P-5'-P-CCNC: 70 U/L (ref 1–33)
ANION GAP SERPL CALCULATED.3IONS-SCNC: 9 MMOL/L (ref 5–15)
AST SERPL-CCNC: 35 U/L (ref 1–32)
BASOPHILS # BLD AUTO: 0.02 10*3/MM3 (ref 0–0.2)
BASOPHILS NFR BLD AUTO: 0.3 % (ref 0–1.5)
BILIRUB SERPL-MCNC: 0.3 MG/DL (ref 0–1.2)
BUN SERPL-MCNC: 13 MG/DL (ref 6–20)
BUN/CREAT SERPL: 17.1 (ref 7–25)
CALCIUM SPEC-SCNC: 9.9 MG/DL (ref 8.6–10.5)
CHLORIDE SERPL-SCNC: 99 MMOL/L (ref 98–107)
CO2 SERPL-SCNC: 25 MMOL/L (ref 22–29)
CREAT SERPL-MCNC: 0.76 MG/DL (ref 0.57–1)
DEPRECATED RDW RBC AUTO: 48.9 FL (ref 37–54)
EGFRCR SERPLBLD CKD-EPI 2021: 103.6 ML/MIN/1.73
EOSINOPHIL # BLD AUTO: 0.07 10*3/MM3 (ref 0–0.4)
EOSINOPHIL NFR BLD AUTO: 0.9 % (ref 0.3–6.2)
ERYTHROCYTE [DISTWIDTH] IN BLOOD BY AUTOMATED COUNT: 14.7 % (ref 12.3–15.4)
GLOBULIN UR ELPH-MCNC: 3.5 GM/DL
GLUCOSE SERPL-MCNC: 113 MG/DL (ref 65–99)
HCT VFR BLD AUTO: 38.9 % (ref 34–46.6)
HGB BLD-MCNC: 12.4 G/DL (ref 12–15.9)
IMM GRANULOCYTES # BLD AUTO: 0.01 10*3/MM3 (ref 0–0.05)
IMM GRANULOCYTES NFR BLD AUTO: 0.1 % (ref 0–0.5)
LYMPHOCYTES # BLD AUTO: 1.43 10*3/MM3 (ref 0.7–3.1)
LYMPHOCYTES NFR BLD AUTO: 18.8 % (ref 19.6–45.3)
MAGNESIUM SERPL-MCNC: 1.9 MG/DL (ref 1.6–2.6)
MCH RBC QN AUTO: 28.8 PG (ref 26.6–33)
MCHC RBC AUTO-ENTMCNC: 31.9 G/DL (ref 31.5–35.7)
MCV RBC AUTO: 90.3 FL (ref 79–97)
MONOCYTES # BLD AUTO: 0.71 10*3/MM3 (ref 0.1–0.9)
MONOCYTES NFR BLD AUTO: 9.3 % (ref 5–12)
NEUTROPHILS NFR BLD AUTO: 5.38 10*3/MM3 (ref 1.7–7)
NEUTROPHILS NFR BLD AUTO: 70.6 % (ref 42.7–76)
PHOSPHATE SERPL-MCNC: 3.9 MG/DL (ref 2.5–4.5)
PLATELET # BLD AUTO: 295 10*3/MM3 (ref 140–450)
PMV BLD AUTO: 9.3 FL (ref 6–12)
POTASSIUM SERPL-SCNC: 4.4 MMOL/L (ref 3.5–5.2)
PROT SERPL-MCNC: 8 G/DL (ref 6–8.5)
RBC # BLD AUTO: 4.31 10*6/MM3 (ref 3.77–5.28)
SODIUM SERPL-SCNC: 133 MMOL/L (ref 136–145)
WBC NRBC COR # BLD AUTO: 7.62 10*3/MM3 (ref 3.4–10.8)

## 2024-02-01 PROCEDURE — 83735 ASSAY OF MAGNESIUM: CPT | Performed by: INTERNAL MEDICINE

## 2024-02-01 PROCEDURE — 85025 COMPLETE CBC W/AUTO DIFF WBC: CPT | Performed by: INTERNAL MEDICINE

## 2024-02-01 PROCEDURE — 80053 COMPREHEN METABOLIC PANEL: CPT | Performed by: INTERNAL MEDICINE

## 2024-02-01 PROCEDURE — 25810000003 SODIUM CHLORIDE 0.9 % SOLUTION: Performed by: INTERNAL MEDICINE

## 2024-02-01 PROCEDURE — 96413 CHEMO IV INFUSION 1 HR: CPT

## 2024-02-01 PROCEDURE — 25010000002 TRASTUZUMAB PER 10 MG: Performed by: INTERNAL MEDICINE

## 2024-02-01 PROCEDURE — 25810000003 SODIUM CHLORIDE 0.9 % SOLUTION 250 ML FLEX CONT: Performed by: INTERNAL MEDICINE

## 2024-02-01 PROCEDURE — 84100 ASSAY OF PHOSPHORUS: CPT | Performed by: INTERNAL MEDICINE

## 2024-02-01 RX ORDER — ACETAMINOPHEN 325 MG/1
650 TABLET ORAL ONCE
Status: DISCONTINUED | OUTPATIENT
Start: 2024-02-01 | End: 2024-02-02 | Stop reason: HOSPADM

## 2024-02-01 RX ORDER — SODIUM CHLORIDE 9 MG/ML
250 INJECTION, SOLUTION INTRAVENOUS ONCE
Status: CANCELLED | OUTPATIENT
Start: 2024-02-01

## 2024-02-01 RX ORDER — LETROZOLE 2.5 MG/1
2.5 TABLET, FILM COATED ORAL DAILY
Qty: 30 TABLET | Refills: 5
Start: 2024-02-01

## 2024-02-01 RX ORDER — ACETAMINOPHEN 325 MG/1
650 TABLET ORAL ONCE
Status: CANCELLED | OUTPATIENT
Start: 2024-02-01

## 2024-02-01 RX ORDER — SODIUM CHLORIDE 9 MG/ML
250 INJECTION, SOLUTION INTRAVENOUS ONCE
Status: COMPLETED | OUTPATIENT
Start: 2024-02-01 | End: 2024-02-01

## 2024-02-01 RX ADMIN — TRASTUZUMAB 530 MG: 150 INJECTION, POWDER, LYOPHILIZED, FOR SOLUTION INTRAVENOUS at 14:47

## 2024-02-01 RX ADMIN — SODIUM CHLORIDE 250 ML: 9 INJECTION, SOLUTION INTRAVENOUS at 14:40

## 2024-02-01 NOTE — ASSESSMENT & PLAN NOTE
Patient is on monthly Xgeva.  Tolerating her injections well.  No dental or jaw pain.  Electrolytes are adequate for treatment.

## 2024-02-01 NOTE — ASSESSMENT & PLAN NOTE
Metastatic.  Patient is on palliative treatment with letrozole, trastuzumab, leuprolide, denosumab.  She notes increased vasomotor symptoms.  We discussed vitamin E 1200 international units to help mitigate those symptoms.  Echocardiogram reviewed demonstrating a normal ejection fraction.  Proceed with trastuzumab today as planned.  Continue letrozole by mouth daily.  Patient reports that she will be having GYN surgery in April.  Continue leuprolide injection monthly until after surgery when it can be stopped.  Continue monthly Xgeva for bony involvement.

## 2024-02-01 NOTE — PROGRESS NOTES
Chief Complaint  Chemotherapy    Lela Vanegas, LÁZARO Vanegas, Lela, APRN    Subjective          Annita Johnson presents to [facility] for ongoing treatment of her metastatic breast cancer, hormone receptor positive, HER2 positive.  She is on palliative treatment with letrozole, trastuzumab, leuprolide for ovarian suppression and denosumab for bony involvement.  She states she is tolerating her treatments well.  She denies chest pain, shortness of breath, lower extremity swelling.  She denies issues with new masses or adenopathy, no unusual aches or pains.  She reports adequate appetite and energy level.  She does note increased hot flashes that occur most days.    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, estrogen receptor positive   6/8/2023 Initial Diagnosis    Malignant neoplasm of upper-inner quadrant of right breast in female, estrogen receptor positive     6/8/2023 Cancer Staged    Staging form: Breast, AJCC 8th Edition  - Clinical: Stage IV (cT2, cN0, cM1, ER+, KS+, HER2-) - Signed by Perry Garcia MD on 6/8/2023 6/9/2023 -  Chemotherapy    OP SUPPORTIVE BREAST Leuprolide 3.75 MG Q28D     6/15/2023 - 6/15/2023 Chemotherapy    OP BREAST Letrozole / Ribociclib     7/13/2023 -  Chemotherapy    OP BREAST Letrozole / Trastuzumab     8/24/2023 -  Chemotherapy    OP SUPPORTIVE Denosumab (Xgeva) Q28D     Metastasis to bone   6/8/2023 Initial Diagnosis    Metastasis to bone     8/24/2023 -  Chemotherapy    OP SUPPORTIVE Denosumab (Xgeva) Q28D         Review of Systems   Constitutional:  Positive for fatigue. Negative for appetite change, diaphoresis, fever, unexpected weight gain and unexpected weight loss.   HENT:  Negative for hearing loss, mouth sores, sore throat, swollen glands, trouble swallowing and voice change.    Eyes:  Negative for blurred vision.   Respiratory:  Negative for cough, shortness of breath  and wheezing.    Cardiovascular:  Negative for chest pain and palpitations.   Gastrointestinal:  Negative for abdominal pain, blood in stool, constipation, diarrhea, nausea and vomiting.   Endocrine: Negative for cold intolerance and heat intolerance.   Genitourinary:  Negative for difficulty urinating, dysuria, frequency, hematuria and urinary incontinence.   Musculoskeletal:  Negative for arthralgias, back pain and myalgias.   Skin:  Negative for rash, skin lesions and wound.   Neurological:  Negative for dizziness, seizures, weakness, numbness and headache.   Hematological:  Does not bruise/bleed easily.   Psychiatric/Behavioral:  Negative for depressed mood. The patient is not nervous/anxious.      Current Outpatient Medications on File Prior to Visit   Medication Sig Dispense Refill    clonazePAM (KlonoPIN) 0.5 MG tablet Take 1 tablet by mouth 2 (Two) Times a Day As Needed for Anxiety. 30 tablet 1    cyclobenzaprine (FLEXERIL) 10 MG tablet Take 1 tablet by mouth 3 (Three) Times a Day As Needed for Muscle Spasms. 90 tablet 5    escitalopram (Lexapro) 10 MG tablet Take 1 tablet by mouth Daily. 90 tablet 1    folic acid (FOLVITE) 1 MG tablet Take 1 tablet by mouth Daily. 90 tablet 1    hydrOXYzine (ATARAX) 50 MG tablet Take 1 tablet by mouth every night at bedtime. 90 tablet 1    letrozole (FEMARA) 2.5 MG tablet Take 1 tablet by mouth Daily. 30 tablet 5    Rimegepant Sulfate (Nurtec) 75 MG tablet dispersible tablet Take 1 tablet by mouth Daily As Needed (migraines). 8 tablet 5    Sodium Fluoride 5000 PPM 1.1 % gel See Admin Instructions.      benzonatate (Tessalon Perles) 100 MG capsule Take 1 capsule by mouth 3 (Three) Times a Day As Needed for Cough. (Patient not taking: Reported on 11/16/2023) 40 capsule 1    Calcium Carbonate-Vitamin D 600-10 MG-MCG per tablet Take 1 tablet by mouth 2 (Two) Times a Day. (Patient not taking: Reported on 11/16/2023) 60 tablet 5    HYDROcodone-acetaminophen (NORCO) 5-325 MG per  tablet take 1 tablet by mouth every 4 to 6 hours      letrozole (FEMARA) 2.5 MG tablet Take 1 tablet by mouth Daily. 30 tablet 5    ondansetron (ZOFRAN) 8 MG tablet Take 1 tablet by mouth 3 (Three) Times a Day As Needed for Nausea or Vomiting. (Patient not taking: Reported on 2023) 30 tablet 5    oxyCODONE-acetaminophen (PERCOCET) 7.5-325 MG per tablet Take 1 tablet by mouth Every 8 (Eight) Hours As Needed (pain). (Patient not taking: Reported on 2023) 30 tablet 0    pantoprazole (PROTONIX) 40 MG EC tablet Take 1 tablet by mouth Daily. (Patient not taking: Reported on 2023) 90 tablet 0     Current Facility-Administered Medications on File Prior to Visit   Medication Dose Route Frequency Provider Last Rate Last Admin    acetaminophen (TYLENOL) tablet 650 mg  650 mg Oral Once Perry Garcia MD        [COMPLETED] sodium chloride 0.9 % infusion 250 mL  250 mL Intravenous Once Perry Garcia MD   Stopped at 24 1519    [COMPLETED] Trastuzumab (HERCEPTIN) 530 mg in sodium chloride 0.9 % 300.2 mL chemo IVPB  6 mg/kg (Treatment Plan Recorded) Intravenous Once Perry Garcia MD   Stopped at 24 1515       No Known Allergies  Past Medical History:   Diagnosis Date    Anemia     Breast cancer     Cancer, metastatic to bone     Kidney stone     Kidney stones     Metastasis to bone 2023     Past Surgical History:   Procedure Laterality Date    BREAST BIOPSY       SECTION N/A 2021    Procedure:  SECTION PRIMARY;  Surgeon: Zoe Dawson MD;  Location: [facility] LABOR DELIVERY;  Service: Obstetrics/Gynecology;  Laterality: N/A;    CYSTOSCOPY BLADDER STONE LITHOTRIPSY       Social History     Socioeconomic History    Marital status:      Spouse name: Janet   Tobacco Use    Smoking status: Some Days     Packs/day: 0.25     Years: 10.00     Additional pack years: 0.00     Total pack years: 2.50     Types: Cigarettes     Start date: 2021     Passive exposure:  "Current    Smokeless tobacco: Never   Vaping Use    Vaping Use: Never used   Substance and Sexual Activity    Alcohol use: Yes     Alcohol/week: 6.0 standard drinks of alcohol     Types: 3 Glasses of wine, 3 Drinks containing 0.5 oz of alcohol per week    Drug use: Not Currently     Types: Marijuana     Comment: for pain control    Sexual activity: Yes     Partners: Male     Birth control/protection: Same-sex partner     Family History   Problem Relation Age of Onset    Mental illness Mother     Hypertension Father     Alcohol abuse Father     Ovarian cancer Paternal Aunt     Breast cancer Maternal Great-Grandmother        Objective   Physical Exam  Vitals reviewed. Exam conducted with a chaperone present.   Constitutional:       General: She is not in acute distress.     Appearance: Normal appearance.   Cardiovascular:      Rate and Rhythm: Normal rate and regular rhythm.      Heart sounds: Normal heart sounds. No murmur heard.     No gallop.   Pulmonary:      Effort: Pulmonary effort is normal.      Breath sounds: Normal breath sounds.   Abdominal:      General: Abdomen is flat. Bowel sounds are normal.      Palpations: Abdomen is soft.   Musculoskeletal:      Right lower leg: No edema.      Left lower leg: No edema.   Neurological:      Mental Status: She is alert and oriented to person, place, and time.   Psychiatric:         Mood and Affect: Mood normal.         Behavior: Behavior normal.         Vitals:    02/01/24 1349   BP: 132/98   Pulse: 111   Resp: 18   SpO2: 100%   Weight: 90.2 kg (198 lb 13.7 oz)   Height: 160 cm (62.99\")   PainSc: 0-No pain     ECOG score: 0         PHQ-9 Total Score:                    Result Review :   The following data was reviewed by: Perry Garcia MD on 02/01/2024:  Lab Results   Component Value Date    HGB 12.4 02/01/2024    HCT 38.9 02/01/2024    MCV 90.3 02/01/2024     02/01/2024    WBC 7.62 02/01/2024    NEUTROABS 5.38 02/01/2024    LYMPHSABS 1.43 02/01/2024    " "MONOSABS 0.71 02/01/2024    EOSABS 0.07 02/01/2024    BASOSABS 0.02 02/01/2024     Lab Results   Component Value Date    GLUCOSE 113 (H) 02/01/2024    BUN 13 02/01/2024    CREATININE 0.76 02/01/2024     (L) 02/01/2024    K 4.4 02/01/2024    CL 99 02/01/2024    CO2 25.0 02/01/2024    CALCIUM 9.9 02/01/2024    PROTEINTOT 8.0 02/01/2024    ALBUMIN 4.5 02/01/2024    BILITOT 0.3 02/01/2024    ALKPHOS 118 (H) 02/01/2024    AST 35 (H) 02/01/2024    ALT 70 (H) 02/01/2024     Lab Results   Component Value Date    MG 1.9 02/01/2024    PHOS 3.9 02/01/2024    TSH 0.885 09/21/2023     No results found for: \"IRON\", \"LABIRON\", \"TRANSFERRIN\", \"TIBC\"  Lab Results   Component Value Date    DHEZXOIL26 479 09/21/2023    FOLATE 2.95 (L) 09/21/2023     No results found for: \"PSA\", \"CEA\", \"AFP\", \"\", \"\"          Assessment and Plan    Diagnoses and all orders for this visit:    1. 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  Patient is on palliative treatment with letrozole, trastuzumab, leuprolide, denosumab.  She notes increased vasomotor symptoms.  We discussed vitamin E 1200 international units to help mitigate those symptoms.  Echocardiogram reviewed demonstrating a normal ejection fraction.  Proceed with trastuzumab today as planned.  Continue letrozole by mouth daily.  Patient reports that she will be having GYN surgery in April.  Continue leuprolide injection monthly until after surgery when it can be stopped.  Continue monthly Xgeva for bony involvement.    Orders:  -     Magnesium; Future  -     Phosphorus; Future    2. Metastasis to bone  Assessment & Plan:  Patient is on monthly Xgeva.  Tolerating her injections well.  No dental or jaw pain.  Electrolytes are adequate for treatment.    Orders:  -     Magnesium; Future  -     Phosphorus; Future    Other orders  -     Cancel: sodium chloride 0.9 % infusion 250 mL  -     " Cancel: acetaminophen (TYLENOL) tablet 650 mg  -     Cancel: Trastuzumab (HERCEPTIN) 530 mg in sodium chloride 0.9 % 250 mL chemo IVPB            Patient Follow Up: 3 weeks    Patient was given instructions and counseling regarding her condition or for health maintenance advice. Please see specific information pulled into the AVS if appropriate.     Perry Garcia MD    2/1/2024

## 2024-02-05 RX ORDER — HYDROXYZINE 50 MG/1
50 TABLET, FILM COATED ORAL
Qty: 90 TABLET | Refills: 1 | Status: SHIPPED | OUTPATIENT
Start: 2024-02-05

## 2024-02-08 ENCOUNTER — HOSPITAL ENCOUNTER (OUTPATIENT)
Dept: ONCOLOGY | Facility: HOSPITAL | Age: 39
Discharge: HOME | End: 2024-02-08
Payer: COMMERCIAL

## 2024-02-08 VITALS
SYSTOLIC BLOOD PRESSURE: 124 MMHG | HEART RATE: 107 BPM | DIASTOLIC BLOOD PRESSURE: 78 MMHG | TEMPERATURE: 97.6 F | RESPIRATION RATE: 18 BRPM | OXYGEN SATURATION: 100 %

## 2024-02-08 DIAGNOSIS — C50.211 MALIGNANT NEOPLASM OF UPPER-INNER QUADRANT OF RIGHT BREAST IN FEMALE, ESTROGEN RECEPTOR POSITIVE: Primary | ICD-10-CM

## 2024-02-08 DIAGNOSIS — Z17.0 MALIGNANT NEOPLASM OF UPPER-INNER QUADRANT OF RIGHT BREAST IN FEMALE, ESTROGEN RECEPTOR POSITIVE: Primary | ICD-10-CM

## 2024-02-08 DIAGNOSIS — C79.51 METASTASIS TO BONE: ICD-10-CM

## 2024-02-08 PROCEDURE — 25010000002 DENOSUMAB 120 MG/1.7ML SOLUTION: Performed by: INTERNAL MEDICINE

## 2024-02-08 PROCEDURE — 96372 THER/PROPH/DIAG INJ SC/IM: CPT

## 2024-02-08 PROCEDURE — 25010000002 LEUPROLIDE PER 3.75 MG: Performed by: INTERNAL MEDICINE

## 2024-02-08 PROCEDURE — 96402 CHEMO HORMON ANTINEOPL SQ/IM: CPT

## 2024-02-08 RX ADMIN — LEUPROLIDE ACETATE 3.75 MG: KIT at 10:14

## 2024-02-08 RX ADMIN — DENOSUMAB 120 MG: 120 INJECTION SUBCUTANEOUS at 10:12

## 2024-02-19 ENCOUNTER — OFFICE VISIT (OUTPATIENT)
Dept: RADIATION ONCOLOGY | Facility: HOSPITAL | Age: 39
End: 2024-02-19
Payer: COMMERCIAL

## 2024-02-19 ENCOUNTER — OFFICE VISIT (OUTPATIENT)
Dept: INTERNAL MEDICINE | Facility: CLINIC | Age: 39
End: 2024-02-19
Payer: COMMERCIAL

## 2024-02-19 VITALS
WEIGHT: 201.72 LBS | HEART RATE: 111 BPM | DIASTOLIC BLOOD PRESSURE: 77 MMHG | RESPIRATION RATE: 16 BRPM | TEMPERATURE: 97.5 F | BODY MASS INDEX: 35.74 KG/M2 | SYSTOLIC BLOOD PRESSURE: 122 MMHG | OXYGEN SATURATION: 98 %

## 2024-02-19 VITALS
HEART RATE: 136 BPM | WEIGHT: 201.2 LBS | TEMPERATURE: 98 F | DIASTOLIC BLOOD PRESSURE: 82 MMHG | OXYGEN SATURATION: 98 % | SYSTOLIC BLOOD PRESSURE: 140 MMHG | HEIGHT: 63 IN | BODY MASS INDEX: 35.65 KG/M2

## 2024-02-19 DIAGNOSIS — E55.9 VITAMIN D DEFICIENCY: ICD-10-CM

## 2024-02-19 DIAGNOSIS — J02.9 SORE THROAT: Primary | ICD-10-CM

## 2024-02-19 DIAGNOSIS — Z17.0 MALIGNANT NEOPLASM OF UPPER-INNER QUADRANT OF RIGHT BREAST IN FEMALE, ESTROGEN RECEPTOR POSITIVE: Primary | ICD-10-CM

## 2024-02-19 DIAGNOSIS — C50.211 MALIGNANT NEOPLASM OF UPPER-INNER QUADRANT OF RIGHT BREAST IN FEMALE, ESTROGEN RECEPTOR POSITIVE: Primary | ICD-10-CM

## 2024-02-19 DIAGNOSIS — C50.211 MALIGNANT NEOPLASM OF UPPER-INNER QUADRANT OF RIGHT BREAST IN FEMALE, ESTROGEN RECEPTOR POSITIVE: ICD-10-CM

## 2024-02-19 DIAGNOSIS — F41.8 ANXIETY ABOUT HEALTH: ICD-10-CM

## 2024-02-19 DIAGNOSIS — E78.00 ELEVATED LDL CHOLESTEROL LEVEL: ICD-10-CM

## 2024-02-19 DIAGNOSIS — R79.89 ELEVATED LFTS: ICD-10-CM

## 2024-02-19 DIAGNOSIS — Z17.0 MALIGNANT NEOPLASM OF UPPER-INNER QUADRANT OF RIGHT BREAST IN FEMALE, ESTROGEN RECEPTOR POSITIVE: ICD-10-CM

## 2024-02-19 DIAGNOSIS — E53.8 FOLIC ACID DEFICIENCY: ICD-10-CM

## 2024-02-19 LAB
EXPIRATION DATE: NORMAL
EXPIRATION DATE: NORMAL
FLUAV AG UPPER RESP QL IA.RAPID: NOT DETECTED
FLUBV AG UPPER RESP QL IA.RAPID: NOT DETECTED
INTERNAL CONTROL: NORMAL
INTERNAL CONTROL: NORMAL
Lab: NORMAL
Lab: NORMAL
S PYO AG THROAT QL: NEGATIVE
SARS-COV-2 AG UPPER RESP QL IA.RAPID: NOT DETECTED

## 2024-02-19 PROCEDURE — 87880 STREP A ASSAY W/OPTIC: CPT

## 2024-02-19 PROCEDURE — 99214 OFFICE O/P EST MOD 30 MIN: CPT

## 2024-02-19 PROCEDURE — G0463 HOSPITAL OUTPT CLINIC VISIT: HCPCS | Performed by: RADIOLOGY

## 2024-02-19 PROCEDURE — 87428 SARSCOV & INF VIR A&B AG IA: CPT

## 2024-02-19 NOTE — ASSESSMENT & PLAN NOTE
Patient with metastatic breast cancer with mets to the bone.   She is managed by cancer care center with palliative treatment and radiation.   She is planning to have GYN surgery later this year and after that she can stop he leuprolide injection.   Continue with oncology/radiation oncology

## 2024-02-19 NOTE — PROGRESS NOTES
Follow Up Office Visit      Encounter Date: 02/19/2024   Patient Name: Annita Johnson  YOB: 1985   Medical Record Number: [id number]   Primary Diagnosis: Malignant neoplasm of upper-inner quadrant of right breast in female, estrogen receptor positive [C50.211, Z17.0]   Cancer Staging: Cancer Staging   Malignant neoplasm of upper-inner quadrant of right breast in female, estrogen receptor positive  Staging form: Breast, AJCC 8th Edition  - Clinical: Stage IV (cT2, cN0, cM1, ER+, MA+, HER2-) - Signed by Perry Garcia MD on 6/8/2023    Completion Date:  8/3/2023    Chief Complaint:    Chief Complaint   Patient presents with    Follow-up     breast       History of Present Illness: Annita Johnson returns for routine scheduled follow-up.  She reports feeling well overall with no complaints.  She denies any bony pain.  She denies neck pain, lower extremity weakness, paresthesias, dysesthesias, difficulty urinating or difficulty with bowel movements.  She does experience occasional hot flashes associated with letrozole but has not started vitamin E as prescribed.    Subjective      Review of Systems: Review of Systems   Constitutional:  Negative for appetite change, chills and fever. Fatigue: 2/10.  HENT:  Positive for sore throat (scratchy throat.). Negative for hearing loss, mouth sores, trouble swallowing and voice change (feels that it'sm improved since last visit.).         C/o dry mouth  and Altered taste; states that this is getting worse      Eyes:  Positive for visual disturbance (right eye twitches).   Respiratory:  Negative for cough and shortness of breath.    Cardiovascular:  Negative for chest pain, palpitations and leg swelling.   Gastrointestinal:  Negative for abdominal pain, blood in stool, constipation, diarrhea, nausea and vomiting.   Endocrine: Positive for heat intolerance (reports hot flashes since starting letrazole).   Genitourinary:  Negative for difficulty urinating,  dysuria, frequency, hematuria and urgency.   Musculoskeletal:  Negative for back pain, gait problem, neck pain and neck stiffness.   Skin:  Negative for color change and rash.   Allergic/Immunologic: Negative.    Neurological:  Negative for dizziness, weakness, numbness and headaches.   Psychiatric/Behavioral:  Negative for sleep disturbance.        The following portions of the patient's history were reviewed and updated as appropriate: allergies, current medications, past family history, past medical history, past social history, past surgical history and problem list.    Medications:     Current Outpatient Medications:     clonazePAM (KlonoPIN) 0.5 MG tablet, Take 1 tablet by mouth 2 (Two) Times a Day As Needed for Anxiety., Disp: 30 tablet, Rfl: 1    cyclobenzaprine (FLEXERIL) 10 MG tablet, Take 1 tablet by mouth 3 (Three) Times a Day As Needed for Muscle Spasms., Disp: 90 tablet, Rfl: 5    escitalopram (Lexapro) 10 MG tablet, Take 1 tablet by mouth Daily., Disp: 90 tablet, Rfl: 1    folic acid (FOLVITE) 1 MG tablet, Take 1 tablet by mouth Daily., Disp: 90 tablet, Rfl: 1    hydrOXYzine (ATARAX) 50 MG tablet, TAKE 1 TABLET BY MOUTH EVERYDAY AT BEDTIME, Disp: 90 tablet, Rfl: 1    letrozole (FEMARA) 2.5 MG tablet, Take 1 tablet by mouth Daily., Disp: 30 tablet, Rfl: 5    Rimegepant Sulfate (Nurtec) 75 MG tablet dispersible tablet, Take 1 tablet by mouth Daily As Needed (migraines)., Disp: 8 tablet, Rfl: 5    Sodium Fluoride 5000 PPM 1.1 % gel, See Admin Instructions., Disp: , Rfl:     Calcium Carbonate-Vitamin D 600-10 MG-MCG per tablet, Take 1 tablet by mouth 2 (Two) Times a Day. (Patient not taking: Reported on 2/19/2024), Disp: 60 tablet, Rfl: 5    ondansetron (ZOFRAN) 8 MG tablet, Take 1 tablet by mouth 3 (Three) Times a Day As Needed for Nausea or Vomiting. (Patient not taking: Reported on 11/16/2023), Disp: 30 tablet, Rfl: 5    Allergies:   No Known Allergies    ECOG: (0) Fully active, able to carry on all  predisease performance without restriction  Quality of Life: 100 - Full Activity     Objective     Physical Exam:   Vital Signs:   Vitals:    02/19/24 1323   BP: 122/77   Pulse: 111   Resp: 16   Temp: 97.5 °F (36.4 °C)   TempSrc: Temporal   SpO2: 98%   Weight: 91.5 kg (201 lb 11.5 oz)   PainSc: 0-No pain     Body mass index is 35.74 kg/m².     Physical Exam  Constitutional:       General: She is not in acute distress.     Appearance: Normal appearance. She is not ill-appearing, toxic-appearing or diaphoretic.   HENT:      Head: Normocephalic and atraumatic.   Pulmonary:      Effort: Pulmonary effort is normal. No respiratory distress.   Skin:     General: Skin is warm and dry.   Neurological:      General: No focal deficit present.      Mental Status: She is alert and oriented to person, place, and time.      Cranial Nerves: No cranial nerve deficit.      Gait: Gait normal.   Psychiatric:         Mood and Affect: Mood normal.         Behavior: Behavior normal.         Judgment: Judgment normal.         Radiographs: NM Bone Scan Whole Body    Result Date: 1/10/2024   No significant change and multifocal osseous metastatic disease.     VAISHALI PINEDA MD       Electronically Signed and Approved By: VAISHALI PINEDA MD on 1/10/2024 at 11:25             CT Abdomen Pelvis With Contrast    Result Date: 1/9/2024    1. Grossly stable size and distribution of the osteoblastic metastases, however a few have increased in density which is favored post treatment related.  Recommend attention on follow-up imaging and correlation with nuclear medicine bone scan. 2. Otherwise no suspicious soft tissue masses or increasing adenopathy. 3. Similar hepatic steatosis, at least moderate severity. 4. For future exams oral contrast is not necessary for purposes of restaging or metastatic evaluation. 5. CT chest reported separately.     LEONARDO BELLA MD       Electronically Signed and Approved By: LEONARDO BELLA MD on 1/09/2024 at 13:42              CT Chest With Contrast Diagnostic    Result Date: 1/9/2024    1. Increasing focal sclerosis within sternum, with otherwise stable osteoblastic metastatic disease.  This is nonspecific in isolation and could represent post treatment changes.  Recommend attention on follow-up imaging as well as correlation with nuclear medicine bone scan. 2. Otherwise no increasing soft tissue masses or thoracic adenopathy. 3. Increasing nonspecific ground-glass and subpleural consolidation within the bilateral upper lobes.  This could represent an infectious process versus possible post treatment related pneumonitis. 4. Separately dictated CT abdomen/pelvis.     LEONARDO BELLA MD       Electronically Signed and Approved By: LEONARDO BELLA MD on 1/09/2024 at 13:36             I personally reviewed the bone scan from 1/9/2024 as well as the CT scan of the chest, abdomen pelvis from 1/8/2024.  The pertinent findings are as above    Assessment / Plan          Assessment/Plan:   Annita Johnson is a 38-year-old female with apparent stage IV ER positive/SD positive, HER2/reena negative breast cancer. She has osseous metastatic disease apparent as cervical spine disease with severe central canal stenosis at the level of C5.  She is now status post posterior cervical decompression and fusion with instrumentation of C2 to T3.  She received subsequent external beam radiotherapy to the cervical and thoracic spine.  ECOG 0      Mrs. Johnson will continue follow-up with Dr. Garcia.  She will return to radiation oncology on an as-needed basis.  She was encouraged to contact me with any questions or concerns regarding her care.    Leoncio Schwartz MD  Radiation Oncology  [facility]    This document has been signed by Leoncio Schwartz MD on February 19, 2024 16:42 EST

## 2024-02-19 NOTE — ASSESSMENT & PLAN NOTE
Patient remains on lexapro 10  mg daily and doing really well.  She is also on klonopin 0.5 mg BID PRN.  Continue current regimen.

## 2024-02-19 NOTE — PROGRESS NOTES
Chief Complaint  Follow-up (6 month follow up.  Patient has no new issues or concerns )    History of Present Illness  SUBJECTIVE  Annita Johnson presents to [facility] follow up on chronic disease management.     She states her son has been sick. She has a scratchy throat and congestion.s he denies fever, chills, n/v/diarrhea. She would like to be tested.    She denies any chest pain, soa, palpitations, nausea, vomiting, diarrhea.     Past Medical History:   Diagnosis Date    Anemia     Breast cancer     Cancer, metastatic to bone     Kidney stone     Kidney stones     Metastasis to bone 2023      Family History   Problem Relation Age of Onset    Mental illness Mother     Hypertension Father     Alcohol abuse Father     Ovarian cancer Paternal Aunt     Breast cancer Maternal Great-Grandmother       Past Surgical History:   Procedure Laterality Date    BREAST BIOPSY       SECTION N/A 2021    Procedure:  SECTION PRIMARY;  Surgeon: Zoe Dawson MD;  Location: [facility] LABOR DELIVERY;  Service: Obstetrics/Gynecology;  Laterality: N/A;    CYSTOSCOPY BLADDER STONE LITHOTRIPSY          Current Outpatient Medications:     clonazePAM (KlonoPIN) 0.5 MG tablet, Take 1 tablet by mouth 2 (Two) Times a Day As Needed for Anxiety., Disp: 30 tablet, Rfl: 1    cyclobenzaprine (FLEXERIL) 10 MG tablet, Take 1 tablet by mouth 3 (Three) Times a Day As Needed for Muscle Spasms., Disp: 90 tablet, Rfl: 5    escitalopram (Lexapro) 10 MG tablet, Take 1 tablet by mouth Daily., Disp: 90 tablet, Rfl: 1    folic acid (FOLVITE) 1 MG tablet, Take 1 tablet by mouth Daily., Disp: 90 tablet, Rfl: 1    hydrOXYzine (ATARAX) 50 MG tablet, TAKE 1 TABLET BY MOUTH EVERYDAY AT BEDTIME, Disp: 90 tablet, Rfl: 1    Rimegepant Sulfate (Nurtec) 75 MG tablet dispersible tablet, Take 1 tablet by mouth Daily As Needed (migraines)., Disp: 8 tablet, Rfl: 5    Sodium Fluoride 5000 PPM 1.1 % gel, See  "Admin Instructions., Disp: , Rfl:     Calcium Carbonate-Vitamin D 600-10 MG-MCG per tablet, Take 1 tablet by mouth 2 (Two) Times a Day. (Patient not taking: Reported on 2/19/2024), Disp: 60 tablet, Rfl: 5    letrozole (FEMARA) 2.5 MG tablet, Take 1 tablet by mouth Daily., Disp: 30 tablet, Rfl: 5    ondansetron (ZOFRAN) 8 MG tablet, Take 1 tablet by mouth 3 (Three) Times a Day As Needed for Nausea or Vomiting. (Patient not taking: Reported on 11/16/2023), Disp: 30 tablet, Rfl: 5    OBJECTIVE  Vital Signs:   /82 (BP Location: Right arm, Patient Position: Sitting)   Pulse (!) 136   Temp 98 °F (36.7 °C)   Ht 160 cm (62.99\")   Wt 91.3 kg (201 lb 3.2 oz)   SpO2 98%   BMI 35.65 kg/m²    Estimated body mass index is 35.65 kg/m² as calculated from the following:    Height as of this encounter: 160 cm (62.99\").    Weight as of this encounter: 91.3 kg (201 lb 3.2 oz).     Wt Readings from Last 3 Encounters:   02/19/24 91.5 kg (201 lb 11.5 oz)   02/19/24 91.3 kg (201 lb 3.2 oz)   02/01/24 90.2 kg (198 lb 13.7 oz)     BP Readings from Last 3 Encounters:   02/19/24 122/77   02/19/24 140/82   02/08/24 124/78       Physical Exam  Vitals and nursing note reviewed.   Constitutional:       Appearance: Normal appearance.   HENT:      Head: Normocephalic.      Right Ear: Tympanic membrane normal.      Left Ear: Tympanic membrane normal.   Eyes:      Extraocular Movements: Extraocular movements intact.      Conjunctiva/sclera: Conjunctivae normal.   Cardiovascular:      Rate and Rhythm: Normal rate and regular rhythm.      Heart sounds: Normal heart sounds. No murmur heard.  Pulmonary:      Effort: Pulmonary effort is normal.      Breath sounds: Normal breath sounds. No wheezing or rales.   Abdominal:      General: Bowel sounds are normal.      Palpations: Abdomen is soft.   Musculoskeletal:         General: No swelling. Normal range of motion.      Cervical back: Normal range of motion and neck supple.   Skin:     General: " Skin is warm and dry.   Neurological:      General: No focal deficit present.      Mental Status: She is alert and oriented to person, place, and time. Mental status is at baseline.   Psychiatric:         Mood and Affect: Mood normal.         Behavior: Behavior normal.         Thought Content: Thought content normal.         Judgment: Judgment normal.          Result Review    CMP          12/21/2023    13:09 1/11/2024    13:20 2/1/2024    13:16   CMP   Glucose 101  103  113    BUN 12  11  13    Creatinine 0.67  0.67  0.76    EGFR 115.6  115.6  103.6    Sodium 136  136  133    Potassium 4.2  4.0  4.4    Chloride 103  102  99    Calcium 9.4  9.2  9.9    Total Protein 7.6  7.5  8.0    Albumin 4.3  4.3  4.5    Globulin 3.3  3.2  3.5    Total Bilirubin 0.4  0.3  0.3    Alkaline Phosphatase 124  133  118    AST (SGOT) 45  42  35    ALT (SGPT) 92  88  70    Albumin/Globulin Ratio 1.3  1.3  1.3    BUN/Creatinine Ratio 17.9  16.4  17.1    Anion Gap 6.4  7.4  9.0      CBC w/diff          12/21/2023    13:09 1/11/2024    13:20 2/1/2024    13:16   CBC w/Diff   WBC 7.01  7.24  7.62    RBC 4.11  4.13  4.31    Hemoglobin 11.9  11.8  12.4    Hematocrit 37.3  37.5  38.9    MCV 90.8  90.8  90.3    MCH 29.0  28.6  28.8    MCHC 31.9  31.5  31.9    RDW 14.4  14.9  14.7    Platelets 333  315  295    Neutrophil Rel % 73.0  72.5  70.6    Immature Granulocyte Rel % 0.1  0.1  0.1    Lymphocyte Rel % 16.8  17.1  18.8    Monocyte Rel % 8.0  8.7  9.3    Eosinophil Rel % 1.7  1.5  0.9    Basophil Rel % 0.4  0.1  0.3      Lipid Panel          5/26/2023    11:38 9/21/2023    10:36   Lipid Panel   Total Cholesterol 198  191    Triglycerides 98  128    HDL Cholesterol 37  47    VLDL Cholesterol 18  23    LDL Cholesterol  143  121    LDL/HDL Ratio 3.82  2.52      TSH          5/26/2023    11:38 9/21/2023    10:36   TSH   TSH 1.290  0.885      Most Recent A1C          9/21/2023    10:36   HGBA1C Most Recent   Hemoglobin A1C 5.40        A1C Last 3  Results          9/21/2023    10:36   HGBA1C Last 3 Results   Hemoglobin A1C 5.40      Lab Results   Component Value Date    XUNQ44RC 41.4 09/21/2023     Lab Results   Component Value Date    JMKNKRRH38 479 09/21/2023     Lab Results   Component Value Date    RBCUA 0-2 (A) 09/21/2023    WBCUA 3-5 (A) 09/21/2023    BACTERIA None Seen 09/21/2023    SQUAMEPIUA 3-6 (A) 09/21/2023    HYALCASTU 0-2 09/21/2023    METHODOLOGY Automated Microscopy 09/21/2023    COLORU Yellow 09/21/2023    CLARITYU Clear 09/21/2023    PHUR 6.5 09/21/2023    SPECGRAVUR 1.009 09/21/2023    GLUCOSEU Negative 09/21/2023    KETONESU Negative 09/21/2023    BILIRUBINUR Negative 09/21/2023    BLOODU Negative 09/21/2023    PROTEINUA Negative 09/21/2023    LEUKOCYTESUR Trace (A) 09/21/2023    NITRITEU Negative 09/21/2023    UROBILINOGEN 0.2 E.U./dL 09/21/2023       NM Bone Scan Whole Body    Result Date: 1/10/2024   No significant change and multifocal osseous metastatic disease.     VAISHALI PINEDA MD       Electronically Signed and Approved By: VAISHALI PINEDA MD on 1/10/2024 at 11:25             CT Abdomen Pelvis With Contrast    Result Date: 1/9/2024    1. Grossly stable size and distribution of the osteoblastic metastases, however a few have increased in density which is favored post treatment related.  Recommend attention on follow-up imaging and correlation with nuclear medicine bone scan. 2. Otherwise no suspicious soft tissue masses or increasing adenopathy. 3. Similar hepatic steatosis, at least moderate severity. 4. For future exams oral contrast is not necessary for purposes of restaging or metastatic evaluation. 5. CT chest reported separately.     LEONARDO BELLA MD       Electronically Signed and Approved By: LEONARDO BELLA MD on 1/09/2024 at 13:42             CT Chest With Contrast Diagnostic    Result Date: 1/9/2024    1. Increasing focal sclerosis within sternum, with otherwise stable osteoblastic metastatic disease.  This is nonspecific in  isolation and could represent post treatment changes.  Recommend attention on follow-up imaging as well as correlation with nuclear medicine bone scan. 2. Otherwise no increasing soft tissue masses or thoracic adenopathy. 3. Increasing nonspecific ground-glass and subpleural consolidation within the bilateral upper lobes.  This could represent an infectious process versus possible post treatment related pneumonitis. 4. Separately dictated CT abdomen/pelvis.     LEONARDO BELLA MD       Electronically Signed and Approved By: LEONARDO BELLA MD on 1/09/2024 at 13:36                The above data has been reviewed by LÁZARO Fonseca 02/19/2024 11:35 EST.          Patient Care Team:  Lela Vanegas APRN as PCP - General (Internal Medicine)  Luna Gaitan, RN as Nurse Navigator            ASSESSMENT & PLAN    Diagnoses and all orders for this visit:    1. Sore throat (Primary)  -     POCT rapid strep A  -     POCT SARS-CoV-2 Antigen [place] + Flu    2. Anxiety about health  Assessment & Plan:  Patient remains on lexapro 10  mg daily and doing really well.  She is also on klonopin 0.5 mg BID PRN.  Continue current regimen.      3. Elevated LFTs  Assessment & Plan:  LFTs are elevated  She admits to drinking margaritas frequently  Ct scan showed fatty liver  Recommended healthier diet.   Will monitor liver functions.      4. Malignant neoplasm of upper-inner quadrant of right breast in female, estrogen receptor positive  Assessment & Plan:  Patient with metastatic breast cancer with mets to the bone.   She is managed by cancer care center with palliative treatment and radiation.   She is planning to have GYN surgery later this year and after that she can stop he leuprolide injection.   Continue with oncology/radiation oncology      5. Folic acid deficiency  -     Vitamin B12; Future  -     Folate; Future    6. Elevated LDL cholesterol level  -     Lipid panel; Future    7. Vitamin D deficiency  -     Vitamin D 25 hydroxy;  Future         Tobacco Use: High Risk (2/19/2024)    Patient History     Smoking Tobacco Use: Some Days     Smokeless Tobacco Use: Never     Passive Exposure: Current       Follow Up     Return in about 6 months (around 8/19/2024) for Annual physical.    Please note that portions of this note were completed with a voice recognition program.    Patient was given instructions and counseling regarding her condition or for health maintenance advice. Please see specific information pulled into the AVS if appropriate.   I have reviewed information obtained and documented by others and I have confirmed the accuracy of this documented note.    LÁAZRO Fonseca

## 2024-02-19 NOTE — ASSESSMENT & PLAN NOTE
LFTs are elevated  She admits to drinking margaritas frequently  Ct scan showed fatty liver  Recommended healthier diet.   Will monitor liver functions.

## 2024-02-22 ENCOUNTER — HOSPITAL ENCOUNTER (OUTPATIENT)
Dept: ONCOLOGY | Facility: HOSPITAL | Age: 39
Discharge: HOME | End: 2024-02-22
Payer: COMMERCIAL

## 2024-02-22 ENCOUNTER — OFFICE VISIT (OUTPATIENT)
Dept: ONCOLOGY | Facility: HOSPITAL | Age: 39
End: 2024-02-22
Payer: COMMERCIAL

## 2024-02-22 VITALS
HEART RATE: 110 BPM | WEIGHT: 197.75 LBS | OXYGEN SATURATION: 98 % | SYSTOLIC BLOOD PRESSURE: 115 MMHG | BODY MASS INDEX: 35.04 KG/M2 | DIASTOLIC BLOOD PRESSURE: 73 MMHG | TEMPERATURE: 97.3 F | RESPIRATION RATE: 18 BRPM | HEIGHT: 63 IN

## 2024-02-22 VITALS
OXYGEN SATURATION: 98 % | SYSTOLIC BLOOD PRESSURE: 115 MMHG | WEIGHT: 197.8 LBS | HEART RATE: 110 BPM | BODY MASS INDEX: 35.05 KG/M2 | TEMPERATURE: 97.3 F | RESPIRATION RATE: 18 BRPM | DIASTOLIC BLOOD PRESSURE: 73 MMHG

## 2024-02-22 DIAGNOSIS — E78.00 ELEVATED LDL CHOLESTEROL LEVEL: ICD-10-CM

## 2024-02-22 DIAGNOSIS — E55.9 VITAMIN D DEFICIENCY: ICD-10-CM

## 2024-02-22 DIAGNOSIS — Z17.0 MALIGNANT NEOPLASM OF UPPER-INNER QUADRANT OF RIGHT BREAST IN FEMALE, ESTROGEN RECEPTOR POSITIVE: Primary | ICD-10-CM

## 2024-02-22 DIAGNOSIS — E53.8 FOLIC ACID DEFICIENCY: ICD-10-CM

## 2024-02-22 DIAGNOSIS — C50.211 MALIGNANT NEOPLASM OF UPPER-INNER QUADRANT OF RIGHT BREAST IN FEMALE, ESTROGEN RECEPTOR POSITIVE: Primary | ICD-10-CM

## 2024-02-22 LAB
25(OH)D3 SERPL-MCNC: 29.9 NG/ML (ref 30–100)
ALBUMIN SERPL-MCNC: 4.5 G/DL (ref 3.5–5.2)
ALBUMIN/GLOB SERPL: 1.3 G/DL
ALP SERPL-CCNC: 133 U/L (ref 39–117)
ALT SERPL W P-5'-P-CCNC: 91 U/L (ref 1–33)
ANION GAP SERPL CALCULATED.3IONS-SCNC: 4.3 MMOL/L (ref 5–15)
AST SERPL-CCNC: 48 U/L (ref 1–32)
BASOPHILS # BLD AUTO: 0.01 10*3/MM3 (ref 0–0.2)
BASOPHILS NFR BLD AUTO: 0.1 % (ref 0–1.5)
BILIRUB SERPL-MCNC: 0.4 MG/DL (ref 0–1.2)
BUN SERPL-MCNC: 11 MG/DL (ref 6–20)
BUN/CREAT SERPL: 14.5 (ref 7–25)
CALCIUM SPEC-SCNC: 9.7 MG/DL (ref 8.6–10.5)
CHLORIDE SERPL-SCNC: 105 MMOL/L (ref 98–107)
CHOLEST SERPL-MCNC: 197 MG/DL (ref 0–200)
CO2 SERPL-SCNC: 26.7 MMOL/L (ref 22–29)
CREAT SERPL-MCNC: 0.76 MG/DL (ref 0.57–1)
DEPRECATED RDW RBC AUTO: 49 FL (ref 37–54)
EGFRCR SERPLBLD CKD-EPI 2021: 103.6 ML/MIN/1.73
EOSINOPHIL # BLD AUTO: 0.14 10*3/MM3 (ref 0–0.4)
EOSINOPHIL NFR BLD AUTO: 2 % (ref 0.3–6.2)
ERYTHROCYTE [DISTWIDTH] IN BLOOD BY AUTOMATED COUNT: 14.6 % (ref 12.3–15.4)
FOLATE SERPL-MCNC: >20 NG/ML (ref 4.78–24.2)
GLOBULIN UR ELPH-MCNC: 3.5 GM/DL
GLUCOSE SERPL-MCNC: 86 MG/DL (ref 65–99)
HCT VFR BLD AUTO: 39.7 % (ref 34–46.6)
HDLC SERPL-MCNC: 32 MG/DL (ref 40–60)
HGB BLD-MCNC: 12.7 G/DL (ref 12–15.9)
IMM GRANULOCYTES # BLD AUTO: 0.01 10*3/MM3 (ref 0–0.05)
IMM GRANULOCYTES NFR BLD AUTO: 0.1 % (ref 0–0.5)
LDLC SERPL CALC-MCNC: 129 MG/DL (ref 0–100)
LDLC/HDLC SERPL: 3.9 {RATIO}
LYMPHOCYTES # BLD AUTO: 1.51 10*3/MM3 (ref 0.7–3.1)
LYMPHOCYTES NFR BLD AUTO: 21.3 % (ref 19.6–45.3)
MCH RBC QN AUTO: 29 PG (ref 26.6–33)
MCHC RBC AUTO-ENTMCNC: 32 G/DL (ref 31.5–35.7)
MCV RBC AUTO: 90.6 FL (ref 79–97)
MONOCYTES # BLD AUTO: 0.73 10*3/MM3 (ref 0.1–0.9)
MONOCYTES NFR BLD AUTO: 10.3 % (ref 5–12)
NEUTROPHILS NFR BLD AUTO: 4.69 10*3/MM3 (ref 1.7–7)
NEUTROPHILS NFR BLD AUTO: 66.2 % (ref 42.7–76)
PLATELET # BLD AUTO: 326 10*3/MM3 (ref 140–450)
PMV BLD AUTO: 9.6 FL (ref 6–12)
POTASSIUM SERPL-SCNC: 4.1 MMOL/L (ref 3.5–5.2)
PROT SERPL-MCNC: 8 G/DL (ref 6–8.5)
RBC # BLD AUTO: 4.38 10*6/MM3 (ref 3.77–5.28)
SODIUM SERPL-SCNC: 136 MMOL/L (ref 136–145)
TRIGL SERPL-MCNC: 201 MG/DL (ref 0–150)
VIT B12 BLD-MCNC: 577 PG/ML (ref 211–946)
VLDLC SERPL-MCNC: 36 MG/DL (ref 5–40)
WBC NRBC COR # BLD AUTO: 7.09 10*3/MM3 (ref 3.4–10.8)

## 2024-02-22 PROCEDURE — 80053 COMPREHEN METABOLIC PANEL: CPT | Performed by: INTERNAL MEDICINE

## 2024-02-22 PROCEDURE — 82607 VITAMIN B-12: CPT

## 2024-02-22 PROCEDURE — 82746 ASSAY OF FOLIC ACID SERUM: CPT

## 2024-02-22 PROCEDURE — 25810000003 SODIUM CHLORIDE 0.9 % SOLUTION 250 ML FLEX CONT: Performed by: INTERNAL MEDICINE

## 2024-02-22 PROCEDURE — 96413 CHEMO IV INFUSION 1 HR: CPT

## 2024-02-22 PROCEDURE — 25810000003 SODIUM CHLORIDE 0.9 % SOLUTION: Performed by: INTERNAL MEDICINE

## 2024-02-22 PROCEDURE — 80061 LIPID PANEL: CPT

## 2024-02-22 PROCEDURE — 85025 COMPLETE CBC W/AUTO DIFF WBC: CPT | Performed by: INTERNAL MEDICINE

## 2024-02-22 PROCEDURE — 82306 VITAMIN D 25 HYDROXY: CPT

## 2024-02-22 PROCEDURE — 25010000002 TRASTUZUMAB PER 10 MG: Performed by: INTERNAL MEDICINE

## 2024-02-22 RX ORDER — SODIUM CHLORIDE 9 MG/ML
250 INJECTION, SOLUTION INTRAVENOUS ONCE
Status: CANCELLED | OUTPATIENT
Start: 2024-02-22

## 2024-02-22 RX ORDER — ACETAMINOPHEN 325 MG/1
650 TABLET ORAL ONCE
Status: CANCELLED | OUTPATIENT
Start: 2024-02-22

## 2024-02-22 RX ORDER — SODIUM CHLORIDE 9 MG/ML
250 INJECTION, SOLUTION INTRAVENOUS ONCE
Status: COMPLETED | OUTPATIENT
Start: 2024-02-22 | End: 2024-02-22

## 2024-02-22 RX ORDER — ACETAMINOPHEN 325 MG/1
650 TABLET ORAL ONCE
Status: COMPLETED | OUTPATIENT
Start: 2024-02-22 | End: 2024-02-22

## 2024-02-22 RX ADMIN — TRASTUZUMAB 530 MG: 150 INJECTION, POWDER, LYOPHILIZED, FOR SOLUTION INTRAVENOUS at 14:57

## 2024-02-22 RX ADMIN — ACETAMINOPHEN 650 MG: 325 TABLET ORAL at 14:38

## 2024-02-22 RX ADMIN — SODIUM CHLORIDE 250 ML: 9 INJECTION, SOLUTION INTRAVENOUS at 14:38

## 2024-02-22 NOTE — PROGRESS NOTES
Chief Complaint  Chemotherapy    Lela Vanegas, APRN  Romina, Lela, APRN    Subjective          Annita Johnson presents to [facility] for ongoing treatment of her breast cancer.  She is on letrozole, trastuzumab, leuprolide for ovarian suppression and denosumab for bony involvement.  She is tolerating her regimen well.  She denies new masses, adenopathy, unusual aches or pains.  She notes good appetite and energy level.  She has no specific complaints today.  No issues from her Port-A-Cath.    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, estrogen receptor positive   6/8/2023 Initial Diagnosis    Malignant neoplasm of upper-inner quadrant of right breast in female, estrogen receptor positive     6/8/2023 Cancer Staged    Staging form: Breast, AJCC 8th Edition  - Clinical: Stage IV (cT2, cN0, cM1, ER+, HI+, HER2-) - Signed by Perry Garcia MD on 6/8/2023 6/9/2023 -  Chemotherapy    OP SUPPORTIVE BREAST Leuprolide 3.75 MG Q28D     6/15/2023 - 6/15/2023 Chemotherapy    OP BREAST Letrozole / Ribociclib     7/13/2023 -  Chemotherapy    OP BREAST Letrozole / Trastuzumab     8/24/2023 -  Chemotherapy    OP SUPPORTIVE Denosumab (Xgeva) Q28D     Metastasis to bone   6/8/2023 Initial Diagnosis    Metastasis to bone     8/24/2023 -  Chemotherapy    OP SUPPORTIVE Denosumab (Xgeva) Q28D         Review of Systems   Constitutional:  Negative for appetite change, diaphoresis, fatigue, fever, unexpected weight gain and unexpected weight loss.   HENT:  Negative for hearing loss, mouth sores, sore throat, swollen glands, trouble swallowing and voice change.    Eyes:  Negative for blurred vision.   Respiratory:  Negative for cough, shortness of breath and wheezing.    Cardiovascular:  Negative for chest pain and palpitations.   Gastrointestinal:  Negative for abdominal pain, blood in stool, constipation, diarrhea, nausea and vomiting.    Endocrine: Negative for cold intolerance and heat intolerance.   Genitourinary:  Negative for difficulty urinating, dysuria, frequency, hematuria and urinary incontinence.   Musculoskeletal:  Negative for arthralgias, back pain and myalgias.   Skin:  Negative for rash, skin lesions and wound.   Neurological:  Negative for dizziness, seizures, weakness, numbness and headache.   Hematological:  Does not bruise/bleed easily.   Psychiatric/Behavioral:  Negative for depressed mood. The patient is not nervous/anxious.      Current Outpatient Medications on File Prior to Visit   Medication Sig Dispense Refill    clonazePAM (KlonoPIN) 0.5 MG tablet Take 1 tablet by mouth 2 (Two) Times a Day As Needed for Anxiety. 30 tablet 1    cyclobenzaprine (FLEXERIL) 10 MG tablet Take 1 tablet by mouth 3 (Three) Times a Day As Needed for Muscle Spasms. 90 tablet 5    escitalopram (Lexapro) 10 MG tablet Take 1 tablet by mouth Daily. 90 tablet 1    folic acid (FOLVITE) 1 MG tablet Take 1 tablet by mouth Daily. 90 tablet 1    hydrOXYzine (ATARAX) 50 MG tablet TAKE 1 TABLET BY MOUTH EVERYDAY AT BEDTIME 90 tablet 1    letrozole (FEMARA) 2.5 MG tablet Take 1 tablet by mouth Daily. 30 tablet 5    Rimegepant Sulfate (Nurtec) 75 MG tablet dispersible tablet Take 1 tablet by mouth Daily As Needed (migraines). 8 tablet 5    Sodium Fluoride 5000 PPM 1.1 % gel See Admin Instructions.      Calcium Carbonate-Vitamin D 600-10 MG-MCG per tablet Take 1 tablet by mouth 2 (Two) Times a Day. (Patient not taking: Reported on 2/19/2024) 60 tablet 5    ondansetron (ZOFRAN) 8 MG tablet Take 1 tablet by mouth 3 (Three) Times a Day As Needed for Nausea or Vomiting. (Patient not taking: Reported on 11/16/2023) 30 tablet 5     Current Facility-Administered Medications on File Prior to Visit   Medication Dose Route Frequency Provider Last Rate Last Admin    [COMPLETED] acetaminophen (TYLENOL) tablet 650 mg  650 mg Oral Once Perry Garcia MD   650 mg at  24 1438    [COMPLETED] sodium chloride 0.9 % infusion 250 mL  250 mL Intravenous Once Perry Garcia MD 20 mL/hr at 24 1438 250 mL at 24 1438    Trastuzumab (HERCEPTIN) 530 mg in sodium chloride 0.9 % 300.2 mL chemo IVPB  6 mg/kg (Treatment Plan Recorded) Intravenous Once Perry Garcia MD           No Known Allergies  Past Medical History:   Diagnosis Date    Anemia     Breast cancer     Cancer, metastatic to bone     Kidney stone     Kidney stones     Metastasis to bone 2023     Past Surgical History:   Procedure Laterality Date    BREAST BIOPSY       SECTION N/A 2021    Procedure:  SECTION PRIMARY;  Surgeon: Zoe Dawson MD;  Location: [facility] LABOR DELIVERY;  Service: Obstetrics/Gynecology;  Laterality: N/A;    CYSTOSCOPY BLADDER STONE LITHOTRIPSY       Social History     Socioeconomic History    Marital status:      Spouse name: Janet   Tobacco Use    Smoking status: Some Days     Packs/day: 0.25     Years: 10.00     Additional pack years: 0.00     Total pack years: 2.50     Types: Cigarettes     Start date: 2021     Passive exposure: Current    Smokeless tobacco: Never   Vaping Use    Vaping Use: Never used   Substance and Sexual Activity    Alcohol use: Yes     Alcohol/week: 6.0 standard drinks of alcohol     Types: 3 Glasses of wine, 3 Drinks containing 0.5 oz of alcohol per week    Drug use: Not Currently     Types: Marijuana     Comment: for pain control    Sexual activity: Yes     Partners: Male     Birth control/protection: Same-sex partner     Family History   Problem Relation Age of Onset    Mental illness Mother     Hypertension Father     Alcohol abuse Father     Ovarian cancer Paternal Aunt     Breast cancer Maternal Great-Grandmother        Objective   Physical Exam  Vitals reviewed. Exam conducted with a chaperone present.   Constitutional:       General: She is not in acute distress.     Appearance: Normal appearance.  "  Cardiovascular:      Rate and Rhythm: Normal rate and regular rhythm.      Heart sounds: Normal heart sounds. No murmur heard.     No gallop.   Pulmonary:      Effort: Pulmonary effort is normal.      Breath sounds: Normal breath sounds.      Comments: Port-A-Cath  Abdominal:      General: Abdomen is flat. Bowel sounds are normal.      Palpations: Abdomen is soft.   Musculoskeletal:      Right lower leg: No edema.      Left lower leg: No edema.   Neurological:      Mental Status: She is alert and oriented to person, place, and time.   Psychiatric:         Mood and Affect: Mood normal.         Behavior: Behavior normal.         Vitals:    02/22/24 1337   BP: 115/73   Pulse: 110   Resp: 18   Temp: 97.3 °F (36.3 °C)   SpO2: 98%   Weight: 89.7 kg (197 lb 12 oz)   Height: 160 cm (62.99\")   PainSc: 0-No pain     ECOG score: 0         PHQ-9 Total Score:                    Result Review :   The following data was reviewed by: Perry Garcia MD on 02/22/2024:  Lab Results   Component Value Date    HGB 12.7 02/22/2024    HCT 39.7 02/22/2024    MCV 90.6 02/22/2024     02/22/2024    WBC 7.09 02/22/2024    NEUTROABS 4.69 02/22/2024    LYMPHSABS 1.51 02/22/2024    MONOSABS 0.73 02/22/2024    EOSABS 0.14 02/22/2024    BASOSABS 0.01 02/22/2024     Lab Results   Component Value Date    GLUCOSE 86 02/22/2024    BUN 11 02/22/2024    CREATININE 0.76 02/22/2024     02/22/2024    K 4.1 02/22/2024     02/22/2024    CO2 26.7 02/22/2024    CALCIUM 9.7 02/22/2024    PROTEINTOT 8.0 02/22/2024    ALBUMIN 4.5 02/22/2024    BILITOT 0.4 02/22/2024    ALKPHOS 133 (H) 02/22/2024    AST 48 (H) 02/22/2024    ALT 91 (H) 02/22/2024     Lab Results   Component Value Date    MG 1.9 02/01/2024    PHOS 3.9 02/01/2024    TSH 0.885 09/21/2023     No results found for: \"IRON\", \"LABIRON\", \"TRANSFERRIN\", \"TIBC\"  Lab Results   Component Value Date    PRPDMRNG80 479 09/21/2023    FOLATE 2.95 (L) 09/21/2023     No results found for: \"PSA\", " "\"CEA\", \"AFP\", \"\", \"\"          Assessment and Plan    Diagnoses and all orders for this visit:    1. 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  Metastatic site in the neck retested and found to be HER2 positive.  She is now on letrozole and trastuzumab for hormone receptor positivity and HER2 positive and respectively.  She also takes leuprolide injection monthly for ovarian suppression.  She is tolerating the regimen well.  Clinically she is feeling much better.  She denies new masses or adenopathy.  Continue letrozole by mouth daily.  Transaminases remain elevated but stable compared to previous.  No need for dose adjustment at this time.  Proceed with trastuzumab today as planned.  Continue monthly leuprolide injection and denosumab injection.  I will see her back in 3 weeks for OV, trastuzumab with lab work prior to monitor for toxicities.    Orders:  -     CBC and Differential; Future  -     Comprehensive metabolic panel; Future    Other orders  -     Cancel: sodium chloride 0.9 % infusion 250 mL  -     Cancel: acetaminophen (TYLENOL) tablet 650 mg  -     Cancel: Trastuzumab (HERCEPTIN) 530 mg in sodium chloride 0.9 % 250 mL chemo IVPB            Patient Follow Up: 3 weeks    Patient was given instructions and counseling regarding her condition or for health maintenance advice. Please see specific information pulled into the AVS if appropriate.     Perry Garcia MD    2/22/2024            "

## 2024-02-22 NOTE — ASSESSMENT & PLAN NOTE
Metastatic.  Metastatic site in the neck retested and found to be HER2 positive.  She is now on letrozole and trastuzumab for hormone receptor positivity and HER2 positive and respectively.  She also takes leuprolide injection monthly for ovarian suppression.  She is tolerating the regimen well.  Clinically she is feeling much better.  She denies new masses or adenopathy.  Continue letrozole by mouth daily.  Transaminases remain elevated but stable compared to previous.  No need for dose adjustment at this time.  Proceed with trastuzumab today as planned.  Continue monthly leuprolide injection and denosumab injection.  I will see her back in 3 weeks for OV, trastuzumab with lab work prior to monitor for toxicities.

## 2024-02-23 RX ORDER — ERGOCALCIFEROL 1.25 MG/1
50000 CAPSULE ORAL WEEKLY
Qty: 12 CAPSULE | Refills: 1 | Status: SHIPPED | OUTPATIENT
Start: 2024-02-23

## 2024-03-01 DIAGNOSIS — F41.9 ANXIETY: Primary | ICD-10-CM

## 2024-03-01 RX ORDER — ESCITALOPRAM OXALATE 10 MG/1
10 TABLET ORAL DAILY
Qty: 90 TABLET | Refills: 1 | Status: SHIPPED | OUTPATIENT
Start: 2024-03-01

## 2024-03-05 DIAGNOSIS — C50.211 MALIGNANT NEOPLASM OF UPPER-INNER QUADRANT OF RIGHT BREAST IN FEMALE, ESTROGEN RECEPTOR POSITIVE: ICD-10-CM

## 2024-03-05 DIAGNOSIS — Z17.0 MALIGNANT NEOPLASM OF UPPER-INNER QUADRANT OF RIGHT BREAST IN FEMALE, ESTROGEN RECEPTOR POSITIVE: ICD-10-CM

## 2024-03-05 DIAGNOSIS — F41.8 ANXIETY ABOUT HEALTH: ICD-10-CM

## 2024-03-05 RX ORDER — CLONAZEPAM 0.5 MG/1
0.5 TABLET ORAL 2 TIMES DAILY PRN
Qty: 30 TABLET | Refills: 1 | Status: SHIPPED | OUTPATIENT
Start: 2024-03-05

## 2024-03-06 RX ORDER — LETROZOLE 2.5 MG/1
2.5 TABLET, FILM COATED ORAL DAILY
Qty: 30 TABLET | Refills: 5 | Status: SHIPPED | OUTPATIENT
Start: 2024-03-06

## 2024-03-07 ENCOUNTER — HOSPITAL ENCOUNTER (OUTPATIENT)
Dept: ONCOLOGY | Facility: HOSPITAL | Age: 39
Discharge: HOME | End: 2024-03-07
Payer: COMMERCIAL

## 2024-03-07 VITALS
HEART RATE: 103 BPM | OXYGEN SATURATION: 98 % | SYSTOLIC BLOOD PRESSURE: 134 MMHG | DIASTOLIC BLOOD PRESSURE: 82 MMHG | TEMPERATURE: 98.1 F | RESPIRATION RATE: 18 BRPM

## 2024-03-07 DIAGNOSIS — C50.211 MALIGNANT NEOPLASM OF UPPER-INNER QUADRANT OF RIGHT BREAST IN FEMALE, ESTROGEN RECEPTOR POSITIVE: ICD-10-CM

## 2024-03-07 DIAGNOSIS — Z17.0 MALIGNANT NEOPLASM OF UPPER-INNER QUADRANT OF RIGHT BREAST IN FEMALE, ESTROGEN RECEPTOR POSITIVE: ICD-10-CM

## 2024-03-07 DIAGNOSIS — C79.51 METASTASIS TO BONE: Primary | ICD-10-CM

## 2024-03-07 LAB
MAGNESIUM SERPL-MCNC: 1.9 MG/DL (ref 1.6–2.6)
PHOSPHATE SERPL-MCNC: 3 MG/DL (ref 2.5–4.5)

## 2024-03-07 PROCEDURE — 83735 ASSAY OF MAGNESIUM: CPT | Performed by: INTERNAL MEDICINE

## 2024-03-07 PROCEDURE — 25010000002 DENOSUMAB 120 MG/1.7ML SOLUTION: Performed by: INTERNAL MEDICINE

## 2024-03-07 PROCEDURE — 25010000002 LEUPROLIDE PER 3.75 MG: Performed by: INTERNAL MEDICINE

## 2024-03-07 PROCEDURE — 84100 ASSAY OF PHOSPHORUS: CPT | Performed by: INTERNAL MEDICINE

## 2024-03-07 PROCEDURE — 96372 THER/PROPH/DIAG INJ SC/IM: CPT

## 2024-03-07 PROCEDURE — 96402 CHEMO HORMON ANTINEOPL SQ/IM: CPT

## 2024-03-07 RX ADMIN — DENOSUMAB 120 MG: 120 INJECTION SUBCUTANEOUS at 11:34

## 2024-03-07 RX ADMIN — LEUPROLIDE ACETATE 3.75 MG: KIT at 11:35

## 2024-03-14 ENCOUNTER — HOSPITAL ENCOUNTER (OUTPATIENT)
Dept: ONCOLOGY | Facility: HOSPITAL | Age: 39
Discharge: HOME | End: 2024-03-14
Payer: COMMERCIAL

## 2024-03-14 ENCOUNTER — OFFICE VISIT (OUTPATIENT)
Dept: ONCOLOGY | Facility: HOSPITAL | Age: 39
End: 2024-03-14
Payer: COMMERCIAL

## 2024-03-14 VITALS
RESPIRATION RATE: 16 BRPM | BODY MASS INDEX: 36.52 KG/M2 | HEIGHT: 63 IN | TEMPERATURE: 98 F | WEIGHT: 206.13 LBS | SYSTOLIC BLOOD PRESSURE: 119 MMHG | HEART RATE: 100 BPM | DIASTOLIC BLOOD PRESSURE: 74 MMHG | OXYGEN SATURATION: 99 %

## 2024-03-14 VITALS
SYSTOLIC BLOOD PRESSURE: 119 MMHG | WEIGHT: 206.13 LBS | HEIGHT: 63 IN | DIASTOLIC BLOOD PRESSURE: 74 MMHG | OXYGEN SATURATION: 99 % | BODY MASS INDEX: 36.52 KG/M2 | RESPIRATION RATE: 16 BRPM | HEART RATE: 100 BPM | TEMPERATURE: 98 F

## 2024-03-14 DIAGNOSIS — C50.211 MALIGNANT NEOPLASM OF UPPER-INNER QUADRANT OF RIGHT BREAST IN FEMALE, ESTROGEN RECEPTOR POSITIVE: Primary | ICD-10-CM

## 2024-03-14 DIAGNOSIS — Z17.0 MALIGNANT NEOPLASM OF UPPER-INNER QUADRANT OF RIGHT BREAST IN FEMALE, ESTROGEN RECEPTOR POSITIVE: Primary | ICD-10-CM

## 2024-03-14 DIAGNOSIS — C79.51 METASTASIS TO BONE: ICD-10-CM

## 2024-03-14 LAB
ALBUMIN SERPL-MCNC: 4.2 G/DL (ref 3.5–5.2)
ALBUMIN/GLOB SERPL: 1.3 G/DL
ALP SERPL-CCNC: 93 U/L (ref 39–117)
ALT SERPL W P-5'-P-CCNC: 70 U/L (ref 1–33)
ANION GAP SERPL CALCULATED.3IONS-SCNC: 8.2 MMOL/L (ref 5–15)
AST SERPL-CCNC: 38 U/L (ref 1–32)
BASOPHILS # BLD AUTO: 0.01 10*3/MM3 (ref 0–0.2)
BASOPHILS NFR BLD AUTO: 0.1 % (ref 0–1.5)
BILIRUB SERPL-MCNC: 0.3 MG/DL (ref 0–1.2)
BUN SERPL-MCNC: 10 MG/DL (ref 6–20)
BUN/CREAT SERPL: 14.5 (ref 7–25)
CALCIUM SPEC-SCNC: 9.2 MG/DL (ref 8.6–10.5)
CHLORIDE SERPL-SCNC: 100 MMOL/L (ref 98–107)
CO2 SERPL-SCNC: 25.8 MMOL/L (ref 22–29)
CREAT SERPL-MCNC: 0.69 MG/DL (ref 0.57–1)
DEPRECATED RDW RBC AUTO: 49.1 FL (ref 37–54)
EGFRCR SERPLBLD CKD-EPI 2021: 114.8 ML/MIN/1.73
EOSINOPHIL # BLD AUTO: 0.13 10*3/MM3 (ref 0–0.4)
EOSINOPHIL NFR BLD AUTO: 1.8 % (ref 0.3–6.2)
ERYTHROCYTE [DISTWIDTH] IN BLOOD BY AUTOMATED COUNT: 14.7 % (ref 12.3–15.4)
GLOBULIN UR ELPH-MCNC: 3.3 GM/DL
GLUCOSE SERPL-MCNC: 90 MG/DL (ref 65–99)
HCT VFR BLD AUTO: 35.9 % (ref 34–46.6)
HGB BLD-MCNC: 11.6 G/DL (ref 12–15.9)
IMM GRANULOCYTES # BLD AUTO: 0 10*3/MM3 (ref 0–0.05)
IMM GRANULOCYTES NFR BLD AUTO: 0 % (ref 0–0.5)
LYMPHOCYTES # BLD AUTO: 1.51 10*3/MM3 (ref 0.7–3.1)
LYMPHOCYTES NFR BLD AUTO: 20.4 % (ref 19.6–45.3)
MCH RBC QN AUTO: 29.3 PG (ref 26.6–33)
MCHC RBC AUTO-ENTMCNC: 32.3 G/DL (ref 31.5–35.7)
MCV RBC AUTO: 90.7 FL (ref 79–97)
MONOCYTES # BLD AUTO: 0.84 10*3/MM3 (ref 0.1–0.9)
MONOCYTES NFR BLD AUTO: 11.3 % (ref 5–12)
NEUTROPHILS NFR BLD AUTO: 4.92 10*3/MM3 (ref 1.7–7)
NEUTROPHILS NFR BLD AUTO: 66.4 % (ref 42.7–76)
PLATELET # BLD AUTO: 278 10*3/MM3 (ref 140–450)
PMV BLD AUTO: 9.6 FL (ref 6–12)
POTASSIUM SERPL-SCNC: 4.3 MMOL/L (ref 3.5–5.2)
PROT SERPL-MCNC: 7.5 G/DL (ref 6–8.5)
RBC # BLD AUTO: 3.96 10*6/MM3 (ref 3.77–5.28)
SODIUM SERPL-SCNC: 134 MMOL/L (ref 136–145)
WBC NRBC COR # BLD AUTO: 7.41 10*3/MM3 (ref 3.4–10.8)

## 2024-03-14 PROCEDURE — 96413 CHEMO IV INFUSION 1 HR: CPT

## 2024-03-14 PROCEDURE — 25810000003 SODIUM CHLORIDE 0.9 % SOLUTION 250 ML FLEX CONT: Performed by: INTERNAL MEDICINE

## 2024-03-14 PROCEDURE — 85025 COMPLETE CBC W/AUTO DIFF WBC: CPT | Performed by: INTERNAL MEDICINE

## 2024-03-14 PROCEDURE — 80053 COMPREHEN METABOLIC PANEL: CPT | Performed by: INTERNAL MEDICINE

## 2024-03-14 PROCEDURE — 25010000002 TRASTUZUMAB PER 10 MG: Performed by: INTERNAL MEDICINE

## 2024-03-14 PROCEDURE — 25810000003 SODIUM CHLORIDE 0.9 % SOLUTION: Performed by: INTERNAL MEDICINE

## 2024-03-14 RX ORDER — ACETAMINOPHEN 325 MG/1
650 TABLET ORAL ONCE
Status: COMPLETED | OUTPATIENT
Start: 2024-03-14 | End: 2024-03-14

## 2024-03-14 RX ORDER — LETROZOLE 2.5 MG/1
2.5 TABLET, FILM COATED ORAL DAILY
Qty: 30 TABLET | Refills: 5
Start: 2024-03-14

## 2024-03-14 RX ORDER — SODIUM CHLORIDE 9 MG/ML
250 INJECTION, SOLUTION INTRAVENOUS ONCE
Status: CANCELLED | OUTPATIENT
Start: 2024-03-14

## 2024-03-14 RX ORDER — SODIUM CHLORIDE 9 MG/ML
250 INJECTION, SOLUTION INTRAVENOUS ONCE
Status: COMPLETED | OUTPATIENT
Start: 2024-03-14 | End: 2024-03-14

## 2024-03-14 RX ORDER — FOLIC ACID 1 MG/1
1000 TABLET ORAL DAILY
Qty: 90 TABLET | Refills: 1 | Status: SHIPPED | OUTPATIENT
Start: 2024-03-14

## 2024-03-14 RX ORDER — ACETAMINOPHEN 325 MG/1
650 TABLET ORAL ONCE
Status: CANCELLED | OUTPATIENT
Start: 2024-03-14

## 2024-03-14 RX ADMIN — SODIUM CHLORIDE 250 ML: 9 INJECTION, SOLUTION INTRAVENOUS at 14:07

## 2024-03-14 RX ADMIN — TRASTUZUMAB 530 MG: 150 INJECTION, POWDER, LYOPHILIZED, FOR SOLUTION INTRAVENOUS at 14:27

## 2024-03-14 RX ADMIN — ACETAMINOPHEN 650 MG: 325 TABLET ORAL at 14:11

## 2024-03-14 NOTE — PROGRESS NOTES
Chief Complaint  Chemotherapy    Lela Vanegas, Lela Reveles, LÁZARO    Subjective          Annita Johnson presents to [facility] for ongoing treatment of her HER2 positive, hormone receptor positive breast cancer.  She is on treatment with trastuzumab, letrozole, leuprolide for ovarian suppression and denosumab for bony involvement.  She is tolerating her regimens well.  She denies issues or side effects.  She does report 1 small area of irritation on her cervical spine incision.  She denies fever, chills.  She denies new masses or adenopathy.  No new sites or pains.  She has good energy and appetite.  She anticipates oophorectomy next month    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, estrogen receptor positive   6/8/2023 Initial Diagnosis    Malignant neoplasm of upper-inner quadrant of right breast in female, estrogen receptor positive     6/8/2023 Cancer Staged    Staging form: Breast, AJCC 8th Edition  - Clinical: Stage IV (cT2, cN0, cM1, ER+, NM+, HER2-) - Signed by Perry Garcia MD on 6/8/2023 6/9/2023 -  Chemotherapy    OP SUPPORTIVE BREAST Leuprolide 3.75 MG Q28D     6/15/2023 - 6/15/2023 Chemotherapy    OP BREAST Letrozole / Ribociclib     7/13/2023 -  Chemotherapy    OP BREAST Letrozole / Trastuzumab     8/24/2023 -  Chemotherapy    OP SUPPORTIVE Denosumab (Xgeva) Q28D     Metastasis to bone   6/8/2023 Initial Diagnosis    Metastasis to bone     8/24/2023 -  Chemotherapy    OP SUPPORTIVE Denosumab (Xgeva) Q28D         Review of Systems   Constitutional:  Positive for fatigue. Negative for appetite change, diaphoresis, fever, unexpected weight gain and unexpected weight loss.   HENT:  Negative for hearing loss, mouth sores, sore throat, swollen glands, trouble swallowing and voice change.    Eyes:  Negative for blurred vision.   Respiratory:  Negative for cough, shortness of breath and wheezing.     Cardiovascular:  Negative for chest pain and palpitations.   Gastrointestinal:  Negative for abdominal pain, blood in stool, constipation, diarrhea, nausea and vomiting.   Endocrine: Negative for cold intolerance and heat intolerance.   Genitourinary:  Negative for difficulty urinating, dysuria, frequency, hematuria and urinary incontinence.   Musculoskeletal:  Negative for arthralgias, back pain and myalgias.   Skin:  Negative for rash, skin lesions and wound.   Neurological:  Negative for dizziness, seizures, weakness, numbness and headache.   Hematological:  Does not bruise/bleed easily.   Psychiatric/Behavioral:  Negative for depressed mood. The patient is not nervous/anxious.      Current Outpatient Medications on File Prior to Visit   Medication Sig Dispense Refill    Calcium Carbonate-Vitamin D 600-10 MG-MCG per tablet Take 1 tablet by mouth 2 (Two) Times a Day. 60 tablet 5    clonazePAM (KlonoPIN) 0.5 MG tablet Take 1 tablet by mouth 2 (Two) Times a Day As Needed for Anxiety. 30 tablet 1    cyclobenzaprine (FLEXERIL) 10 MG tablet Take 1 tablet by mouth 3 (Three) Times a Day As Needed for Muscle Spasms. 90 tablet 5    escitalopram (LEXAPRO) 10 MG tablet TAKE 1 TABLET BY MOUTH EVERY DAY 90 tablet 1    folic acid (FOLVITE) 1 MG tablet TAKE 1 TABLET BY MOUTH EVERY DAY 90 tablet 1    hydrOXYzine (ATARAX) 50 MG tablet TAKE 1 TABLET BY MOUTH EVERYDAY AT BEDTIME 90 tablet 1    letrozole (FEMARA) 2.5 MG tablet Take 1 tablet by mouth Daily. 30 tablet 5    Rimegepant Sulfate (Nurtec) 75 MG tablet dispersible tablet Take 1 tablet by mouth Daily As Needed (migraines). 8 tablet 5    Sodium Fluoride 5000 PPM 1.1 % gel See Admin Instructions.      vitamin D (ERGOCALCIFEROL) 1.25 MG (06875 UT) capsule capsule Take 1 capsule by mouth 1 (One) Time Per Week. 12 capsule 1    letrozole (FEMARA) 2.5 MG tablet Take 1 tablet by mouth Daily. 30 tablet 5    ondansetron (ZOFRAN) 8 MG tablet Take 1 tablet by mouth 3 (Three) Times a  Day As Needed for Nausea or Vomiting. (Patient not taking: Reported on 2023) 30 tablet 5    [DISCONTINUED] folic acid (FOLVITE) 1 MG tablet Take 1 tablet by mouth Daily. 90 tablet 1     Current Facility-Administered Medications on File Prior to Visit   Medication Dose Route Frequency Provider Last Rate Last Admin    acetaminophen (TYLENOL) tablet 650 mg  650 mg Oral Once Perry Garcia MD        sodium chloride 0.9 % infusion 250 mL  250 mL Intravenous Once Perry Garcia MD        Trastuzumab (HERCEPTIN) 530 mg in sodium chloride 0.9 % 250 mL chemo IVPB  6 mg/kg (Treatment Plan Recorded) Intravenous Once Perry Garcia MD           No Known Allergies  Past Medical History:   Diagnosis Date    Anemia     Breast cancer     Cancer, metastatic to bone     Kidney stone     Kidney stones     Metastasis to bone 2023     Past Surgical History:   Procedure Laterality Date    BREAST BIOPSY       SECTION N/A 2021    Procedure:  SECTION PRIMARY;  Surgeon: Zoe Dawson MD;  Location: [facility] LABOR DELIVERY;  Service: Obstetrics/Gynecology;  Laterality: N/A;    CYSTOSCOPY BLADDER STONE LITHOTRIPSY       Social History     Socioeconomic History    Marital status:      Spouse name: Janet   Tobacco Use    Smoking status: Some Days     Current packs/day: 0.25     Average packs/day: 0.3 packs/day for 3.1 years (0.8 ttl pk-yrs)     Types: Cigarettes     Start date: 2021     Passive exposure: Current    Smokeless tobacco: Never   Vaping Use    Vaping status: Never Used   Substance and Sexual Activity    Alcohol use: Yes     Alcohol/week: 6.0 standard drinks of alcohol     Types: 3 Glasses of wine, 3 Drinks containing 0.5 oz of alcohol per week    Drug use: Not Currently     Types: Marijuana     Comment: for pain control    Sexual activity: Yes     Partners: Male     Birth control/protection: Same-sex partner     Family History   Problem Relation Age of Onset    Mental illness  "Mother     Hypertension Father     Alcohol abuse Father     Ovarian cancer Paternal Aunt     Breast cancer Maternal Great-Grandmother        Objective   Physical Exam  Vitals reviewed. Exam conducted with a chaperone present.   Constitutional:       General: She is not in acute distress.     Appearance: Normal appearance.   Neck:      Comments: Well-healed posterior cervical midline incision.  1 small area of irritation without erythema or drainage  Cardiovascular:      Rate and Rhythm: Normal rate and regular rhythm.      Heart sounds: Normal heart sounds. No murmur heard.     No gallop.   Pulmonary:      Effort: Pulmonary effort is normal.      Breath sounds: Normal breath sounds.   Abdominal:      General: Abdomen is flat. Bowel sounds are normal.      Palpations: Abdomen is soft.   Musculoskeletal:      Cervical back: Neck supple.      Right lower leg: No edema.      Left lower leg: No edema.   Lymphadenopathy:      Cervical: No cervical adenopathy.   Neurological:      Mental Status: She is alert and oriented to person, place, and time.   Psychiatric:         Mood and Affect: Mood normal.         Behavior: Behavior normal.         Vitals:    03/14/24 1343   BP: 119/74   Pulse: 100   Resp: 16   Temp: 98 °F (36.7 °C)   SpO2: 99%   Weight: 93.5 kg (206 lb 2.1 oz)   Height: 160 cm (62.99\")   PainSc: 0-No pain     ECOG score: 0         PHQ-9 Total Score:                    Result Review :   The following data was reviewed by: Perry Garcia MD on 03/14/2024:  Lab Results   Component Value Date    HGB 11.6 (L) 03/14/2024    HCT 35.9 03/14/2024    MCV 90.7 03/14/2024     03/14/2024    WBC 7.41 03/14/2024    NEUTROABS 4.92 03/14/2024    LYMPHSABS 1.51 03/14/2024    MONOSABS 0.84 03/14/2024    EOSABS 0.13 03/14/2024    BASOSABS 0.01 03/14/2024     Lab Results   Component Value Date    GLUCOSE 86 02/22/2024    BUN 11 02/22/2024    CREATININE 0.76 02/22/2024     02/22/2024    K 4.1 02/22/2024     " "02/22/2024    CO2 26.7 02/22/2024    CALCIUM 9.7 02/22/2024    PROTEINTOT 8.0 02/22/2024    ALBUMIN 4.5 02/22/2024    BILITOT 0.4 02/22/2024    ALKPHOS 133 (H) 02/22/2024    AST 48 (H) 02/22/2024    ALT 91 (H) 02/22/2024     Lab Results   Component Value Date    MG 1.9 03/07/2024    PHOS 3.0 03/07/2024    TSH 0.885 09/21/2023     No results found for: \"IRON\", \"LABIRON\", \"TRANSFERRIN\", \"TIBC\"  Lab Results   Component Value Date    RHPHOEYO37 577 02/22/2024    FOLATE >20.00 02/22/2024     No results found for: \"PSA\", \"CEA\", \"AFP\", \"\", \"\"          Assessment and Plan    Diagnoses and all orders for this visit:    1. Malignant neoplasm of upper-inner quadrant of right breast in female, estrogen receptor positive (Primary)  Assessment & Plan:  Triple positive.  Metastatic.  Patient is on treatment with letrozole, trastuzumab, leuprolide and denosumab.  Tolerating well.  Clinically she is feeling well.  Lab work today is notable for mild anemia but otherwise adequate for treatment.  Proceed with trastuzumab as planned.  Continue leuprolide monthly until oophorectomy which is anticipated next month.  Continue letrozole by mouth daily.  I will see her back in 3 weeks for ongoing treatment with lab work prior to monitor for toxicities and restaging CT/bone scan to assess response to therapy.    Orders:  -     CT chest w contrast; Future  -     CT abdomen pelvis w contrast; Future  -     NM Bone Scan Whole Body; Future    Other orders  -     Cancel: sodium chloride 0.9 % infusion 250 mL  -     Cancel: acetaminophen (TYLENOL) tablet 650 mg  -     Cancel: Trastuzumab (HERCEPTIN) 530 mg in sodium chloride 0.9 % 250 mL chemo IVPB            Patient Follow Up: 3 weeks    Patient was given instructions and counseling regarding her condition or for health maintenance advice. Please see specific information pulled into the AVS if appropriate.     Perry Garcia MD    3/14/2024            "

## 2024-03-14 NOTE — ASSESSMENT & PLAN NOTE
Triple positive.  Metastatic.  Patient is on treatment with letrozole, trastuzumab, leuprolide and denosumab.  Tolerating well.  Clinically she is feeling well.  Lab work today is notable for mild anemia but otherwise adequate for treatment.  Proceed with trastuzumab as planned.  Continue leuprolide monthly until oophorectomy which is anticipated next month.  Continue letrozole by mouth daily.  I will see her back in 3 weeks for ongoing treatment with lab work prior to monitor for toxicities and restaging CT/bone scan to assess response to therapy.

## 2024-03-18 DIAGNOSIS — Z17.0 MALIGNANT NEOPLASM OF UPPER-INNER QUADRANT OF RIGHT BREAST IN FEMALE, ESTROGEN RECEPTOR POSITIVE: Primary | ICD-10-CM

## 2024-03-18 DIAGNOSIS — Z79.899 PATIENT ON ANTINEOPLASTIC CHEMOTHERAPY REGIMEN: ICD-10-CM

## 2024-03-18 DIAGNOSIS — C50.211 MALIGNANT NEOPLASM OF UPPER-INNER QUADRANT OF RIGHT BREAST IN FEMALE, ESTROGEN RECEPTOR POSITIVE: Primary | ICD-10-CM

## 2024-03-21 ENCOUNTER — TELEPHONE (OUTPATIENT)
Dept: INTERNAL MEDICINE | Age: 39
End: 2024-03-21

## 2024-03-21 NOTE — TELEPHONE ENCOUNTER
Caller: Annita Johnson    Relationship: Self    Best call back number: [phone]     What is the best time to reach you: ANY    Who are you requesting to speak with (clinical staff, provider,  specific staff member): CLINICAL      What was the call regarding: STATED SHE IS WAITING ON SURGICAL CLEARANCE FROM THE OFFICE TO BE SIGNED OFF ON. PLEASE ADVISE.     PHONE : [phone]  FAX: [phone]  ATTN: EVANGELINA

## 2024-03-28 ENCOUNTER — PRE-ADMISSION TESTING (OUTPATIENT)
Dept: PREADMISSION TESTING | Facility: HOSPITAL | Age: 39
End: 2024-03-28
Payer: COMMERCIAL

## 2024-03-28 VITALS
HEART RATE: 99 BPM | SYSTOLIC BLOOD PRESSURE: 112 MMHG | OXYGEN SATURATION: 98 % | DIASTOLIC BLOOD PRESSURE: 76 MMHG | WEIGHT: 207.5 LBS | TEMPERATURE: 97.8 F | BODY MASS INDEX: 35.42 KG/M2 | RESPIRATION RATE: 16 BRPM | HEIGHT: 64 IN

## 2024-03-28 LAB
ANION GAP SERPL CALCULATED.3IONS-SCNC: 11.1 MMOL/L (ref 5–15)
B-HCG UR QL: NEGATIVE
BASOPHILS # BLD AUTO: 0.03 10*3/MM3 (ref 0–0.2)
BASOPHILS NFR BLD AUTO: 0.4 % (ref 0–1.5)
BUN SERPL-MCNC: 9 MG/DL (ref 6–20)
BUN/CREAT SERPL: 12.2 (ref 7–25)
CALCIUM SPEC-SCNC: 9.7 MG/DL (ref 8.6–10.5)
CHLORIDE SERPL-SCNC: 104 MMOL/L (ref 98–107)
CO2 SERPL-SCNC: 23.9 MMOL/L (ref 22–29)
CREAT SERPL-MCNC: 0.74 MG/DL (ref 0.57–1)
DEPRECATED RDW RBC AUTO: 44.5 FL (ref 37–54)
EGFRCR SERPLBLD CKD-EPI 2021: 107 ML/MIN/1.73
EOSINOPHIL # BLD AUTO: 0.09 10*3/MM3 (ref 0–0.4)
EOSINOPHIL NFR BLD AUTO: 1.1 % (ref 0.3–6.2)
ERYTHROCYTE [DISTWIDTH] IN BLOOD BY AUTOMATED COUNT: 14.1 % (ref 12.3–15.4)
GLUCOSE SERPL-MCNC: 95 MG/DL (ref 65–99)
HCT VFR BLD AUTO: 36.8 % (ref 34–46.6)
HGB BLD-MCNC: 12.1 G/DL (ref 12–15.9)
IMM GRANULOCYTES # BLD AUTO: 0.03 10*3/MM3 (ref 0–0.05)
IMM GRANULOCYTES NFR BLD AUTO: 0.4 % (ref 0–0.5)
LYMPHOCYTES # BLD AUTO: 1.51 10*3/MM3 (ref 0.7–3.1)
LYMPHOCYTES NFR BLD AUTO: 18.6 % (ref 19.6–45.3)
MCH RBC QN AUTO: 28.8 PG (ref 26.6–33)
MCHC RBC AUTO-ENTMCNC: 32.9 G/DL (ref 31.5–35.7)
MCV RBC AUTO: 87.6 FL (ref 79–97)
MONOCYTES # BLD AUTO: 1.04 10*3/MM3 (ref 0.1–0.9)
MONOCYTES NFR BLD AUTO: 12.8 % (ref 5–12)
NEUTROPHILS NFR BLD AUTO: 5.42 10*3/MM3 (ref 1.7–7)
NEUTROPHILS NFR BLD AUTO: 66.7 % (ref 42.7–76)
NRBC BLD AUTO-RTO: 0 /100 WBC (ref 0–0.2)
PLATELET # BLD AUTO: 307 10*3/MM3 (ref 140–450)
PMV BLD AUTO: 9.8 FL (ref 6–12)
POTASSIUM SERPL-SCNC: 4.6 MMOL/L (ref 3.5–5.2)
RBC # BLD AUTO: 4.2 10*6/MM3 (ref 3.77–5.28)
SODIUM SERPL-SCNC: 139 MMOL/L (ref 136–145)
WBC NRBC COR # BLD AUTO: 8.12 10*3/MM3 (ref 3.4–10.8)

## 2024-03-28 PROCEDURE — 36415 COLL VENOUS BLD VENIPUNCTURE: CPT

## 2024-03-28 PROCEDURE — 85025 COMPLETE CBC W/AUTO DIFF WBC: CPT

## 2024-03-28 PROCEDURE — 80048 BASIC METABOLIC PNL TOTAL CA: CPT

## 2024-03-28 PROCEDURE — 81025 URINE PREGNANCY TEST: CPT

## 2024-03-28 NOTE — DISCHARGE INSTRUCTIONS
Take the following medications the morning of surgery:    LEXAPRO  KLONIPIN  BRING LETROZOLE    If you are on prescription narcotic pain medication to control your pain you may also take that medication the morning of surgery.    General Instructions:  Do not eat solid food after midnight the night before surgery.  You may drink clear liquids day of surgery but must stop at least one hour before your hospital arrival time.  It is beneficial for you to have a clear drink that contains carbohydrates the day of surgery.  We suggest a 12 to 20 ounce bottle of Gatorade or Powerade for non-diabetic patients or a 12 to 20 ounce bottle of G2 or Powerade Zero for diabetic patients. (Pediatric patients, are not advised to drink a 12 to 20 ounce carbohydrate drink)    Clear liquids are liquids you can see through.  Nothing red in color.     Plain water                               Sports drinks  Sodas                                   Gelatin (Jell-O)  Fruit juices without pulp such as white grape juice and apple juice  Popsicles that contain no fruit or yogurt  Tea or coffee (no cream or milk added)  Gatorade / Powerade  G2 / Powerade Zero    Infants may have breast milk up to four hours before surgery.  Infants drinking formula may drink formula up to six hours before surgery.   Patients who avoid smoking, chewing tobacco and alcohol for 4 weeks prior to surgery have a reduced risk of post-operative complications.  Quit smoking as many days before surgery as you can.  Do not smoke, use chewing tobacco or drink alcohol the day of surgery.   If applicable bring your C-PAP/ BI-PAP machine in with you to preop day of surgery.  Bring any papers given to you in the doctor’s office.  Wear clean comfortable clothes.  Do not wear contact lenses, false eyelashes or make-up.  Bring a case for your glasses.   Bring crutches or walker if applicable.  Remove all piercings.  Leave jewelry and any other valuables at home.  Hair extensions  with metal clips must be removed prior to surgery.  The Pre-Admission Testing nurse will instruct you to bring medications if unable to obtain an accurate list in Pre-Admission Testing.        If you were given a blood bank ID arm band remember to bring it with you the day of surgery.    Preventing a Surgical Site Infection:  For 2 to 3 days before surgery, avoid shaving with a razor because the razor can irritate skin and make it easier to develop an infection.    Any areas of open skin can increase the risk of a post-operative wound infection by allowing bacteria to enter and travel throughout the body.  Notify your surgeon if you have any skin wounds / rashes even if it is not near the expected surgical site.  The area will need assessed to determine if surgery should be delayed until it is healed.  The night prior to surgery shower using a fresh bar of anti-bacterial soap (such as Dial) and clean washcloth.  Sleep in a clean bed with clean clothing.  Do not allow pets to sleep with you.  Shower on the morning of surgery using a fresh bar of anti-bacterial soap (such as Dial) and clean washcloth.  Dry with a clean towel and dress in clean clothing.  Ask your surgeon if you will be receiving antibiotics prior to surgery.  Make sure you, your family, and all healthcare providers clean their hands with soap and water or an alcohol based hand  before caring for you or your wound.    Day of surgery:  Your arrival time is approximately two hours before your scheduled surgery time.  Upon arrival, a Pre-op nurse and Anesthesiologist will review your health history, obtain vital signs, and answer questions you may have.  The only belongings needed at this time will be a list of your home medications and if applicable your C-PAP/BI-PAP machine.  A Pre-op nurse will start an IV and you may receive medication in preparation for surgery, including something to help you relax.     Please be aware that surgery does  come with discomfort.  We want to make every effort to control your discomfort so please discuss any uncontrolled symptoms with your nurse.   Your doctor will most likely have prescribed pain medications.      If you are going home after surgery you will receive individualized written care instructions before being discharged.  A responsible adult must drive you to and from the hospital on the day of your surgery and ideally stay with you through the night.   .  Discharge prescriptions can be filled by the hospital pharmacy during regular pharmacy hours.  If you are having surgery late in the day/evening your prescription may be e-prescribed to your pharmacy.  Please verify your pharmacy hours or chose a 24 hour pharmacy to avoid not having access to your prescription because your pharmacy has closed for the day.    If you are staying overnight following surgery, you will be transported to your hospital room following the recovery period.  [facility] has all private rooms.    If you have any questions please call Pre-Admission Testing at [phone].  Deductibles and co-payments are collected on the day of service. Please be prepared to pay the required co-pay, deductible or deposit on the day of service as defined by your plan.    Call your surgeon immediately if you experience any of the following symptoms:  Sore Throat  Shortness of Breath or difficulty breathing  Cough  Chills  Body soreness or muscle pain  Headache  Fever  New loss of taste or smell  Do not arrive for your surgery ill.  Your procedure will need to be rescheduled to another time.  You will need to call your physician before the day of surgery to avoid any unnecessary exposure to hospital staff as well as other patients.

## 2024-04-02 ENCOUNTER — HOSPITAL ENCOUNTER (OUTPATIENT)
Dept: NUCLEAR MEDICINE | Facility: HOSPITAL | Age: 39
Discharge: HOME | End: 2024-04-02
Payer: COMMERCIAL

## 2024-04-02 ENCOUNTER — HOSPITAL ENCOUNTER (OUTPATIENT)
Dept: CT IMAGING | Facility: HOSPITAL | Age: 39
Discharge: HOME | End: 2024-04-02
Payer: COMMERCIAL

## 2024-04-02 ENCOUNTER — HOSPITAL ENCOUNTER (OUTPATIENT)
Dept: CARDIOLOGY | Facility: HOSPITAL | Age: 39
Discharge: HOME | End: 2024-04-02
Payer: COMMERCIAL

## 2024-04-02 DIAGNOSIS — C50.211 MALIGNANT NEOPLASM OF UPPER-INNER QUADRANT OF RIGHT BREAST IN FEMALE, ESTROGEN RECEPTOR POSITIVE: ICD-10-CM

## 2024-04-02 DIAGNOSIS — Z17.0 MALIGNANT NEOPLASM OF UPPER-INNER QUADRANT OF RIGHT BREAST IN FEMALE, ESTROGEN RECEPTOR POSITIVE: ICD-10-CM

## 2024-04-02 DIAGNOSIS — C79.51 METASTASIS TO BONE: ICD-10-CM

## 2024-04-02 DIAGNOSIS — Z79.899 PATIENT ON ANTINEOPLASTIC CHEMOTHERAPY REGIMEN: ICD-10-CM

## 2024-04-02 LAB
ALBUMIN SERPL-MCNC: 4.2 G/DL (ref 3.5–5.2)
ALBUMIN/GLOB SERPL: 1.2 G/DL
ALP SERPL-CCNC: 86 U/L (ref 39–117)
ALT SERPL W P-5'-P-CCNC: 59 U/L (ref 1–33)
ANION GAP SERPL CALCULATED.3IONS-SCNC: 13.1 MMOL/L (ref 5–15)
AST SERPL-CCNC: 44 U/L (ref 1–32)
BASOPHILS # BLD AUTO: 0.02 10*3/MM3 (ref 0–0.2)
BASOPHILS NFR BLD AUTO: 0.3 % (ref 0–1.5)
BH CV ECHO MEAS - AO ROOT DIAM: 2.2 CM
BH CV ECHO MEAS - EDV(CUBED): 126.5 ML
BH CV ECHO MEAS - EDV(MOD-SP2): 44 ML
BH CV ECHO MEAS - EDV(MOD-SP4): 48.7 ML
BH CV ECHO MEAS - EF(MOD-BP): 62.6 %
BH CV ECHO MEAS - EF(MOD-SP2): 63.6 %
BH CV ECHO MEAS - EF(MOD-SP4): 60 %
BH CV ECHO MEAS - ESV(CUBED): 36.3 ML
BH CV ECHO MEAS - ESV(MOD-SP2): 16 ML
BH CV ECHO MEAS - ESV(MOD-SP4): 19.5 ML
BH CV ECHO MEAS - FS: 34.1 %
BH CV ECHO MEAS - IVS/LVPW: 0.86 CM
BH CV ECHO MEAS - IVSD: 0.85 CM
BH CV ECHO MEAS - LA DIMENSION: 3.6 CM
BH CV ECHO MEAS - LAT PEAK E' VEL: 8.1 CM/SEC
BH CV ECHO MEAS - LV DIASTOLIC VOL/BSA (35-75): 22.7 CM2
BH CV ECHO MEAS - LV MASS(C)D: 163 GRAMS
BH CV ECHO MEAS - LV SYSTOLIC VOL/BSA (12-30): 9.1 CM2
BH CV ECHO MEAS - LVIDD: 5 CM
BH CV ECHO MEAS - LVIDS: 3.3 CM
BH CV ECHO MEAS - LVPWD: 0.98 CM
BH CV ECHO MEAS - MED PEAK E' VEL: 7.4 CM/SEC
BH CV ECHO MEAS - MV A MAX VEL: 47.6 CM/SEC
BH CV ECHO MEAS - MV DEC SLOPE: 534 CM/SEC2
BH CV ECHO MEAS - MV DEC TIME: 0.11 SEC
BH CV ECHO MEAS - MV E MAX VEL: 57.8 CM/SEC
BH CV ECHO MEAS - MV E/A: 1.21
BH CV ECHO MEAS - RVDD: 2.9 CM
BH CV ECHO MEAS - SI(MOD-SP2): 13 ML/M2
BH CV ECHO MEAS - SI(MOD-SP4): 13.6 ML/M2
BH CV ECHO MEAS - SV(MOD-SP2): 28 ML
BH CV ECHO MEAS - SV(MOD-SP4): 29.2 ML
BH CV ECHO MEASUREMENTS AVERAGE E/E' RATIO: 7.46
BILIRUB SERPL-MCNC: 0.2 MG/DL (ref 0–1.2)
BUN SERPL-MCNC: 10 MG/DL (ref 6–20)
BUN/CREAT SERPL: 14.1 (ref 7–25)
CALCIUM SPEC-SCNC: 9.2 MG/DL (ref 8.6–10.5)
CHLORIDE SERPL-SCNC: 103 MMOL/L (ref 98–107)
CO2 SERPL-SCNC: 21.9 MMOL/L (ref 22–29)
CREAT SERPL-MCNC: 0.71 MG/DL (ref 0.57–1)
DEPRECATED RDW RBC AUTO: 49.8 FL (ref 37–54)
EGFRCR SERPLBLD CKD-EPI 2021: 112.5 ML/MIN/1.73
EOSINOPHIL # BLD AUTO: 0.12 10*3/MM3 (ref 0–0.4)
EOSINOPHIL NFR BLD AUTO: 2 % (ref 0.3–6.2)
ERYTHROCYTE [DISTWIDTH] IN BLOOD BY AUTOMATED COUNT: 14.6 % (ref 12.3–15.4)
GLOBULIN UR ELPH-MCNC: 3.5 GM/DL
GLUCOSE SERPL-MCNC: 88 MG/DL (ref 65–99)
HCT VFR BLD AUTO: 38.2 % (ref 34–46.6)
HGB BLD-MCNC: 11.4 G/DL (ref 12–15.9)
IMM GRANULOCYTES # BLD AUTO: 0.02 10*3/MM3 (ref 0–0.05)
IMM GRANULOCYTES NFR BLD AUTO: 0.3 % (ref 0–0.5)
LEFT ATRIUM VOLUME INDEX: 10.7 ML/M2
LYMPHOCYTES # BLD AUTO: 1.29 10*3/MM3 (ref 0.7–3.1)
LYMPHOCYTES NFR BLD AUTO: 21.1 % (ref 19.6–45.3)
MAGNESIUM SERPL-MCNC: 1.8 MG/DL (ref 1.6–2.6)
MCH RBC QN AUTO: 27.7 PG (ref 26.6–33)
MCHC RBC AUTO-ENTMCNC: 29.8 G/DL (ref 31.5–35.7)
MCV RBC AUTO: 92.9 FL (ref 79–97)
MONOCYTES # BLD AUTO: 0.73 10*3/MM3 (ref 0.1–0.9)
MONOCYTES NFR BLD AUTO: 11.9 % (ref 5–12)
NEUTROPHILS NFR BLD AUTO: 3.93 10*3/MM3 (ref 1.7–7)
NEUTROPHILS NFR BLD AUTO: 64.4 % (ref 42.7–76)
NRBC BLD AUTO-RTO: 0 /100 WBC (ref 0–0.2)
PHOSPHATE SERPL-MCNC: 2.8 MG/DL (ref 2.5–4.5)
PLATELET # BLD AUTO: 304 10*3/MM3 (ref 140–450)
PMV BLD AUTO: 10 FL (ref 6–12)
POTASSIUM SERPL-SCNC: 4.2 MMOL/L (ref 3.5–5.2)
PROT SERPL-MCNC: 7.7 G/DL (ref 6–8.5)
RBC # BLD AUTO: 4.11 10*6/MM3 (ref 3.77–5.28)
SODIUM SERPL-SCNC: 138 MMOL/L (ref 136–145)
WBC NRBC COR # BLD AUTO: 6.11 10*3/MM3 (ref 3.4–10.8)

## 2024-04-02 PROCEDURE — 74177 CT ABD & PELVIS W/CONTRAST: CPT

## 2024-04-02 PROCEDURE — A9503 TC99M MEDRONATE: HCPCS | Performed by: INTERNAL MEDICINE

## 2024-04-02 PROCEDURE — 93306 TTE W/DOPPLER COMPLETE: CPT

## 2024-04-02 PROCEDURE — 25510000001 IOPAMIDOL PER 1 ML: Performed by: INTERNAL MEDICINE

## 2024-04-02 PROCEDURE — 71260 CT THORAX DX C+: CPT

## 2024-04-02 PROCEDURE — 78306 BONE IMAGING WHOLE BODY: CPT

## 2024-04-02 PROCEDURE — 83735 ASSAY OF MAGNESIUM: CPT | Performed by: INTERNAL MEDICINE

## 2024-04-02 PROCEDURE — 80053 COMPREHEN METABOLIC PANEL: CPT | Performed by: INTERNAL MEDICINE

## 2024-04-02 PROCEDURE — 85025 COMPLETE CBC W/AUTO DIFF WBC: CPT | Performed by: INTERNAL MEDICINE

## 2024-04-02 PROCEDURE — 84100 ASSAY OF PHOSPHORUS: CPT | Performed by: INTERNAL MEDICINE

## 2024-04-02 PROCEDURE — 0 TECHNETIUM MEDRONATE KIT: Performed by: INTERNAL MEDICINE

## 2024-04-02 RX ORDER — TC 99M MEDRONATE 20 MG/10ML
22.7 INJECTION, POWDER, LYOPHILIZED, FOR SOLUTION INTRAVENOUS
Status: COMPLETED | OUTPATIENT
Start: 2024-04-02 | End: 2024-04-02

## 2024-04-02 RX ADMIN — TC 99M MEDRONATE 22.7 MILLICURIE: 20 INJECTION, POWDER, LYOPHILIZED, FOR SOLUTION INTRAVENOUS at 10:55

## 2024-04-02 RX ADMIN — IOPAMIDOL 100 ML: 755 INJECTION, SOLUTION INTRAVENOUS at 12:32

## 2024-04-04 ENCOUNTER — OFFICE VISIT (OUTPATIENT)
Dept: ONCOLOGY | Facility: HOSPITAL | Age: 39
End: 2024-04-04
Payer: COMMERCIAL

## 2024-04-04 ENCOUNTER — HOSPITAL ENCOUNTER (OUTPATIENT)
Dept: ONCOLOGY | Facility: HOSPITAL | Age: 39
Discharge: HOME | End: 2024-04-04
Admitting: INTERNAL MEDICINE
Payer: COMMERCIAL

## 2024-04-04 VITALS
OXYGEN SATURATION: 98 % | SYSTOLIC BLOOD PRESSURE: 120 MMHG | RESPIRATION RATE: 18 BRPM | WEIGHT: 205.03 LBS | DIASTOLIC BLOOD PRESSURE: 81 MMHG | BODY MASS INDEX: 36.33 KG/M2 | HEIGHT: 63 IN | TEMPERATURE: 98 F | HEART RATE: 101 BPM

## 2024-04-04 VITALS
RESPIRATION RATE: 18 BRPM | OXYGEN SATURATION: 98 % | HEART RATE: 101 BPM | WEIGHT: 205.03 LBS | HEIGHT: 63 IN | TEMPERATURE: 98 F | SYSTOLIC BLOOD PRESSURE: 120 MMHG | DIASTOLIC BLOOD PRESSURE: 81 MMHG | BODY MASS INDEX: 36.33 KG/M2

## 2024-04-04 DIAGNOSIS — Z17.0 MALIGNANT NEOPLASM OF UPPER-INNER QUADRANT OF RIGHT BREAST IN FEMALE, ESTROGEN RECEPTOR POSITIVE: Primary | ICD-10-CM

## 2024-04-04 DIAGNOSIS — C79.51 METASTASIS TO BONE: ICD-10-CM

## 2024-04-04 DIAGNOSIS — C50.211 MALIGNANT NEOPLASM OF UPPER-INNER QUADRANT OF RIGHT BREAST IN FEMALE, ESTROGEN RECEPTOR POSITIVE: Primary | ICD-10-CM

## 2024-04-04 PROCEDURE — 96372 THER/PROPH/DIAG INJ SC/IM: CPT

## 2024-04-04 PROCEDURE — 96413 CHEMO IV INFUSION 1 HR: CPT

## 2024-04-04 PROCEDURE — 25010000002 DENOSUMAB 120 MG/1.7ML SOLUTION: Performed by: INTERNAL MEDICINE

## 2024-04-04 PROCEDURE — 96402 CHEMO HORMON ANTINEOPL SQ/IM: CPT

## 2024-04-04 PROCEDURE — 25010000002 LEUPROLIDE PER 3.75 MG: Performed by: INTERNAL MEDICINE

## 2024-04-04 PROCEDURE — 25810000003 SODIUM CHLORIDE 0.9 % SOLUTION: Performed by: INTERNAL MEDICINE

## 2024-04-04 PROCEDURE — 25010000002 TRASTUZUMAB PER 10 MG: Performed by: INTERNAL MEDICINE

## 2024-04-04 PROCEDURE — 25810000003 SODIUM CHLORIDE 0.9 % SOLUTION 250 ML FLEX CONT: Performed by: INTERNAL MEDICINE

## 2024-04-04 RX ORDER — ACETAMINOPHEN 325 MG/1
650 TABLET ORAL ONCE
Status: CANCELLED | OUTPATIENT
Start: 2024-04-04

## 2024-04-04 RX ORDER — SODIUM CHLORIDE 9 MG/ML
250 INJECTION, SOLUTION INTRAVENOUS ONCE
Status: CANCELLED | OUTPATIENT
Start: 2024-04-04

## 2024-04-04 RX ORDER — SODIUM CHLORIDE 9 MG/ML
250 INJECTION, SOLUTION INTRAVENOUS ONCE
Status: COMPLETED | OUTPATIENT
Start: 2024-04-04 | End: 2024-04-04

## 2024-04-04 RX ORDER — ACETAMINOPHEN 325 MG/1
650 TABLET ORAL ONCE
Status: DISCONTINUED | OUTPATIENT
Start: 2024-04-04 | End: 2024-04-05 | Stop reason: HOSPADM

## 2024-04-04 RX ORDER — LETROZOLE 2.5 MG/1
2.5 TABLET, FILM COATED ORAL DAILY
Qty: 30 TABLET | Refills: 5
Start: 2024-04-04

## 2024-04-04 RX ADMIN — TRASTUZUMAB 530 MG: 150 INJECTION, POWDER, LYOPHILIZED, FOR SOLUTION INTRAVENOUS at 14:01

## 2024-04-04 RX ADMIN — DENOSUMAB 120 MG: 120 INJECTION SUBCUTANEOUS at 14:37

## 2024-04-04 RX ADMIN — LEUPROLIDE ACETATE 3.75 MG: KIT at 14:35

## 2024-04-04 RX ADMIN — SODIUM CHLORIDE 250 ML: 9 INJECTION, SOLUTION INTRAVENOUS at 14:01

## 2024-04-04 NOTE — PROGRESS NOTES
Chief Complaint  Chemotherapy    Lela Vanegas, APRN  Romina, Lela, APRN    Subjective          Annita Johnson presents to [facility] for ongoing treatment of her metastatic breast cancer.  She is on letrozole, trastuzumab, leuprolide for ovarian suppression and denosumab for bony involvement.  She is tolerating her treatments well.  She does note some fatigue but is able to perform all of her normal activities of daily living including caring for her young children.  She denies chest pain, shortness of breath, lower extremity swelling.  No new masses or adenopathy.  No unusual aches or pains.  She notes good appetite.  She is pursuing bilateral oophorectomy and this is scheduled for later this month.    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, estrogen receptor positive   6/8/2023 Initial Diagnosis    Malignant neoplasm of upper-inner quadrant of right breast in female, estrogen receptor positive     6/8/2023 Cancer Staged    Staging form: Breast, AJCC 8th Edition  - Clinical: Stage IV (cT2, cN0, cM1, ER+, PA+, HER2-) - Signed by Perry Garcia MD on 6/8/2023 6/9/2023 -  Chemotherapy    OP SUPPORTIVE BREAST Leuprolide 3.75 MG Q28D     6/15/2023 - 6/15/2023 Chemotherapy    OP BREAST Letrozole / Ribociclib     7/13/2023 -  Chemotherapy    OP BREAST Letrozole / Trastuzumab     8/24/2023 -  Chemotherapy    OP SUPPORTIVE Denosumab (Xgeva) Q28D     Metastasis to bone   6/8/2023 Initial Diagnosis    Metastasis to bone     8/24/2023 -  Chemotherapy    OP SUPPORTIVE Denosumab (Xgeva) Q28D         Review of Systems   Constitutional:  Positive for fatigue. Negative for appetite change, diaphoresis, fever, unexpected weight gain and unexpected weight loss.   HENT:  Negative for hearing loss, mouth sores, sore throat, swollen glands, trouble swallowing and voice change.    Eyes:  Negative for blurred vision.   Respiratory:   Negative for cough, shortness of breath and wheezing.    Cardiovascular:  Negative for chest pain and palpitations.   Gastrointestinal:  Negative for abdominal pain, blood in stool, constipation, diarrhea, nausea and vomiting.   Endocrine: Negative for cold intolerance and heat intolerance.   Genitourinary:  Negative for difficulty urinating, dysuria, frequency, hematuria and urinary incontinence.   Musculoskeletal:  Negative for arthralgias, back pain and myalgias.   Skin:  Negative for rash, skin lesions and wound.   Neurological:  Negative for dizziness, seizures, weakness, numbness and headache.   Hematological:  Does not bruise/bleed easily.   Psychiatric/Behavioral:  Negative for depressed mood. The patient is not nervous/anxious.      Current Outpatient Medications on File Prior to Visit   Medication Sig Dispense Refill    Calcium Carbonate-Vitamin D 600-10 MG-MCG per tablet Take 1 tablet by mouth 2 (Two) Times a Day. 60 tablet 5    clonazePAM (KlonoPIN) 0.5 MG tablet Take 1 tablet by mouth 2 (Two) Times a Day As Needed for Anxiety. 30 tablet 1    cyclobenzaprine (FLEXERIL) 10 MG tablet Take 1 tablet by mouth 3 (Three) Times a Day As Needed for Muscle Spasms. 90 tablet 5    escitalopram (LEXAPRO) 10 MG tablet TAKE 1 TABLET BY MOUTH EVERY DAY 90 tablet 1    folic acid (FOLVITE) 1 MG tablet TAKE 1 TABLET BY MOUTH EVERY DAY 90 tablet 1    hydrOXYzine (ATARAX) 50 MG tablet TAKE 1 TABLET BY MOUTH EVERYDAY AT BEDTIME (Patient taking differently: Take 1 tablet by mouth At Night As Needed.) 90 tablet 1    letrozole (FEMARA) 2.5 MG tablet Take 1 tablet by mouth Daily. 30 tablet 5    letrozole (FEMARA) 2.5 MG tablet Take 1 tablet by mouth Daily. 30 tablet 5    Rimegepant Sulfate (Nurtec) 75 MG tablet dispersible tablet Take 1 tablet by mouth Daily As Needed (migraines). 8 tablet 5    Sodium Fluoride 5000 PPM 1.1 % gel See Admin Instructions.      Trastuzumab (HERCEPTIN IV) Infuse  into a venous catheter. EVERY 21  DAYS-NEXT ON 24      vitamin D (ERGOCALCIFEROL) 1.25 MG (95702 UT) capsule capsule Take 1 capsule by mouth 1 (One) Time Per Week. 12 capsule 1    letrozole (FEMARA) 2.5 MG tablet Take 1 tablet by mouth Daily. 30 tablet 5    ondansetron (ZOFRAN) 8 MG tablet Take 1 tablet by mouth 3 (Three) Times a Day As Needed for Nausea or Vomiting. (Patient not taking: Reported on 2023) 30 tablet 5     Current Facility-Administered Medications on File Prior to Visit   Medication Dose Route Frequency Provider Last Rate Last Admin    acetaminophen (TYLENOL) tablet 650 mg  650 mg Oral Once Perry Garcia MD        denosumab (XGEVA) injection 120 mg  120 mg Subcutaneous Once Perry Garcia MD        leuprolide (LUPRON) injection 3.75 mg  3.75 mg Intramuscular Once Perry Garcia MD        sodium chloride 0.9 % infusion 250 mL  250 mL Intravenous Once Perry Garcia MD        Trastuzumab (HERCEPTIN) 530 mg in sodium chloride 0.9 % 300.2 mL chemo IVPB  6 mg/kg (Treatment Plan Recorded) Intravenous Once Perry Garcia MD           No Known Allergies  Past Medical History:   Diagnosis Date    Anemia     Breast cancer     Cancer, metastatic to bone     Kidney stone     Kidney stones     Metastasis to bone 2023     Past Surgical History:   Procedure Laterality Date    BREAST BIOPSY      CERVICAL FUSION  2023     SECTION N/A 2021    Procedure:  SECTION PRIMARY;  Surgeon: Zoe Dawson MD;  Location: [facility] DELIVERY;  Service: Obstetrics/Gynecology;  Laterality: N/A;    CYSTOSCOPY BLADDER STONE LITHOTRIPSY       Social History     Socioeconomic History    Marital status:      Spouse name: Janet   Tobacco Use    Smoking status: Every Day     Current packs/day: 0.25     Average packs/day: 0.3 packs/day for 3.2 years (0.8 ttl pk-yrs)     Types: Cigarettes     Start date: 2021     Passive exposure: Current    Smokeless tobacco: Never   Vaping Use    Vaping status: Never  "Used   Substance and Sexual Activity    Alcohol use: Yes     Alcohol/week: 6.0 standard drinks of alcohol     Types: 3 Glasses of wine, 3 Drinks containing 0.5 oz of alcohol per week     Comment: OCC    Drug use: Yes     Types: Marijuana     Comment: for pain control/DAILY    Sexual activity: Yes     Partners: Male     Birth control/protection: Same-sex partner     Family History   Problem Relation Age of Onset    Mental illness Mother     Hypertension Father     Alcohol abuse Father     Ovarian cancer Paternal Aunt     Breast cancer Maternal Great-Grandmother     Malig Hyperthermia Neg Hx        Objective   Physical Exam  Vitals reviewed. Exam conducted with a chaperone present.   Constitutional:       General: She is not in acute distress.     Appearance: Normal appearance.   Cardiovascular:      Rate and Rhythm: Normal rate and regular rhythm.      Heart sounds: Normal heart sounds. No murmur heard.     No gallop.   Pulmonary:      Effort: Pulmonary effort is normal.      Breath sounds: Normal breath sounds.   Abdominal:      General: Abdomen is flat. Bowel sounds are normal.      Palpations: Abdomen is soft.   Musculoskeletal:      Right lower leg: No edema.      Left lower leg: No edema.   Neurological:      Mental Status: She is alert and oriented to person, place, and time.   Psychiatric:         Mood and Affect: Mood normal.         Behavior: Behavior normal.         Vitals:    04/04/24 1315   BP: 120/81   Pulse: 101   Resp: 18   Temp: 98 °F (36.7 °C)   SpO2: 98%   Weight: 93 kg (205 lb 0.4 oz)   Height: 160 cm (62.99\")   PainSc: 0-No pain     ECOG score: 0         PHQ-9 Total Score:                    Result Review :   The following data was reviewed by: Perry Garcia MD on 04/04/2024:  Lab Results   Component Value Date    HGB 11.4 (L) 04/02/2024    HCT 38.2 04/02/2024    MCV 92.9 04/02/2024     04/02/2024    WBC 6.11 04/02/2024    NEUTROABS 3.93 04/02/2024    LYMPHSABS 1.29 04/02/2024    " "MONOSABS 0.73 04/02/2024    EOSABS 0.12 04/02/2024    BASOSABS 0.02 04/02/2024     Lab Results   Component Value Date    GLUCOSE 88 04/02/2024    BUN 10 04/02/2024    CREATININE 0.71 04/02/2024     04/02/2024    K 4.2 04/02/2024     04/02/2024    CO2 21.9 (L) 04/02/2024    CALCIUM 9.2 04/02/2024    PROTEINTOT 7.7 04/02/2024    ALBUMIN 4.2 04/02/2024    BILITOT 0.2 04/02/2024    ALKPHOS 86 04/02/2024    AST 44 (H) 04/02/2024    ALT 59 (H) 04/02/2024     Lab Results   Component Value Date    MG 1.8 04/02/2024    PHOS 2.8 04/02/2024    TSH 0.885 09/21/2023     No results found for: \"IRON\", \"LABIRON\", \"TRANSFERRIN\", \"TIBC\"  Lab Results   Component Value Date    NEYUIORW87 577 02/22/2024    FOLATE >20.00 02/22/2024     No results found for: \"PSA\", \"CEA\", \"AFP\", \"\", \"\"    Data reviewed : Radiologic studies CT chest, abdomen, pelvis and bone scan reviewed  Echocardiogram reviewed     Assessment and Plan    Diagnoses and all orders for this visit:    1. Malignant neoplasm of upper-inner quadrant of right breast in female, estrogen receptor positive (Primary)  Assessment & Plan:  HER2 and hormone receptor positive based on 2 different tumor locations-breast and cervical spine.  She is on treatment with letrozole, trastuzumab, leuprolide for ovarian suppression and denosumab for bony involvement.  Tolerating well.  Restaging scans show stable disease with no new or worsening findings.  She has opted to proceed with bilateral oophorectomy which is scheduled for later this month.  She will receive leuprolide injection today but can stop after her oophorectomy.  Echocardiogram shows normal ejection fraction.  Proceed with trastuzumab today as planned.  We discussed routine exercise, good nutrition and fluid intake, good sleep to help with her fatigue    Orders:  -     CBC and Differential; Future  -     Comprehensive metabolic panel; Future    2. Metastasis to bone  Assessment & Plan:  Patient is on monthly " denosumab.  Tolerating well.  Restaging scans show stable disease in the bones.  She denies dental or jaw pain.  Recent lecture lites are normal.  Denosumab today monthly.      Other orders  -     Cancel: sodium chloride 0.9 % infusion 250 mL  -     Cancel: acetaminophen (TYLENOL) tablet 650 mg  -     Cancel: Trastuzumab (HERCEPTIN) 530 mg in sodium chloride 0.9 % 250 mL chemo IVPB  -     Cancel: leuprolide (LUPRON) injection 3.75 mg  -     Cancel: denosumab (XGEVA) injection 120 mg            Patient Follow Up: 3 weeks    Patient was given instructions and counseling regarding her condition or for health maintenance advice. Please see specific information pulled into the AVS if appropriate.     Perry Garcai MD    4/4/2024

## 2024-04-04 NOTE — ASSESSMENT & PLAN NOTE
HER2 and hormone receptor positive based on 2 different tumor locations-breast and cervical spine.  She is on treatment with letrozole, trastuzumab, leuprolide for ovarian suppression and denosumab for bony involvement.  Tolerating well.  Restaging scans show stable disease with no new or worsening findings.  She has opted to proceed with bilateral oophorectomy which is scheduled for later this month.  She will receive leuprolide injection today but can stop after her oophorectomy.  Echocardiogram shows normal ejection fraction.  Proceed with trastuzumab today as planned.  We discussed routine exercise, good nutrition and fluid intake, good sleep to help with her fatigue

## 2024-04-04 NOTE — ASSESSMENT & PLAN NOTE
Patient is on monthly denosumab.  Tolerating well.  Restaging scans show stable disease in the bones.  She denies dental or jaw pain.  Recent lecture lites are normal.  Denosumab today monthly.

## 2024-04-17 ENCOUNTER — ANESTHESIA (OUTPATIENT)
Dept: PERIOP | Facility: HOSPITAL | Age: 39
End: 2024-04-17
Payer: COMMERCIAL

## 2024-04-17 ENCOUNTER — HOSPITAL ENCOUNTER (OUTPATIENT)
Facility: HOSPITAL | Age: 39
Setting detail: HOSPITAL OUTPATIENT SURGERY
Discharge: HOME | End: 2024-04-17
Attending: OBSTETRICS & GYNECOLOGY | Admitting: OBSTETRICS & GYNECOLOGY
Payer: COMMERCIAL

## 2024-04-17 ENCOUNTER — ANESTHESIA EVENT (OUTPATIENT)
Dept: PERIOP | Facility: HOSPITAL | Age: 39
End: 2024-04-17
Payer: COMMERCIAL

## 2024-04-17 VITALS
HEART RATE: 77 BPM | TEMPERATURE: 98.6 F | SYSTOLIC BLOOD PRESSURE: 119 MMHG | OXYGEN SATURATION: 100 % | DIASTOLIC BLOOD PRESSURE: 66 MMHG | RESPIRATION RATE: 16 BRPM

## 2024-04-17 DIAGNOSIS — Z85.3 HX: BREAST CANCER: ICD-10-CM

## 2024-04-17 LAB
ABO GROUP BLD: NORMAL
BLD GP AB SCN SERPL QL: NEGATIVE
HCG SERPL QL: NEGATIVE
HOLD SPECIMEN: NORMAL
RH BLD: POSITIVE
T&S EXPIRATION DATE: NORMAL

## 2024-04-17 PROCEDURE — 25010000002 ONDANSETRON PER 1 MG: Performed by: NURSE ANESTHETIST, CERTIFIED REGISTERED

## 2024-04-17 PROCEDURE — 25010000002 HYDROMORPHONE PER 4 MG: Performed by: NURSE ANESTHETIST, CERTIFIED REGISTERED

## 2024-04-17 PROCEDURE — 25010000002 ROPIVACAINE PER 1 MG: Performed by: OBSTETRICS & GYNECOLOGY

## 2024-04-17 PROCEDURE — 25810000003 LACTATED RINGERS PER 1000 ML: Performed by: NURSE ANESTHETIST, CERTIFIED REGISTERED

## 2024-04-17 PROCEDURE — 25010000002 SUGAMMADEX 200 MG/2ML SOLUTION: Performed by: NURSE ANESTHETIST, CERTIFIED REGISTERED

## 2024-04-17 PROCEDURE — 84703 CHORIONIC GONADOTROPIN ASSAY: CPT | Performed by: ANESTHESIOLOGY

## 2024-04-17 PROCEDURE — 25810000003 LACTATED RINGERS PER 1000 ML: Performed by: ANESTHESIOLOGY

## 2024-04-17 PROCEDURE — 86900 BLOOD TYPING SEROLOGIC ABO: CPT | Performed by: OBSTETRICS & GYNECOLOGY

## 2024-04-17 PROCEDURE — 25010000002 PROPOFOL 10 MG/ML EMULSION: Performed by: NURSE ANESTHETIST, CERTIFIED REGISTERED

## 2024-04-17 PROCEDURE — 25010000002 DEXAMETHASONE PER 1 MG: Performed by: NURSE ANESTHETIST, CERTIFIED REGISTERED

## 2024-04-17 PROCEDURE — 86901 BLOOD TYPING SEROLOGIC RH(D): CPT | Performed by: OBSTETRICS & GYNECOLOGY

## 2024-04-17 PROCEDURE — 25010000002 CEFAZOLIN PER 500 MG: Performed by: OBSTETRICS & GYNECOLOGY

## 2024-04-17 PROCEDURE — 88305 TISSUE EXAM BY PATHOLOGIST: CPT | Performed by: OBSTETRICS & GYNECOLOGY

## 2024-04-17 PROCEDURE — 25010000002 LIDOCAINE 1 % SOLUTION 20 ML VIAL: Performed by: OBSTETRICS & GYNECOLOGY

## 2024-04-17 PROCEDURE — 25010000002 MIDAZOLAM PER 1 MG: Performed by: ANESTHESIOLOGY

## 2024-04-17 PROCEDURE — 25010000002 GLYCOPYRROLATE 0.2 MG/ML SOLUTION: Performed by: NURSE ANESTHETIST, CERTIFIED REGISTERED

## 2024-04-17 PROCEDURE — 25010000002 FENTANYL CITRATE (PF) 50 MCG/ML SOLUTION: Performed by: NURSE ANESTHETIST, CERTIFIED REGISTERED

## 2024-04-17 PROCEDURE — 86850 RBC ANTIBODY SCREEN: CPT | Performed by: OBSTETRICS & GYNECOLOGY

## 2024-04-17 PROCEDURE — 25810000003 SODIUM CHLORIDE PER 500 ML: Performed by: OBSTETRICS & GYNECOLOGY

## 2024-04-17 PROCEDURE — C1894 INTRO/SHEATH, NON-LASER: HCPCS | Performed by: OBSTETRICS & GYNECOLOGY

## 2024-04-17 RX ORDER — SODIUM CHLORIDE 0.9 % (FLUSH) 0.9 %
3-10 SYRINGE (ML) INJECTION AS NEEDED
Status: DISCONTINUED | OUTPATIENT
Start: 2024-04-17 | End: 2024-04-17 | Stop reason: HOSPADM

## 2024-04-17 RX ORDER — FENTANYL CITRATE 50 UG/ML
50 INJECTION, SOLUTION INTRAMUSCULAR; INTRAVENOUS ONCE AS NEEDED
Status: DISCONTINUED | OUTPATIENT
Start: 2024-04-17 | End: 2024-04-17 | Stop reason: HOSPADM

## 2024-04-17 RX ORDER — SCOLOPAMINE TRANSDERMAL SYSTEM 1 MG/1
1 PATCH, EXTENDED RELEASE TRANSDERMAL ONCE
Status: DISCONTINUED | OUTPATIENT
Start: 2024-04-17 | End: 2024-04-17 | Stop reason: HOSPADM

## 2024-04-17 RX ORDER — HYDROMORPHONE HYDROCHLORIDE 1 MG/ML
0.5 INJECTION, SOLUTION INTRAMUSCULAR; INTRAVENOUS; SUBCUTANEOUS
Status: DISCONTINUED | OUTPATIENT
Start: 2024-04-17 | End: 2024-04-17 | Stop reason: HOSPADM

## 2024-04-17 RX ORDER — GLYCOPYRROLATE 0.2 MG/ML
INJECTION INTRAMUSCULAR; INTRAVENOUS AS NEEDED
Status: DISCONTINUED | OUTPATIENT
Start: 2024-04-17 | End: 2024-04-17 | Stop reason: SURG

## 2024-04-17 RX ORDER — MIDAZOLAM HYDROCHLORIDE 1 MG/ML
1 INJECTION INTRAMUSCULAR; INTRAVENOUS
Status: COMPLETED | OUTPATIENT
Start: 2024-04-17 | End: 2024-04-17

## 2024-04-17 RX ORDER — NALOXONE HCL 0.4 MG/ML
0.2 VIAL (ML) INJECTION AS NEEDED
Status: DISCONTINUED | OUTPATIENT
Start: 2024-04-17 | End: 2024-04-17 | Stop reason: HOSPADM

## 2024-04-17 RX ORDER — OXYCODONE HYDROCHLORIDE AND ACETAMINOPHEN 5; 325 MG/1; MG/1
1 TABLET ORAL EVERY 6 HOURS PRN
Qty: 10 TABLET | Refills: 0 | Status: SHIPPED | OUTPATIENT
Start: 2024-04-17

## 2024-04-17 RX ORDER — ONDANSETRON 2 MG/ML
4 INJECTION INTRAMUSCULAR; INTRAVENOUS ONCE AS NEEDED
Status: COMPLETED | OUTPATIENT
Start: 2024-04-17 | End: 2024-04-17

## 2024-04-17 RX ORDER — OXYCODONE AND ACETAMINOPHEN 7.5; 325 MG/1; MG/1
1 TABLET ORAL EVERY 4 HOURS PRN
Status: DISCONTINUED | OUTPATIENT
Start: 2024-04-17 | End: 2024-04-17 | Stop reason: HOSPADM

## 2024-04-17 RX ORDER — DROPERIDOL 2.5 MG/ML
0.62 INJECTION, SOLUTION INTRAMUSCULAR; INTRAVENOUS
Status: DISCONTINUED | OUTPATIENT
Start: 2024-04-17 | End: 2024-04-17 | Stop reason: HOSPADM

## 2024-04-17 RX ORDER — PROMETHAZINE HYDROCHLORIDE 25 MG/1
25 SUPPOSITORY RECTAL ONCE AS NEEDED
Status: DISCONTINUED | OUTPATIENT
Start: 2024-04-17 | End: 2024-04-17 | Stop reason: HOSPADM

## 2024-04-17 RX ORDER — FAMOTIDINE 10 MG/ML
20 INJECTION, SOLUTION INTRAVENOUS ONCE
Status: COMPLETED | OUTPATIENT
Start: 2024-04-17 | End: 2024-04-17

## 2024-04-17 RX ORDER — LIDOCAINE HYDROCHLORIDE 10 MG/ML
0.5 INJECTION, SOLUTION INFILTRATION; PERINEURAL ONCE AS NEEDED
Status: DISCONTINUED | OUTPATIENT
Start: 2024-04-17 | End: 2024-04-17 | Stop reason: HOSPADM

## 2024-04-17 RX ORDER — PROPOFOL 10 MG/ML
VIAL (ML) INTRAVENOUS AS NEEDED
Status: DISCONTINUED | OUTPATIENT
Start: 2024-04-17 | End: 2024-04-17 | Stop reason: SURG

## 2024-04-17 RX ORDER — FLUMAZENIL 0.1 MG/ML
0.2 INJECTION INTRAVENOUS AS NEEDED
Status: DISCONTINUED | OUTPATIENT
Start: 2024-04-17 | End: 2024-04-17 | Stop reason: HOSPADM

## 2024-04-17 RX ORDER — EPHEDRINE SULFATE 50 MG/ML
5 INJECTION, SOLUTION INTRAVENOUS ONCE AS NEEDED
Status: DISCONTINUED | OUTPATIENT
Start: 2024-04-17 | End: 2024-04-17 | Stop reason: HOSPADM

## 2024-04-17 RX ORDER — SODIUM CHLORIDE 0.9 % (FLUSH) 0.9 %
3 SYRINGE (ML) INJECTION EVERY 12 HOURS SCHEDULED
Status: DISCONTINUED | OUTPATIENT
Start: 2024-04-17 | End: 2024-04-17 | Stop reason: HOSPADM

## 2024-04-17 RX ORDER — ONDANSETRON 2 MG/ML
INJECTION INTRAMUSCULAR; INTRAVENOUS AS NEEDED
Status: DISCONTINUED | OUTPATIENT
Start: 2024-04-17 | End: 2024-04-17 | Stop reason: SURG

## 2024-04-17 RX ORDER — DEXAMETHASONE SODIUM PHOSPHATE 4 MG/ML
INJECTION, SOLUTION INTRA-ARTICULAR; INTRALESIONAL; INTRAMUSCULAR; INTRAVENOUS; SOFT TISSUE AS NEEDED
Status: DISCONTINUED | OUTPATIENT
Start: 2024-04-17 | End: 2024-04-17 | Stop reason: SURG

## 2024-04-17 RX ORDER — SODIUM CHLORIDE, SODIUM LACTATE, POTASSIUM CHLORIDE, CALCIUM CHLORIDE 600; 310; 30; 20 MG/100ML; MG/100ML; MG/100ML; MG/100ML
9 INJECTION, SOLUTION INTRAVENOUS CONTINUOUS
Status: DISCONTINUED | OUTPATIENT
Start: 2024-04-17 | End: 2024-04-17 | Stop reason: HOSPADM

## 2024-04-17 RX ORDER — ROCURONIUM BROMIDE 10 MG/ML
INJECTION, SOLUTION INTRAVENOUS AS NEEDED
Status: DISCONTINUED | OUTPATIENT
Start: 2024-04-17 | End: 2024-04-17 | Stop reason: SURG

## 2024-04-17 RX ORDER — SODIUM CHLORIDE, SODIUM LACTATE, POTASSIUM CHLORIDE, CALCIUM CHLORIDE 600; 310; 30; 20 MG/100ML; MG/100ML; MG/100ML; MG/100ML
INJECTION, SOLUTION INTRAVENOUS CONTINUOUS PRN
Status: DISCONTINUED | OUTPATIENT
Start: 2024-04-17 | End: 2024-04-17 | Stop reason: SURG

## 2024-04-17 RX ORDER — PROMETHAZINE HYDROCHLORIDE 25 MG/1
25 TABLET ORAL ONCE AS NEEDED
Status: DISCONTINUED | OUTPATIENT
Start: 2024-04-17 | End: 2024-04-17 | Stop reason: HOSPADM

## 2024-04-17 RX ORDER — FENTANYL CITRATE 50 UG/ML
50 INJECTION, SOLUTION INTRAMUSCULAR; INTRAVENOUS
Status: DISCONTINUED | OUTPATIENT
Start: 2024-04-17 | End: 2024-04-17 | Stop reason: HOSPADM

## 2024-04-17 RX ORDER — HYDRALAZINE HYDROCHLORIDE 20 MG/ML
5 INJECTION INTRAMUSCULAR; INTRAVENOUS
Status: DISCONTINUED | OUTPATIENT
Start: 2024-04-17 | End: 2024-04-17 | Stop reason: HOSPADM

## 2024-04-17 RX ORDER — LIDOCAINE HYDROCHLORIDE 20 MG/ML
INJECTION, SOLUTION INFILTRATION; PERINEURAL AS NEEDED
Status: DISCONTINUED | OUTPATIENT
Start: 2024-04-17 | End: 2024-04-17 | Stop reason: SURG

## 2024-04-17 RX ORDER — IPRATROPIUM BROMIDE AND ALBUTEROL SULFATE 2.5; .5 MG/3ML; MG/3ML
3 SOLUTION RESPIRATORY (INHALATION) ONCE AS NEEDED
Status: DISCONTINUED | OUTPATIENT
Start: 2024-04-17 | End: 2024-04-17 | Stop reason: HOSPADM

## 2024-04-17 RX ORDER — FENTANYL CITRATE 50 UG/ML
INJECTION, SOLUTION INTRAMUSCULAR; INTRAVENOUS AS NEEDED
Status: DISCONTINUED | OUTPATIENT
Start: 2024-04-17 | End: 2024-04-17 | Stop reason: SURG

## 2024-04-17 RX ORDER — LABETALOL HYDROCHLORIDE 5 MG/ML
5 INJECTION, SOLUTION INTRAVENOUS
Status: DISCONTINUED | OUTPATIENT
Start: 2024-04-17 | End: 2024-04-17 | Stop reason: HOSPADM

## 2024-04-17 RX ORDER — SODIUM CHLORIDE 9 MG/ML
INJECTION, SOLUTION INTRAVENOUS AS NEEDED
Status: DISCONTINUED | OUTPATIENT
Start: 2024-04-17 | End: 2024-04-17 | Stop reason: HOSPADM

## 2024-04-17 RX ORDER — DIPHENHYDRAMINE HYDROCHLORIDE 50 MG/ML
12.5 INJECTION INTRAMUSCULAR; INTRAVENOUS
Status: DISCONTINUED | OUTPATIENT
Start: 2024-04-17 | End: 2024-04-17 | Stop reason: HOSPADM

## 2024-04-17 RX ORDER — HYDROCODONE BITARTRATE AND ACETAMINOPHEN 5; 325 MG/1; MG/1
1 TABLET ORAL ONCE AS NEEDED
Status: DISCONTINUED | OUTPATIENT
Start: 2024-04-17 | End: 2024-04-17 | Stop reason: HOSPADM

## 2024-04-17 RX ADMIN — ONDANSETRON 4 MG: 2 INJECTION INTRAMUSCULAR; INTRAVENOUS at 11:33

## 2024-04-17 RX ADMIN — SUGAMMADEX 200 MG: 100 INJECTION, SOLUTION INTRAVENOUS at 11:33

## 2024-04-17 RX ADMIN — SODIUM CHLORIDE, POTASSIUM CHLORIDE, SODIUM LACTATE AND CALCIUM CHLORIDE: 600; 310; 30; 20 INJECTION, SOLUTION INTRAVENOUS at 10:21

## 2024-04-17 RX ADMIN — DEXAMETHASONE SODIUM PHOSPHATE 8 MG: 4 INJECTION, SOLUTION INTRA-ARTICULAR; INTRALESIONAL; INTRAMUSCULAR; INTRAVENOUS; SOFT TISSUE at 10:39

## 2024-04-17 RX ADMIN — SCOPALAMINE 1 PATCH: 1 PATCH, EXTENDED RELEASE TRANSDERMAL at 08:20

## 2024-04-17 RX ADMIN — FAMOTIDINE 20 MG: 10 INJECTION INTRAVENOUS at 08:17

## 2024-04-17 RX ADMIN — PROPOFOL 200 MG: 10 INJECTION, EMULSION INTRAVENOUS at 10:31

## 2024-04-17 RX ADMIN — FENTANYL CITRATE 50 MCG: 50 INJECTION, SOLUTION INTRAMUSCULAR; INTRAVENOUS at 10:31

## 2024-04-17 RX ADMIN — ONDANSETRON 4 MG: 2 INJECTION INTRAMUSCULAR; INTRAVENOUS at 12:28

## 2024-04-17 RX ADMIN — SODIUM CHLORIDE 2000 MG: 900 INJECTION INTRAVENOUS at 10:15

## 2024-04-17 RX ADMIN — PROPOFOL 50 MCG/KG/MIN: 10 INJECTION, EMULSION INTRAVENOUS at 10:44

## 2024-04-17 RX ADMIN — FENTANYL CITRATE 50 MCG: 50 INJECTION, SOLUTION INTRAMUSCULAR; INTRAVENOUS at 11:33

## 2024-04-17 RX ADMIN — ROCURONIUM BROMIDE 50 MG: 10 INJECTION, SOLUTION INTRAVENOUS at 10:31

## 2024-04-17 RX ADMIN — SODIUM CHLORIDE, POTASSIUM CHLORIDE, SODIUM LACTATE AND CALCIUM CHLORIDE 9 ML/HR: 600; 310; 30; 20 INJECTION, SOLUTION INTRAVENOUS at 08:22

## 2024-04-17 RX ADMIN — GLYCOPYRROLATE 0.1 MG: 0.2 INJECTION INTRAMUSCULAR; INTRAVENOUS at 10:39

## 2024-04-17 RX ADMIN — LIDOCAINE HYDROCHLORIDE 100 MG: 20 INJECTION, SOLUTION INFILTRATION; PERINEURAL at 10:31

## 2024-04-17 RX ADMIN — HYDROMORPHONE HYDROCHLORIDE 0.5 MG: 1 INJECTION, SOLUTION INTRAMUSCULAR; INTRAVENOUS; SUBCUTANEOUS at 12:10

## 2024-04-17 RX ADMIN — MIDAZOLAM 1 MG: 1 INJECTION INTRAMUSCULAR; INTRAVENOUS at 08:22

## 2024-04-17 RX ADMIN — MIDAZOLAM 1 MG: 1 INJECTION INTRAMUSCULAR; INTRAVENOUS at 08:18

## 2024-04-17 RX ADMIN — SODIUM CHLORIDE, POTASSIUM CHLORIDE, SODIUM LACTATE AND CALCIUM CHLORIDE 9 ML/HR: 600; 310; 30; 20 INJECTION, SOLUTION INTRAVENOUS at 08:21

## 2024-04-17 NOTE — ADDENDUM NOTE
Addendum  created 04/17/24 1334 by Leoncio Busby MD    Attestation recorded in Intraprocedure, Intraprocedure Attestations filed

## 2024-04-17 NOTE — OP NOTE
Preop dx: Breast Cancer    Post op dx: Same    Procedure: DaVinci Assisted Bilateral Salpingoophorectomy    Anesthesia: General    Complications: none    Surgeon: Dr Avelar    Assistant: Dr Chun    EBL: <50cc    Specimens:   Order Name Source Comment Collection Info Order Time   HCG, SERUM, QUALITATIVE   Collected By: Susan Mares RN 4/17/2024  7:15 AM     Release to patient   Routine Release        TISSUE PATHOLOGY EXAM Ovaries, Bilateral with Fallopian Tubes  Collected By: Ashlyn Avelar MD 4/17/2024 11:34 AM     Release to patient   Routine Release            Anesthesia: General    Estimated Blood Loss: 25 mL    Complications: None    Findings: uterus, nl tubes and ovaries.       Detail of Procedure:     After appropriate consents were obtained, pt was taken to the operating room where anesthesia was found to be adequate.     Pt was placed in dorsal lithotomy position, arms tucked in and prepped and draped in usual fashion.     Attention was then placed to the perineum where a griffin was placed, followed by uterine manipulator.     Then attention was placed to the abdomen where pt was flat and Veress needle introduced and PCO2 in sulfated to archive 25 mmHg. Then under direct visualization 8 mm trocar was introduced and identification of intraperitoneal placement was achieved. Pt then placed in steep trendelenburg and two 8 mm trocars where introduced under direct visualization on right and left lower abdominal area. Then a 10 mm trocar was introduced on midline lower abd area.     The Da Beba was then docked and using the fenestrated bipolar and the vessel sealer, bilateral tuboovarian ligaments where cauterized and transected after visualizing the track of the ureters/  Hemostasis assured.   The specimen was then removed via a laparoscopic bag/    10mm fascia incision was closed with 0 vicryl/    The skin was closed with 4-0 vicryl.     Sponge, laps and needle counts correct x 2. Pt taken to  recovery in stable condition. Family updated on the status of pt by me.

## 2024-04-17 NOTE — DISCHARGE INSTRUCTIONS
Scopolamine Patch  This patch has been applied to the skin behind one of your ears.  It may stay in place up to 24 hours. You may remove it at any time after your surgery; however, it should be removed after you are up and walking around the next day.  This medicine reduces stomach upset. Side effects may include: dry mouth, dizziness, sleepiness, constipation, or upset stomach.  An allergy would show up as: a rash, itching, wheezing or shortness of breath.  Follow these instructions:  Do not drink alcohol, drive or operate machinery while taking this medicine.  Wear only 1 patch at a time. You can leave the patch on for up to 24 hours.  When you remove the patch, fold it in half with the sticky sides together and throw it away. Wash your hands and the area under the patch.  Do not touch your eye with your hand if it has touched the patch.  Wash your hands well before and after touching the patch.  Sit or stand slowly to avoid dizziness.  Call your doctor if you have:  Any sign of allergy  No relief  Trouble passing urine  Any new or severe symptoms          YOU WILL BE ON PELVIC REST FOR THE NEXT 2 WEEKS OR UNTIL SPECIFIED BY YOUR PHYSICIAN. PELVIC REST INCLUDES:  Avoiding long periods of standing.  Avoiding heavy lifting, pushing or pulling.  DO NOT lift anything heavier than 10 pounds (4.5 kg)  DO NOT douche, use tampons, or have sex (intercourse) for 2 weeks after the procedure.

## 2024-04-17 NOTE — ANESTHESIA POSTPROCEDURE EVALUATION
Patient: Annita Johnson    Procedure Summary       Date: 04/17/24 Room / Location: [facility] OR  / [facility] MAIN OR    Anesthesia Start: 1021 Anesthesia Stop: 1140    Procedure: SALPINGO OOPHORECTOMY LAPAROSCOPIC WITH DAVINCI ROBOT (Bilateral: Abdomen) Diagnosis:     Surgeons: Ashlyn Avelar MD Provider: Leoncio Busby MD    Anesthesia Type: general ASA Status: 2            Anesthesia Type: general    Vitals  Vitals Value Taken Time   /83 04/17/24 1230   Temp 37 °C (98.6 °F) 04/17/24 1140   Pulse 64 04/17/24 1230   Resp 16 04/17/24 1215   SpO2 98 % 04/17/24 1230   Vitals shown include unfiled device data.        Post Anesthesia Care and Evaluation    Patient location during evaluation: bedside  Patient participation: complete - patient participated  Level of consciousness: awake and alert  Pain management: adequate    Airway patency: patent  Anesthetic complications: No anesthetic complications    Cardiovascular status: acceptable  Respiratory status: acceptable  Hydration status: acceptable    Comments: /66   Pulse 77   Temp 37 °C (98.6 °F) (Oral)   Resp 16   SpO2 100%

## 2024-04-17 NOTE — ANESTHESIA PREPROCEDURE EVALUATION
Anesthesia Evaluation     NPO Solid Status: > 8 hours  NPO Liquid Status: > 2 hours           Airway   Mallampati: II  TM distance: >3 FB  Neck ROM: full  Dental - normal exam     Pulmonary    Cardiovascular     Rhythm: regular  Rate: normal        Neuro/Psych  (+) headaches, numbness, psychiatric history Anxiety  GI/Hepatic/Renal/Endo    (+) renal disease- stones    Musculoskeletal     Abdominal    Substance History      OB/GYN          Other      history of cancer                Anesthesia Plan    ASA 2     general     intravenous induction     Anesthetic plan, risks, benefits, and alternatives have been provided, discussed and informed consent has been obtained with: patient.    CODE STATUS:

## 2024-04-17 NOTE — ANESTHESIA PROCEDURE NOTES
Airway  Urgency: elective    Date/Time: 4/17/2024 10:34 AM  Airway not difficult    General Information and Staff    Patient location during procedure: OR    Indications and Patient Condition  Indications for airway management: airway protection    Preoxygenated: yes  Mask difficulty assessment: 2 - vent by mask + OA or adjuvant +/- NMBA    Final Airway Details  Final airway type: endotracheal airway      Successful airway: ETT  Cuffed: yes   Successful intubation technique: direct laryngoscopy  Facilitating devices/methods: intubating stylet  Endotracheal tube insertion site: oral  Blade: Betsey  Blade size: 3  ETT size (mm): 7.0  Cormack-Lehane Classification: grade I - full view of glottis  Placement verified by: chest auscultation and capnometry   Measured from: lips  ETT/EBT  to lips (cm): 22  Number of attempts at approach: 1  Assessment: lips, teeth, and gum same as pre-op and atraumatic intubation

## 2024-04-19 LAB
LAB AP CASE REPORT: NORMAL
PATH REPORT.FINAL DX SPEC: NORMAL
PATH REPORT.GROSS SPEC: NORMAL

## 2024-04-25 ENCOUNTER — HOSPITAL ENCOUNTER (OUTPATIENT)
Dept: ONCOLOGY | Facility: HOSPITAL | Age: 39
Discharge: HOME | End: 2024-04-25
Payer: COMMERCIAL

## 2024-04-25 ENCOUNTER — OFFICE VISIT (OUTPATIENT)
Dept: ONCOLOGY | Facility: HOSPITAL | Age: 39
End: 2024-04-25
Payer: COMMERCIAL

## 2024-04-25 VITALS
HEART RATE: 99 BPM | OXYGEN SATURATION: 99 % | WEIGHT: 202.16 LBS | DIASTOLIC BLOOD PRESSURE: 79 MMHG | SYSTOLIC BLOOD PRESSURE: 108 MMHG | BODY MASS INDEX: 35.82 KG/M2 | RESPIRATION RATE: 18 BRPM | TEMPERATURE: 97.6 F

## 2024-04-25 VITALS
TEMPERATURE: 97.6 F | WEIGHT: 202.16 LBS | BODY MASS INDEX: 35.82 KG/M2 | RESPIRATION RATE: 18 BRPM | SYSTOLIC BLOOD PRESSURE: 108 MMHG | DIASTOLIC BLOOD PRESSURE: 79 MMHG | HEART RATE: 99 BPM | HEIGHT: 63 IN | OXYGEN SATURATION: 99 %

## 2024-04-25 DIAGNOSIS — C79.51 METASTASIS TO BONE: Primary | ICD-10-CM

## 2024-04-25 DIAGNOSIS — C79.51 METASTASIS TO BONE: ICD-10-CM

## 2024-04-25 DIAGNOSIS — Z17.0 MALIGNANT NEOPLASM OF UPPER-INNER QUADRANT OF RIGHT BREAST IN FEMALE, ESTROGEN RECEPTOR POSITIVE: Primary | ICD-10-CM

## 2024-04-25 DIAGNOSIS — C50.211 MALIGNANT NEOPLASM OF UPPER-INNER QUADRANT OF RIGHT BREAST IN FEMALE, ESTROGEN RECEPTOR POSITIVE: Primary | ICD-10-CM

## 2024-04-25 DIAGNOSIS — C50.211 MALIGNANT NEOPLASM OF UPPER-INNER QUADRANT OF RIGHT BREAST IN FEMALE, ESTROGEN RECEPTOR POSITIVE: ICD-10-CM

## 2024-04-25 DIAGNOSIS — Z17.0 MALIGNANT NEOPLASM OF UPPER-INNER QUADRANT OF RIGHT BREAST IN FEMALE, ESTROGEN RECEPTOR POSITIVE: ICD-10-CM

## 2024-04-25 LAB
ALBUMIN SERPL-MCNC: 4.5 G/DL (ref 3.5–5.2)
ALBUMIN/GLOB SERPL: 1.3 G/DL
ALP SERPL-CCNC: 116 U/L (ref 39–117)
ALT SERPL W P-5'-P-CCNC: 85 U/L (ref 1–33)
ANION GAP SERPL CALCULATED.3IONS-SCNC: 10 MMOL/L (ref 5–15)
AST SERPL-CCNC: 50 U/L (ref 1–32)
BASOPHILS # BLD AUTO: 0.02 10*3/MM3 (ref 0–0.2)
BASOPHILS NFR BLD AUTO: 0.3 % (ref 0–1.5)
BILIRUB SERPL-MCNC: 0.7 MG/DL (ref 0–1.2)
BUN SERPL-MCNC: 13 MG/DL (ref 6–20)
BUN/CREAT SERPL: 16.9 (ref 7–25)
CALCIUM SPEC-SCNC: 9.1 MG/DL (ref 8.6–10.5)
CHLORIDE SERPL-SCNC: 104 MMOL/L (ref 98–107)
CO2 SERPL-SCNC: 25 MMOL/L (ref 22–29)
CREAT SERPL-MCNC: 0.77 MG/DL (ref 0.57–1)
DEPRECATED RDW RBC AUTO: 49 FL (ref 37–54)
EGFRCR SERPLBLD CKD-EPI 2021: 102 ML/MIN/1.73
EOSINOPHIL # BLD AUTO: 0.09 10*3/MM3 (ref 0–0.4)
EOSINOPHIL NFR BLD AUTO: 1.5 % (ref 0.3–6.2)
ERYTHROCYTE [DISTWIDTH] IN BLOOD BY AUTOMATED COUNT: 14.5 % (ref 12.3–15.4)
GLOBULIN UR ELPH-MCNC: 3.4 GM/DL
GLUCOSE SERPL-MCNC: 101 MG/DL (ref 65–99)
HCT VFR BLD AUTO: 37.7 % (ref 34–46.6)
HGB BLD-MCNC: 12 G/DL (ref 12–15.9)
IMM GRANULOCYTES # BLD AUTO: 0.01 10*3/MM3 (ref 0–0.05)
IMM GRANULOCYTES NFR BLD AUTO: 0.2 % (ref 0–0.5)
LYMPHOCYTES # BLD AUTO: 1.24 10*3/MM3 (ref 0.7–3.1)
LYMPHOCYTES NFR BLD AUTO: 20.4 % (ref 19.6–45.3)
MAGNESIUM SERPL-MCNC: 1.7 MG/DL (ref 1.6–2.6)
MCH RBC QN AUTO: 28.9 PG (ref 26.6–33)
MCHC RBC AUTO-ENTMCNC: 31.8 G/DL (ref 31.5–35.7)
MCV RBC AUTO: 90.8 FL (ref 79–97)
MONOCYTES # BLD AUTO: 0.66 10*3/MM3 (ref 0.1–0.9)
MONOCYTES NFR BLD AUTO: 10.8 % (ref 5–12)
NEUTROPHILS NFR BLD AUTO: 4.07 10*3/MM3 (ref 1.7–7)
NEUTROPHILS NFR BLD AUTO: 66.8 % (ref 42.7–76)
PHOSPHATE SERPL-MCNC: 3.4 MG/DL (ref 2.5–4.5)
PLATELET # BLD AUTO: 270 10*3/MM3 (ref 140–450)
PMV BLD AUTO: 9.3 FL (ref 6–12)
POTASSIUM SERPL-SCNC: 3.8 MMOL/L (ref 3.5–5.2)
PROT SERPL-MCNC: 7.9 G/DL (ref 6–8.5)
RBC # BLD AUTO: 4.15 10*6/MM3 (ref 3.77–5.28)
SODIUM SERPL-SCNC: 139 MMOL/L (ref 136–145)
WBC NRBC COR # BLD AUTO: 6.09 10*3/MM3 (ref 3.4–10.8)

## 2024-04-25 PROCEDURE — 84100 ASSAY OF PHOSPHORUS: CPT | Performed by: INTERNAL MEDICINE

## 2024-04-25 PROCEDURE — 83735 ASSAY OF MAGNESIUM: CPT | Performed by: INTERNAL MEDICINE

## 2024-04-25 PROCEDURE — 85025 COMPLETE CBC W/AUTO DIFF WBC: CPT | Performed by: INTERNAL MEDICINE

## 2024-04-25 PROCEDURE — 25810000003 SODIUM CHLORIDE 0.9 % SOLUTION 250 ML FLEX CONT: Performed by: INTERNAL MEDICINE

## 2024-04-25 PROCEDURE — 25010000002 TRASTUZUMAB PER 10 MG: Performed by: INTERNAL MEDICINE

## 2024-04-25 PROCEDURE — 80053 COMPREHEN METABOLIC PANEL: CPT | Performed by: INTERNAL MEDICINE

## 2024-04-25 PROCEDURE — 96413 CHEMO IV INFUSION 1 HR: CPT

## 2024-04-25 PROCEDURE — 25810000003 SODIUM CHLORIDE 0.9 % SOLUTION: Performed by: INTERNAL MEDICINE

## 2024-04-25 RX ORDER — SODIUM CHLORIDE 9 MG/ML
250 INJECTION, SOLUTION INTRAVENOUS ONCE
Status: COMPLETED | OUTPATIENT
Start: 2024-04-25 | End: 2024-04-25

## 2024-04-25 RX ORDER — ACETAMINOPHEN 325 MG/1
650 TABLET ORAL ONCE
Status: CANCELLED | OUTPATIENT
Start: 2024-04-25

## 2024-04-25 RX ORDER — ACETAMINOPHEN 325 MG/1
650 TABLET ORAL ONCE
Status: COMPLETED | OUTPATIENT
Start: 2024-04-25 | End: 2024-04-25

## 2024-04-25 RX ORDER — SODIUM CHLORIDE 9 MG/ML
250 INJECTION, SOLUTION INTRAVENOUS ONCE
Status: CANCELLED | OUTPATIENT
Start: 2024-04-25

## 2024-04-25 RX ADMIN — SODIUM CHLORIDE 250 ML: 9 INJECTION, SOLUTION INTRAVENOUS at 14:33

## 2024-04-25 RX ADMIN — ACETAMINOPHEN 650 MG: 325 TABLET ORAL at 14:33

## 2024-04-25 RX ADMIN — TRASTUZUMAB 530 MG: 150 INJECTION, POWDER, LYOPHILIZED, FOR SOLUTION INTRAVENOUS at 14:43

## 2024-04-25 NOTE — PROGRESS NOTES
Chief Complaint  Chemotherapy    Lela Vanegas, Lela Reveles, LÁZARO    Subjective          Annita Johnson presents to [facility] HEMATOLOGY & ONCOLOGY for ongoing treatment of her breast cancer.  She is on letrozole and trastuzumab along with denosumab for bony involvement.  She was previously on leuprolide but underwent bilateral salpingo-oophorectomy earlier in the month.  Her op note and pathology reviewed demonstrating benign ovaries and fallopian tubes.  She notes she is feeling well.  She denies other masses, adenopathy, unusual aches or pains.  She denies short of breath, chest pain, lower extremity swelling.  She notes good appetite and weight is maintained.  Her energy level is a little low but adequate for her ADLs.  She is on several medications which can cause fatigue and she will talk to her primary care provider about adjusting.    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, estrogen receptor positive   6/8/2023 Initial Diagnosis    Malignant neoplasm of upper-inner quadrant of right breast in female, estrogen receptor positive     6/8/2023 Cancer Staged    Staging form: Breast, AJCC 8th Edition  - Clinical: Stage IV (cT2, cN0, cM1, ER+, IA+, HER2-) - Signed by Perry Garcia MD on 6/8/2023 6/9/2023 - 4/4/2024 Chemotherapy    OP SUPPORTIVE BREAST Leuprolide 3.75 MG Q28D     6/15/2023 - 6/15/2023 Chemotherapy    OP BREAST Letrozole / Ribociclib     7/13/2023 -  Chemotherapy    OP BREAST Letrozole / Trastuzumab     8/24/2023 -  Chemotherapy    OP SUPPORTIVE Denosumab (Xgeva) Q28D     Metastasis to bone   6/8/2023 Initial Diagnosis    Metastasis to bone     8/24/2023 -  Chemotherapy    OP SUPPORTIVE Denosumab (Xgeva) Q28D         Review of Systems   Constitutional:  Positive for fatigue. Negative for appetite change, diaphoresis, fever, unexpected weight gain and unexpected weight loss.   HENT:  Negative for hearing  loss, mouth sores, sore throat, swollen glands, trouble swallowing and voice change.    Eyes:  Negative for blurred vision.   Respiratory:  Negative for cough, shortness of breath and wheezing.    Cardiovascular:  Negative for chest pain and palpitations.   Gastrointestinal:  Negative for abdominal pain, blood in stool, constipation, diarrhea, nausea and vomiting.   Endocrine: Negative for cold intolerance and heat intolerance.   Genitourinary:  Negative for difficulty urinating, dysuria, frequency, hematuria and urinary incontinence.   Musculoskeletal:  Negative for arthralgias, back pain and myalgias.   Skin:  Negative for rash, skin lesions and wound.   Neurological:  Negative for dizziness, seizures, weakness, numbness and headache.   Hematological:  Does not bruise/bleed easily.   Psychiatric/Behavioral:  Negative for depressed mood. The patient is not nervous/anxious.      Current Outpatient Medications on File Prior to Visit   Medication Sig Dispense Refill    Calcium Carbonate-Vitamin D 600-10 MG-MCG per tablet Take 1 tablet by mouth 2 (Two) Times a Day. 60 tablet 5    clonazePAM (KlonoPIN) 0.5 MG tablet Take 1 tablet by mouth 2 (Two) Times a Day As Needed for Anxiety. 30 tablet 1    cyclobenzaprine (FLEXERIL) 10 MG tablet Take 1 tablet by mouth 3 (Three) Times a Day As Needed for Muscle Spasms. 90 tablet 5    escitalopram (LEXAPRO) 10 MG tablet TAKE 1 TABLET BY MOUTH EVERY DAY 90 tablet 1    folic acid (FOLVITE) 1 MG tablet TAKE 1 TABLET BY MOUTH EVERY DAY 90 tablet 1    hydrOXYzine (ATARAX) 50 MG tablet TAKE 1 TABLET BY MOUTH EVERYDAY AT BEDTIME 90 tablet 1    letrozole (FEMARA) 2.5 MG tablet Take 1 tablet by mouth Daily. 30 tablet 5    letrozole (FEMARA) 2.5 MG tablet Take 1 tablet by mouth Daily. 30 tablet 5    letrozole (FEMARA) 2.5 MG tablet Take 1 tablet by mouth Daily. 30 tablet 5    ondansetron (ZOFRAN) 8 MG tablet Take 1 tablet by mouth 3 (Three) Times a Day As Needed for Nausea or Vomiting. 30  tablet 5    oxyCODONE-acetaminophen (PERCOCET) 5-325 MG per tablet Take 1 tablet by mouth Every 6 (Six) Hours As Needed (Pain). 10 tablet 0    Rimegepant Sulfate (Nurtec) 75 MG tablet dispersible tablet Take 1 tablet by mouth Daily As Needed (migraines). 8 tablet 5    Sodium Fluoride 5000 PPM 1.1 % gel See Admin Instructions.      Trastuzumab (HERCEPTIN IV) Infuse  into a venous catheter. EVERY 21 DAYS-NEXT ON 24      vitamin D (ERGOCALCIFEROL) 1.25 MG (91156 UT) capsule capsule Take 1 capsule by mouth 1 (One) Time Per Week. 12 capsule 1     Current Facility-Administered Medications on File Prior to Visit   Medication Dose Route Frequency Provider Last Rate Last Admin    acetaminophen (TYLENOL) tablet 650 mg  650 mg Oral Once Perry Garcia MD        sodium chloride 0.9 % infusion 250 mL  250 mL Intravenous Once Perry Garcia MD        Trastuzumab (HERCEPTIN) 530 mg in sodium chloride 0.9 % 250 mL chemo IVPB  6 mg/kg (Treatment Plan Recorded) Intravenous Once Perry Garcia MD           No Known Allergies  Past Medical History:   Diagnosis Date    Anemia     Breast cancer     Cancer, metastatic to bone     Kidney stone     Kidney stones     Metastasis to bone 2023     Past Surgical History:   Procedure Laterality Date    BREAST BIOPSY      CERVICAL FUSION  2023     SECTION N/A 2021    Procedure:  SECTION PRIMARY;  Surgeon: Zoe Dawson MD;  Location: [facility] LABOR DELIVERY;  Service: Obstetrics/Gynecology;  Laterality: N/A;    CYSTOSCOPY BLADDER STONE LITHOTRIPSY      SALPINGO OOPHORECTOMY Bilateral 2024    Procedure: SALPINGO OOPHORECTOMY LAPAROSCOPIC WITH DAVINCI ROBOT;  Surgeon: Ashlyn Avelar MD;  Location: [facility] OR;  Service: Robotics - DaVinci;  Laterality: Bilateral;     Social History     Socioeconomic History    Marital status:      Spouse name: Janet   Tobacco Use    Smoking status: Every Day     Current packs/day: 0.25     Average  "packs/day: 0.3 packs/day for 3.2 years (0.8 ttl pk-yrs)     Types: Cigarettes     Start date: 2/1/2021     Passive exposure: Current    Smokeless tobacco: Never   Vaping Use    Vaping status: Never Used   Substance and Sexual Activity    Alcohol use: Yes     Alcohol/week: 6.0 standard drinks of alcohol     Types: 3 Glasses of wine, 3 Drinks containing 0.5 oz of alcohol per week     Comment: OCC    Drug use: Yes     Types: Marijuana     Comment: for pain control/DAILY    Sexual activity: Yes     Partners: Male     Birth control/protection: Same-sex partner     Family History   Problem Relation Age of Onset    Mental illness Mother     Hypertension Father     Alcohol abuse Father     Ovarian cancer Paternal Aunt     Breast cancer Maternal Great-Grandmother     Malig Hyperthermia Neg Hx        Objective   Physical Exam  Vitals reviewed. Exam conducted with a chaperone present.   Constitutional:       General: She is not in acute distress.     Appearance: Normal appearance.   Cardiovascular:      Rate and Rhythm: Normal rate and regular rhythm.      Heart sounds: Normal heart sounds. No murmur heard.     No gallop.   Pulmonary:      Effort: Pulmonary effort is normal.      Breath sounds: Normal breath sounds.      Comments: Port-A-Cath  Abdominal:      General: Abdomen is flat. Bowel sounds are normal.      Palpations: Abdomen is soft.   Musculoskeletal:      Right lower leg: No edema.      Left lower leg: No edema.   Neurological:      Mental Status: She is alert.   Psychiatric:         Mood and Affect: Mood normal.         Behavior: Behavior normal.         Vitals:    04/25/24 1412   BP: 108/79   Pulse: 99   Resp: 18   Temp: 97.6 °F (36.4 °C)   SpO2: 99%   Weight: 91.7 kg (202 lb 2.6 oz)   Height: 160 cm (62.99\")   PainSc: 0-No pain     ECOG score: 0         PHQ-9 Total Score:                    Result Review :   The following data was reviewed by: Perry Garcia MD on 04/25/2024:  Lab Results   Component Value " "Date    HGB 12.0 04/25/2024    HCT 37.7 04/25/2024    MCV 90.8 04/25/2024     04/25/2024    WBC 6.09 04/25/2024    NEUTROABS 4.07 04/25/2024    LYMPHSABS 1.24 04/25/2024    MONOSABS 0.66 04/25/2024    EOSABS 0.09 04/25/2024    BASOSABS 0.02 04/25/2024     Lab Results   Component Value Date    GLUCOSE 101 (H) 04/25/2024    BUN 13 04/25/2024    CREATININE 0.77 04/25/2024     04/25/2024    K 3.8 04/25/2024     04/25/2024    CO2 25.0 04/25/2024    CALCIUM 9.1 04/25/2024    PROTEINTOT 7.9 04/25/2024    ALBUMIN 4.5 04/25/2024    BILITOT 0.7 04/25/2024    ALKPHOS 116 04/25/2024    AST 50 (H) 04/25/2024    ALT 85 (H) 04/25/2024     Lab Results   Component Value Date    MG 1.7 04/25/2024    PHOS 2.8 04/02/2024    TSH 0.885 09/21/2023     No results found for: \"IRON\", \"LABIRON\", \"TRANSFERRIN\", \"TIBC\"  Lab Results   Component Value Date    [id number] 577 02/22/2024    FOLATE >20.00 02/22/2024     No results found for: \"PSA\", \"CEA\", \"AFP\", \"\", \"\"          Assessment and Plan    Diagnoses and all orders for this visit:    1. 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  Hormone receptor positive, HER2 positive.  Patient is on letrozole and trastuzumab along with denosumab for bony involvement.  Tolerating her regimen well.  Recent scans showed good response to therapy.  She has undergone oophorectomy.  Leuprolide can be stopped.  Proceed with trastuzumab today as planned.  Continue letrozole daily by mouth.  I will see her back in 3 weeks for OV, trastuzumab with lab work prior to monitor for toxicities.    Orders:  -     CBC and Differential; Future  -     Comprehensive metabolic panel; Future    2. Metastasis to bone  Assessment & Plan:  Patient is on monthly denosumab.  Tolerating well.  No dental or jaw pain.  Continue same.      Other orders  -     Cancel: sodium chloride 0.9 % infusion 250 mL  -     " Cancel: acetaminophen (TYLENOL) tablet 650 mg  -     Cancel: Trastuzumab (HERCEPTIN) 530 mg in sodium chloride 0.9 % 250 mL chemo IVPB            Patient Follow Up: 3 weeks    Patient was given instructions and counseling regarding her condition or for health maintenance advice. Please see specific information pulled into the AVS if appropriate.     Perry Garcia MD    4/25/2024

## 2024-04-25 NOTE — ASSESSMENT & PLAN NOTE
Metastatic.  Hormone receptor positive, HER2 positive.  Patient is on letrozole and trastuzumab along with denosumab for bony involvement.  Tolerating her regimen well.  Recent scans showed good response to therapy.  She has undergone oophorectomy.  Leuprolide can be stopped.  Proceed with trastuzumab today as planned.  Continue letrozole daily by mouth.  I will see her back in 3 weeks for OV, trastuzumab with lab work prior to monitor for toxicities.

## 2024-05-02 ENCOUNTER — HOSPITAL ENCOUNTER (OUTPATIENT)
Dept: ONCOLOGY | Facility: HOSPITAL | Age: 39
Discharge: HOME | End: 2024-05-02
Admitting: INTERNAL MEDICINE
Payer: COMMERCIAL

## 2024-05-02 VITALS
SYSTOLIC BLOOD PRESSURE: 119 MMHG | RESPIRATION RATE: 16 BRPM | HEART RATE: 99 BPM | DIASTOLIC BLOOD PRESSURE: 82 MMHG | TEMPERATURE: 97.6 F | OXYGEN SATURATION: 99 %

## 2024-05-02 DIAGNOSIS — C50.211 MALIGNANT NEOPLASM OF UPPER-INNER QUADRANT OF RIGHT BREAST IN FEMALE, ESTROGEN RECEPTOR POSITIVE: ICD-10-CM

## 2024-05-02 DIAGNOSIS — C79.51 METASTASIS TO BONE: Primary | ICD-10-CM

## 2024-05-02 DIAGNOSIS — Z17.0 MALIGNANT NEOPLASM OF UPPER-INNER QUADRANT OF RIGHT BREAST IN FEMALE, ESTROGEN RECEPTOR POSITIVE: ICD-10-CM

## 2024-05-02 PROCEDURE — 25010000002 DENOSUMAB 120 MG/1.7ML SOLUTION: Performed by: INTERNAL MEDICINE

## 2024-05-02 PROCEDURE — 96372 THER/PROPH/DIAG INJ SC/IM: CPT

## 2024-05-02 RX ADMIN — DENOSUMAB 120 MG: 120 INJECTION SUBCUTANEOUS at 15:24

## 2024-05-14 ENCOUNTER — TELEPHONE (OUTPATIENT)
Dept: ONCOLOGY | Facility: HOSPITAL | Age: 39
End: 2024-05-14
Payer: COMMERCIAL

## 2024-05-14 NOTE — TELEPHONE ENCOUNTER
Spoke with patient to let her know that Dr Garcia would examine her when she sees him on Thursday. Patient v/u.

## 2024-05-14 NOTE — TELEPHONE ENCOUNTER
Caller: Annita Johnson    Relationship: Self    Best call back number: [phone]      What was the call regarding: PATIENT LEFT A MESSAGE LAST NIGHT AND WANTED TO SPEAK TO THE NURSE TODAY IF POSSIBLE, SHE IS VERY ANXIOUS REGARDING THE LUMP ON THE RIGHT BREAST CLOSE TO HER AREOLA     PLEASE CALL TO ADVISE

## 2024-05-16 ENCOUNTER — HOSPITAL ENCOUNTER (OUTPATIENT)
Dept: ONCOLOGY | Facility: HOSPITAL | Age: 39
Discharge: HOME | End: 2024-05-16
Payer: COMMERCIAL

## 2024-05-16 ENCOUNTER — OFFICE VISIT (OUTPATIENT)
Dept: ONCOLOGY | Facility: HOSPITAL | Age: 39
End: 2024-05-16
Payer: COMMERCIAL

## 2024-05-16 VITALS
HEIGHT: 63 IN | RESPIRATION RATE: 18 BRPM | DIASTOLIC BLOOD PRESSURE: 75 MMHG | OXYGEN SATURATION: 100 % | HEART RATE: 98 BPM | TEMPERATURE: 98.9 F | SYSTOLIC BLOOD PRESSURE: 124 MMHG | BODY MASS INDEX: 36.64 KG/M2 | WEIGHT: 206.79 LBS

## 2024-05-16 VITALS
DIASTOLIC BLOOD PRESSURE: 75 MMHG | HEART RATE: 98 BPM | OXYGEN SATURATION: 100 % | SYSTOLIC BLOOD PRESSURE: 124 MMHG | TEMPERATURE: 98.9 F | RESPIRATION RATE: 18 BRPM

## 2024-05-16 DIAGNOSIS — C79.51 METASTASIS TO BONE: ICD-10-CM

## 2024-05-16 DIAGNOSIS — C50.211 MALIGNANT NEOPLASM OF UPPER-INNER QUADRANT OF RIGHT BREAST IN FEMALE, ESTROGEN RECEPTOR POSITIVE: Primary | ICD-10-CM

## 2024-05-16 DIAGNOSIS — Z17.0 MALIGNANT NEOPLASM OF UPPER-INNER QUADRANT OF RIGHT BREAST IN FEMALE, ESTROGEN RECEPTOR POSITIVE: Primary | ICD-10-CM

## 2024-05-16 LAB
ALBUMIN SERPL-MCNC: 4.3 G/DL (ref 3.5–5.2)
ALBUMIN/GLOB SERPL: 1.2 G/DL
ALP SERPL-CCNC: 102 U/L (ref 39–117)
ALT SERPL W P-5'-P-CCNC: 102 U/L (ref 1–33)
ANION GAP SERPL CALCULATED.3IONS-SCNC: 13.5 MMOL/L (ref 5–15)
AST SERPL-CCNC: 59 U/L (ref 1–32)
BASOPHILS # BLD AUTO: 0.02 10*3/MM3 (ref 0–0.2)
BASOPHILS NFR BLD AUTO: 0.3 % (ref 0–1.5)
BILIRUB SERPL-MCNC: 0.3 MG/DL (ref 0–1.2)
BUN SERPL-MCNC: 12 MG/DL (ref 6–20)
BUN/CREAT SERPL: 15 (ref 7–25)
CALCIUM SPEC-SCNC: 9.8 MG/DL (ref 8.6–10.5)
CHLORIDE SERPL-SCNC: 102 MMOL/L (ref 98–107)
CO2 SERPL-SCNC: 23.5 MMOL/L (ref 22–29)
CREAT SERPL-MCNC: 0.8 MG/DL (ref 0.57–1)
DEPRECATED RDW RBC AUTO: 49.6 FL (ref 37–54)
EGFRCR SERPLBLD CKD-EPI 2021: 97.5 ML/MIN/1.73
EOSINOPHIL # BLD AUTO: 0.08 10*3/MM3 (ref 0–0.4)
EOSINOPHIL NFR BLD AUTO: 1.1 % (ref 0.3–6.2)
ERYTHROCYTE [DISTWIDTH] IN BLOOD BY AUTOMATED COUNT: 14.5 % (ref 12.3–15.4)
GLOBULIN UR ELPH-MCNC: 3.7 GM/DL
GLUCOSE SERPL-MCNC: 79 MG/DL (ref 65–99)
HCT VFR BLD AUTO: 37.2 % (ref 34–46.6)
HGB BLD-MCNC: 11.7 G/DL (ref 12–15.9)
IMM GRANULOCYTES # BLD AUTO: 0.02 10*3/MM3 (ref 0–0.05)
IMM GRANULOCYTES NFR BLD AUTO: 0.3 % (ref 0–0.5)
LYMPHOCYTES # BLD AUTO: 1.58 10*3/MM3 (ref 0.7–3.1)
LYMPHOCYTES NFR BLD AUTO: 21.2 % (ref 19.6–45.3)
MAGNESIUM SERPL-MCNC: 1.8 MG/DL (ref 1.6–2.6)
MCH RBC QN AUTO: 28.9 PG (ref 26.6–33)
MCHC RBC AUTO-ENTMCNC: 31.5 G/DL (ref 31.5–35.7)
MCV RBC AUTO: 91.9 FL (ref 79–97)
MONOCYTES # BLD AUTO: 0.86 10*3/MM3 (ref 0.1–0.9)
MONOCYTES NFR BLD AUTO: 11.5 % (ref 5–12)
NEUTROPHILS NFR BLD AUTO: 4.9 10*3/MM3 (ref 1.7–7)
NEUTROPHILS NFR BLD AUTO: 65.6 % (ref 42.7–76)
PHOSPHATE SERPL-MCNC: 3.1 MG/DL (ref 2.5–4.5)
PLATELET # BLD AUTO: 322 10*3/MM3 (ref 140–450)
PMV BLD AUTO: 9.7 FL (ref 6–12)
POTASSIUM SERPL-SCNC: 4.1 MMOL/L (ref 3.5–5.2)
PROT SERPL-MCNC: 8 G/DL (ref 6–8.5)
RBC # BLD AUTO: 4.05 10*6/MM3 (ref 3.77–5.28)
SODIUM SERPL-SCNC: 139 MMOL/L (ref 136–145)
WBC NRBC COR # BLD AUTO: 7.46 10*3/MM3 (ref 3.4–10.8)

## 2024-05-16 PROCEDURE — 25810000003 SODIUM CHLORIDE 0.9 % SOLUTION 250 ML FLEX CONT: Performed by: INTERNAL MEDICINE

## 2024-05-16 PROCEDURE — 25010000002 TRASTUZUMAB PER 10 MG: Performed by: INTERNAL MEDICINE

## 2024-05-16 PROCEDURE — 80053 COMPREHEN METABOLIC PANEL: CPT | Performed by: INTERNAL MEDICINE

## 2024-05-16 PROCEDURE — 85025 COMPLETE CBC W/AUTO DIFF WBC: CPT | Performed by: INTERNAL MEDICINE

## 2024-05-16 PROCEDURE — 83735 ASSAY OF MAGNESIUM: CPT | Performed by: INTERNAL MEDICINE

## 2024-05-16 PROCEDURE — 96413 CHEMO IV INFUSION 1 HR: CPT

## 2024-05-16 PROCEDURE — 25810000003 SODIUM CHLORIDE 0.9 % SOLUTION: Performed by: INTERNAL MEDICINE

## 2024-05-16 PROCEDURE — 84100 ASSAY OF PHOSPHORUS: CPT | Performed by: INTERNAL MEDICINE

## 2024-05-16 RX ORDER — LETROZOLE 2.5 MG/1
2.5 TABLET, FILM COATED ORAL DAILY
Qty: 30 TABLET | Refills: 5
Start: 2024-05-16

## 2024-05-16 RX ORDER — ACETAMINOPHEN 325 MG/1
650 TABLET ORAL ONCE
Status: DISCONTINUED | OUTPATIENT
Start: 2024-05-16 | End: 2024-05-17 | Stop reason: HOSPADM

## 2024-05-16 RX ORDER — SODIUM CHLORIDE 9 MG/ML
250 INJECTION, SOLUTION INTRAVENOUS ONCE
Status: COMPLETED | OUTPATIENT
Start: 2024-05-16 | End: 2024-05-16

## 2024-05-16 RX ORDER — ACETAMINOPHEN 325 MG/1
650 TABLET ORAL ONCE
Status: CANCELLED | OUTPATIENT
Start: 2024-05-16

## 2024-05-16 RX ORDER — SODIUM CHLORIDE 9 MG/ML
250 INJECTION, SOLUTION INTRAVENOUS ONCE
Status: CANCELLED | OUTPATIENT
Start: 2024-05-16

## 2024-05-16 RX ADMIN — TRASTUZUMAB 530 MG: 150 INJECTION, POWDER, LYOPHILIZED, FOR SOLUTION INTRAVENOUS at 14:49

## 2024-05-16 RX ADMIN — SODIUM CHLORIDE 250 ML: 9 INJECTION, SOLUTION INTRAVENOUS at 14:49

## 2024-05-16 NOTE — PROGRESS NOTES
Chief Complaint  Chemotherapy    Churchill, Lela, APRN  Romina, Lela, APRN    Subjective          Annita Johnson presents to [facility] HEMATOLOGY & ONCOLOGY for ongoing treatment of her metastatic breast cancer.  She is on letrozole, trastuzumab and denosumab for bony involvement.  She was on ovarian suppression but since her last visit she has undergone bilateral salpingo oophorectomy.  She has healed well from the surgery.  Pathology reviewed demonstrating benign tissue with no evidence of malignancy.  She does report a knot in the right breast that is a little more prominent feeling to her.  This is where she has known cancer.  She denies other masses, adenopathy, unusual aches or pains.    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, estrogen receptor positive   6/8/2023 Initial Diagnosis    Malignant neoplasm of upper-inner quadrant of right breast in female, estrogen receptor positive     6/8/2023 Cancer Staged    Staging form: Breast, AJCC 8th Edition  - Clinical: Stage IV (cT2, cN0, cM1, ER+, ND+, HER2-) - Signed by Perry Garcia MD on 6/8/2023 6/9/2023 - 4/4/2024 Chemotherapy    OP SUPPORTIVE BREAST Leuprolide 3.75 MG Q28D     6/15/2023 - 6/15/2023 Chemotherapy    OP BREAST Letrozole / Ribociclib     7/13/2023 -  Chemotherapy    OP BREAST Letrozole / Trastuzumab     8/24/2023 -  Chemotherapy    OP SUPPORTIVE Denosumab (Xgeva) Q28D     Metastasis to bone   6/8/2023 Initial Diagnosis    Metastasis to bone     8/24/2023 -  Chemotherapy    OP SUPPORTIVE Denosumab (Xgeva) Q28D         Review of Systems   Constitutional:  Positive for fatigue. Negative for appetite change, diaphoresis, fever, unexpected weight gain and unexpected weight loss.   HENT:  Negative for hearing loss, mouth sores, sore throat, swollen glands, trouble swallowing and voice change.    Eyes:  Negative for blurred vision.   Respiratory:  Negative for cough,  shortness of breath and wheezing.    Cardiovascular:  Negative for chest pain and palpitations.   Gastrointestinal:  Negative for abdominal pain, blood in stool, constipation, diarrhea, nausea and vomiting.   Endocrine: Negative for cold intolerance and heat intolerance.   Genitourinary:  Positive for breast lump. Negative for difficulty urinating, dysuria, frequency, hematuria and urinary incontinence.   Musculoskeletal:  Negative for arthralgias, back pain and myalgias.   Skin:  Positive for rash. Negative for skin lesions and wound.   Neurological:  Negative for dizziness, seizures, weakness, numbness and headache.   Hematological:  Does not bruise/bleed easily.   Psychiatric/Behavioral:  Negative for depressed mood. The patient is not nervous/anxious.      Current Outpatient Medications on File Prior to Visit   Medication Sig Dispense Refill    Calcium Carbonate-Vitamin D 600-10 MG-MCG per tablet Take 1 tablet by mouth 2 (Two) Times a Day. 60 tablet 5    clonazePAM (KlonoPIN) 0.5 MG tablet Take 1 tablet by mouth 2 (Two) Times a Day As Needed for Anxiety. 30 tablet 1    cyclobenzaprine (FLEXERIL) 10 MG tablet Take 1 tablet by mouth 3 (Three) Times a Day As Needed for Muscle Spasms. 90 tablet 5    escitalopram (LEXAPRO) 10 MG tablet TAKE 1 TABLET BY MOUTH EVERY DAY 90 tablet 1    folic acid (FOLVITE) 1 MG tablet TAKE 1 TABLET BY MOUTH EVERY DAY 90 tablet 1    hydrOXYzine (ATARAX) 50 MG tablet TAKE 1 TABLET BY MOUTH EVERYDAY AT BEDTIME 90 tablet 1    letrozole (FEMARA) 2.5 MG tablet Take 1 tablet by mouth Daily. 30 tablet 5    letrozole (FEMARA) 2.5 MG tablet Take 1 tablet by mouth Daily. 30 tablet 5    letrozole (FEMARA) 2.5 MG tablet Take 1 tablet by mouth Daily. 30 tablet 5    ondansetron (ZOFRAN) 8 MG tablet Take 1 tablet by mouth 3 (Three) Times a Day As Needed for Nausea or Vomiting. 30 tablet 5    oxyCODONE-acetaminophen (PERCOCET) 5-325 MG per tablet Take 1 tablet by mouth Every 6 (Six) Hours As Needed  (Pain). 10 tablet 0    Rimegepant Sulfate (Nurtec) 75 MG tablet dispersible tablet Take 1 tablet by mouth Daily As Needed (migraines). 8 tablet 5    Sodium Fluoride 5000 PPM 1.1 % gel See Admin Instructions.      Trastuzumab (HERCEPTIN IV) Infuse  into a venous catheter. EVERY 21 DAYS-NEXT ON 24      vitamin D (ERGOCALCIFEROL) 1.25 MG (35918 UT) capsule capsule Take 1 capsule by mouth 1 (One) Time Per Week. 12 capsule 1    letrozole (FEMARA) 2.5 MG tablet Take 1 tablet by mouth Daily. 30 tablet 5     Current Facility-Administered Medications on File Prior to Visit   Medication Dose Route Frequency Provider Last Rate Last Admin    [COMPLETED] sodium chloride 0.9 % infusion 250 mL  250 mL Intravenous Once Perry Garcia MD   Stopped at 24 1521    [COMPLETED] Trastuzumab (HERCEPTIN) 530 mg in sodium chloride 0.9 % 300.2 mL chemo IVPB  6 mg/kg (Treatment Plan Recorded) Intravenous Once Perry Garcia MD   Stopped at 24 1519    [DISCONTINUED] acetaminophen (TYLENOL) tablet 650 mg  650 mg Oral Once Perry Garcia MD           No Known Allergies  Past Medical History:   Diagnosis Date    Anemia     Breast cancer     Cancer, metastatic to bone     Kidney stone     Kidney stones     Metastasis to bone 2023     Past Surgical History:   Procedure Laterality Date    BREAST BIOPSY      CERVICAL FUSION  2023     SECTION N/A 2021    Procedure:  SECTION PRIMARY;  Surgeon: Zoe Dawson MD;  Location: [facility] LABOR DELIVERY;  Service: Obstetrics/Gynecology;  Laterality: N/A;    CYSTOSCOPY BLADDER STONE LITHOTRIPSY      SALPINGO OOPHORECTOMY Bilateral 2024    Procedure: SALPINGO OOPHORECTOMY LAPAROSCOPIC WITH DAVINCI ROBOT;  Surgeon: Ashlyn Avelar MD;  Location: [facility] OR;  Service: Robotics - DaVinci;  Laterality: Bilateral;     Social History     Socioeconomic History    Marital status:      Spouse name: Janet   Tobacco Use    Smoking status: Every  "Day     Current packs/day: 0.25     Average packs/day: 0.3 packs/day for 3.3 years (0.8 ttl pk-yrs)     Types: Cigarettes     Start date: 2/1/2021     Passive exposure: Current    Smokeless tobacco: Never   Vaping Use    Vaping status: Never Used   Substance and Sexual Activity    Alcohol use: Yes     Alcohol/week: 6.0 standard drinks of alcohol     Types: 3 Glasses of wine, 3 Drinks containing 0.5 oz of alcohol per week     Comment: OCC    Drug use: Yes     Types: Marijuana     Comment: for pain control/DAILY    Sexual activity: Yes     Partners: Male     Birth control/protection: Same-sex partner     Family History   Problem Relation Age of Onset    Mental illness Mother     Hypertension Father     Alcohol abuse Father     Ovarian cancer Paternal Aunt     Breast cancer Maternal Great-Grandmother     Malig Hyperthermia Neg Hx        Objective   Physical Exam  Vitals reviewed. Exam conducted with a chaperone present.   Cardiovascular:      Rate and Rhythm: Normal rate and regular rhythm.      Heart sounds: Normal heart sounds. No murmur heard.     No gallop.   Pulmonary:      Effort: Pulmonary effort is normal.      Breath sounds: Normal breath sounds.      Comments: Port-A-Cath  Chest:       Abdominal:      General: Abdomen is flat. Bowel sounds are normal.      Palpations: Abdomen is soft.   Lymphadenopathy:      Cervical: No cervical adenopathy.   Psychiatric:         Mood and Affect: Mood normal.         Behavior: Behavior normal.         Vitals:    05/16/24 1347   BP: 124/75   Pulse: 98   Resp: 18   Temp: 98.9 °F (37.2 °C)   SpO2: 100%   Weight: 93.8 kg (206 lb 12.7 oz)   Height: 160 cm (62.99\")   PainSc: 0-No pain     ECOG score: 0         PHQ-9 Total Score:                    Result Review :   The following data was reviewed by: Perry Garcia MD on 05/16/2024:  Lab Results   Component Value Date    HGB 11.7 (L) 05/16/2024    HCT 37.2 05/16/2024    MCV 91.9 05/16/2024     05/16/2024    WBC 7.46 " "05/16/2024    NEUTROABS 4.90 05/16/2024    LYMPHSABS 1.58 05/16/2024    MONOSABS 0.86 05/16/2024    EOSABS 0.08 05/16/2024    BASOSABS 0.02 05/16/2024     Lab Results   Component Value Date    GLUCOSE 79 05/16/2024    BUN 12 05/16/2024    CREATININE 0.80 05/16/2024     05/16/2024    K 4.1 05/16/2024     05/16/2024    CO2 23.5 05/16/2024    CALCIUM 9.8 05/16/2024    PROTEINTOT 8.0 05/16/2024    ALBUMIN 4.3 05/16/2024    BILITOT 0.3 05/16/2024    ALKPHOS 102 05/16/2024    AST 59 (H) 05/16/2024     (H) 05/16/2024     Lab Results   Component Value Date    MG 1.8 05/16/2024    PHOS 3.1 05/16/2024    TSH 0.885 09/21/2023     No results found for: \"IRON\", \"LABIRON\", \"TRANSFERRIN\", \"TIBC\"  Lab Results   Component Value Date    [id number] 577 02/22/2024    FOLATE >20.00 02/22/2024     No results found for: \"PSA\", \"CEA\", \"AFP\", \"\", \"\"          Assessment and Plan    Diagnoses and all orders for this visit:    1. 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  Patient is on palliative treatment with letrozole and trastuzumab.  Since her last visit she has undergone bilateral salpingo-oophorectomy.  That pathology report reviewed demonstrating no malignancy.  She is recovering well.  She does report a knot in the right breast.  She has known disease in that breast.  Prior imaging was stable.  Advised to watch the area since she is not having any pain there.  Continue letrozole by mouth daily.  Proceed with trastuzumab today as planned.  RTC 3 weeks for OV, trastuzumab with lab work prior to monitor for toxicities.  Continue monthly denosumab for bony involvement.    Orders:  -     CBC and Differential; Future  -     Comprehensive metabolic panel; Future  -     Magnesium; Future  -     Phosphorus; Future    2. Metastasis to bone  Assessment & Plan:  Patient is on monthly denosumab.  Tolerating well.  No " dental or jaw pain.  Electrolytes are adequate for treatment.    Orders:  -     Magnesium; Future  -     Phosphorus; Future    Other orders  -     Cancel: sodium chloride 0.9 % infusion 250 mL  -     Cancel: acetaminophen (TYLENOL) tablet 650 mg  -     Cancel: Trastuzumab (HERCEPTIN) 530 mg in sodium chloride 0.9 % 250 mL chemo IVPB            Patient Follow Up: 3 weeks    Patient was given instructions and counseling regarding her condition or for health maintenance advice. Please see specific information pulled into the AVS if appropriate.     Perry Garcia MD    5/17/2024

## 2024-05-17 NOTE — ASSESSMENT & PLAN NOTE
Patient is on monthly denosumab.  Tolerating well.  No dental or jaw pain.  Electrolytes are adequate for treatment.

## 2024-05-17 NOTE — ASSESSMENT & PLAN NOTE
Metastatic.  Patient is on palliative treatment with letrozole and trastuzumab.  Since her last visit she has undergone bilateral salpingo-oophorectomy.  That pathology report reviewed demonstrating no malignancy.  She is recovering well.  She does report a knot in the right breast.  She has known disease in that breast.  Prior imaging was stable.  Advised to watch the area since she is not having any pain there.  Continue letrozole by mouth daily.  Proceed with trastuzumab today as planned.  RTC 3 weeks for OV, trastuzumab with lab work prior to monitor for toxicities.  Continue monthly denosumab for bony involvement.

## 2024-05-23 ENCOUNTER — TELEPHONE (OUTPATIENT)
Dept: ONCOLOGY | Facility: HOSPITAL | Age: 39
End: 2024-05-23

## 2024-05-23 NOTE — TELEPHONE ENCOUNTER
Caller: DOMINIK    Relationship: ERICA    Best call back number: [phone] EXT [phone]    What is the best time to reach you: 8 - 4 M-F EST (CONFIDENTIAL VM)    Who are you requesting to speak with (clinical staff, provider,  specific staff member): CLINICAL    What was the call regarding: HEALTHCARE MANAGEMENT PROGRAM - REACHING OUT TO LET THE DR AND STAFF KNOW THAT THEY CAN REACH OUT AND WORK THEM THEM ON HELPING THE PT IF THERE IS ANY NEED.     NO NEED FOR A CALL BACK UNLESS ASSISTANCE IS NEEDED.        [facility] DEPT  EMERGENCY DEPARTMENT ENCOUNTER          Pt Name: Codie Timmons  MRN: [id number]  [address] [facility] 1963  Date of evaluation: 10/17/2023  Resident Physician: John Garnett MD EM Resident PGY-2  Attending Physician: Teo Patel DO      CHIEF COMPLAINT       Chief Complaint   Patient presents with    Tachycardia    Dizziness         HISTORY OF PRESENT ILLNESS    HPI  Codie Timmons is a 62 y.o. male who presents to the emergency department from home, by private vehicle for evaluation of tachycardia, dizziness. Patient states that he has had multiple episodes of A-fib RVR in the past with sensation of vertigo. States approximately 10 minutes prior to arrival had episode start with palpitations, vertigo, unable to break it with home maneuvers of swallowing and coughing. Patient had a rate in the 180s to 200s is able to convert himself using Valsalva maneuver holding his breath and bearing down. The patient has no other acute complaints at this time. ROS negative except as stated above. PAST MEDICAL AND SURGICAL HISTORY     Past Medical History:   Diagnosis Date    CAD (coronary artery disease)     stents    MI (myocardial infarction) ([address])      Past Surgical History:   Procedure Laterality Date    COLONOSCOPY           MEDICATIONS   No current facility-administered medications for this encounter.     Current Outpatient Medications:     buPROPion (WELLBUTRIN XL) 150 MG extended release tablet, Take 1 tablet by mouth every morning, Disp: , Rfl:     metoprolol tartrate (LOPRESSOR) 25 MG tablet, Take 0.5 tablets by mouth 2 times daily, Disp: 90 tablet, Rfl: 3    prasugrel (EFFIENT) 10 MG TABS, Take 1 tablet by mouth daily, Disp: 90 tablet, Rfl: 3    nitroGLYCERIN (NITROSTAT) 0.4 MG SL tablet, Place 1 tablet under the tongue every 5 minutes as needed for Chest pain, Disp: 25 tablet, Rfl: 3    rosuvastatin (CRESTOR) 20 MG tablet, Take 1 tablet by mouth daily,

## 2024-05-30 ENCOUNTER — HOSPITAL ENCOUNTER (OUTPATIENT)
Dept: ONCOLOGY | Facility: HOSPITAL | Age: 39
Discharge: HOME | End: 2024-05-30
Payer: COMMERCIAL

## 2024-05-30 VITALS
OXYGEN SATURATION: 100 % | SYSTOLIC BLOOD PRESSURE: 120 MMHG | DIASTOLIC BLOOD PRESSURE: 64 MMHG | HEART RATE: 76 BPM | RESPIRATION RATE: 16 BRPM | TEMPERATURE: 98.2 F

## 2024-05-30 DIAGNOSIS — C50.211 MALIGNANT NEOPLASM OF UPPER-INNER QUADRANT OF RIGHT BREAST IN FEMALE, ESTROGEN RECEPTOR POSITIVE: ICD-10-CM

## 2024-05-30 DIAGNOSIS — Z17.0 MALIGNANT NEOPLASM OF UPPER-INNER QUADRANT OF RIGHT BREAST IN FEMALE, ESTROGEN RECEPTOR POSITIVE: ICD-10-CM

## 2024-05-30 DIAGNOSIS — C79.51 METASTASIS TO BONE: Primary | ICD-10-CM

## 2024-05-30 PROCEDURE — 25010000002 DENOSUMAB 120 MG/1.7ML SOLUTION: Performed by: INTERNAL MEDICINE

## 2024-05-30 PROCEDURE — 96372 THER/PROPH/DIAG INJ SC/IM: CPT

## 2024-05-30 RX ADMIN — DENOSUMAB 120 MG: 120 INJECTION SUBCUTANEOUS at 08:11

## 2024-06-06 ENCOUNTER — OFFICE VISIT (OUTPATIENT)
Dept: ONCOLOGY | Facility: HOSPITAL | Age: 39
End: 2024-06-06
Payer: COMMERCIAL

## 2024-06-06 ENCOUNTER — HOSPITAL ENCOUNTER (OUTPATIENT)
Dept: ONCOLOGY | Facility: HOSPITAL | Age: 39
Discharge: HOME | End: 2024-06-06
Payer: COMMERCIAL

## 2024-06-06 VITALS
SYSTOLIC BLOOD PRESSURE: 120 MMHG | WEIGHT: 205.8 LBS | DIASTOLIC BLOOD PRESSURE: 85 MMHG | OXYGEN SATURATION: 98 % | TEMPERATURE: 98 F | BODY MASS INDEX: 36.47 KG/M2 | RESPIRATION RATE: 18 BRPM | HEART RATE: 88 BPM

## 2024-06-06 VITALS
BODY MASS INDEX: 36.32 KG/M2 | HEART RATE: 88 BPM | SYSTOLIC BLOOD PRESSURE: 120 MMHG | WEIGHT: 205 LBS | OXYGEN SATURATION: 98 % | DIASTOLIC BLOOD PRESSURE: 85 MMHG

## 2024-06-06 DIAGNOSIS — Z17.0 MALIGNANT NEOPLASM OF UPPER-INNER QUADRANT OF RIGHT BREAST IN FEMALE, ESTROGEN RECEPTOR POSITIVE: Primary | ICD-10-CM

## 2024-06-06 DIAGNOSIS — C50.211 MALIGNANT NEOPLASM OF UPPER-INNER QUADRANT OF RIGHT BREAST IN FEMALE, ESTROGEN RECEPTOR POSITIVE: Primary | ICD-10-CM

## 2024-06-06 DIAGNOSIS — C79.51 METASTASIS TO BONE: ICD-10-CM

## 2024-06-06 DIAGNOSIS — T45.1X5A ANEMIA DUE TO CHEMOTHERAPY: ICD-10-CM

## 2024-06-06 DIAGNOSIS — D64.81 ANEMIA DUE TO CHEMOTHERAPY: ICD-10-CM

## 2024-06-06 LAB
ALBUMIN SERPL-MCNC: 4.3 G/DL (ref 3.5–5.2)
ALBUMIN/GLOB SERPL: 1.3 G/DL
ALP SERPL-CCNC: 112 U/L (ref 39–117)
ALT SERPL W P-5'-P-CCNC: 68 U/L (ref 1–33)
ANION GAP SERPL CALCULATED.3IONS-SCNC: 10 MMOL/L (ref 5–15)
AST SERPL-CCNC: 41 U/L (ref 1–32)
BASOPHILS # BLD AUTO: 0.02 10*3/MM3 (ref 0–0.2)
BASOPHILS NFR BLD AUTO: 0.3 % (ref 0–1.5)
BILIRUB SERPL-MCNC: 0.4 MG/DL (ref 0–1.2)
BUN SERPL-MCNC: 7 MG/DL (ref 6–20)
BUN/CREAT SERPL: 9.7 (ref 7–25)
CALCIUM SPEC-SCNC: 9.5 MG/DL (ref 8.6–10.5)
CHLORIDE SERPL-SCNC: 101 MMOL/L (ref 98–107)
CO2 SERPL-SCNC: 26 MMOL/L (ref 22–29)
CREAT SERPL-MCNC: 0.72 MG/DL (ref 0.57–1)
DEPRECATED RDW RBC AUTO: 50.2 FL (ref 37–54)
EGFRCR SERPLBLD CKD-EPI 2021: 110.6 ML/MIN/1.73
EOSINOPHIL # BLD AUTO: 0.11 10*3/MM3 (ref 0–0.4)
EOSINOPHIL NFR BLD AUTO: 1.8 % (ref 0.3–6.2)
ERYTHROCYTE [DISTWIDTH] IN BLOOD BY AUTOMATED COUNT: 14.6 % (ref 12.3–15.4)
GLOBULIN UR ELPH-MCNC: 3.3 GM/DL
GLUCOSE SERPL-MCNC: 119 MG/DL (ref 65–99)
HCT VFR BLD AUTO: 36.8 % (ref 34–46.6)
HGB BLD-MCNC: 11.5 G/DL (ref 12–15.9)
IMM GRANULOCYTES # BLD AUTO: 0.01 10*3/MM3 (ref 0–0.05)
IMM GRANULOCYTES NFR BLD AUTO: 0.2 % (ref 0–0.5)
LYMPHOCYTES # BLD AUTO: 1.5 10*3/MM3 (ref 0.7–3.1)
LYMPHOCYTES NFR BLD AUTO: 24.7 % (ref 19.6–45.3)
MCH RBC QN AUTO: 28.9 PG (ref 26.6–33)
MCHC RBC AUTO-ENTMCNC: 31.3 G/DL (ref 31.5–35.7)
MCV RBC AUTO: 92.5 FL (ref 79–97)
MONOCYTES # BLD AUTO: 0.53 10*3/MM3 (ref 0.1–0.9)
MONOCYTES NFR BLD AUTO: 8.7 % (ref 5–12)
NEUTROPHILS NFR BLD AUTO: 3.9 10*3/MM3 (ref 1.7–7)
NEUTROPHILS NFR BLD AUTO: 64.3 % (ref 42.7–76)
PLATELET # BLD AUTO: 293 10*3/MM3 (ref 140–450)
PMV BLD AUTO: 9.8 FL (ref 6–12)
POTASSIUM SERPL-SCNC: 4.5 MMOL/L (ref 3.5–5.2)
PROT SERPL-MCNC: 7.6 G/DL (ref 6–8.5)
RBC # BLD AUTO: 3.98 10*6/MM3 (ref 3.77–5.28)
SODIUM SERPL-SCNC: 137 MMOL/L (ref 136–145)
WBC NRBC COR # BLD AUTO: 6.07 10*3/MM3 (ref 3.4–10.8)

## 2024-06-06 PROCEDURE — 25810000003 SODIUM CHLORIDE 0.9 % SOLUTION: Performed by: INTERNAL MEDICINE

## 2024-06-06 PROCEDURE — 80053 COMPREHEN METABOLIC PANEL: CPT | Performed by: INTERNAL MEDICINE

## 2024-06-06 PROCEDURE — 25010000002 TRASTUZUMAB PER 10 MG: Performed by: INTERNAL MEDICINE

## 2024-06-06 PROCEDURE — 96413 CHEMO IV INFUSION 1 HR: CPT

## 2024-06-06 PROCEDURE — 25810000003 SODIUM CHLORIDE 0.9 % SOLUTION 250 ML FLEX CONT: Performed by: INTERNAL MEDICINE

## 2024-06-06 PROCEDURE — 85025 COMPLETE CBC W/AUTO DIFF WBC: CPT | Performed by: INTERNAL MEDICINE

## 2024-06-06 RX ORDER — SODIUM CHLORIDE 9 MG/ML
250 INJECTION, SOLUTION INTRAVENOUS ONCE
Status: COMPLETED | OUTPATIENT
Start: 2024-06-06 | End: 2024-06-06

## 2024-06-06 RX ORDER — ACETAMINOPHEN 325 MG/1
650 TABLET ORAL ONCE
Status: CANCELLED | OUTPATIENT
Start: 2024-06-06

## 2024-06-06 RX ORDER — SODIUM CHLORIDE 9 MG/ML
250 INJECTION, SOLUTION INTRAVENOUS ONCE
Status: CANCELLED | OUTPATIENT
Start: 2024-06-06

## 2024-06-06 RX ORDER — ACETAMINOPHEN 325 MG/1
650 TABLET ORAL ONCE
Status: COMPLETED | OUTPATIENT
Start: 2024-06-06 | End: 2024-06-06

## 2024-06-06 RX ADMIN — TRASTUZUMAB 530 MG: 150 INJECTION, POWDER, LYOPHILIZED, FOR SOLUTION INTRAVENOUS at 14:50

## 2024-06-06 RX ADMIN — ACETAMINOPHEN 650 MG: 325 TABLET ORAL at 14:36

## 2024-06-06 RX ADMIN — SODIUM CHLORIDE 250 ML: 9 INJECTION, SOLUTION INTRAVENOUS at 14:37

## 2024-06-06 NOTE — PROGRESS NOTES
Chief Complaint  Malignant neoplasm of upper-inner quadrant of right breast     Lela Vanegas, LÁZARO  Romina, Lela, LÁZARO    Subjective          Annita Johnson presents to [facility] for ongoing treatment of her breast cancer.  She is on letrozole, trastuzumab, denosumab for bony involvement.  Tolerating her treatments well.  She denies any issues or side effects.  No new masses, adenopathy, unusual aches or pains.  She notes adequate appetite and her weight is maintained.  Her energy level is a little low but adequate for ADLs.      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, estrogen receptor positive   6/8/2023 Initial Diagnosis    Malignant neoplasm of upper-inner quadrant of right breast in female, estrogen receptor positive     6/8/2023 Cancer Staged    Staging form: Breast, AJCC 8th Edition  - Clinical: Stage IV (cT2, cN0, cM1, ER+, ND+, HER2-) - Signed by Perry Garcia MD on 6/8/2023 6/9/2023 - 4/4/2024 Chemotherapy    OP SUPPORTIVE BREAST Leuprolide 3.75 MG Q28D     6/15/2023 - 6/15/2023 Chemotherapy    OP BREAST Letrozole / Ribociclib     7/13/2023 -  Chemotherapy    OP BREAST Letrozole / Trastuzumab     8/24/2023 -  Chemotherapy    OP SUPPORTIVE Denosumab (Xgeva) Q28D     Metastasis to bone   6/8/2023 Initial Diagnosis    Metastasis to bone     8/24/2023 -  Chemotherapy    OP SUPPORTIVE Denosumab (Xgeva) Q28D           Current Outpatient Medications on File Prior to Visit   Medication Sig Dispense Refill    Calcium Carbonate-Vitamin D 600-10 MG-MCG per tablet Take 1 tablet by mouth 2 (Two) Times a Day. 60 tablet 5    clonazePAM (KlonoPIN) 0.5 MG tablet Take 1 tablet by mouth 2 (Two) Times a Day As Needed for Anxiety. 30 tablet 1    cyclobenzaprine (FLEXERIL) 10 MG tablet Take 1 tablet by mouth 3 (Three) Times a Day As Needed for Muscle Spasms. 90 tablet 5    escitalopram (LEXAPRO) 10 MG tablet TAKE 1  TABLET BY MOUTH EVERY DAY 90 tablet 1    folic acid (FOLVITE) 1 MG tablet TAKE 1 TABLET BY MOUTH EVERY DAY 90 tablet 1    hydrOXYzine (ATARAX) 50 MG tablet TAKE 1 TABLET BY MOUTH EVERYDAY AT BEDTIME 90 tablet 1    letrozole (FEMARA) 2.5 MG tablet Take 1 tablet by mouth Daily. 30 tablet 5    letrozole (FEMARA) 2.5 MG tablet Take 1 tablet by mouth Daily. 30 tablet 5    letrozole (FEMARA) 2.5 MG tablet Take 1 tablet by mouth Daily. 30 tablet 5    letrozole (FEMARA) 2.5 MG tablet Take 1 tablet by mouth Daily. 30 tablet 5    ondansetron (ZOFRAN) 8 MG tablet Take 1 tablet by mouth 3 (Three) Times a Day As Needed for Nausea or Vomiting. 30 tablet 5    oxyCODONE-acetaminophen (PERCOCET) 5-325 MG per tablet Take 1 tablet by mouth Every 6 (Six) Hours As Needed (Pain). 10 tablet 0    Rimegepant Sulfate (Nurtec) 75 MG tablet dispersible tablet Take 1 tablet by mouth Daily As Needed (migraines). 8 tablet 5    Sodium Fluoride 5000 PPM 1.1 % gel See Admin Instructions.      Trastuzumab (HERCEPTIN IV) Infuse  into a venous catheter. EVERY 21 DAYS-NEXT ON 4/4/24      vitamin D (ERGOCALCIFEROL) 1.25 MG (44557 UT) capsule capsule Take 1 capsule by mouth 1 (One) Time Per Week. 12 capsule 1     Current Facility-Administered Medications on File Prior to Visit   Medication Dose Route Frequency Provider Last Rate Last Admin    [COMPLETED] acetaminophen (TYLENOL) tablet 650 mg  650 mg Oral Once Perry Garcia MD   650 mg at 06/06/24 1436    [COMPLETED] sodium chloride 0.9 % infusion 250 mL  250 mL Intravenous Once Perry Garcia MD   Stopped at 06/06/24 1530    [COMPLETED] Trastuzumab (HERCEPTIN) 530 mg in sodium chloride 0.9 % 300.2 mL chemo IVPB  6 mg/kg (Treatment Plan Recorded) Intravenous Once Perry Garcia MD   Stopped at 06/06/24 1520       No Known Allergies  Past Medical History:   Diagnosis Date    Anemia     Breast cancer     Cancer, metastatic to bone     Kidney stone     Kidney stones     Metastasis to bone 06/08/2023      Past Surgical History:   Procedure Laterality Date    BREAST BIOPSY      CERVICAL FUSION  2023     SECTION N/A 2021    Procedure:  SECTION PRIMARY;  Surgeon: Zoe Dawson MD;  Location: [facility] LABOR DELIVERY;  Service: Obstetrics/Gynecology;  Laterality: N/A;    CYSTOSCOPY BLADDER STONE LITHOTRIPSY      SALPINGO OOPHORECTOMY Bilateral 2024    Procedure: SALPINGO OOPHORECTOMY LAPAROSCOPIC WITH DAVINCI ROBOT;  Surgeon: Ashlyn Avelar MD;  Location: [facility] MAIN OR;  Service: Robotics - DaVinci;  Laterality: Bilateral;     Social History     Socioeconomic History    Marital status:      Spouse name: Janet   Tobacco Use    Smoking status: Every Day     Current packs/day: 0.25     Average packs/day: 0.3 packs/day for 3.3 years (0.8 ttl pk-yrs)     Types: Cigarettes     Start date: 2021     Passive exposure: Current    Smokeless tobacco: Never   Vaping Use    Vaping status: Never Used   Substance and Sexual Activity    Alcohol use: Yes     Alcohol/week: 6.0 standard drinks of alcohol     Types: 3 Glasses of wine, 3 Drinks containing 0.5 oz of alcohol per week     Comment: OCC    Drug use: Yes     Types: Marijuana     Comment: for pain control/DAILY    Sexual activity: Yes     Partners: Male     Birth control/protection: Same-sex partner     Family History   Problem Relation Age of Onset    Mental illness Mother     Hypertension Father     Alcohol abuse Father     Ovarian cancer Paternal Aunt     Breast cancer Maternal Great-Grandmother     Malig Hyperthermia Neg Hx        Objective   Physical Exam  Vitals reviewed. Exam conducted with a chaperone present.   Cardiovascular:      Rate and Rhythm: Normal rate and regular rhythm.      Heart sounds: Normal heart sounds. No murmur heard.     No gallop.   Pulmonary:      Effort: Pulmonary effort is normal.      Breath sounds: Normal breath sounds.   Abdominal:      General: Bowel sounds are normal.   Lymphadenopathy:       "Cervical: No cervical adenopathy.   Psychiatric:         Mood and Affect: Mood normal.         Behavior: Behavior normal.         Vitals:    06/06/24 1348   BP: 120/85   Pulse: 88   SpO2: 98%   Weight: 93 kg (205 lb)     ECOG score: 0         PHQ-9 Total Score:                    Result Review :   The following data was reviewed by: Perry Garcia MD on 06/06/2024:  Lab Results   Component Value Date    HGB 11.5 (L) 06/06/2024    HCT 36.8 06/06/2024    MCV 92.5 06/06/2024     06/06/2024    WBC 6.07 06/06/2024    NEUTROABS 3.90 06/06/2024    LYMPHSABS 1.50 06/06/2024    MONOSABS 0.53 06/06/2024    EOSABS 0.11 06/06/2024    BASOSABS 0.02 06/06/2024     Lab Results   Component Value Date    GLUCOSE 119 (H) 06/06/2024    BUN 7 06/06/2024    CREATININE 0.72 06/06/2024     06/06/2024    K 4.5 06/06/2024     06/06/2024    CO2 26.0 06/06/2024    CALCIUM 9.5 06/06/2024    PROTEINTOT 7.6 06/06/2024    ALBUMIN 4.3 06/06/2024    BILITOT 0.4 06/06/2024    ALKPHOS 112 06/06/2024    AST 41 (H) 06/06/2024    ALT 68 (H) 06/06/2024     Lab Results   Component Value Date    MG 1.8 05/16/2024    PHOS 3.1 05/16/2024    TSH 0.885 09/21/2023     No results found for: \"IRON\", \"LABIRON\", \"TRANSFERRIN\", \"TIBC\"  Lab Results   Component Value Date    [id number] 577 02/22/2024    FOLATE >20.00 02/22/2024     No results found for: \"PSA\", \"CEA\", \"AFP\", \"\", \"\"          Assessment and Plan    Diagnoses and all orders for this visit:    1. 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  HER2 and hormone receptor positive.  Patient is on letrozole and trastuzumab.  Tolerating well.  Proceed with trastuzumab today as planned.  Continue letrozole by mouth daily.  RTC 3 weeks for OV, trastuzumab with lab work prior to monitor for toxicities.    Orders:  -     CBC and Differential; Future  -     Comprehensive metabolic panel; " Future    2. Anemia due to chemotherapy  Assessment & Plan:  Hemoglobin today is mildly decreased at 11.5 g/dL.  No symptoms or evidence of bleeding.  Repeat CBC next visit.      3. Metastasis to bone  Assessment & Plan:  Continue Xgeva monthly.      Other orders  -     Cancel: sodium chloride 0.9 % infusion 250 mL  -     Cancel: acetaminophen (TYLENOL) tablet 650 mg  -     Cancel: Trastuzumab (HERCEPTIN) 530 mg in sodium chloride 0.9 % 250 mL chemo IVPB            Patient Follow Up: 3 weeks    Patient was given instructions and counseling regarding her condition or for health maintenance advice. Please see specific information pulled into the AVS if appropriate.     Perry Garcia MD    6/7/2024

## 2024-06-07 PROBLEM — D64.81 ANEMIA DUE TO CHEMOTHERAPY: Status: ACTIVE | Noted: 2021-05-10

## 2024-06-07 PROBLEM — T45.1X5A ANEMIA DUE TO CHEMOTHERAPY: Status: ACTIVE | Noted: 2021-05-10

## 2024-06-07 NOTE — ASSESSMENT & PLAN NOTE
Hemoglobin today is mildly decreased at 11.5 g/dL.  No symptoms or evidence of bleeding.  Repeat CBC next visit.

## 2024-06-07 NOTE — ASSESSMENT & PLAN NOTE
Metastatic.  HER2 and hormone receptor positive.  Patient is on letrozole and trastuzumab.  Tolerating well.  Proceed with trastuzumab today as planned.  Continue letrozole by mouth daily.  RTC 3 weeks for OV, trastuzumab with lab work prior to monitor for toxicities.

## 2024-06-27 ENCOUNTER — HOSPITAL ENCOUNTER (OUTPATIENT)
Dept: ONCOLOGY | Facility: HOSPITAL | Age: 39
Discharge: HOME | End: 2024-06-27
Payer: COMMERCIAL

## 2024-06-27 ENCOUNTER — OFFICE VISIT (OUTPATIENT)
Dept: ONCOLOGY | Facility: HOSPITAL | Age: 39
End: 2024-06-27
Payer: COMMERCIAL

## 2024-06-27 VITALS
BODY MASS INDEX: 35.55 KG/M2 | WEIGHT: 200.62 LBS | SYSTOLIC BLOOD PRESSURE: 126 MMHG | TEMPERATURE: 98.1 F | DIASTOLIC BLOOD PRESSURE: 89 MMHG | HEART RATE: 106 BPM | RESPIRATION RATE: 18 BRPM | OXYGEN SATURATION: 98 %

## 2024-06-27 VITALS
WEIGHT: 200.7 LBS | HEART RATE: 106 BPM | BODY MASS INDEX: 35.56 KG/M2 | OXYGEN SATURATION: 98 % | SYSTOLIC BLOOD PRESSURE: 126 MMHG | TEMPERATURE: 98.1 F | DIASTOLIC BLOOD PRESSURE: 89 MMHG

## 2024-06-27 DIAGNOSIS — C79.51 METASTASIS TO BONE: ICD-10-CM

## 2024-06-27 DIAGNOSIS — Z17.0 MALIGNANT NEOPLASM OF UPPER-INNER QUADRANT OF RIGHT BREAST IN FEMALE, ESTROGEN RECEPTOR POSITIVE: Primary | ICD-10-CM

## 2024-06-27 DIAGNOSIS — Z79.899 LONG-TERM USE OF HIGH-RISK MEDICATION: ICD-10-CM

## 2024-06-27 DIAGNOSIS — C50.211 MALIGNANT NEOPLASM OF UPPER-INNER QUADRANT OF RIGHT BREAST IN FEMALE, ESTROGEN RECEPTOR POSITIVE: Primary | ICD-10-CM

## 2024-06-27 DIAGNOSIS — R79.89 ELEVATED LFTS: ICD-10-CM

## 2024-06-27 LAB
ALBUMIN SERPL-MCNC: 4.5 G/DL (ref 3.5–5.2)
ALBUMIN/GLOB SERPL: 1.5 G/DL
ALP SERPL-CCNC: 114 U/L (ref 39–117)
ALT SERPL W P-5'-P-CCNC: 77 U/L (ref 1–33)
ANION GAP SERPL CALCULATED.3IONS-SCNC: 10.5 MMOL/L (ref 5–15)
AST SERPL-CCNC: 45 U/L (ref 1–32)
BASOPHILS # BLD AUTO: 0.03 10*3/MM3 (ref 0–0.2)
BASOPHILS NFR BLD AUTO: 0.4 % (ref 0–1.5)
BILIRUB SERPL-MCNC: 0.3 MG/DL (ref 0–1.2)
BUN SERPL-MCNC: 12 MG/DL (ref 6–20)
BUN/CREAT SERPL: 14.6 (ref 7–25)
CALCIUM SPEC-SCNC: 10.1 MG/DL (ref 8.6–10.5)
CHLORIDE SERPL-SCNC: 104 MMOL/L (ref 98–107)
CO2 SERPL-SCNC: 24.5 MMOL/L (ref 22–29)
CREAT SERPL-MCNC: 0.82 MG/DL (ref 0.57–1)
DEPRECATED RDW RBC AUTO: 48.3 FL (ref 37–54)
EGFRCR SERPLBLD CKD-EPI 2021: 94.6 ML/MIN/1.73
EOSINOPHIL # BLD AUTO: 0.12 10*3/MM3 (ref 0–0.4)
EOSINOPHIL NFR BLD AUTO: 1.7 % (ref 0.3–6.2)
ERYTHROCYTE [DISTWIDTH] IN BLOOD BY AUTOMATED COUNT: 14.6 % (ref 12.3–15.4)
GLOBULIN UR ELPH-MCNC: 3.1 GM/DL
GLUCOSE SERPL-MCNC: 112 MG/DL (ref 65–99)
HCT VFR BLD AUTO: 40.9 % (ref 34–46.6)
HGB BLD-MCNC: 13 G/DL (ref 12–15.9)
IMM GRANULOCYTES # BLD AUTO: 0.01 10*3/MM3 (ref 0–0.05)
IMM GRANULOCYTES NFR BLD AUTO: 0.1 % (ref 0–0.5)
LYMPHOCYTES # BLD AUTO: 1.31 10*3/MM3 (ref 0.7–3.1)
LYMPHOCYTES NFR BLD AUTO: 18.7 % (ref 19.6–45.3)
MAGNESIUM SERPL-MCNC: 2.1 MG/DL (ref 1.6–2.6)
MCH RBC QN AUTO: 28.6 PG (ref 26.6–33)
MCHC RBC AUTO-ENTMCNC: 31.8 G/DL (ref 31.5–35.7)
MCV RBC AUTO: 89.9 FL (ref 79–97)
MONOCYTES # BLD AUTO: 0.53 10*3/MM3 (ref 0.1–0.9)
MONOCYTES NFR BLD AUTO: 7.5 % (ref 5–12)
NEUTROPHILS NFR BLD AUTO: 5.02 10*3/MM3 (ref 1.7–7)
NEUTROPHILS NFR BLD AUTO: 71.6 % (ref 42.7–76)
PHOSPHATE SERPL-MCNC: 2.7 MG/DL (ref 2.5–4.5)
PLATELET # BLD AUTO: 302 10*3/MM3 (ref 140–450)
PMV BLD AUTO: 9.7 FL (ref 6–12)
POTASSIUM SERPL-SCNC: 4 MMOL/L (ref 3.5–5.2)
PROT SERPL-MCNC: 7.6 G/DL (ref 6–8.5)
RBC # BLD AUTO: 4.55 10*6/MM3 (ref 3.77–5.28)
SODIUM SERPL-SCNC: 139 MMOL/L (ref 136–145)
WBC NRBC COR # BLD AUTO: 7.02 10*3/MM3 (ref 3.4–10.8)

## 2024-06-27 PROCEDURE — 96413 CHEMO IV INFUSION 1 HR: CPT

## 2024-06-27 PROCEDURE — 83735 ASSAY OF MAGNESIUM: CPT | Performed by: INTERNAL MEDICINE

## 2024-06-27 PROCEDURE — 25810000003 SODIUM CHLORIDE 0.9 % SOLUTION 250 ML FLEX CONT: Performed by: INTERNAL MEDICINE

## 2024-06-27 PROCEDURE — 25010000002 TRASTUZUMAB PER 10 MG: Performed by: INTERNAL MEDICINE

## 2024-06-27 PROCEDURE — 84100 ASSAY OF PHOSPHORUS: CPT | Performed by: INTERNAL MEDICINE

## 2024-06-27 PROCEDURE — 25010000002 DENOSUMAB 120 MG/1.7ML SOLUTION: Performed by: INTERNAL MEDICINE

## 2024-06-27 PROCEDURE — 99214 OFFICE O/P EST MOD 30 MIN: CPT | Performed by: NURSE PRACTITIONER

## 2024-06-27 PROCEDURE — 96372 THER/PROPH/DIAG INJ SC/IM: CPT

## 2024-06-27 PROCEDURE — 25810000003 SODIUM CHLORIDE 0.9 % SOLUTION: Performed by: INTERNAL MEDICINE

## 2024-06-27 PROCEDURE — 85025 COMPLETE CBC W/AUTO DIFF WBC: CPT | Performed by: INTERNAL MEDICINE

## 2024-06-27 PROCEDURE — 80053 COMPREHEN METABOLIC PANEL: CPT | Performed by: INTERNAL MEDICINE

## 2024-06-27 RX ORDER — ACETAMINOPHEN 325 MG/1
650 TABLET ORAL ONCE
Status: DISCONTINUED | OUTPATIENT
Start: 2024-06-27 | End: 2024-06-29 | Stop reason: HOSPADM

## 2024-06-27 RX ORDER — SODIUM CHLORIDE 9 MG/ML
250 INJECTION, SOLUTION INTRAVENOUS ONCE
Status: COMPLETED | OUTPATIENT
Start: 2024-06-27 | End: 2024-06-27

## 2024-06-27 RX ORDER — SODIUM CHLORIDE 9 MG/ML
250 INJECTION, SOLUTION INTRAVENOUS ONCE
Status: CANCELLED | OUTPATIENT
Start: 2024-06-27

## 2024-06-27 RX ORDER — LETROZOLE 2.5 MG/1
2.5 TABLET, FILM COATED ORAL DAILY
Qty: 30 TABLET | Refills: 5
Start: 2024-06-27

## 2024-06-27 RX ORDER — ACETAMINOPHEN 325 MG/1
650 TABLET ORAL ONCE
Status: CANCELLED | OUTPATIENT
Start: 2024-06-27

## 2024-06-27 RX ADMIN — SODIUM CHLORIDE 250 ML: 9 INJECTION, SOLUTION INTRAVENOUS at 14:22

## 2024-06-27 RX ADMIN — DENOSUMAB 120 MG: 120 INJECTION SUBCUTANEOUS at 14:34

## 2024-06-27 RX ADMIN — TRASTUZUMAB 530 MG: 150 INJECTION, POWDER, LYOPHILIZED, FOR SOLUTION INTRAVENOUS at 14:41

## 2024-06-27 NOTE — PROGRESS NOTES
Chief Complaint/Reason for Referral:  Breast cancer treatment    Lela Vanegas APRN Oldham, Tara, APRN      Subjective    History of Present Illness  Ms. Annita Johnsno presents for treatment visit today for stage IV breast cancer with bone mets.  Receiving Letrozole daily as instructed and tolerating well. Receives Herceptin every 21 days. Receives Xgeva for bone mets.  Tolerating well without any intolerable side effects. Deneis any loose stools. Denies any CP, SOA, CP or heart palpitations or leg swelling. Last echo showed ER of 62% back April of 2024.     Last scans were done in early April.     Labs today: CBC acceptable. CMP: normal except for AST of 45 and ALT of 77.       Cancer Staging   Malignant neoplasm of upper-inner quadrant of right breast in female, estrogen receptor positive  Staging form: Breast, AJCC 8th Edition  - Clinical: Stage IV (cT2, cN0, cM1, ER+, MN+, HER2-) - Signed by Perry Garcia MD on 6/8/2023       Treatment intent: palliative    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, estrogen receptor positive   6/8/2023 Initial Diagnosis    Malignant neoplasm of upper-inner quadrant of right breast in female, estrogen receptor positive     6/8/2023 Cancer Staged    Staging form: Breast, AJCC 8th Edition  - Clinical: Stage IV (cT2, cN0, cM1, ER+, MN+, HER2-) - Signed by Perry Garcia MD on 6/8/2023 6/9/2023 - 4/4/2024 Chemotherapy    OP SUPPORTIVE BREAST Leuprolide 3.75 MG Q28D     6/15/2023 - 6/15/2023 Chemotherapy    OP BREAST Letrozole / Ribociclib     7/13/2023 -  Chemotherapy    OP BREAST Letrozole / Trastuzumab     8/24/2023 -  Chemotherapy    OP SUPPORTIVE Denosumab (Xgeva) Q28D     Metastasis to bone   6/8/2023 Initial Diagnosis    Metastasis to bone     8/24/2023 -  Chemotherapy    OP SUPPORTIVE Denosumab (Xgeva) Q28D         Review of Systems   Constitutional:  Positive for fatigue.   HENT: Negative.     Eyes:  Negative.    Respiratory: Negative.     Cardiovascular: Negative.    Gastrointestinal: Negative.    Endocrine: Negative.    Genitourinary: Negative.    Allergic/Immunologic: Negative.    Neurological: Negative.    Hematological: Negative.    Psychiatric/Behavioral: Negative.         Current Outpatient Medications on File Prior to Visit   Medication Sig Dispense Refill    Calcium Carbonate-Vitamin D 600-10 MG-MCG per tablet Take 1 tablet by mouth 2 (Two) Times a Day. 60 tablet 5    clonazePAM (KlonoPIN) 0.5 MG tablet Take 1 tablet by mouth 2 (Two) Times a Day As Needed for Anxiety. 30 tablet 1    cyclobenzaprine (FLEXERIL) 10 MG tablet Take 1 tablet by mouth 3 (Three) Times a Day As Needed for Muscle Spasms. 90 tablet 5    escitalopram (LEXAPRO) 10 MG tablet TAKE 1 TABLET BY MOUTH EVERY DAY (Patient not taking: Reported on 6/27/2024) 90 tablet 1    folic acid (FOLVITE) 1 MG tablet TAKE 1 TABLET BY MOUTH EVERY DAY 90 tablet 1    hydrOXYzine (ATARAX) 50 MG tablet TAKE 1 TABLET BY MOUTH EVERYDAY AT BEDTIME 90 tablet 1    letrozole (FEMARA) 2.5 MG tablet Take 1 tablet by mouth Daily. 30 tablet 5    letrozole (FEMARA) 2.5 MG tablet Take 1 tablet by mouth Daily. 30 tablet 5    letrozole (FEMARA) 2.5 MG tablet Take 1 tablet by mouth Daily. 30 tablet 5    letrozole (FEMARA) 2.5 MG tablet Take 1 tablet by mouth Daily. 30 tablet 5    letrozole (FEMARA) 2.5 MG tablet Take 1 tablet by mouth Daily. 30 tablet 5    ondansetron (ZOFRAN) 8 MG tablet Take 1 tablet by mouth 3 (Three) Times a Day As Needed for Nausea or Vomiting. 30 tablet 5    oxyCODONE-acetaminophen (PERCOCET) 5-325 MG per tablet Take 1 tablet by mouth Every 6 (Six) Hours As Needed (Pain). 10 tablet 0    Rimegepant Sulfate (Nurtec) 75 MG tablet dispersible tablet Take 1 tablet by mouth Daily As Needed (migraines). 8 tablet 5    Sodium Fluoride 5000 PPM 1.1 % gel See Admin Instructions.      Trastuzumab (HERCEPTIN IV) Infuse  into a venous catheter. EVERY 21 DAYS-NEXT  ON 24      vitamin D (ERGOCALCIFEROL) 1.25 MG (46991 UT) capsule capsule Take 1 capsule by mouth 1 (One) Time Per Week. 12 capsule 1     Current Facility-Administered Medications on File Prior to Visit   Medication Dose Route Frequency Provider Last Rate Last Admin    acetaminophen (TYLENOL) tablet 650 mg  650 mg Oral Once Garry Moore MD        [COMPLETED] denosumab (XGEVA) injection 120 mg  120 mg Subcutaneous Once Garry Moore MD   120 mg at 24 1434    [COMPLETED] sodium chloride 0.9 % infusion 250 mL  250 mL Intravenous Once Garry Moore MD 20 mL/hr at 24 1422 250 mL at 24 1422    [COMPLETED] Trastuzumab (HERCEPTIN) 530 mg in sodium chloride 0.9 % 300.2 mL chemo IVPB  6 mg/kg (Treatment Plan Recorded) Intravenous Once Garry Moore MD   Stopped at 24 1511       No Known Allergies  Past Medical History:   Diagnosis Date    Anemia     Breast cancer     Cancer, metastatic to bone     Kidney stone     Kidney stones     Metastasis to bone 2023     Past Surgical History:   Procedure Laterality Date    BREAST BIOPSY      CERVICAL FUSION  2023     SECTION N/A 2021    Procedure:  SECTION PRIMARY;  Surgeon: Zoe Dawson MD;  Location: [facility] LABOR DELIVERY;  Service: Obstetrics/Gynecology;  Laterality: N/A;    CYSTOSCOPY BLADDER STONE LITHOTRIPSY      SALPINGO OOPHORECTOMY Bilateral 2024    Procedure: SALPINGO OOPHORECTOMY LAPAROSCOPIC WITH DAVINCI ROBOT;  Surgeon: Ashlyn Avelar MD;  Location: [facility] OR;  Service: Robotics - DaVinci;  Laterality: Bilateral;     Social History     Socioeconomic History    Marital status:      Spouse name: Janet   Tobacco Use    Smoking status: Every Day     Current packs/day: 0.25     Average packs/day: 0.3 packs/day for 3.4 years (0.9 ttl pk-yrs)     Types: Cigarettes     Start date: 2021     Passive exposure: Current    Smokeless tobacco: Never   Vaping  Use    Vaping status: Never Used   Substance and Sexual Activity    Alcohol use: Yes     Alcohol/week: 6.0 standard drinks of alcohol     Types: 3 Glasses of wine, 3 Drinks containing 0.5 oz of alcohol per week     Comment: OCC    Drug use: Yes     Types: Marijuana     Comment: for pain control/DAILY    Sexual activity: Yes     Partners: Male     Birth control/protection: Same-sex partner     Family History   Problem Relation Age of Onset    Mental illness Mother     Hypertension Father     Alcohol abuse Father     Ovarian cancer Paternal Aunt     Breast cancer Maternal Great-Grandmother     Malig Hyperthermia Neg Hx      Immunization History   Administered Date(s) Administered    MMR 07/04/2021    PPD Test 07/24/2014, 07/21/2015, 07/11/2016    Tdap 05/04/2021       Tobacco Use: High Risk (6/27/2024)    Patient History     Smoking Tobacco Use: Every Day     Smokeless Tobacco Use: Never     Passive Exposure: Current       Objective     Physical Exam  Vitals and nursing note reviewed.   Constitutional:       Appearance: Normal appearance.   HENT:      Head: Normocephalic.      Nose: Nose normal.      Mouth/Throat:      Mouth: Mucous membranes are moist.   Eyes:      Pupils: Pupils are equal, round, and reactive to light.   Cardiovascular:      Rate and Rhythm: Normal rate and regular rhythm.      Pulses: Normal pulses.      Heart sounds: Normal heart sounds.   Pulmonary:      Effort: Pulmonary effort is normal.      Breath sounds: Normal breath sounds.   Abdominal:      General: Bowel sounds are normal. There is no distension.      Palpations: Abdomen is soft.   Musculoskeletal:         General: Normal range of motion.      Cervical back: Normal range of motion and neck supple.   Skin:     General: Skin is warm and dry.      Capillary Refill: Capillary refill takes less than 2 seconds.   Neurological:      General: No focal deficit present.      Mental Status: She is alert.   Psychiatric:         Mood and Affect: Mood  "normal.         Behavior: Behavior normal.         Thought Content: Thought content normal.         Judgment: Judgment normal.         Vitals:    06/27/24 1333   BP: 126/89   Pulse: 106   Resp: 18   Temp: 98.1 °F (36.7 °C)   TempSrc: Temporal   SpO2: 98%   Weight: 91 kg (200 lb 9.9 oz)   PainSc: 0-No pain       Wt Readings from Last 3 Encounters:   06/27/24 91 kg (200 lb 9.9 oz)   06/27/24 91 kg (200 lb 11.2 oz)   06/06/24 93.4 kg (205 lb 12.8 oz)               ECOG score: 0         ECOG: (0) Fully Active - Able to Carry On All Pre-disease Performance Without Restriction  Fall Risk Assessment was completed, and patient is at low risk for falls.  PHQ-9 Total Score:         The patient is  experiencing fatigue. Fatigue score: 1    PT/OT Functional Screening: PT fx screen : No needs identified  Speech Functional Screening: Speech fx screen : No needs identified  Rehab to be ordered: Rehab to be ordered : No needs identified        Result Review :  The following data was reviewed by: LÁZARO Mcdonnell on 06/27/2024:  Lab Results   Component Value Date    HGB 13.0 06/27/2024    HCT 40.9 06/27/2024    MCV 89.9 06/27/2024     06/27/2024    WBC 7.02 06/27/2024    NEUTROABS 5.02 06/27/2024    LYMPHSABS 1.31 06/27/2024    MONOSABS 0.53 06/27/2024    EOSABS 0.12 06/27/2024    BASOSABS 0.03 06/27/2024     Lab Results   Component Value Date    GLUCOSE 112 (H) 06/27/2024    BUN 12 06/27/2024    CREATININE 0.82 06/27/2024     06/27/2024    K 4.0 06/27/2024     06/27/2024    CO2 24.5 06/27/2024    CALCIUM 10.1 06/27/2024    PROTEINTOT 7.6 06/27/2024    ALBUMIN 4.5 06/27/2024    BILITOT 0.3 06/27/2024    ALKPHOS 114 06/27/2024    AST 45 (H) 06/27/2024    ALT 77 (H) 06/27/2024     Lab Results   Component Value Date    [id number] 577 02/22/2024    FOLATE >20.00 02/22/2024     No results found for: \"IRON\", \"LABIRON\", \"TRANSFERRIN\", \"TIBC\"  Lab Results   Component Value Date    [id number] 577 02/22/2024    " "FOLATE >20.00 02/22/2024     No results found for: \"PSA\", \"CEA\", \"AFP\", \"\", \"\"    NM Bone Scan Whole Body    Result Date: 4/2/2024  Stable exam. Multifocal osseous metastatic disease.   Electronically Signed By-AZUL VALERIO MD On:4/2/2024 3:18 PM      CT Chest With Contrast Diagnostic    Result Date: 4/2/2024  Impression:  1. No evidence of intrathoracic or intraabdominal/pelvic metastatic disease 2. Osteoblastic metastatic disease. The metastatic lesions are stable in distribution, although some of the lesions demonstrate increased sclerosis which is likely related to treatment. No new bony metastases are demonstrated    Electronically Signed By-Clinton Bradshaw On:4/2/2024 2:41 PM      CT Abdomen Pelvis With Contrast    Result Date: 4/2/2024  Impression:  1. No evidence of intrathoracic or intraabdominal/pelvic metastatic disease 2. Osteoblastic metastatic disease. The metastatic lesions are stable in distribution, although some of the lesions demonstrate increased sclerosis which is likely related to treatment. No new bony metastases are demonstrated    Electronically Signed ByHammad Bradshaw On:4/2/2024 2:41 PM             Assessment and Plan:  Diagnoses and all orders for this visit:    1. Malignant neoplasm of upper-inner quadrant of right breast in female, estrogen receptor positive (Primary)  -     CBC and Differential; Future  -     Comprehensive metabolic panel; Future  -     Magnesium; Future  -     Phosphorus; Future  -     CT Abdomen Pelvis With Contrast; Future  -     CT Chest With Contrast; Future  -     NM Bone Scan Whole Body; Future    2. Metastasis to bone  -     Magnesium; Future  -     Phosphorus; Future  -     CT Abdomen Pelvis With Contrast; Future  -     CT Chest With Contrast; Future  -     NM Bone Scan Whole Body; Future    3. Long-term use of high-risk medication  -     Adult Transthoracic Echo Limited W/ Cont if Necessary Per Protocol; Future    4. Elevated LFTs    Other " orders  -     Cancel: denosumab (XGEVA) injection 120 mg  -     Cancel: sodium chloride 0.9 % infusion 250 mL  -     Cancel: acetaminophen (TYLENOL) tablet 650 mg  -     Cancel: Trastuzumab (HERCEPTIN) 530 mg in sodium chloride 0.9 % 250 mL chemo IVPB    Stage IV right breast cancer diagnosed in June of 2023.  Now on Herceptin based therapy for HER 2 + protein. Receiving treatment every 21 days. Tolerating Letrozle well. Taking Xgeva monthly for the bony involvement. Denies any treatment associated side effects. Weight is stable.     Labs reviewed. OK for treatment. Does have mild stable elevated liver enzymes. Likely secondary due fatty liver seen on prior CT from January of 2024.     Echo is due now.     Bone scan, CT CAP are due. Will scheduled 1-2 days prior to next treatment and can be reviewed at her next treatment.     I spent 30 minutes caring for Annita on this date of service. This time includes time spent by me in the following activities:preparing for the visit, reviewing tests, obtaining and/or reviewing a separately obtained history, performing a medically appropriate examination and/or evaluation , counseling and educating the patient/family/caregiver, ordering medications, tests, or procedures, referring and communicating with other health care professionals , documenting information in the medical record, and independently interpreting results and communicating that information with the patient/family/caregiver    Patient Follow Up: 21 days for next treatment and treatment visit with Dr. Garcia.     Patient was given instructions and counseling regarding her condition or for health maintenance advice. Please see specific information pulled into the AVS if appropriate.     Marylin Morillo, APRN    6/27/2024    .tob

## 2024-07-09 ENCOUNTER — HOSPITAL ENCOUNTER (OUTPATIENT)
Dept: CARDIOLOGY | Facility: HOSPITAL | Age: 39
Discharge: HOME | End: 2024-07-09
Admitting: NURSE PRACTITIONER
Payer: COMMERCIAL

## 2024-07-09 DIAGNOSIS — Z79.899 LONG-TERM USE OF HIGH-RISK MEDICATION: ICD-10-CM

## 2024-07-09 LAB
BH CV ECHO LEFT VENTRICLE GLOBAL LONGITUDINAL STRAIN: -11.7 %
BH CV ECHO MEAS - AO ROOT DIAM: 3.3 CM
BH CV ECHO MEAS - EDV(CUBED): 85.2 ML
BH CV ECHO MEAS - EDV(MOD-SP2): 48.1 ML
BH CV ECHO MEAS - EDV(MOD-SP4): 60.5 ML
BH CV ECHO MEAS - EF(MOD-BP): 58.7 %
BH CV ECHO MEAS - EF(MOD-SP2): 58.2 %
BH CV ECHO MEAS - EF(MOD-SP4): 62 %
BH CV ECHO MEAS - ESV(CUBED): 27 ML
BH CV ECHO MEAS - ESV(MOD-SP2): 20.1 ML
BH CV ECHO MEAS - ESV(MOD-SP4): 23 ML
BH CV ECHO MEAS - FS: 31.8 %
BH CV ECHO MEAS - IVS/LVPW: 1.11 CM
BH CV ECHO MEAS - IVSD: 1 CM
BH CV ECHO MEAS - LA DIMENSION: 3.6 CM
BH CV ECHO MEAS - LV DIASTOLIC VOL/BSA (35-75): 31 CM2
BH CV ECHO MEAS - LV MASS(C)D: 137.8 GRAMS
BH CV ECHO MEAS - LV SYSTOLIC VOL/BSA (12-30): 11.8 CM2
BH CV ECHO MEAS - LVIDD: 4.4 CM
BH CV ECHO MEAS - LVIDS: 3 CM
BH CV ECHO MEAS - LVPWD: 0.9 CM
BH CV ECHO MEAS - SV(MOD-SP2): 28 ML
BH CV ECHO MEAS - SV(MOD-SP4): 37.5 ML
BH CV ECHO MEAS - SVI(MOD-SP2): 14.3 ML/M2
BH CV ECHO MEAS - SVI(MOD-SP4): 19.2 ML/M2
LEFT ATRIUM VOLUME INDEX: 16 ML/M2

## 2024-07-09 PROCEDURE — 93308 TTE F-UP OR LMTD: CPT

## 2024-07-09 PROCEDURE — 93308 TTE F-UP OR LMTD: CPT | Performed by: INTERNAL MEDICINE

## 2024-07-16 ENCOUNTER — HOSPITAL ENCOUNTER (OUTPATIENT)
Dept: CT IMAGING | Facility: HOSPITAL | Age: 39
Discharge: HOME | End: 2024-07-16
Admitting: NURSE PRACTITIONER
Payer: COMMERCIAL

## 2024-07-16 ENCOUNTER — HOSPITAL ENCOUNTER (OUTPATIENT)
Dept: NUCLEAR MEDICINE | Facility: HOSPITAL | Age: 39
Discharge: HOME | End: 2024-07-16
Payer: COMMERCIAL

## 2024-07-16 DIAGNOSIS — C79.51 METASTASIS TO BONE: ICD-10-CM

## 2024-07-16 DIAGNOSIS — Z17.0 MALIGNANT NEOPLASM OF UPPER-INNER QUADRANT OF RIGHT BREAST IN FEMALE, ESTROGEN RECEPTOR POSITIVE: ICD-10-CM

## 2024-07-16 DIAGNOSIS — C50.211 MALIGNANT NEOPLASM OF UPPER-INNER QUADRANT OF RIGHT BREAST IN FEMALE, ESTROGEN RECEPTOR POSITIVE: ICD-10-CM

## 2024-07-16 PROCEDURE — 74177 CT ABD & PELVIS W/CONTRAST: CPT

## 2024-07-16 PROCEDURE — 0 TECHNETIUM MEDRONATE KIT: Performed by: NURSE PRACTITIONER

## 2024-07-16 PROCEDURE — 78306 BONE IMAGING WHOLE BODY: CPT

## 2024-07-16 PROCEDURE — 71260 CT THORAX DX C+: CPT

## 2024-07-16 PROCEDURE — 25510000001 IOPAMIDOL PER 1 ML: Performed by: NURSE PRACTITIONER

## 2024-07-16 PROCEDURE — A9503 TC99M MEDRONATE: HCPCS | Performed by: NURSE PRACTITIONER

## 2024-07-16 RX ORDER — TC 99M MEDRONATE 20 MG/10ML
21.6 INJECTION, POWDER, LYOPHILIZED, FOR SOLUTION INTRAVENOUS
Status: COMPLETED | OUTPATIENT
Start: 2024-07-16 | End: 2024-07-16

## 2024-07-16 RX ADMIN — IOPAMIDOL 100 ML: 755 INJECTION, SOLUTION INTRAVENOUS at 13:26

## 2024-07-16 RX ADMIN — TC 99M MEDRONATE 21.6 MILLICURIE: 20 INJECTION, POWDER, LYOPHILIZED, FOR SOLUTION INTRAVENOUS at 13:05

## 2024-07-18 ENCOUNTER — OFFICE VISIT (OUTPATIENT)
Dept: ONCOLOGY | Facility: HOSPITAL | Age: 39
End: 2024-07-18
Payer: COMMERCIAL

## 2024-07-18 ENCOUNTER — HOSPITAL ENCOUNTER (OUTPATIENT)
Dept: ONCOLOGY | Facility: HOSPITAL | Age: 39
Discharge: HOME | End: 2024-07-18
Payer: COMMERCIAL

## 2024-07-18 VITALS
HEART RATE: 98 BPM | SYSTOLIC BLOOD PRESSURE: 118 MMHG | BODY MASS INDEX: 36.56 KG/M2 | WEIGHT: 206.35 LBS | TEMPERATURE: 98.6 F | OXYGEN SATURATION: 98 % | DIASTOLIC BLOOD PRESSURE: 77 MMHG

## 2024-07-18 VITALS
OXYGEN SATURATION: 98 % | WEIGHT: 206.35 LBS | BODY MASS INDEX: 36.56 KG/M2 | SYSTOLIC BLOOD PRESSURE: 118 MMHG | DIASTOLIC BLOOD PRESSURE: 77 MMHG | RESPIRATION RATE: 18 BRPM | HEART RATE: 98 BPM | TEMPERATURE: 98.6 F

## 2024-07-18 DIAGNOSIS — C79.51 METASTASIS TO BONE: ICD-10-CM

## 2024-07-18 DIAGNOSIS — Z17.0 MALIGNANT NEOPLASM OF UPPER-INNER QUADRANT OF RIGHT BREAST IN FEMALE, ESTROGEN RECEPTOR POSITIVE: Primary | ICD-10-CM

## 2024-07-18 DIAGNOSIS — C50.211 MALIGNANT NEOPLASM OF UPPER-INNER QUADRANT OF RIGHT BREAST IN FEMALE, ESTROGEN RECEPTOR POSITIVE: Primary | ICD-10-CM

## 2024-07-18 DIAGNOSIS — Z79.899 LONG-TERM USE OF HIGH-RISK MEDICATION: ICD-10-CM

## 2024-07-18 DIAGNOSIS — R79.89 ELEVATED LFTS: ICD-10-CM

## 2024-07-18 LAB
ALBUMIN SERPL-MCNC: 4.6 G/DL (ref 3.5–5.2)
ALBUMIN/GLOB SERPL: 1.4 G/DL
ALP SERPL-CCNC: 104 U/L (ref 39–117)
ALT SERPL W P-5'-P-CCNC: 93 U/L (ref 1–33)
ANION GAP SERPL CALCULATED.3IONS-SCNC: 8.8 MMOL/L (ref 5–15)
AST SERPL-CCNC: 54 U/L (ref 1–32)
BASOPHILS # BLD AUTO: 0.03 10*3/MM3 (ref 0–0.2)
BASOPHILS NFR BLD AUTO: 0.4 % (ref 0–1.5)
BILIRUB SERPL-MCNC: 0.2 MG/DL (ref 0–1.2)
BUN SERPL-MCNC: 11 MG/DL (ref 6–20)
BUN/CREAT SERPL: 15.5 (ref 7–25)
CALCIUM SPEC-SCNC: 10.1 MG/DL (ref 8.6–10.5)
CHLORIDE SERPL-SCNC: 104 MMOL/L (ref 98–107)
CO2 SERPL-SCNC: 26.2 MMOL/L (ref 22–29)
CREAT SERPL-MCNC: 0.71 MG/DL (ref 0.57–1)
DEPRECATED RDW RBC AUTO: 49.5 FL (ref 37–54)
EGFRCR SERPLBLD CKD-EPI 2021: 111.8 ML/MIN/1.73
EOSINOPHIL # BLD AUTO: 0.15 10*3/MM3 (ref 0–0.4)
EOSINOPHIL NFR BLD AUTO: 2.2 % (ref 0.3–6.2)
ERYTHROCYTE [DISTWIDTH] IN BLOOD BY AUTOMATED COUNT: 14.9 % (ref 12.3–15.4)
GLOBULIN UR ELPH-MCNC: 3.2 GM/DL
GLUCOSE SERPL-MCNC: 79 MG/DL (ref 65–99)
HCT VFR BLD AUTO: 38.8 % (ref 34–46.6)
HGB BLD-MCNC: 12.4 G/DL (ref 12–15.9)
IMM GRANULOCYTES # BLD AUTO: 0.02 10*3/MM3 (ref 0–0.05)
IMM GRANULOCYTES NFR BLD AUTO: 0.3 % (ref 0–0.5)
LYMPHOCYTES # BLD AUTO: 1.62 10*3/MM3 (ref 0.7–3.1)
LYMPHOCYTES NFR BLD AUTO: 23.3 % (ref 19.6–45.3)
MCH RBC QN AUTO: 29 PG (ref 26.6–33)
MCHC RBC AUTO-ENTMCNC: 32 G/DL (ref 31.5–35.7)
MCV RBC AUTO: 90.9 FL (ref 79–97)
MONOCYTES # BLD AUTO: 0.65 10*3/MM3 (ref 0.1–0.9)
MONOCYTES NFR BLD AUTO: 9.4 % (ref 5–12)
NEUTROPHILS NFR BLD AUTO: 4.47 10*3/MM3 (ref 1.7–7)
NEUTROPHILS NFR BLD AUTO: 64.4 % (ref 42.7–76)
PLATELET # BLD AUTO: 275 10*3/MM3 (ref 140–450)
PMV BLD AUTO: 9.7 FL (ref 6–12)
POTASSIUM SERPL-SCNC: 4.4 MMOL/L (ref 3.5–5.2)
PROT SERPL-MCNC: 7.8 G/DL (ref 6–8.5)
RBC # BLD AUTO: 4.27 10*6/MM3 (ref 3.77–5.28)
SODIUM SERPL-SCNC: 139 MMOL/L (ref 136–145)
WBC NRBC COR # BLD AUTO: 6.94 10*3/MM3 (ref 3.4–10.8)

## 2024-07-18 PROCEDURE — 85025 COMPLETE CBC W/AUTO DIFF WBC: CPT | Performed by: INTERNAL MEDICINE

## 2024-07-18 PROCEDURE — 96413 CHEMO IV INFUSION 1 HR: CPT

## 2024-07-18 PROCEDURE — 25010000002 TRASTUZUMAB PER 10 MG: Performed by: INTERNAL MEDICINE

## 2024-07-18 PROCEDURE — 25810000003 SODIUM CHLORIDE 0.9 % SOLUTION 250 ML FLEX CONT: Performed by: INTERNAL MEDICINE

## 2024-07-18 PROCEDURE — 25810000003 SODIUM CHLORIDE 0.9 % SOLUTION: Performed by: INTERNAL MEDICINE

## 2024-07-18 PROCEDURE — 80053 COMPREHEN METABOLIC PANEL: CPT | Performed by: INTERNAL MEDICINE

## 2024-07-18 RX ORDER — SODIUM CHLORIDE 9 MG/ML
250 INJECTION, SOLUTION INTRAVENOUS ONCE
Status: COMPLETED | OUTPATIENT
Start: 2024-07-18 | End: 2024-07-18

## 2024-07-18 RX ORDER — ACETAMINOPHEN 325 MG/1
650 TABLET ORAL ONCE
Status: CANCELLED | OUTPATIENT
Start: 2024-07-18

## 2024-07-18 RX ORDER — ACETAMINOPHEN 325 MG/1
650 TABLET ORAL ONCE
Status: DISCONTINUED | OUTPATIENT
Start: 2024-07-18 | End: 2024-07-19 | Stop reason: HOSPADM

## 2024-07-18 RX ORDER — SODIUM CHLORIDE 9 MG/ML
250 INJECTION, SOLUTION INTRAVENOUS ONCE
Status: CANCELLED | OUTPATIENT
Start: 2024-07-18

## 2024-07-18 RX ORDER — LETROZOLE 2.5 MG/1
2.5 TABLET, FILM COATED ORAL DAILY
Qty: 30 TABLET | Refills: 5
Start: 2024-07-18

## 2024-07-18 RX ADMIN — SODIUM CHLORIDE 250 ML: 9 INJECTION, SOLUTION INTRAVENOUS at 14:12

## 2024-07-18 RX ADMIN — TRASTUZUMAB 530 MG: 150 INJECTION, POWDER, LYOPHILIZED, FOR SOLUTION INTRAVENOUS at 14:32

## 2024-08-08 ENCOUNTER — OFFICE VISIT (OUTPATIENT)
Dept: ONCOLOGY | Facility: HOSPITAL | Age: 39
End: 2024-08-08
Payer: COMMERCIAL

## 2024-08-08 ENCOUNTER — HOSPITAL ENCOUNTER (OUTPATIENT)
Dept: ONCOLOGY | Facility: HOSPITAL | Age: 39
Discharge: HOME | End: 2024-08-08
Payer: COMMERCIAL

## 2024-08-08 VITALS
BODY MASS INDEX: 36.59 KG/M2 | TEMPERATURE: 98.1 F | DIASTOLIC BLOOD PRESSURE: 84 MMHG | HEART RATE: 90 BPM | WEIGHT: 206.5 LBS | OXYGEN SATURATION: 99 % | RESPIRATION RATE: 18 BRPM | SYSTOLIC BLOOD PRESSURE: 128 MMHG

## 2024-08-08 VITALS
TEMPERATURE: 98.1 F | WEIGHT: 206 LBS | RESPIRATION RATE: 18 BRPM | BODY MASS INDEX: 36.5 KG/M2 | OXYGEN SATURATION: 99 % | HEART RATE: 90 BPM | DIASTOLIC BLOOD PRESSURE: 84 MMHG | SYSTOLIC BLOOD PRESSURE: 128 MMHG

## 2024-08-08 DIAGNOSIS — C50.211 MALIGNANT NEOPLASM OF UPPER-INNER QUADRANT OF RIGHT BREAST IN FEMALE, ESTROGEN RECEPTOR POSITIVE: ICD-10-CM

## 2024-08-08 DIAGNOSIS — C79.51 METASTASIS TO BONE: Primary | ICD-10-CM

## 2024-08-08 DIAGNOSIS — Z17.0 MALIGNANT NEOPLASM OF UPPER-INNER QUADRANT OF RIGHT BREAST IN FEMALE, ESTROGEN RECEPTOR POSITIVE: ICD-10-CM

## 2024-08-08 DIAGNOSIS — Z79.899 ENCOUNTER FOR LONG-TERM (CURRENT) USE OF HIGH-RISK MEDICATION: ICD-10-CM

## 2024-08-08 LAB
ALBUMIN SERPL-MCNC: 4.5 G/DL (ref 3.5–5.2)
ALBUMIN/GLOB SERPL: 1.2 G/DL
ALP SERPL-CCNC: 110 U/L (ref 39–117)
ALT SERPL W P-5'-P-CCNC: 92 U/L (ref 1–33)
ANION GAP SERPL CALCULATED.3IONS-SCNC: 11.3 MMOL/L (ref 5–15)
AST SERPL-CCNC: 52 U/L (ref 1–32)
BASOPHILS # BLD AUTO: 0.03 10*3/MM3 (ref 0–0.2)
BASOPHILS NFR BLD AUTO: 0.4 % (ref 0–1.5)
BILIRUB SERPL-MCNC: 0.3 MG/DL (ref 0–1.2)
BUN SERPL-MCNC: 15 MG/DL (ref 6–20)
BUN/CREAT SERPL: 21.1 (ref 7–25)
CALCIUM SPEC-SCNC: 10.2 MG/DL (ref 8.6–10.5)
CHLORIDE SERPL-SCNC: 101 MMOL/L (ref 98–107)
CO2 SERPL-SCNC: 24.7 MMOL/L (ref 22–29)
CREAT SERPL-MCNC: 0.71 MG/DL (ref 0.57–1)
DEPRECATED RDW RBC AUTO: 51.2 FL (ref 37–54)
EGFRCR SERPLBLD CKD-EPI 2021: 111.8 ML/MIN/1.73
EOSINOPHIL # BLD AUTO: 0.15 10*3/MM3 (ref 0–0.4)
EOSINOPHIL NFR BLD AUTO: 1.9 % (ref 0.3–6.2)
ERYTHROCYTE [DISTWIDTH] IN BLOOD BY AUTOMATED COUNT: 14.7 % (ref 12.3–15.4)
GLOBULIN UR ELPH-MCNC: 3.7 GM/DL
GLUCOSE SERPL-MCNC: 119 MG/DL (ref 65–99)
HCT VFR BLD AUTO: 40.2 % (ref 34–46.6)
HGB BLD-MCNC: 12.8 G/DL (ref 12–15.9)
IMM GRANULOCYTES # BLD AUTO: 0.01 10*3/MM3 (ref 0–0.05)
IMM GRANULOCYTES NFR BLD AUTO: 0.1 % (ref 0–0.5)
LYMPHOCYTES # BLD AUTO: 1.43 10*3/MM3 (ref 0.7–3.1)
LYMPHOCYTES NFR BLD AUTO: 18.2 % (ref 19.6–45.3)
MAGNESIUM SERPL-MCNC: 2.1 MG/DL (ref 1.6–2.6)
MCH RBC QN AUTO: 29.2 PG (ref 26.6–33)
MCHC RBC AUTO-ENTMCNC: 31.8 G/DL (ref 31.5–35.7)
MCV RBC AUTO: 91.8 FL (ref 79–97)
MONOCYTES # BLD AUTO: 0.68 10*3/MM3 (ref 0.1–0.9)
MONOCYTES NFR BLD AUTO: 8.7 % (ref 5–12)
NEUTROPHILS NFR BLD AUTO: 5.56 10*3/MM3 (ref 1.7–7)
NEUTROPHILS NFR BLD AUTO: 70.7 % (ref 42.7–76)
PHOSPHATE SERPL-MCNC: 3.7 MG/DL (ref 2.5–4.5)
PLATELET # BLD AUTO: 334 10*3/MM3 (ref 140–450)
PMV BLD AUTO: 10 FL (ref 6–12)
POTASSIUM SERPL-SCNC: 4.4 MMOL/L (ref 3.5–5.2)
PROT SERPL-MCNC: 8.2 G/DL (ref 6–8.5)
RBC # BLD AUTO: 4.38 10*6/MM3 (ref 3.77–5.28)
SODIUM SERPL-SCNC: 137 MMOL/L (ref 136–145)
WBC NRBC COR # BLD AUTO: 7.86 10*3/MM3 (ref 3.4–10.8)

## 2024-08-08 PROCEDURE — 25010000002 TRASTUZUMAB PER 10 MG: Performed by: INTERNAL MEDICINE

## 2024-08-08 PROCEDURE — 96372 THER/PROPH/DIAG INJ SC/IM: CPT

## 2024-08-08 PROCEDURE — 25810000003 SODIUM CHLORIDE 0.9 % SOLUTION: Performed by: INTERNAL MEDICINE

## 2024-08-08 PROCEDURE — 25010000002 DENOSUMAB 120 MG/1.7ML SOLUTION: Performed by: INTERNAL MEDICINE

## 2024-08-08 PROCEDURE — 96413 CHEMO IV INFUSION 1 HR: CPT

## 2024-08-08 PROCEDURE — 80053 COMPREHEN METABOLIC PANEL: CPT | Performed by: INTERNAL MEDICINE

## 2024-08-08 PROCEDURE — 85025 COMPLETE CBC W/AUTO DIFF WBC: CPT | Performed by: INTERNAL MEDICINE

## 2024-08-08 PROCEDURE — 83735 ASSAY OF MAGNESIUM: CPT | Performed by: INTERNAL MEDICINE

## 2024-08-08 PROCEDURE — 25810000003 SODIUM CHLORIDE 0.9 % SOLUTION 250 ML FLEX CONT: Performed by: INTERNAL MEDICINE

## 2024-08-08 PROCEDURE — 84100 ASSAY OF PHOSPHORUS: CPT | Performed by: INTERNAL MEDICINE

## 2024-08-08 RX ORDER — SODIUM CHLORIDE 9 MG/ML
250 INJECTION, SOLUTION INTRAVENOUS ONCE
Status: CANCELLED | OUTPATIENT
Start: 2024-08-08

## 2024-08-08 RX ORDER — ACETAMINOPHEN 325 MG/1
650 TABLET ORAL ONCE
Status: CANCELLED | OUTPATIENT
Start: 2024-08-08

## 2024-08-08 RX ORDER — ACETAMINOPHEN 325 MG/1
650 TABLET ORAL ONCE
Status: DISCONTINUED | OUTPATIENT
Start: 2024-08-08 | End: 2024-08-09 | Stop reason: HOSPADM

## 2024-08-08 RX ORDER — SODIUM CHLORIDE 9 MG/ML
250 INJECTION, SOLUTION INTRAVENOUS ONCE
Status: COMPLETED | OUTPATIENT
Start: 2024-08-08 | End: 2024-08-08

## 2024-08-08 RX ADMIN — TRASTUZUMAB 530 MG: 150 INJECTION, POWDER, LYOPHILIZED, FOR SOLUTION INTRAVENOUS at 14:25

## 2024-08-08 RX ADMIN — DENOSUMAB 120 MG: 120 INJECTION SUBCUTANEOUS at 14:13

## 2024-08-08 RX ADMIN — SODIUM CHLORIDE 250 ML: 9 INJECTION, SOLUTION INTRAVENOUS at 14:09

## 2024-08-08 NOTE — PROGRESS NOTES
Chief Complaint  Malignant neoplasm of upper-inner quadrant of right breast     Lela Vanegas, APRN  Romina, Lela, APRN    Subjective          Annita Johnson presents to [facility] for ongoing treatment of her breast cancer.  She is on letrozole, trastuzumab,. She is on denosumab for bony involvement.  Tolerating her treatments well.  Does have hot flashes.  They are tolerable.  No increase in joint pain.  No lower extremity edema. No trouble breathing. No nausea or vomiting.  She notes adequate appetite and her weight is maintained.     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, estrogen receptor positive   6/8/2023 Initial Diagnosis    Malignant neoplasm of upper-inner quadrant of right breast in female, estrogen receptor positive     6/8/2023 Cancer Staged    Staging form: Breast, AJCC 8th Edition  - Clinical: Stage IV (cT2, cN0, cM1, ER+, WA+, HER2-) - Signed by Perry Garcia MD on 6/8/2023 6/9/2023 - 4/4/2024 Chemotherapy    OP SUPPORTIVE BREAST Leuprolide 3.75 MG Q28D     6/15/2023 - 6/15/2023 Chemotherapy    OP BREAST Letrozole / Ribociclib     7/13/2023 -  Chemotherapy    OP BREAST Letrozole / Trastuzumab     8/24/2023 -  Chemotherapy    OP SUPPORTIVE Denosumab (Xgeva) Q42D     Metastasis to bone   6/8/2023 Initial Diagnosis    Metastasis to bone     8/24/2023 -  Chemotherapy    OP SUPPORTIVE Denosumab (Xgeva) Q42D           Current Outpatient Medications on File Prior to Visit   Medication Sig Dispense Refill    Calcium Carbonate-Vitamin D 600-10 MG-MCG per tablet Take 1 tablet by mouth 2 (Two) Times a Day. 60 tablet 5    clonazePAM (KlonoPIN) 0.5 MG tablet Take 1 tablet by mouth 2 (Two) Times a Day As Needed for Anxiety. 30 tablet 1    cyclobenzaprine (FLEXERIL) 10 MG tablet Take 1 tablet by mouth 3 (Three) Times a Day As Needed for Muscle Spasms. 90 tablet 5    escitalopram (LEXAPRO) 10 MG tablet TAKE  1 TABLET BY MOUTH EVERY DAY 90 tablet 1    folic acid (FOLVITE) 1 MG tablet TAKE 1 TABLET BY MOUTH EVERY DAY 90 tablet 1    hydrOXYzine (ATARAX) 50 MG tablet TAKE 1 TABLET BY MOUTH EVERYDAY AT BEDTIME 90 tablet 1    letrozole (FEMARA) 2.5 MG tablet Take 1 tablet by mouth Daily. 30 tablet 5    letrozole (FEMARA) 2.5 MG tablet Take 1 tablet by mouth Daily. 30 tablet 5    letrozole (FEMARA) 2.5 MG tablet Take 1 tablet by mouth Daily. 30 tablet 5    letrozole (FEMARA) 2.5 MG tablet Take 1 tablet by mouth Daily. 30 tablet 5    letrozole (FEMARA) 2.5 MG tablet Take 1 tablet by mouth Daily. 30 tablet 5    letrozole (FEMARA) 2.5 MG tablet Take 1 tablet by mouth Daily. 30 tablet 5    ondansetron (ZOFRAN) 8 MG tablet Take 1 tablet by mouth 3 (Three) Times a Day As Needed for Nausea or Vomiting. 30 tablet 5    oxyCODONE-acetaminophen (PERCOCET) 5-325 MG per tablet Take 1 tablet by mouth Every 6 (Six) Hours As Needed (Pain). 10 tablet 0    Rimegepant Sulfate (Nurtec) 75 MG tablet dispersible tablet Take 1 tablet by mouth Daily As Needed (migraines). 8 tablet 5    Sodium Fluoride 5000 PPM 1.1 % gel See Admin Instructions.      Trastuzumab (HERCEPTIN IV) Infuse  into a venous catheter. EVERY 21 DAYS-NEXT ON 24      vitamin D (ERGOCALCIFEROL) 1.25 MG (50220 UT) capsule capsule Take 1 capsule by mouth 1 (One) Time Per Week. 12 capsule 1     No current facility-administered medications on file prior to visit.       No Known Allergies  Past Medical History:   Diagnosis Date    Anemia     Breast cancer     Cancer, metastatic to bone     Kidney stone     Kidney stones     Metastasis to bone 2023     Past Surgical History:   Procedure Laterality Date    BREAST BIOPSY      CERVICAL FUSION  2023     SECTION N/A 2021    Procedure:  SECTION PRIMARY;  Surgeon: Zoe Dawson MD;  Location: [facility] DELIVERY;  Service: Obstetrics/Gynecology;  Laterality: N/A;    CYSTOSCOPY BLADDER STONE LITHOTRIPSY       SALPINGO OOPHORECTOMY Bilateral 4/17/2024    Procedure: SALPINGO OOPHORECTOMY LAPAROSCOPIC WITH DAVINCI ROBOT;  Surgeon: Ashlyn Avelar MD;  Location: [facility];  Service: Robotics - DaVinci;  Laterality: Bilateral;     Social History     Socioeconomic History    Marital status:      Spouse name: Janet   Tobacco Use    Smoking status: Every Day     Current packs/day: 0.25     Average packs/day: 0.3 packs/day for 3.5 years (0.9 ttl pk-yrs)     Types: Cigarettes     Start date: 2/1/2021     Passive exposure: Current    Smokeless tobacco: Never   Vaping Use    Vaping status: Never Used   Substance and Sexual Activity    Alcohol use: Yes     Alcohol/week: 6.0 standard drinks of alcohol     Types: 3 Glasses of wine, 3 Drinks containing 0.5 oz of alcohol per week     Comment: OCC    Drug use: Yes     Types: Marijuana     Comment: for pain control/DAILY    Sexual activity: Yes     Partners: Male     Birth control/protection: Same-sex partner     Family History   Problem Relation Age of Onset    Mental illness Mother     Hypertension Father     Alcohol abuse Father     Ovarian cancer Paternal Aunt     Breast cancer Maternal Great-Grandmother     Malig Hyperthermia Neg Hx        Objective   Physical Exam  Vitals reviewed. Exam conducted with a chaperone present.   Cardiovascular:      Rate and Rhythm: Normal rate and regular rhythm.      Heart sounds: Normal heart sounds. No murmur heard.     No gallop.   Pulmonary:      Effort: Pulmonary effort is normal.      Breath sounds: Normal breath sounds.   Abdominal:      General: Bowel sounds are normal.   Lymphadenopathy:      Cervical: No cervical adenopathy.   Psychiatric:         Mood and Affect: Mood normal.         Behavior: Behavior normal.         Vitals:    08/08/24 1330   BP: 128/84   Pulse: 90   Resp: 18   Temp: 98.1 °F (36.7 °C)   TempSrc: Temporal   SpO2: 99%   Weight: 93.4 kg (206 lb)   PainSc: 0-No pain     ECOG score: 0         PHQ-9 Total  "Score:                Result Review :   The following data was reviewed by: Garry Moore MD on 08/08/24:  Lab Results   Component Value Date    HGB 12.8 08/08/2024    HCT 40.2 08/08/2024    MCV 91.8 08/08/2024     08/08/2024    WBC 7.86 08/08/2024    NEUTROABS 5.56 08/08/2024    LYMPHSABS 1.43 08/08/2024    MONOSABS 0.68 08/08/2024    EOSABS 0.15 08/08/2024    BASOSABS 0.03 08/08/2024     Lab Results   Component Value Date    GLUCOSE 119 (H) 08/08/2024    BUN 15 08/08/2024    CREATININE 0.71 08/08/2024     08/08/2024    K 4.4 08/08/2024     08/08/2024    CO2 24.7 08/08/2024    CALCIUM 10.2 08/08/2024    PROTEINTOT 8.2 08/08/2024    ALBUMIN 4.5 08/08/2024    BILITOT 0.3 08/08/2024    ALKPHOS 110 08/08/2024    AST 52 (H) 08/08/2024    ALT 92 (H) 08/08/2024     Lab Results   Component Value Date    MG 2.1 06/27/2024    PHOS 2.7 06/27/2024    TSH 0.885 09/21/2023     No results found for: \"IRON\", \"LABIRON\", \"TRANSFERRIN\", \"TIBC\"  Lab Results   Component Value Date    [id number] 577 02/22/2024    FOLATE >20.00 02/22/2024     No results found for: \"PSA\", \"CEA\", \"AFP\", \"\", \"\"    Labs personally reviewed. LFTs up but bili normal.         Assessment and Plan    Diagnoses and all orders for this visit:    1. Metastasis to bone (Primary)    2. Malignant neoplasm of upper-inner quadrant of right breast in female, estrogen receptor positive    3. Encounter for long-term (current) use of high-risk medication    Other orders  -     Cancel: sodium chloride 0.9 % infusion 250 mL  -     Cancel: acetaminophen (TYLENOL) tablet 650 mg  -     Cancel: Trastuzumab (HERCEPTIN) 530 mg in sodium chloride 0.9 % 250 mL chemo IVPB  -     Cancel: denosumab (XGEVA) injection 120 mg        ER and HER2 positive metastatic breast cancer  Currently on letrozole and trastuzumab.  Tolerating both well.  Is having some hot flashes.  Labs acceptable for treatment today.  Will continue with current therapy.  Return to " clinic in 3 weeks for labs and toxicity check.  Last scans were in July and appropriate.  Next echo due in October.    Metastatic cancer to bone  On Denosumab.      Patient Follow Up: 3 weeks    Patient was given instructions and counseling regarding her condition or for health maintenance advice. Please see specific information pulled into the AVS if appropriate.

## 2024-08-14 LAB
CYTO UR: NORMAL
LAB AP CASE REPORT: NORMAL
LAB AP CLINICAL INFORMATION: NORMAL
LAB AP DIAGNOSIS COMMENT: NORMAL
LAB AP SPECIAL STAINS: NORMAL
LAB AP SYNOPTIC CHECKLIST: NORMAL
Lab: NORMAL
PATH REPORT.ADDENDUM SPEC: NORMAL
PATH REPORT.FINAL DX SPEC: NORMAL
PATH REPORT.GROSS SPEC: NORMAL

## 2024-08-16 ENCOUNTER — TELEPHONE (OUTPATIENT)
Dept: ONCOLOGY | Facility: HOSPITAL | Age: 39
End: 2024-08-16
Payer: COMMERCIAL

## 2024-08-16 ENCOUNTER — TELEPHONE (OUTPATIENT)
Dept: INTERNAL MEDICINE | Age: 39
End: 2024-08-16
Payer: COMMERCIAL

## 2024-08-16 DIAGNOSIS — R23.2 HOT FLASHES: Primary | ICD-10-CM

## 2024-08-16 RX ORDER — FEZOLINETANT 45 MG/1
45 TABLET, FILM COATED ORAL EVERY 24 HOURS
Qty: 30 TABLET | Refills: 2 | Status: SHIPPED | OUTPATIENT
Start: 2024-08-16

## 2024-08-16 NOTE — TELEPHONE ENCOUNTER
The pt called and c/o hot flashes. The pt states that she has had them for a while but they were tolerable. The pt states that for the last few days that her hot flashes have worsened. The hot flashes are happening day and night. The pt is asking about a med for hot flashes.

## 2024-08-16 NOTE — TELEPHONE ENCOUNTER
I have sent in veRisk Ident-we should have samples.  I checked in with cancer care center and this is used sometimes for hot flashes.

## 2024-08-16 NOTE — TELEPHONE ENCOUNTER
Caller: Annita Johnson    Relationship: Self    Best call back number: [phone]     What medication are you requesting: SOMETHING FOR HOT FLASHES    What are your current symptoms: HAVING VERY SEVERE AND FREQUENT HOT FLASHES. SEVERAL DAYS IN A ROW, LASTING FOR LONGER PERIODS.    How long have you been experiencing symptoms: ALWAYS HAD THEM BUT SIDE EFFECT TO LETROZOLE THAT SHE'S ON. GOTTEN WAY WORSE OVER THE PAST WEEK    Have you had these symptoms before:    [x] Yes  [] No    Have you been treated for these symptoms before:   [] Yes  [x] No    If a prescription is needed, what is your preferred pharmacy and phone number: [facility]/PHARMACY #[id number] - [place] - [address] [facility] [phone]  - [phone] FX     Additional notes: PATIENT STATES THAT SHE HAS BEEN TAKING VITAMIN E, THAT WAS RECOMMENDED BY ONCOLOGIST. PATIENT TRIED CONTACTING ONCOLOGIST BUT HE WAS OUT OF THE OFFICE. PLEASE ADVISE.

## 2024-08-16 NOTE — TELEPHONE ENCOUNTER
The pt states that she is already taking Vitamin 180mg = 800iu QD. Do you want me to instruct her to increase to 1200iu or do you want her to take more than that since she is already on it?

## 2024-08-20 LAB
LAB AP CASE REPORT: NORMAL
PATH REPORT.ADDENDUM SPEC: NORMAL
PATH REPORT.FINAL DX SPEC: NORMAL
PATH REPORT.GROSS SPEC: NORMAL

## 2024-08-29 ENCOUNTER — HOSPITAL ENCOUNTER (OUTPATIENT)
Dept: ONCOLOGY | Facility: HOSPITAL | Age: 39
Discharge: HOME | End: 2024-08-29
Payer: COMMERCIAL

## 2024-08-29 ENCOUNTER — OFFICE VISIT (OUTPATIENT)
Dept: ONCOLOGY | Facility: HOSPITAL | Age: 39
End: 2024-08-29
Payer: COMMERCIAL

## 2024-08-29 VITALS
OXYGEN SATURATION: 95 % | SYSTOLIC BLOOD PRESSURE: 118 MMHG | TEMPERATURE: 98.4 F | BODY MASS INDEX: 36.52 KG/M2 | DIASTOLIC BLOOD PRESSURE: 76 MMHG | WEIGHT: 206.13 LBS | HEART RATE: 95 BPM | RESPIRATION RATE: 18 BRPM

## 2024-08-29 VITALS
OXYGEN SATURATION: 95 % | BODY MASS INDEX: 36.5 KG/M2 | HEART RATE: 95 BPM | DIASTOLIC BLOOD PRESSURE: 76 MMHG | WEIGHT: 206 LBS | TEMPERATURE: 98.4 F | SYSTOLIC BLOOD PRESSURE: 118 MMHG | RESPIRATION RATE: 18 BRPM

## 2024-08-29 DIAGNOSIS — C50.211 MALIGNANT NEOPLASM OF UPPER-INNER QUADRANT OF RIGHT BREAST IN FEMALE, ESTROGEN RECEPTOR POSITIVE: ICD-10-CM

## 2024-08-29 DIAGNOSIS — Z17.0 MALIGNANT NEOPLASM OF UPPER-INNER QUADRANT OF RIGHT BREAST IN FEMALE, ESTROGEN RECEPTOR POSITIVE: Primary | ICD-10-CM

## 2024-08-29 DIAGNOSIS — Z17.0 MALIGNANT NEOPLASM OF UPPER-INNER QUADRANT OF RIGHT BREAST IN FEMALE, ESTROGEN RECEPTOR POSITIVE: ICD-10-CM

## 2024-08-29 DIAGNOSIS — C50.211 MALIGNANT NEOPLASM OF UPPER-INNER QUADRANT OF RIGHT BREAST IN FEMALE, ESTROGEN RECEPTOR POSITIVE: Primary | ICD-10-CM

## 2024-08-29 DIAGNOSIS — C79.51 METASTASIS TO BONE: ICD-10-CM

## 2024-08-29 LAB
ALBUMIN SERPL-MCNC: 4.5 G/DL (ref 3.5–5.2)
ALBUMIN/GLOB SERPL: 1.3 G/DL
ALP SERPL-CCNC: 94 U/L (ref 39–117)
ALT SERPL W P-5'-P-CCNC: 88 U/L (ref 1–33)
ANION GAP SERPL CALCULATED.3IONS-SCNC: 8.4 MMOL/L (ref 5–15)
AST SERPL-CCNC: 50 U/L (ref 1–32)
BASOPHILS # BLD AUTO: 0.01 10*3/MM3 (ref 0–0.2)
BASOPHILS NFR BLD AUTO: 0.1 % (ref 0–1.5)
BILIRUB SERPL-MCNC: 0.3 MG/DL (ref 0–1.2)
BUN SERPL-MCNC: 13 MG/DL (ref 6–20)
BUN/CREAT SERPL: 20.6 (ref 7–25)
CALCIUM SPEC-SCNC: 9.9 MG/DL (ref 8.6–10.5)
CHLORIDE SERPL-SCNC: 106 MMOL/L (ref 98–107)
CO2 SERPL-SCNC: 26.6 MMOL/L (ref 22–29)
CREAT SERPL-MCNC: 0.63 MG/DL (ref 0.57–1)
DEPRECATED RDW RBC AUTO: 50.1 FL (ref 37–54)
EGFRCR SERPLBLD CKD-EPI 2021: 116.6 ML/MIN/1.73
EOSINOPHIL # BLD AUTO: 0.13 10*3/MM3 (ref 0–0.4)
EOSINOPHIL NFR BLD AUTO: 1.9 % (ref 0.3–6.2)
ERYTHROCYTE [DISTWIDTH] IN BLOOD BY AUTOMATED COUNT: 14.6 % (ref 12.3–15.4)
GLOBULIN UR ELPH-MCNC: 3.4 GM/DL
GLUCOSE SERPL-MCNC: 106 MG/DL (ref 65–99)
HCT VFR BLD AUTO: 40 % (ref 34–46.6)
HGB BLD-MCNC: 12.7 G/DL (ref 12–15.9)
IMM GRANULOCYTES # BLD AUTO: 0.02 10*3/MM3 (ref 0–0.05)
IMM GRANULOCYTES NFR BLD AUTO: 0.3 % (ref 0–0.5)
LYMPHOCYTES # BLD AUTO: 1.65 10*3/MM3 (ref 0.7–3.1)
LYMPHOCYTES NFR BLD AUTO: 24.3 % (ref 19.6–45.3)
MCH RBC QN AUTO: 29.1 PG (ref 26.6–33)
MCHC RBC AUTO-ENTMCNC: 31.8 G/DL (ref 31.5–35.7)
MCV RBC AUTO: 91.5 FL (ref 79–97)
MONOCYTES # BLD AUTO: 0.61 10*3/MM3 (ref 0.1–0.9)
MONOCYTES NFR BLD AUTO: 9 % (ref 5–12)
NEUTROPHILS NFR BLD AUTO: 4.38 10*3/MM3 (ref 1.7–7)
NEUTROPHILS NFR BLD AUTO: 64.4 % (ref 42.7–76)
PLATELET # BLD AUTO: 311 10*3/MM3 (ref 140–450)
PMV BLD AUTO: 9.7 FL (ref 6–12)
POTASSIUM SERPL-SCNC: 4.2 MMOL/L (ref 3.5–5.2)
PROT SERPL-MCNC: 7.9 G/DL (ref 6–8.5)
RBC # BLD AUTO: 4.37 10*6/MM3 (ref 3.77–5.28)
SODIUM SERPL-SCNC: 141 MMOL/L (ref 136–145)
WBC NRBC COR # BLD AUTO: 6.8 10*3/MM3 (ref 3.4–10.8)

## 2024-08-29 PROCEDURE — 85025 COMPLETE CBC W/AUTO DIFF WBC: CPT | Performed by: INTERNAL MEDICINE

## 2024-08-29 PROCEDURE — 25810000003 SODIUM CHLORIDE 0.9 % SOLUTION 250 ML FLEX CONT: Performed by: INTERNAL MEDICINE

## 2024-08-29 PROCEDURE — 25810000003 SODIUM CHLORIDE 0.9 % SOLUTION: Performed by: INTERNAL MEDICINE

## 2024-08-29 PROCEDURE — 80053 COMPREHEN METABOLIC PANEL: CPT | Performed by: INTERNAL MEDICINE

## 2024-08-29 PROCEDURE — 25010000002 TRASTUZUMAB PER 10 MG: Performed by: INTERNAL MEDICINE

## 2024-08-29 PROCEDURE — 96413 CHEMO IV INFUSION 1 HR: CPT

## 2024-08-29 RX ORDER — SODIUM CHLORIDE 9 MG/ML
250 INJECTION, SOLUTION INTRAVENOUS ONCE
Status: CANCELLED | OUTPATIENT
Start: 2024-08-29

## 2024-08-29 RX ORDER — SODIUM CHLORIDE 9 MG/ML
250 INJECTION, SOLUTION INTRAVENOUS ONCE
Status: COMPLETED | OUTPATIENT
Start: 2024-08-29 | End: 2024-08-29

## 2024-08-29 RX ORDER — LETROZOLE 2.5 MG/1
2.5 TABLET, FILM COATED ORAL DAILY
Qty: 30 TABLET | Refills: 5
Start: 2024-08-29 | End: 2024-08-30 | Stop reason: SDUPTHER

## 2024-08-29 RX ORDER — ACETAMINOPHEN 325 MG/1
650 TABLET ORAL ONCE
Status: DISCONTINUED | OUTPATIENT
Start: 2024-08-29 | End: 2024-08-31 | Stop reason: HOSPADM

## 2024-08-29 RX ORDER — ACETAMINOPHEN 325 MG/1
650 TABLET ORAL ONCE
Status: CANCELLED | OUTPATIENT
Start: 2024-08-29

## 2024-08-29 RX ADMIN — TRASTUZUMAB 530 MG: 150 INJECTION, POWDER, LYOPHILIZED, FOR SOLUTION INTRAVENOUS at 11:46

## 2024-08-29 RX ADMIN — SODIUM CHLORIDE 250 ML: 9 INJECTION, SOLUTION INTRAVENOUS at 11:48

## 2024-08-29 NOTE — PROGRESS NOTES
Chief Complaint  Malignant neoplasm of upper-inner quadrant of right breast     Lela Vanegas, LÁZARO  Romina, Lela, LÁZARO    Subjective          Annita Johnson presents to [facility] for ongoing treatment of her breast cancer.  She is on letrozole, trastuzumab, denosumab for bony involvement.  She is tolerating her treatments well.  She does report occasional vasomotor symptoms including hot flashes and night sweats.  She feels like those symptoms have improved some.  She denies shortness of breath, chest pain, lower extremity swelling.  She has not masses, adenopathy, unusual aches or pains.  She notes good appetite and energy level.  Overall she is feeling well.  She denies dental or jaw pain.    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, estrogen receptor positive   6/8/2023 Initial Diagnosis    Malignant neoplasm of upper-inner quadrant of right breast in female, estrogen receptor positive     6/8/2023 Cancer Staged    Staging form: Breast, AJCC 8th Edition  - Clinical: Stage IV (cT2, cN0, cM1, ER+, KS+, HER2-) - Signed by Perry Garcia MD on 6/8/2023 6/9/2023 - 4/4/2024 Chemotherapy    OP SUPPORTIVE BREAST Leuprolide 3.75 MG Q28D     6/15/2023 - 6/15/2023 Chemotherapy    OP BREAST Letrozole / Ribociclib     7/13/2023 -  Chemotherapy    OP BREAST Letrozole / Trastuzumab     8/24/2023 -  Chemotherapy    OP SUPPORTIVE Denosumab (Xgeva) Q42D     Metastasis to bone   6/8/2023 Initial Diagnosis    Metastasis to bone     8/24/2023 -  Chemotherapy    OP SUPPORTIVE Denosumab (Xgeva) Q42D           Current Outpatient Medications on File Prior to Visit   Medication Sig Dispense Refill    Calcium Carbonate-Vitamin D 600-10 MG-MCG per tablet Take 1 tablet by mouth 2 (Two) Times a Day. 60 tablet 5    clonazePAM (KlonoPIN) 0.5 MG tablet Take 1 tablet by mouth 2 (Two) Times a Day As Needed for Anxiety. 30 tablet 1     cyclobenzaprine (FLEXERIL) 10 MG tablet Take 1 tablet by mouth 3 (Three) Times a Day As Needed for Muscle Spasms. 90 tablet 5    escitalopram (LEXAPRO) 10 MG tablet TAKE 1 TABLET BY MOUTH EVERY DAY 90 tablet 1    Fezolinetant (Veozah) 45 MG tablet Take 1 tablet by mouth Daily. 30 tablet 2    folic acid (FOLVITE) 1 MG tablet TAKE 1 TABLET BY MOUTH EVERY DAY 90 tablet 1    hydrOXYzine (ATARAX) 50 MG tablet TAKE 1 TABLET BY MOUTH EVERYDAY AT BEDTIME 90 tablet 1    ondansetron (ZOFRAN) 8 MG tablet Take 1 tablet by mouth 3 (Three) Times a Day As Needed for Nausea or Vomiting. 30 tablet 5    oxyCODONE-acetaminophen (PERCOCET) 5-325 MG per tablet Take 1 tablet by mouth Every 6 (Six) Hours As Needed (Pain). 10 tablet 0    Rimegepant Sulfate (Nurtec) 75 MG tablet dispersible tablet Take 1 tablet by mouth Daily As Needed (migraines). 8 tablet 5    Sodium Fluoride 5000 PPM 1.1 % gel See Admin Instructions.      Trastuzumab (HERCEPTIN IV) Infuse  into a venous catheter. EVERY 21 DAYS-NEXT ON 4/4/24      vitamin D (ERGOCALCIFEROL) 1.25 MG (94137 UT) capsule capsule Take 1 capsule by mouth 1 (One) Time Per Week. 12 capsule 1    [DISCONTINUED] letrozole (FEMARA) 2.5 MG tablet Take 1 tablet by mouth Daily. 30 tablet 5    [DISCONTINUED] letrozole (FEMARA) 2.5 MG tablet Take 1 tablet by mouth Daily. 30 tablet 5    [DISCONTINUED] letrozole (FEMARA) 2.5 MG tablet Take 1 tablet by mouth Daily. 30 tablet 5    [DISCONTINUED] letrozole (FEMARA) 2.5 MG tablet Take 1 tablet by mouth Daily. 30 tablet 5    [DISCONTINUED] letrozole (FEMARA) 2.5 MG tablet Take 1 tablet by mouth Daily. 30 tablet 5    [DISCONTINUED] letrozole (FEMARA) 2.5 MG tablet Take 1 tablet by mouth Daily. 30 tablet 5    [DISCONTINUED] letrozole (FEMARA) 2.5 MG tablet Take 1 tablet by mouth Daily. 30 tablet 5     Current Facility-Administered Medications on File Prior to Visit   Medication Dose Route Frequency Provider Last Rate Last Admin    acetaminophen (TYLENOL) tablet  650 mg  650 mg Oral Once Perry Garcia MD        [COMPLETED] sodium chloride 0.9 % infusion 250 mL  250 mL Intravenous Once Perry Garcia MD   Stopped at 24 1220    [COMPLETED] Trastuzumab (HERCEPTIN) 530 mg in sodium chloride 0.9 % 300.2 mL chemo IVPB  6 mg/kg (Treatment Plan Recorded) Intravenous Once Perry Garcia MD   Stopped at 24 1216       No Known Allergies  Past Medical History:   Diagnosis Date    Anemia     Breast cancer     Cancer, metastatic to bone     Kidney stone     Kidney stones     Metastasis to bone 2023     Past Surgical History:   Procedure Laterality Date    BREAST BIOPSY      CERVICAL FUSION  2023     SECTION N/A 2021    Procedure:  SECTION PRIMARY;  Surgeon: Zoe Dawson MD;  Location: [facility] LABOR DELIVERY;  Service: Obstetrics/Gynecology;  Laterality: N/A;    CYSTOSCOPY BLADDER STONE LITHOTRIPSY      SALPINGO OOPHORECTOMY Bilateral 2024    Procedure: SALPINGO OOPHORECTOMY LAPAROSCOPIC WITH DAVINCI ROBOT;  Surgeon: Ashlyn Avelar MD;  Location: [facility] MAIN OR;  Service: Robotics - DaVinci;  Laterality: Bilateral;     Social History     Socioeconomic History    Marital status:      Spouse name: Janet   Tobacco Use    Smoking status: Every Day     Current packs/day: 0.25     Average packs/day: 0.3 packs/day for 3.6 years (0.9 ttl pk-yrs)     Types: Cigarettes     Start date: 2021     Passive exposure: Current    Smokeless tobacco: Never   Vaping Use    Vaping status: Never Used   Substance and Sexual Activity    Alcohol use: Yes     Alcohol/week: 6.0 standard drinks of alcohol     Types: 3 Glasses of wine, 3 Drinks containing 0.5 oz of alcohol per week     Comment: OCC    Drug use: Yes     Types: Marijuana     Comment: for pain control/DAILY    Sexual activity: Yes     Partners: Male     Birth control/protection: Partner of same sex     Family History   Problem Relation Age of Onset    Mental illness Mother      "Hypertension Father     Alcohol abuse Father     Ovarian cancer Paternal Aunt     Breast cancer Maternal Great-Grandmother     Malig Hyperthermia Neg Hx        Objective   Physical Exam  Vitals reviewed. Exam conducted with a chaperone present.   Cardiovascular:      Rate and Rhythm: Normal rate and regular rhythm.      Heart sounds: Normal heart sounds. No murmur heard.     No gallop.   Pulmonary:      Effort: Pulmonary effort is normal.      Breath sounds: Normal breath sounds.   Abdominal:      General: Bowel sounds are normal.   Musculoskeletal:      Right lower leg: No edema.      Left lower leg: No edema.   Lymphadenopathy:      Cervical: No cervical adenopathy.   Psychiatric:         Mood and Affect: Mood normal.         Behavior: Behavior normal.         Vitals:    08/29/24 1101   BP: 118/76   Pulse: 95   Resp: 18   Temp: 98.4 °F (36.9 °C)   TempSrc: Temporal   SpO2: 95%   Weight: 93.4 kg (206 lb)   PainSc: 0-No pain     ECOG score: 0         PHQ-9 Total Score:                    Result Review :   The following data was reviewed by: Perry Garcia MD on 08/29/2024:  Lab Results   Component Value Date    HGB 12.7 08/29/2024    HCT 40.0 08/29/2024    MCV 91.5 08/29/2024     08/29/2024    WBC 6.80 08/29/2024    NEUTROABS 4.38 08/29/2024    LYMPHSABS 1.65 08/29/2024    MONOSABS 0.61 08/29/2024    EOSABS 0.13 08/29/2024    BASOSABS 0.01 08/29/2024     Lab Results   Component Value Date    GLUCOSE 106 (H) 08/29/2024    BUN 13 08/29/2024    CREATININE 0.63 08/29/2024     08/29/2024    K 4.2 08/29/2024     08/29/2024    CO2 26.6 08/29/2024    CALCIUM 9.9 08/29/2024    PROTEINTOT 7.9 08/29/2024    ALBUMIN 4.5 08/29/2024    BILITOT 0.3 08/29/2024    ALKPHOS 94 08/29/2024    AST 50 (H) 08/29/2024    ALT 88 (H) 08/29/2024     Lab Results   Component Value Date    MG 2.1 08/08/2024    PHOS 3.7 08/08/2024    TSH 0.885 09/21/2023     No results found for: \"IRON\", \"LABIRON\", \"TRANSFERRIN\", \"TIBC\"  Lab " "Results   Component Value Date    AHPOBXCE04 577 02/22/2024    FOLATE >20.00 02/22/2024     No results found for: \"PSA\", \"CEA\", \"AFP\", \"\", \"\"          Assessment and Plan    Diagnoses and all orders for this visit:    1. 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  Triple positive.  Patient is on trastuzumab for HER2 positivity and letrozole for hormone receptor positivity.  Tolerating both well except for vasomotor symptoms.  She denies any cardiac signs or symptoms.  Proceed with trastuzumab today as planned.  Continue letrozole by mouth daily.  I will see her back in 3 weeks for OV, trastuzumab remember prior to monitor for toxicities.    Orders:  -     CBC and Differential; Future  -     Comprehensive metabolic panel; Future    2. Metastasis to bone  Assessment & Plan:  Continue monthly Xgeva.  Patient denies dental or jaw pain.  Her electrolytes have been adequate.      Other orders  -     Cancel: sodium chloride 0.9 % infusion 250 mL  -     Cancel: acetaminophen (TYLENOL) tablet 650 mg  -     Cancel: Trastuzumab (HERCEPTIN) 530 mg in sodium chloride 0.9 % 250 mL chemo IVPB            Patient Follow Up: 3 weeks    Patient was given instructions and counseling regarding her condition or for health maintenance advice. Please see specific information pulled into the AVS if appropriate.     Perry Garcia MD    8/30/2024            "

## 2024-08-30 PROBLEM — D49.2 CERVICAL SPINE TUMOR: Status: RESOLVED | Noted: 2023-06-14 | Resolved: 2024-08-30

## 2024-08-30 RX ORDER — LETROZOLE 2.5 MG/1
2.5 TABLET, FILM COATED ORAL DAILY
Qty: 90 TABLET | Refills: 1 | Status: SHIPPED | OUTPATIENT
Start: 2024-08-30

## 2024-08-30 NOTE — ASSESSMENT & PLAN NOTE
Metastatic.  Triple positive.  Patient is on trastuzumab for HER2 positivity and letrozole for hormone receptor positivity.  Tolerating both well except for vasomotor symptoms.  She denies any cardiac signs or symptoms.  Proceed with trastuzumab today as planned.  Continue letrozole by mouth daily.  I will see her back in 3 weeks for OV, trastuzumab remember prior to monitor for toxicities.

## 2024-09-05 ENCOUNTER — OFFICE VISIT (OUTPATIENT)
Dept: INTERNAL MEDICINE | Age: 39
End: 2024-09-05
Payer: COMMERCIAL

## 2024-09-05 VITALS
DIASTOLIC BLOOD PRESSURE: 70 MMHG | BODY MASS INDEX: 35.19 KG/M2 | HEIGHT: 63 IN | HEART RATE: 78 BPM | WEIGHT: 198.6 LBS | SYSTOLIC BLOOD PRESSURE: 112 MMHG | OXYGEN SATURATION: 100 % | RESPIRATION RATE: 18 BRPM | TEMPERATURE: 97.4 F

## 2024-09-05 DIAGNOSIS — Z17.0 MALIGNANT NEOPLASM OF UPPER-INNER QUADRANT OF RIGHT BREAST IN FEMALE, ESTROGEN RECEPTOR POSITIVE: ICD-10-CM

## 2024-09-05 DIAGNOSIS — Z01.419 WELL WOMAN EXAM WITH ROUTINE GYNECOLOGICAL EXAM: Primary | ICD-10-CM

## 2024-09-05 DIAGNOSIS — C50.211 MALIGNANT NEOPLASM OF UPPER-INNER QUADRANT OF RIGHT BREAST IN FEMALE, ESTROGEN RECEPTOR POSITIVE: ICD-10-CM

## 2024-09-05 DIAGNOSIS — R45.89 ANXIETY ABOUT HEALTH: ICD-10-CM

## 2024-09-05 PROCEDURE — G0123 SCREEN CERV/VAG THIN LAYER: HCPCS

## 2024-09-05 PROCEDURE — 99395 PREV VISIT EST AGE 18-39: CPT

## 2024-09-05 NOTE — PROGRESS NOTES
"Subjective   History of Present Illness    Annita Johnson is a 39 y.o. female who presents for annual exam.    Obstetric History:  OB History          1    Para   1    Term   1            AB        Living   1         SAB        IAB        Ectopic        Molar        Multiple   0    Live Births   1               Menstrual History:     No LMP recorded. (Menstrual status: Chemotherapy/radiation).       Sexual History:      1 partner    Lab Results   Component Value Date    HPVAPTIMA Positive (A) 2023       Current contraception: tubal ligation  History of abnormal Pap smear: yes - +HPV  Received Gardasil immunization: No  Family history of uterine or ovarian cancer: no  Family History of cervical cancer: no  Family History of colon cancer/colon polyps: no  History of abnormal mammogram: yes - +breast cancer  History of abnormal lipids: yes -      The following portions of the patient's history were reviewed and updated as appropriate: allergies, current medications, past family history, past medical history, past social history, past surgical history, and problem list.    Review of Systems         Objective   Physical Exam    /70 (BP Location: Left arm, Patient Position: Sitting)   Pulse 78   Temp 97.4 °F (36.3 °C) (Temporal)   Resp 18   Ht 160 cm (62.99\")   Wt 90.1 kg (198 lb 9.6 oz)   SpO2 100%   BMI 35.19 kg/m²   Wt Readings from Last 3 Encounters:   24 90.1 kg (198 lb 9.6 oz)   24 93.5 kg (206 lb 2.1 oz)   24 93.4 kg (206 lb)      BP Readings from Last 3 Encounters:   24 112/70   24 118/76   24 118/76        General:   alert, appears stated age, and cooperative   Heart: regular rate and rhythm, S1, S2 normal, no murmur, click, rub or gallop   Lungs: clear to auscultation bilaterally   Abdomen: soft, non-tender, without masses or organomegaly   Breast: positive findings: firm and non-tender nodule located on the right upper inner quadrant "   Vulva: normal   Vagina: normal mucosa, normal discharge   Cervix: no bleeding following Pap, no cervical motion tenderness, and no lesions   Uterus: normal size, non-tender, normal shape and consistency   Adnexa: normal adnexa and no mass, fullness, tenderness     Assessment & Plan   Diagnoses and all orders for this visit:    1. Well woman exam with routine gynecological exam (Primary)  Assessment & Plan:  Age related preventatives discussed with the patient.  Recommend regular dental/eye exams, healthy diet and regular exercise.    Orders:  -     Pap IG, Ct-Ng TV Rfx HPV All; Future  -     Comprehensive Metabolic Panel; Future  -     Lipid Panel; Future  -     Vitamin D,25-Hydroxy; Future  -     TSH; Future  -     CBC & Differential; Future  -     Pap IG, Ct-Ng TV Rfx HPV All    2. Malignant neoplasm of upper-inner quadrant of right breast in female, estrogen receptor positive  -     Mammo Screening Digital Tomosynthesis Bilateral With CAD; Future    3. Anxiety about health  Assessment & Plan:  Patient takes clonazepam as needed and this works very well for her.  Continue current regimen.          All questions answered.  Await pap smear results.  Breast self exam technique reviewed and patient encouraged to perform self-exam monthly.  Dietary diary.  Discussed healthy lifestyle modifications.

## 2024-09-09 RX ORDER — FOLIC ACID 1 MG/1
1000 TABLET ORAL DAILY
Qty: 90 TABLET | Refills: 1 | Status: SHIPPED | OUTPATIENT
Start: 2024-09-09

## 2024-09-11 LAB
CONV .: NORMAL
CYTOLOGIST CVX/VAG CYTO: NORMAL
CYTOLOGY CVX/VAG DOC CYTO: NORMAL
CYTOLOGY CVX/VAG DOC THIN PREP: NORMAL
DX ICD CODE: NORMAL
Lab: NORMAL
Lab: NORMAL
OTHER STN SPEC: NORMAL
RECOM F/U CVX/VAG CYTO: NORMAL
STAT OF ADQ CVX/VAG CYTO-IMP: NORMAL

## 2024-09-19 ENCOUNTER — OFFICE VISIT (OUTPATIENT)
Dept: ONCOLOGY | Facility: HOSPITAL | Age: 39
End: 2024-09-19
Payer: COMMERCIAL

## 2024-09-19 ENCOUNTER — TELEPHONE (OUTPATIENT)
Dept: INTERNAL MEDICINE | Age: 39
End: 2024-09-19
Payer: COMMERCIAL

## 2024-09-19 ENCOUNTER — HOSPITAL ENCOUNTER (OUTPATIENT)
Dept: ONCOLOGY | Facility: HOSPITAL | Age: 39
Discharge: HOME | End: 2024-09-19
Payer: COMMERCIAL

## 2024-09-19 VITALS
BODY MASS INDEX: 37.42 KG/M2 | TEMPERATURE: 98.9 F | WEIGHT: 211.2 LBS | HEART RATE: 82 BPM | OXYGEN SATURATION: 100 % | RESPIRATION RATE: 18 BRPM | HEIGHT: 63 IN

## 2024-09-19 VITALS
OXYGEN SATURATION: 100 % | BODY MASS INDEX: 37.42 KG/M2 | RESPIRATION RATE: 18 BRPM | HEART RATE: 82 BPM | WEIGHT: 211.2 LBS | TEMPERATURE: 98.9 F

## 2024-09-19 DIAGNOSIS — Z17.0 MALIGNANT NEOPLASM OF UPPER-INNER QUADRANT OF RIGHT BREAST IN FEMALE, ESTROGEN RECEPTOR POSITIVE: Primary | ICD-10-CM

## 2024-09-19 DIAGNOSIS — C50.211 MALIGNANT NEOPLASM OF UPPER-INNER QUADRANT OF RIGHT BREAST IN FEMALE, ESTROGEN RECEPTOR POSITIVE: Primary | ICD-10-CM

## 2024-09-19 DIAGNOSIS — Z01.419 WELL WOMAN EXAM WITH ROUTINE GYNECOLOGICAL EXAM: ICD-10-CM

## 2024-09-19 DIAGNOSIS — Z79.899 LONG-TERM USE OF HIGH-RISK MEDICATION: ICD-10-CM

## 2024-09-19 DIAGNOSIS — C79.51 METASTASIS TO BONE: ICD-10-CM

## 2024-09-19 LAB
25(OH)D3 SERPL-MCNC: 38.9 NG/ML (ref 30–100)
ALBUMIN SERPL-MCNC: 4.4 G/DL (ref 3.5–5.2)
ALBUMIN/GLOB SERPL: 1.3 G/DL
ALP SERPL-CCNC: 87 U/L (ref 39–117)
ALT SERPL W P-5'-P-CCNC: 83 U/L (ref 1–33)
ANION GAP SERPL CALCULATED.3IONS-SCNC: 9.5 MMOL/L (ref 5–15)
AST SERPL-CCNC: 54 U/L (ref 1–32)
BASOPHILS # BLD AUTO: 0.02 10*3/MM3 (ref 0–0.2)
BASOPHILS NFR BLD AUTO: 0.2 % (ref 0–1.5)
BILIRUB SERPL-MCNC: 0.5 MG/DL (ref 0–1.2)
BUN SERPL-MCNC: 12 MG/DL (ref 6–20)
BUN/CREAT SERPL: 17.4 (ref 7–25)
CALCIUM SPEC-SCNC: 10 MG/DL (ref 8.6–10.5)
CHLORIDE SERPL-SCNC: 99 MMOL/L (ref 98–107)
CHOLEST SERPL-MCNC: 237 MG/DL (ref 0–200)
CO2 SERPL-SCNC: 27.5 MMOL/L (ref 22–29)
CREAT SERPL-MCNC: 0.69 MG/DL (ref 0.57–1)
DEPRECATED RDW RBC AUTO: 49.5 FL (ref 37–54)
EGFRCR SERPLBLD CKD-EPI 2021: 114.1 ML/MIN/1.73
EOSINOPHIL # BLD AUTO: 0.08 10*3/MM3 (ref 0–0.4)
EOSINOPHIL NFR BLD AUTO: 0.9 % (ref 0.3–6.2)
ERYTHROCYTE [DISTWIDTH] IN BLOOD BY AUTOMATED COUNT: 14.5 % (ref 12.3–15.4)
GLOBULIN UR ELPH-MCNC: 3.4 GM/DL
GLUCOSE SERPL-MCNC: 96 MG/DL (ref 65–99)
HCT VFR BLD AUTO: 39.1 % (ref 34–46.6)
HDLC SERPL-MCNC: 31 MG/DL (ref 40–60)
HGB BLD-MCNC: 12.6 G/DL (ref 12–15.9)
IMM GRANULOCYTES # BLD AUTO: 0.01 10*3/MM3 (ref 0–0.05)
IMM GRANULOCYTES NFR BLD AUTO: 0.1 % (ref 0–0.5)
LDLC SERPL CALC-MCNC: 163 MG/DL (ref 0–100)
LDLC/HDLC SERPL: 5.18 {RATIO}
LYMPHOCYTES # BLD AUTO: 2.47 10*3/MM3 (ref 0.7–3.1)
LYMPHOCYTES NFR BLD AUTO: 28.3 % (ref 19.6–45.3)
MAGNESIUM SERPL-MCNC: 2 MG/DL (ref 1.6–2.6)
MCH RBC QN AUTO: 29.5 PG (ref 26.6–33)
MCHC RBC AUTO-ENTMCNC: 32.2 G/DL (ref 31.5–35.7)
MCV RBC AUTO: 91.6 FL (ref 79–97)
MONOCYTES # BLD AUTO: 0.93 10*3/MM3 (ref 0.1–0.9)
MONOCYTES NFR BLD AUTO: 10.6 % (ref 5–12)
NEUTROPHILS NFR BLD AUTO: 5.23 10*3/MM3 (ref 1.7–7)
NEUTROPHILS NFR BLD AUTO: 59.9 % (ref 42.7–76)
PHOSPHATE SERPL-MCNC: 4.7 MG/DL (ref 2.5–4.5)
PLATELET # BLD AUTO: 312 10*3/MM3 (ref 140–450)
PMV BLD AUTO: 9.4 FL (ref 6–12)
POTASSIUM SERPL-SCNC: 3.9 MMOL/L (ref 3.5–5.2)
PROT SERPL-MCNC: 7.8 G/DL (ref 6–8.5)
RBC # BLD AUTO: 4.27 10*6/MM3 (ref 3.77–5.28)
SODIUM SERPL-SCNC: 136 MMOL/L (ref 136–145)
TRIGL SERPL-MCNC: 227 MG/DL (ref 0–150)
TSH SERPL DL<=0.05 MIU/L-ACNC: 1.32 UIU/ML (ref 0.27–4.2)
VLDLC SERPL-MCNC: 43 MG/DL (ref 5–40)
WBC NRBC COR # BLD AUTO: 8.74 10*3/MM3 (ref 3.4–10.8)

## 2024-09-19 PROCEDURE — 25010000002 DENOSUMAB 120 MG/1.7ML SOLUTION: Performed by: INTERNAL MEDICINE

## 2024-09-19 PROCEDURE — 25810000003 SODIUM CHLORIDE 0.9 % SOLUTION: Performed by: INTERNAL MEDICINE

## 2024-09-19 PROCEDURE — 83735 ASSAY OF MAGNESIUM: CPT | Performed by: INTERNAL MEDICINE

## 2024-09-19 PROCEDURE — 80061 LIPID PANEL: CPT

## 2024-09-19 PROCEDURE — 82306 VITAMIN D 25 HYDROXY: CPT

## 2024-09-19 PROCEDURE — 84100 ASSAY OF PHOSPHORUS: CPT | Performed by: INTERNAL MEDICINE

## 2024-09-19 PROCEDURE — 25010000002 TRASTUZUMAB PER 10 MG: Performed by: INTERNAL MEDICINE

## 2024-09-19 PROCEDURE — 96413 CHEMO IV INFUSION 1 HR: CPT

## 2024-09-19 PROCEDURE — 80050 GENERAL HEALTH PANEL: CPT | Performed by: INTERNAL MEDICINE

## 2024-09-19 PROCEDURE — 25810000003 SODIUM CHLORIDE 0.9 % SOLUTION 250 ML FLEX CONT: Performed by: INTERNAL MEDICINE

## 2024-09-19 PROCEDURE — 96372 THER/PROPH/DIAG INJ SC/IM: CPT

## 2024-09-19 RX ORDER — LETROZOLE 2.5 MG/1
2.5 TABLET, FILM COATED ORAL DAILY
Qty: 30 TABLET | Refills: 5
Start: 2024-09-19

## 2024-09-19 RX ORDER — ACETAMINOPHEN 325 MG/1
650 TABLET ORAL ONCE
Status: DISCONTINUED | OUTPATIENT
Start: 2024-09-19 | End: 2024-09-20 | Stop reason: HOSPADM

## 2024-09-19 RX ORDER — SODIUM CHLORIDE 9 MG/ML
250 INJECTION, SOLUTION INTRAVENOUS ONCE
Status: CANCELLED | OUTPATIENT
Start: 2024-09-19

## 2024-09-19 RX ORDER — ACETAMINOPHEN 325 MG/1
650 TABLET ORAL ONCE
Status: CANCELLED | OUTPATIENT
Start: 2024-09-19

## 2024-09-19 RX ORDER — SODIUM CHLORIDE 9 MG/ML
250 INJECTION, SOLUTION INTRAVENOUS ONCE
Status: COMPLETED | OUTPATIENT
Start: 2024-09-19 | End: 2024-09-19

## 2024-09-19 RX ADMIN — TRASTUZUMAB 530 MG: 150 INJECTION, POWDER, LYOPHILIZED, FOR SOLUTION INTRAVENOUS at 14:43

## 2024-09-19 RX ADMIN — SODIUM CHLORIDE 250 ML: 9 INJECTION, SOLUTION INTRAVENOUS at 14:36

## 2024-09-19 RX ADMIN — DENOSUMAB 120 MG: 120 INJECTION SUBCUTANEOUS at 14:39

## 2024-09-19 NOTE — TELEPHONE ENCOUNTER
Name: Annita Johnson    Relationship: Self    Best Callback Number: [phone]     HUB PROVIDED THE RELAY MESSAGE FROM THE OFFICE   PATIENT HAS FURTHER QUESTIONS AND WOULD LIKE A CALL BACK AT THE FOLLOWING PHONE NUMBER [phone]    ADDITIONAL INFORMATION: PATIENT IS UNSURE WHEN SHE SHOULD SCHEDULE THE APPOINTMENT. PATIENT IS ALSO INQUIRING ABOUT MEDICAL MARIJUANA.

## 2024-09-26 ENCOUNTER — HOSPITAL ENCOUNTER (OUTPATIENT)
Dept: MAMMOGRAPHY | Facility: HOSPITAL | Age: 39
Discharge: HOME | End: 2024-09-26
Payer: COMMERCIAL

## 2024-09-26 DIAGNOSIS — Z17.0 MALIGNANT NEOPLASM OF UPPER-INNER QUADRANT OF RIGHT BREAST IN FEMALE, ESTROGEN RECEPTOR POSITIVE: ICD-10-CM

## 2024-09-26 DIAGNOSIS — C50.211 MALIGNANT NEOPLASM OF UPPER-INNER QUADRANT OF RIGHT BREAST IN FEMALE, ESTROGEN RECEPTOR POSITIVE: ICD-10-CM

## 2024-09-27 DIAGNOSIS — R45.89 ANXIETY ABOUT HEALTH: ICD-10-CM

## 2024-09-27 DIAGNOSIS — M54.12 CERVICAL RADICULOPATHY: ICD-10-CM

## 2024-09-27 RX ORDER — CLONAZEPAM 0.5 MG/1
0.5 TABLET ORAL 2 TIMES DAILY PRN
Qty: 30 TABLET | Refills: 1 | Status: SHIPPED | OUTPATIENT
Start: 2024-09-27

## 2024-09-27 RX ORDER — CYCLOBENZAPRINE HCL 10 MG
10 TABLET ORAL 3 TIMES DAILY PRN
Qty: 90 TABLET | Refills: 5 | Status: SHIPPED | OUTPATIENT
Start: 2024-09-27

## 2024-10-07 ENCOUNTER — HOSPITAL ENCOUNTER (OUTPATIENT)
Dept: NUCLEAR MEDICINE | Facility: HOSPITAL | Age: 39
Discharge: HOME | End: 2024-10-07
Payer: COMMERCIAL

## 2024-10-07 ENCOUNTER — HOSPITAL ENCOUNTER (OUTPATIENT)
Dept: CT IMAGING | Facility: HOSPITAL | Age: 39
Discharge: HOME | End: 2024-10-07
Payer: COMMERCIAL

## 2024-10-07 ENCOUNTER — HOSPITAL ENCOUNTER (OUTPATIENT)
Dept: CARDIOLOGY | Facility: HOSPITAL | Age: 39
Discharge: HOME | End: 2024-10-07
Payer: COMMERCIAL

## 2024-10-07 DIAGNOSIS — C79.51 METASTASIS TO BONE: ICD-10-CM

## 2024-10-07 DIAGNOSIS — C50.211 MALIGNANT NEOPLASM OF UPPER-INNER QUADRANT OF RIGHT BREAST IN FEMALE, ESTROGEN RECEPTOR POSITIVE: ICD-10-CM

## 2024-10-07 DIAGNOSIS — Z17.0 MALIGNANT NEOPLASM OF UPPER-INNER QUADRANT OF RIGHT BREAST IN FEMALE, ESTROGEN RECEPTOR POSITIVE: ICD-10-CM

## 2024-10-07 DIAGNOSIS — Z79.899 LONG-TERM USE OF HIGH-RISK MEDICATION: ICD-10-CM

## 2024-10-07 LAB
BH CV ECHO MEAS - EDV(CUBED): 81.1 ML
BH CV ECHO MEAS - EDV(MOD-SP2): 84.6 ML
BH CV ECHO MEAS - EDV(MOD-SP4): 86.9 ML
BH CV ECHO MEAS - EF(MOD-BP): 60.7 %
BH CV ECHO MEAS - EF(MOD-SP2): 63.8 %
BH CV ECHO MEAS - EF(MOD-SP4): 61.2 %
BH CV ECHO MEAS - ESV(CUBED): 25 ML
BH CV ECHO MEAS - ESV(MOD-SP2): 30.6 ML
BH CV ECHO MEAS - ESV(MOD-SP4): 33.7 ML
BH CV ECHO MEAS - FS: 32.4 %
BH CV ECHO MEAS - IVS/LVPW: 1.21 CM
BH CV ECHO MEAS - IVSD: 0.99 CM
BH CV ECHO MEAS - LV MASS(C)D: 126 GRAMS
BH CV ECHO MEAS - LVIDD: 4.3 CM
BH CV ECHO MEAS - LVIDS: 2.9 CM
BH CV ECHO MEAS - LVPWD: 0.82 CM
BH CV ECHO MEAS - RVDD: 3.4 CM
BH CV ECHO MEAS - SV(MOD-SP2): 54 ML
BH CV ECHO MEAS - SV(MOD-SP4): 53.2 ML

## 2024-10-07 PROCEDURE — 74177 CT ABD & PELVIS W/CONTRAST: CPT

## 2024-10-07 PROCEDURE — 93308 TTE F-UP OR LMTD: CPT | Performed by: INTERNAL MEDICINE

## 2024-10-07 PROCEDURE — 0 TECHNETIUM MEDRONATE KIT: Performed by: INTERNAL MEDICINE

## 2024-10-07 PROCEDURE — 78306 BONE IMAGING WHOLE BODY: CPT

## 2024-10-07 PROCEDURE — 25510000001 IOPAMIDOL PER 1 ML: Performed by: INTERNAL MEDICINE

## 2024-10-07 PROCEDURE — 71260 CT THORAX DX C+: CPT

## 2024-10-07 PROCEDURE — 93308 TTE F-UP OR LMTD: CPT

## 2024-10-07 PROCEDURE — A9503 TC99M MEDRONATE: HCPCS | Performed by: INTERNAL MEDICINE

## 2024-10-07 RX ORDER — TC 99M MEDRONATE 20 MG/10ML
22.9 INJECTION, POWDER, LYOPHILIZED, FOR SOLUTION INTRAVENOUS
Status: COMPLETED | OUTPATIENT
Start: 2024-10-07 | End: 2024-10-07

## 2024-10-07 RX ORDER — IOPAMIDOL 755 MG/ML
100 INJECTION, SOLUTION INTRAVASCULAR
Status: COMPLETED | OUTPATIENT
Start: 2024-10-07 | End: 2024-10-07

## 2024-10-07 RX ADMIN — IOPAMIDOL 100 ML: 755 INJECTION, SOLUTION INTRAVENOUS at 12:12

## 2024-10-07 RX ADMIN — TC 99M MEDRONATE 22.9 MILLICURIE: 20 INJECTION, POWDER, LYOPHILIZED, FOR SOLUTION INTRAVENOUS at 11:45

## 2024-10-10 ENCOUNTER — HOSPITAL ENCOUNTER (OUTPATIENT)
Dept: ONCOLOGY | Facility: HOSPITAL | Age: 39
Discharge: HOME | End: 2024-10-10
Payer: COMMERCIAL

## 2024-10-10 ENCOUNTER — OFFICE VISIT (OUTPATIENT)
Dept: ONCOLOGY | Facility: HOSPITAL | Age: 39
End: 2024-10-10
Payer: COMMERCIAL

## 2024-10-10 VITALS
SYSTOLIC BLOOD PRESSURE: 113 MMHG | RESPIRATION RATE: 18 BRPM | DIASTOLIC BLOOD PRESSURE: 89 MMHG | WEIGHT: 209 LBS | OXYGEN SATURATION: 99 % | BODY MASS INDEX: 37.03 KG/M2 | HEART RATE: 85 BPM | TEMPERATURE: 97.6 F

## 2024-10-10 VITALS
OXYGEN SATURATION: 99 % | HEART RATE: 85 BPM | BODY MASS INDEX: 37.12 KG/M2 | TEMPERATURE: 97.6 F | SYSTOLIC BLOOD PRESSURE: 113 MMHG | WEIGHT: 209.5 LBS | DIASTOLIC BLOOD PRESSURE: 89 MMHG | RESPIRATION RATE: 18 BRPM

## 2024-10-10 DIAGNOSIS — Z17.0 MALIGNANT NEOPLASM OF UPPER-INNER QUADRANT OF RIGHT BREAST IN FEMALE, ESTROGEN RECEPTOR POSITIVE: Primary | ICD-10-CM

## 2024-10-10 DIAGNOSIS — C50.211 MALIGNANT NEOPLASM OF UPPER-INNER QUADRANT OF RIGHT BREAST IN FEMALE, ESTROGEN RECEPTOR POSITIVE: Primary | ICD-10-CM

## 2024-10-10 DIAGNOSIS — C79.51 METASTASIS TO BONE: ICD-10-CM

## 2024-10-10 LAB
ALBUMIN SERPL-MCNC: 4.5 G/DL (ref 3.5–5.2)
ALBUMIN/GLOB SERPL: 1.4 G/DL
ALP SERPL-CCNC: 87 U/L (ref 39–117)
ALT SERPL W P-5'-P-CCNC: 96 U/L (ref 1–33)
ANION GAP SERPL CALCULATED.3IONS-SCNC: 7 MMOL/L (ref 5–15)
AST SERPL-CCNC: 75 U/L (ref 1–32)
BASOPHILS # BLD AUTO: 0.03 10*3/MM3 (ref 0–0.2)
BASOPHILS NFR BLD AUTO: 0.4 % (ref 0–1.5)
BILIRUB SERPL-MCNC: 0.5 MG/DL (ref 0–1.2)
BUN SERPL-MCNC: 14 MG/DL (ref 6–20)
BUN/CREAT SERPL: 17.7 (ref 7–25)
CALCIUM SPEC-SCNC: 9.8 MG/DL (ref 8.6–10.5)
CHLORIDE SERPL-SCNC: 103 MMOL/L (ref 98–107)
CO2 SERPL-SCNC: 26 MMOL/L (ref 22–29)
CREAT SERPL-MCNC: 0.79 MG/DL (ref 0.57–1)
DEPRECATED RDW RBC AUTO: 49.1 FL (ref 37–54)
EGFRCR SERPLBLD CKD-EPI 2021: 98.3 ML/MIN/1.73
EOSINOPHIL # BLD AUTO: 0.13 10*3/MM3 (ref 0–0.4)
EOSINOPHIL NFR BLD AUTO: 1.6 % (ref 0.3–6.2)
ERYTHROCYTE [DISTWIDTH] IN BLOOD BY AUTOMATED COUNT: 14.3 % (ref 12.3–15.4)
GLOBULIN UR ELPH-MCNC: 3.3 GM/DL
GLUCOSE SERPL-MCNC: 86 MG/DL (ref 65–99)
HCT VFR BLD AUTO: 38.4 % (ref 34–46.6)
HGB BLD-MCNC: 12.3 G/DL (ref 12–15.9)
IMM GRANULOCYTES # BLD AUTO: 0 10*3/MM3 (ref 0–0.05)
IMM GRANULOCYTES NFR BLD AUTO: 0 % (ref 0–0.5)
LYMPHOCYTES # BLD AUTO: 1.98 10*3/MM3 (ref 0.7–3.1)
LYMPHOCYTES NFR BLD AUTO: 24.6 % (ref 19.6–45.3)
MCH RBC QN AUTO: 29.2 PG (ref 26.6–33)
MCHC RBC AUTO-ENTMCNC: 32 G/DL (ref 31.5–35.7)
MCV RBC AUTO: 91.2 FL (ref 79–97)
MONOCYTES # BLD AUTO: 0.72 10*3/MM3 (ref 0.1–0.9)
MONOCYTES NFR BLD AUTO: 8.9 % (ref 5–12)
NEUTROPHILS NFR BLD AUTO: 5.19 10*3/MM3 (ref 1.7–7)
NEUTROPHILS NFR BLD AUTO: 64.5 % (ref 42.7–76)
PLATELET # BLD AUTO: 317 10*3/MM3 (ref 140–450)
PMV BLD AUTO: 9.6 FL (ref 6–12)
POTASSIUM SERPL-SCNC: 4.4 MMOL/L (ref 3.5–5.2)
PROT SERPL-MCNC: 7.8 G/DL (ref 6–8.5)
RBC # BLD AUTO: 4.21 10*6/MM3 (ref 3.77–5.28)
SODIUM SERPL-SCNC: 136 MMOL/L (ref 136–145)
WBC NRBC COR # BLD AUTO: 8.05 10*3/MM3 (ref 3.4–10.8)

## 2024-10-10 PROCEDURE — 96413 CHEMO IV INFUSION 1 HR: CPT

## 2024-10-10 PROCEDURE — 25810000003 SODIUM CHLORIDE 0.9 % SOLUTION: Performed by: INTERNAL MEDICINE

## 2024-10-10 PROCEDURE — 80053 COMPREHEN METABOLIC PANEL: CPT | Performed by: INTERNAL MEDICINE

## 2024-10-10 PROCEDURE — 85025 COMPLETE CBC W/AUTO DIFF WBC: CPT | Performed by: INTERNAL MEDICINE

## 2024-10-10 PROCEDURE — 99214 OFFICE O/P EST MOD 30 MIN: CPT | Performed by: INTERNAL MEDICINE

## 2024-10-10 PROCEDURE — 25010000002 TRASTUZUMAB PER 10 MG: Performed by: INTERNAL MEDICINE

## 2024-10-10 PROCEDURE — 25810000003 SODIUM CHLORIDE 0.9 % SOLUTION 250 ML FLEX CONT: Performed by: INTERNAL MEDICINE

## 2024-10-10 RX ORDER — ACETAMINOPHEN 325 MG/1
650 TABLET ORAL ONCE
Status: CANCELLED | OUTPATIENT
Start: 2024-10-10

## 2024-10-10 RX ORDER — SODIUM CHLORIDE 9 MG/ML
250 INJECTION, SOLUTION INTRAVENOUS ONCE
Status: CANCELLED | OUTPATIENT
Start: 2024-10-10

## 2024-10-10 RX ORDER — LETROZOLE 2.5 MG/1
2.5 TABLET, FILM COATED ORAL DAILY
Qty: 30 TABLET | Refills: 5
Start: 2024-10-10

## 2024-10-10 RX ORDER — SODIUM CHLORIDE 9 MG/ML
250 INJECTION, SOLUTION INTRAVENOUS ONCE
Status: COMPLETED | OUTPATIENT
Start: 2024-10-10 | End: 2024-10-10

## 2024-10-10 RX ORDER — ACETAMINOPHEN 325 MG/1
650 TABLET ORAL ONCE
Status: DISCONTINUED | OUTPATIENT
Start: 2024-10-10 | End: 2024-10-11 | Stop reason: HOSPADM

## 2024-10-10 RX ADMIN — SODIUM CHLORIDE 250 ML: 9 INJECTION, SOLUTION INTRAVENOUS at 14:32

## 2024-10-10 RX ADMIN — TRASTUZUMAB 530 MG: 150 INJECTION, POWDER, LYOPHILIZED, FOR SOLUTION INTRAVENOUS at 14:35

## 2024-10-10 NOTE — PROGRESS NOTES
Chief Complaint  <9>Malignant neoplasm of upper-inner quadrant of right breast    Lela Vanegas, APRN  Romina, Lela, APRN    Subjective          Annita Johnson presents to [facility] for ongoing treatment of her breast cancer.  She is on letrozole, trastuzumab and denosumab for bony involvement.  She states she is tolerating all treatments well.  Denies issues or side effects.  She was having hot flashes from her letrozole but this is better-only occasionally now.  She denies new masses, adenopathy, unusual aches or pains.  She denies chest pain, shortness of breath, lower extremity swelling.      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, estrogen receptor positive   6/8/2023 Initial Diagnosis    Malignant neoplasm of upper-inner quadrant of right breast in female, estrogen receptor positive     6/8/2023 Cancer Staged    Staging form: Breast, AJCC 8th Edition  - Clinical: Stage IV (cT2, cN0, cM1, ER+, DC+, HER2-) - Signed by Perry Garcia MD on 6/8/2023 6/9/2023 - 4/4/2024 Chemotherapy    OP SUPPORTIVE BREAST Leuprolide 3.75 MG Q28D     6/15/2023 - 6/15/2023 Chemotherapy    OP BREAST Letrozole / Ribociclib     7/13/2023 -  Chemotherapy    OP BREAST Letrozole / Trastuzumab     8/24/2023 -  Chemotherapy    OP SUPPORTIVE Denosumab (Xgeva) Q42D     Metastasis to bone   6/8/2023 Initial Diagnosis    Metastasis to bone     8/24/2023 -  Chemotherapy    OP SUPPORTIVE Denosumab (Xgeva) Q42D           Current Outpatient Medications on File Prior to Visit   Medication Sig Dispense Refill    Calcium Carbonate-Vitamin D 600-10 MG-MCG per tablet Take 1 tablet by mouth 2 (Two) Times a Day. 60 tablet 5    clonazePAM (KlonoPIN) 0.5 MG tablet TAKE 1 TABLET BY MOUTH 2 TIMES A DAY AS NEEDED FOR ANXIETY. 30 tablet 1    cyclobenzaprine (FLEXERIL) 10 MG tablet TAKE 1 TABLET BY MOUTH 3 TIMES A DAY AS NEEDED FOR MUSCLE SPASMS. 90  tablet 5    Fezolinetant (Veozah) 45 MG tablet Take 1 tablet by mouth Daily. 30 tablet 2    folic acid (FOLVITE) 1 MG tablet TAKE 1 TABLET BY MOUTH EVERY DAY 90 tablet 1    hydrOXYzine (ATARAX) 50 MG tablet TAKE 1 TABLET BY MOUTH EVERYDAY AT BEDTIME (Patient taking differently: Take 1 tablet by mouth As Needed.) 90 tablet 1    letrozole (FEMARA) 2.5 MG tablet TAKE 1 TABLET BY MOUTH EVERY DAY 90 tablet 1    letrozole (FEMARA) 2.5 MG tablet Take 1 tablet by mouth Daily. 30 tablet 5    ondansetron (ZOFRAN) 8 MG tablet Take 1 tablet by mouth 3 (Three) Times a Day As Needed for Nausea or Vomiting. 30 tablet 5    Rimegepant Sulfate (Nurtec) 75 MG tablet dispersible tablet Take 1 tablet by mouth Daily As Needed (migraines). 8 tablet 5    Sodium Fluoride 5000 PPM 1.1 % gel See Admin Instructions.      Trastuzumab (HERCEPTIN IV) Infuse  into a venous catheter. EVERY 21 DAYS-NEXT ON 4/4/24      vitamin D (ERGOCALCIFEROL) 1.25 MG (84949 UT) capsule capsule Take 1 capsule by mouth 1 (One) Time Per Week. 12 capsule 1    letrozole (FEMARA) 2.5 MG tablet Take 1 tablet by mouth Daily. 30 tablet 5     Current Facility-Administered Medications on File Prior to Visit   Medication Dose Route Frequency Provider Last Rate Last Admin    [COMPLETED] sodium chloride 0.9 % infusion 250 mL  250 mL Intravenous Once Perry Garcia MD   Stopped at 10/10/24 1516    [COMPLETED] Trastuzumab (HERCEPTIN) 530 mg in sodium chloride 0.9 % 300.2 mL chemo IVPB  6 mg/kg (Treatment Plan Recorded) Intravenous Once Peryr Garcia MD   Stopped at 10/10/24 1505    [DISCONTINUED] acetaminophen (TYLENOL) tablet 650 mg  650 mg Oral Once Perry Garcia MD           No Known Allergies  Past Medical History:   Diagnosis Date    Anemia     Breast cancer     Cancer, metastatic to bone     Kidney stone     Kidney stones     Metastasis to bone 06/08/2023     Past Surgical History:   Procedure Laterality Date    BREAST BIOPSY      CERVICAL FUSION  06/2023      SECTION N/A 2021    Procedure:  SECTION PRIMARY;  Surgeon: Zoe Dawson MD;  Location:  [facility] DELIVERY;  Service: Obstetrics/Gynecology;  Laterality: N/A;    CYSTOSCOPY BLADDER STONE LITHOTRIPSY      SALPINGO OOPHORECTOMY Bilateral 2024    Procedure: SALPINGO OOPHORECTOMY LAPAROSCOPIC WITH DAVINCI ROBOT;  Surgeon: Ashlyn Avelar MD;  Location: [facility] MAIN OR;  Service: Robotics - DaVinci;  Laterality: Bilateral;     Social History     Socioeconomic History    Marital status:      Spouse name: Janet   Tobacco Use    Smoking status: Every Day     Current packs/day: 0.25     Average packs/day: 0.3 packs/day for 3.7 years (0.9 ttl pk-yrs)     Types: Cigarettes     Start date: 2021     Passive exposure: Current    Smokeless tobacco: Never   Vaping Use    Vaping status: Never Used   Substance and Sexual Activity    Alcohol use: Yes     Alcohol/week: 6.0 standard drinks of alcohol     Types: 3 Glasses of wine, 3 Drinks containing 0.5 oz of alcohol per week     Comment: OCC    Drug use: Yes     Types: Marijuana     Comment: for pain control/DAILY    Sexual activity: Yes     Partners: Male     Birth control/protection: Partner of same sex     Family History   Problem Relation Age of Onset    Mental illness Mother     Hypertension Father     Alcohol abuse Father     Ovarian cancer Paternal Aunt     Breast cancer Maternal Great-Grandmother     Malig Hyperthermia Neg Hx        Objective   Physical Exam  Vitals reviewed. Exam conducted with a chaperone present.   Cardiovascular:      Rate and Rhythm: Normal rate and regular rhythm.      Heart sounds: Normal heart sounds. No murmur heard.     No gallop.   Pulmonary:      Effort: Pulmonary effort is normal.      Breath sounds: Normal breath sounds.   Abdominal:      General: Bowel sounds are normal.   Musculoskeletal:      Right lower leg: No edema.      Left lower leg: No edema.   Lymphadenopathy:      Cervical: No cervical  "adenopathy.   Psychiatric:         Mood and Affect: Mood normal.         Behavior: Behavior normal.         Vitals:    10/10/24 1345   BP: 113/89   Pulse: 85   Resp: 18   Temp: 97.6 °F (36.4 °C)   TempSrc: Temporal   SpO2: 99%   Weight: 94.8 kg (209 lb)   PainSc: 0-No pain     ECOG score: 0         PHQ-9 Total Score:                    Result Review :   The following data was reviewed by: Perry Garcia MD on 10/10/2024:  Lab Results   Component Value Date    HGB 12.3 10/10/2024    HCT 38.4 10/10/2024    MCV 91.2 10/10/2024     10/10/2024    WBC 8.05 10/10/2024    NEUTROABS 5.19 10/10/2024    LYMPHSABS 1.98 10/10/2024    MONOSABS 0.72 10/10/2024    EOSABS 0.13 10/10/2024    BASOSABS 0.03 10/10/2024     Lab Results   Component Value Date    GLUCOSE 86 10/10/2024    BUN 14 10/10/2024    CREATININE 0.79 10/10/2024     10/10/2024    K 4.4 10/10/2024     10/10/2024    CO2 26.0 10/10/2024    CALCIUM 9.8 10/10/2024    PROTEINTOT 7.8 10/10/2024    ALBUMIN 4.5 10/10/2024    BILITOT 0.5 10/10/2024    ALKPHOS 87 10/10/2024    AST 75 (H) 10/10/2024    ALT 96 (H) 10/10/2024     Lab Results   Component Value Date    MG 2.0 09/19/2024    PHOS 4.7 (H) 09/19/2024    TSH 1.320 09/19/2024     No results found for: \"IRON\", \"LABIRON\", \"TRANSFERRIN\", \"TIBC\"  Lab Results   Component Value Date    MQFHFOEU54 577 02/22/2024    FOLATE >20.00 02/22/2024     No results found for: \"PSA\", \"CEA\", \"AFP\", \"\", \"\"    Data reviewed : Cardiology studies echocardiogram reviewed showing normal ejection fraction    CT chest, abdomen, pelvis reviewed  Bone scan reviewed     Assessment and Plan    Diagnoses and all orders for this visit:    1. 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  Triple positive.  Patient is on letrozole, trastuzumab and denosumab for bony involvement.  She is tolerating her regimen well.  Recent scans show " stable disease with no new or worsening findings including CT and bone scan.  Echocardiogram shows normal ejection fraction.  Proceed with trastuzumab today as planned.  Continue letrozole 2.5 mg daily by mouth.  I will see her back in 3 weeks with the next infusion with lab work prior to monitor for toxicities.    Orders:  -     CBC and Differential; Future  -     Comprehensive metabolic panel; Future  -     CBC and Differential; Future  -     Comprehensive metabolic panel; Future    2. Metastasis to bone  Assessment & Plan:  Patient is on monthly denosumab.  Tolerating well.  No dental or jaw pain.  Electrolytes have been normal.  Recent bone scan shows stable lesions in the bone.  I will change her denosumab to every 6 weeks to line up with her trastuzumab infusions.      Other orders  -     Cancel: sodium chloride 0.9 % infusion 250 mL  -     Cancel: acetaminophen (TYLENOL) tablet 650 mg  -     Cancel: Trastuzumab (HERCEPTIN) 530 mg in sodium chloride 0.9 % 250 mL chemo IVPB            Patient Follow Up: 3 weeks    Patient was given instructions and counseling regarding her condition or for health maintenance advice. Please see specific information pulled into the AVS if appropriate.     Perry Garcia MD    10/11/2024

## 2024-10-11 NOTE — ASSESSMENT & PLAN NOTE
Patient is on monthly denosumab.  Tolerating well.  No dental or jaw pain.  Electrolytes have been normal.  Recent bone scan shows stable lesions in the bone.  I will change her denosumab to every 6 weeks to line up with her trastuzumab infusions.

## 2024-10-11 NOTE — ASSESSMENT & PLAN NOTE
Metastatic.  Triple positive.  Patient is on letrozole, trastuzumab and denosumab for bony involvement.  She is tolerating her regimen well.  Recent scans show stable disease with no new or worsening findings including CT and bone scan.  Echocardiogram shows normal ejection fraction.  Proceed with trastuzumab today as planned.  Continue letrozole 2.5 mg daily by mouth.  I will see her back in 3 weeks with the next infusion with lab work prior to monitor for toxicities.

## 2024-10-25 DIAGNOSIS — Z17.0 MALIGNANT NEOPLASM OF UPPER-INNER QUADRANT OF RIGHT BREAST IN FEMALE, ESTROGEN RECEPTOR POSITIVE: Primary | ICD-10-CM

## 2024-10-25 DIAGNOSIS — C50.211 MALIGNANT NEOPLASM OF UPPER-INNER QUADRANT OF RIGHT BREAST IN FEMALE, ESTROGEN RECEPTOR POSITIVE: Primary | ICD-10-CM

## 2024-10-28 ENCOUNTER — TELEPHONE (OUTPATIENT)
Dept: SURGERY | Facility: CLINIC | Age: 39
End: 2024-10-28

## 2024-10-28 ENCOUNTER — PREP FOR SURGERY (OUTPATIENT)
Dept: OTHER | Facility: HOSPITAL | Age: 39
End: 2024-10-28
Payer: COMMERCIAL

## 2024-10-28 ENCOUNTER — OFFICE VISIT (OUTPATIENT)
Dept: SURGERY | Facility: CLINIC | Age: 39
End: 2024-10-28
Payer: COMMERCIAL

## 2024-10-28 VITALS — BODY MASS INDEX: 38.64 KG/M2 | WEIGHT: 210 LBS | HEIGHT: 62 IN

## 2024-10-28 DIAGNOSIS — C50.211 MALIGNANT NEOPLASM OF UPPER-INNER QUADRANT OF RIGHT BREAST IN FEMALE, ESTROGEN RECEPTOR POSITIVE: Primary | ICD-10-CM

## 2024-10-28 DIAGNOSIS — Z17.0 MALIGNANT NEOPLASM OF UPPER-INNER QUADRANT OF RIGHT BREAST IN FEMALE, ESTROGEN RECEPTOR POSITIVE: Primary | ICD-10-CM

## 2024-10-28 PROCEDURE — 99202 OFFICE O/P NEW SF 15 MIN: CPT | Performed by: SURGERY

## 2024-10-28 NOTE — PROGRESS NOTES
Chief Complaint:  Port placement     Primary Care Provider: Lela Vanegas APRN    Referring Provider: Perry Garcia MD    History of Present Illness  Annita Johnson is a 39 y.o. female referred by Perry Garcia MD to have a port-a-catheter placed.    The patient has metastatic right breast cancer.  She has been receiving intravenous therapy and has decided she would like to have a port placed.    The patient is going to receive IV therapy and port-a-catheter placement is needed.    Allergies: Patient has no known allergies.    Outpatient Medications Marked as Taking for the 10/28/24 encounter (Office Visit) with Moreno Pichardo MD   Medication Sig Dispense Refill    clonazePAM (KlonoPIN) 0.5 MG tablet TAKE 1 TABLET BY MOUTH 2 TIMES A DAY AS NEEDED FOR ANXIETY. 30 tablet 1    cyclobenzaprine (FLEXERIL) 10 MG tablet TAKE 1 TABLET BY MOUTH 3 TIMES A DAY AS NEEDED FOR MUSCLE SPASMS. 90 tablet 5    Fezolinetant (Veozah) 45 MG tablet Take 1 tablet by mouth Daily. 30 tablet 2    folic acid (FOLVITE) 1 MG tablet TAKE 1 TABLET BY MOUTH EVERY DAY 90 tablet 1    hydrOXYzine (ATARAX) 50 MG tablet TAKE 1 TABLET BY MOUTH EVERYDAY AT BEDTIME (Patient taking differently: Take 1 tablet by mouth As Needed.) 90 tablet 1    letrozole (FEMARA) 2.5 MG tablet TAKE 1 TABLET BY MOUTH EVERY DAY 90 tablet 1    letrozole (FEMARA) 2.5 MG tablet Take 1 tablet by mouth Daily. 30 tablet 5    ondansetron (ZOFRAN) 8 MG tablet Take 1 tablet by mouth 3 (Three) Times a Day As Needed for Nausea or Vomiting. 30 tablet 5    Rimegepant Sulfate (Nurtec) 75 MG tablet dispersible tablet Take 1 tablet by mouth Daily As Needed (migraines). 8 tablet 5    Sodium Fluoride 5000 PPM 1.1 % gel See Admin Instructions.      Trastuzumab (HERCEPTIN IV) Infuse  into a venous catheter. EVERY 21 DAYS-NEXT ON 4/4/24      vitamin D (ERGOCALCIFEROL) 1.25 MG (31895 UT) capsule capsule Take 1 capsule by mouth 1 (One) Time Per Week. 12 capsule 1       Past Medical  "History:    Anemia    Breast cancer    Cancer, metastatic to bone    Kidney stone    Kidney stones    Metastasis to bone        Past Surgical History:    BREAST BIOPSY    CERVICAL FUSION     SECTION    Procedure:  SECTION PRIMARY;  Surgeon: Zoe Dawson MD;  Location: [facility] LABOR DELIVERY;  Service: Obstetrics/Gynecology;  Laterality: N/A;    CYSTOSCOPY BLADDER STONE LITHOTRIPSY    SALPINGO OOPHORECTOMY    Procedure: SALPINGO OOPHORECTOMY LAPAROSCOPIC WITH DAVINCI ROBOT;  Surgeon: Ashlyn Avelar MD;  Location: [facility] MAIN OR;  Service: Robotics - DaVinci;  Laterality: Bilateral;       Family History:   Family History   Problem Relation Age of Onset    Mental illness Mother     Hypertension Father     Alcohol abuse Father     Ovarian cancer Paternal Aunt     Breast cancer Maternal Great-Grandmother     Malig Hyperthermia Neg Hx         Social History:  Social History     Tobacco Use    Smoking status: Former     Current packs/day: 0.00     Average packs/day: 0.3 packs/day for 19.6 years (4.9 ttl pk-yrs)     Types: Cigarettes     Start date: 2005     Quit date: 2024     Years since quittin.1     Passive exposure: Current    Smokeless tobacco: Never    Tobacco comments:     Done with the [employer]   Substance Use Topics    Alcohol use: Not Currently     Alcohol/week: 6.0 standard drinks of alcohol     Comment: OCC       Objective     Vital Signs:  Ht 157.5 cm (62\")   Wt 95.3 kg (210 lb)   BMI 38.41 kg/m²   Respiratory:  breathing not labored, respiratory effort appears normal  Cardiovascular:  heart regular rate  Musculoskeletal: moving all extremities symmetrically and purposefully  Neurologic:  no obvious motor or sensory deficits  Here alone    Assessment:  Diagnoses and all orders for this visit:    1. Malignant neoplasm of upper-inner quadrant of right breast in female, estrogen receptor positive (Primary)        Plan:  Port-a-catheter placement    Discussion: Indications, " options, risks, benefits, and expected outcomes of planned surgery were discussed with the patient and she agrees to proceed.    Moreno Pichardo MD  10/28/2024    Electronically signed by Moreno Pichardo MD, 10/28/24, 2:34 PM EDT.    Addendum:  The earliest I can place the port is one week from today.  The patient wants to have the port placed before this Thursday.  I will schedule her to have the port placed with another one of the general surgeons.  Patient agrees.    Electronically signed by Moreno Pichardo MD, 10/28/24, 2:51 PM EDT.

## 2024-10-28 NOTE — H&P (VIEW-ONLY)
Chief Complaint:  Port placement     Primary Care Provider: Lela Vanegas APRN    Referring Provider: Perry Garcia MD    History of Present Illness  Annita Johnson is a 39 y.o. female referred by Perry Garcia MD to have a port-a-catheter placed.    The patient has metastatic right breast cancer.  She has been receiving intravenous therapy and has decided she would like to have a port placed.    The patient is going to receive IV therapy and port-a-catheter placement is needed.    Allergies: Patient has no known allergies.    Outpatient Medications Marked as Taking for the 10/28/24 encounter (Office Visit) with Moreno Pichardo MD   Medication Sig Dispense Refill    clonazePAM (KlonoPIN) 0.5 MG tablet TAKE 1 TABLET BY MOUTH 2 TIMES A DAY AS NEEDED FOR ANXIETY. 30 tablet 1    cyclobenzaprine (FLEXERIL) 10 MG tablet TAKE 1 TABLET BY MOUTH 3 TIMES A DAY AS NEEDED FOR MUSCLE SPASMS. 90 tablet 5    Fezolinetant (Veozah) 45 MG tablet Take 1 tablet by mouth Daily. 30 tablet 2    folic acid (FOLVITE) 1 MG tablet TAKE 1 TABLET BY MOUTH EVERY DAY 90 tablet 1    hydrOXYzine (ATARAX) 50 MG tablet TAKE 1 TABLET BY MOUTH EVERYDAY AT BEDTIME (Patient taking differently: Take 1 tablet by mouth As Needed.) 90 tablet 1    letrozole (FEMARA) 2.5 MG tablet TAKE 1 TABLET BY MOUTH EVERY DAY 90 tablet 1    letrozole (FEMARA) 2.5 MG tablet Take 1 tablet by mouth Daily. 30 tablet 5    ondansetron (ZOFRAN) 8 MG tablet Take 1 tablet by mouth 3 (Three) Times a Day As Needed for Nausea or Vomiting. 30 tablet 5    Rimegepant Sulfate (Nurtec) 75 MG tablet dispersible tablet Take 1 tablet by mouth Daily As Needed (migraines). 8 tablet 5    Sodium Fluoride 5000 PPM 1.1 % gel See Admin Instructions.      Trastuzumab (HERCEPTIN IV) Infuse  into a venous catheter. EVERY 21 DAYS-NEXT ON 4/4/24      vitamin D (ERGOCALCIFEROL) 1.25 MG (99732 UT) capsule capsule Take 1 capsule by mouth 1 (One) Time Per Week. 12 capsule 1       Past Medical  "History:    Anemia    Breast cancer    Cancer, metastatic to bone    Kidney stone    Kidney stones    Metastasis to bone        Past Surgical History:    BREAST BIOPSY    CERVICAL FUSION     SECTION    Procedure:  SECTION PRIMARY;  Surgeon: Zoe Dawson MD;  Location: [facility] LABOR DELIVERY;  Service: Obstetrics/Gynecology;  Laterality: N/A;    CYSTOSCOPY BLADDER STONE LITHOTRIPSY    SALPINGO OOPHORECTOMY    Procedure: SALPINGO OOPHORECTOMY LAPAROSCOPIC WITH DAVINCI ROBOT;  Surgeon: Ashlyn Avelar MD;  Location: [facility] MAIN OR;  Service: Robotics - DaVinci;  Laterality: Bilateral;       Family History:   Family History   Problem Relation Age of Onset    Mental illness Mother     Hypertension Father     Alcohol abuse Father     Ovarian cancer Paternal Aunt     Breast cancer Maternal Great-Grandmother     Malig Hyperthermia Neg Hx         Social History:  Social History     Tobacco Use    Smoking status: Former     Current packs/day: 0.00     Average packs/day: 0.3 packs/day for 19.6 years (4.9 ttl pk-yrs)     Types: Cigarettes     Start date: 2005     Quit date: 2024     Years since quittin.1     Passive exposure: Current    Smokeless tobacco: Never    Tobacco comments:     Done with the [employer]   Substance Use Topics    Alcohol use: Not Currently     Alcohol/week: 6.0 standard drinks of alcohol     Comment: OCC       Objective     Vital Signs:  Ht 157.5 cm (62\")   Wt 95.3 kg (210 lb)   BMI 38.41 kg/m²   Respiratory:  breathing not labored, respiratory effort appears normal  Cardiovascular:  heart regular rate  Musculoskeletal: moving all extremities symmetrically and purposefully  Neurologic:  no obvious motor or sensory deficits  Here alone    Assessment:  Diagnoses and all orders for this visit:    1. Malignant neoplasm of upper-inner quadrant of right breast in female, estrogen receptor positive (Primary)        Plan:  Port-a-catheter placement    Discussion: Indications, " options, risks, benefits, and expected outcomes of planned surgery were discussed with the patient and she agrees to proceed.    Moreno Pichardo MD  10/28/2024    Electronically signed by Moreno Pichardo MD, 10/28/24, 2:34 PM EDT.    Addendum:  The earliest I can place the port is one week from today.  The patient wants to have the port placed before this Thursday.  I will schedule her to have the port placed with another one of the general surgeons.  Patient agrees.    Electronically signed by Moreno Pichardo MD, 10/28/24, 2:51 PM EDT.

## 2024-10-28 NOTE — PRE-PROCEDURE INSTRUCTIONS
IMPORTANT INSTRUCTIONS - PRE-ADMISSION TESTING  DO NOT EAT OR CHEW anything after midnight the night before your procedure.    NPO EXCEPT SIPS OF WATER TO TAKE MEDICATIONS LISTED BELOW  Take the following medications the morning of your procedure with JUST A SIP OF WATER:  __CLONAZEPAM IF NEEDED, CYCLOBENZAPRINE IF NEEDED, CEOZAH, LETROZOLE, ZOFRAN IF NEEDED, NURTEC IF NEEDED_____________________________________________________________________________________________________________________________________________________________________________________    DO NOT BRING your medications to the hospital with you, UNLESS something has changed since your PRE-Admission Testing appointment.  Hold all vitamins, supplements, and NSAIDS (Non- steroidal anti-inflammatory meds) for one week prior to surgery (you MAY take Tylenol or Acetaminophen).  If you are diabetic, check your blood sugar the morning of your procedure. If it is less than 70 or if you are feeling symptomatic, call the following number for further instructions: [phone]  Use your inhalers/nebulizers as usual, the morning of your procedure. BRING YOUR INHALERS with you.   Bring your CPAP or BIPAP to hospital, ONLY IF YOU WILL BE SPENDING THE NIGHT.   Make sure you have a ride home and have someone who will stay with you the day of your procedure after you go home.  If you have any questions, please call your Pre-Admission Testing Nurse, ___CARSON_____________ at [phone]____________.   Per anesthesia request, do not smoke for 24 hours before your procedure or as instructed by your surgeon.    WILL CALL ON   10/28/24      NORMALLY BETWEEN 1 AND 4 PM TO GIVE OFFICIAL ARRIVAL TIME FOR DAY OF PROCEDURE  BATHING INSTRUCTIONS GIVEN. NO JEWELRY OF ANY TYPE OR NAIL POLISH UPPER OR LOWER EXT  COME TO ENTRANCE C [facility] MAIN DOOR DAY OF PROCEDURE  CASH,  OR CARD FOR MEDS TO BED IF INDICATED AT DISCHARGE

## 2024-10-28 NOTE — TELEPHONE ENCOUNTER
KAROL CALLED FROM [facility].  SHE SAID THE PATIENT WAS SCHEDULED FOR A PORT 10/29/24 WITH DR. FAN.  NOW SHE IS SCHEDULED WITH DR. SAEZ ON WEDNESDAY.  CARSON DID PAT AND PATIENT SAID THE PATIENT SAID SOMEONE CALLED HER WITH A SURGERY TIME FOR TOMORROW.  DID ANYONE CALL THE PATIENT TO LET HER KNOW SHE IS NOT HAVING SURGERY TOMORROW?    PLEASE CALL CARSON AT [phone].

## 2024-10-30 ENCOUNTER — ANESTHESIA (OUTPATIENT)
Dept: PERIOP | Facility: HOSPITAL | Age: 39
End: 2024-10-30
Payer: COMMERCIAL

## 2024-10-30 ENCOUNTER — APPOINTMENT (OUTPATIENT)
Dept: GENERAL RADIOLOGY | Facility: HOSPITAL | Age: 39
End: 2024-10-30
Payer: COMMERCIAL

## 2024-10-30 ENCOUNTER — HOSPITAL ENCOUNTER (OUTPATIENT)
Facility: HOSPITAL | Age: 39
Setting detail: HOSPITAL OUTPATIENT SURGERY
Discharge: HOME | End: 2024-10-30
Attending: SURGERY | Admitting: SURGERY
Payer: COMMERCIAL

## 2024-10-30 ENCOUNTER — ANESTHESIA EVENT (OUTPATIENT)
Dept: PERIOP | Facility: HOSPITAL | Age: 39
End: 2024-10-30
Payer: COMMERCIAL

## 2024-10-30 VITALS
OXYGEN SATURATION: 97 % | SYSTOLIC BLOOD PRESSURE: 125 MMHG | WEIGHT: 207.01 LBS | RESPIRATION RATE: 17 BRPM | TEMPERATURE: 97.1 F | BODY MASS INDEX: 38.09 KG/M2 | HEART RATE: 82 BPM | HEIGHT: 62 IN | DIASTOLIC BLOOD PRESSURE: 67 MMHG

## 2024-10-30 DIAGNOSIS — Z17.0 MALIGNANT NEOPLASM OF UPPER-INNER QUADRANT OF RIGHT BREAST IN FEMALE, ESTROGEN RECEPTOR POSITIVE: ICD-10-CM

## 2024-10-30 DIAGNOSIS — C50.211 MALIGNANT NEOPLASM OF UPPER-INNER QUADRANT OF RIGHT BREAST IN FEMALE, ESTROGEN RECEPTOR POSITIVE: ICD-10-CM

## 2024-10-30 PROCEDURE — 36561 INSERT TUNNELED CV CATH: CPT | Performed by: SPECIALIST/TECHNOLOGIST, OTHER

## 2024-10-30 PROCEDURE — C1788 PORT, INDWELLING, IMP: HCPCS | Performed by: SURGERY

## 2024-10-30 PROCEDURE — 25010000002 LIDOCAINE 1 % SOLUTION 20 ML VIAL: Performed by: SURGERY

## 2024-10-30 PROCEDURE — 25010000002 HEPARIN (PORCINE) PER 1000 UNITS: Performed by: SURGERY

## 2024-10-30 PROCEDURE — 25010000002 FENTANYL CITRATE (PF) 50 MCG/ML SOLUTION: Performed by: NURSE ANESTHETIST, CERTIFIED REGISTERED

## 2024-10-30 PROCEDURE — 25010000002 PROPOFOL 10 MG/ML EMULSION: Performed by: NURSE ANESTHETIST, CERTIFIED REGISTERED

## 2024-10-30 PROCEDURE — 76000 FLUOROSCOPY <1 HR PHYS/QHP: CPT

## 2024-10-30 PROCEDURE — 25010000002 MIDAZOLAM PER 1MG: Performed by: ANESTHESIOLOGY

## 2024-10-30 PROCEDURE — 36561 INSERT TUNNELED CV CATH: CPT | Performed by: SURGERY

## 2024-10-30 PROCEDURE — 25010000002 LIDOCAINE PF 2% 2 % SOLUTION: Performed by: NURSE ANESTHETIST, CERTIFIED REGISTERED

## 2024-10-30 PROCEDURE — 25810000003 LACTATED RINGERS PER 1000 ML: Performed by: ANESTHESIOLOGY

## 2024-10-30 PROCEDURE — 77001 FLUOROGUIDE FOR VEIN DEVICE: CPT | Performed by: SURGERY

## 2024-10-30 PROCEDURE — 25010000002 CEFAZOLIN PER 500 MG: Performed by: SURGERY

## 2024-10-30 DEVICE — POWERPORT CLEARVUE ISP IMPLANTABLE PORT WITH ATTACHABLE 8F POLYURETHANE OPEN-ENDED SINGLE-LUMEN VENOUS CATHETER PROCEDURAL KIT
Type: IMPLANTABLE DEVICE | Site: INTERNAL JUGULAR | Status: FUNCTIONAL
Brand: POWERPORT CLEARVUE

## 2024-10-30 RX ORDER — MEPERIDINE HYDROCHLORIDE 25 MG/ML
12.5 INJECTION INTRAMUSCULAR; INTRAVENOUS; SUBCUTANEOUS
Status: DISCONTINUED | OUTPATIENT
Start: 2024-10-30 | End: 2024-10-30 | Stop reason: HOSPADM

## 2024-10-30 RX ORDER — PROMETHAZINE HYDROCHLORIDE 12.5 MG/1
25 TABLET ORAL ONCE AS NEEDED
Status: DISCONTINUED | OUTPATIENT
Start: 2024-10-30 | End: 2024-10-30 | Stop reason: HOSPADM

## 2024-10-30 RX ORDER — SODIUM CHLORIDE, SODIUM LACTATE, POTASSIUM CHLORIDE, CALCIUM CHLORIDE 600; 310; 30; 20 MG/100ML; MG/100ML; MG/100ML; MG/100ML
9 INJECTION, SOLUTION INTRAVENOUS CONTINUOUS PRN
Status: DISCONTINUED | OUTPATIENT
Start: 2024-10-30 | End: 2024-10-30 | Stop reason: HOSPADM

## 2024-10-30 RX ORDER — OXYCODONE HYDROCHLORIDE 5 MG/1
5 TABLET ORAL
Status: DISCONTINUED | OUTPATIENT
Start: 2024-10-30 | End: 2024-10-30 | Stop reason: HOSPADM

## 2024-10-30 RX ORDER — HYDROCODONE BITARTRATE AND ACETAMINOPHEN 5; 325 MG/1; MG/1
1 TABLET ORAL EVERY 6 HOURS PRN
Qty: 4 TABLET | Refills: 0 | Status: SHIPPED | OUTPATIENT
Start: 2024-10-30

## 2024-10-30 RX ORDER — FENTANYL CITRATE 50 UG/ML
INJECTION, SOLUTION INTRAMUSCULAR; INTRAVENOUS AS NEEDED
Status: DISCONTINUED | OUTPATIENT
Start: 2024-10-30 | End: 2024-10-30 | Stop reason: SURG

## 2024-10-30 RX ORDER — LIDOCAINE HYDROCHLORIDE 20 MG/ML
INJECTION, SOLUTION EPIDURAL; INFILTRATION; INTRACAUDAL; PERINEURAL AS NEEDED
Status: DISCONTINUED | OUTPATIENT
Start: 2024-10-30 | End: 2024-10-30 | Stop reason: SURG

## 2024-10-30 RX ORDER — ONDANSETRON 2 MG/ML
4 INJECTION INTRAMUSCULAR; INTRAVENOUS ONCE AS NEEDED
Status: DISCONTINUED | OUTPATIENT
Start: 2024-10-30 | End: 2024-10-30 | Stop reason: HOSPADM

## 2024-10-30 RX ORDER — SCOLOPAMINE TRANSDERMAL SYSTEM 1 MG/1
1 PATCH, EXTENDED RELEASE TRANSDERMAL ONCE
Status: DISCONTINUED | OUTPATIENT
Start: 2024-10-30 | End: 2024-10-30 | Stop reason: HOSPADM

## 2024-10-30 RX ORDER — PROMETHAZINE HYDROCHLORIDE 25 MG/1
25 SUPPOSITORY RECTAL ONCE AS NEEDED
Status: DISCONTINUED | OUTPATIENT
Start: 2024-10-30 | End: 2024-10-30 | Stop reason: HOSPADM

## 2024-10-30 RX ORDER — HYDROCODONE BITARTRATE AND ACETAMINOPHEN 5; 325 MG/1; MG/1
1 TABLET ORAL ONCE AS NEEDED
Status: DISCONTINUED | OUTPATIENT
Start: 2024-10-30 | End: 2024-10-30 | Stop reason: HOSPADM

## 2024-10-30 RX ORDER — ACETAMINOPHEN 500 MG
1000 TABLET ORAL ONCE
Status: COMPLETED | OUTPATIENT
Start: 2024-10-30 | End: 2024-10-30

## 2024-10-30 RX ORDER — MIDAZOLAM HYDROCHLORIDE 2 MG/2ML
2 INJECTION, SOLUTION INTRAMUSCULAR; INTRAVENOUS ONCE
Status: COMPLETED | OUTPATIENT
Start: 2024-10-30 | End: 2024-10-30

## 2024-10-30 RX ADMIN — PROPOFOL 250 MCG/KG/MIN: 10 INJECTION, EMULSION INTRAVENOUS at 12:15

## 2024-10-30 RX ADMIN — LIDOCAINE HYDROCHLORIDE 40 MG: 20 INJECTION, SOLUTION INTRAVENOUS at 12:15

## 2024-10-30 RX ADMIN — SODIUM CHLORIDE, POTASSIUM CHLORIDE, SODIUM LACTATE AND CALCIUM CHLORIDE 9 ML/HR: 600; 310; 30; 20 INJECTION, SOLUTION INTRAVENOUS at 11:51

## 2024-10-30 RX ADMIN — FENTANYL CITRATE 25 MCG: 50 INJECTION, SOLUTION INTRAMUSCULAR; INTRAVENOUS at 12:29

## 2024-10-30 RX ADMIN — ACETAMINOPHEN 1000 MG: 500 TABLET ORAL at 11:51

## 2024-10-30 RX ADMIN — SODIUM CHLORIDE 2000 MG: 9 INJECTION, SOLUTION INTRAVENOUS at 12:13

## 2024-10-30 RX ADMIN — MIDAZOLAM HYDROCHLORIDE 2 MG: 1 INJECTION, SOLUTION INTRAMUSCULAR; INTRAVENOUS at 11:53

## 2024-10-30 RX ADMIN — FENTANYL CITRATE 25 MCG: 50 INJECTION, SOLUTION INTRAMUSCULAR; INTRAVENOUS at 12:37

## 2024-10-30 RX ADMIN — SCOPALAMINE 1 PATCH: 1 PATCH, EXTENDED RELEASE TRANSDERMAL at 11:51

## 2024-10-30 RX ADMIN — FENTANYL CITRATE 50 MCG: 50 INJECTION, SOLUTION INTRAMUSCULAR; INTRAVENOUS at 12:15

## 2024-10-30 NOTE — OP NOTE
Preoperative diagnosis: Breast cancer with need for chemotherapy infusions; need for venous access     Postoperative diagnosis: Same     Procedure: PowerPort insertion     Surgeon: Jitendra García MD     Anesthesia: MAC     Assistant: Reva MTZ CSA     EBL: 11 mL     Specimens: None     Complications: None     Indications: 39-year-old female in need of a PowerPort for chemotherapy infusions.  Presents today for elective placement.     PROCEDURE     Patient was seen in the preoperative holding area.  Risks, benefits, and alternatives of the operation were again discussed in detail.  All of the patient and family's questions were answered.  They voiced understanding and agreed to proceed.    Patient was brought to the operating room.  Monitoring devices and Santi stockings were placed.  Anesthesia was administered.  Patient was prepped and draped in standard surgical fashion.  After prepping and draping a timeout was performed to verify both the correct patient and correct procedure.    We began by identifying the right internal jugular vein with ultrasound.  Local anesthesia was injected over the area of the right internal jugular vein.  The internal jugular vein was accessed on the first pass with an 18-gauge spinal needle.  We had return of dark nonpulsatile blood.  Guidewire was inserted through the spinal needle.  X-ray fluoroscopy confirmed that the guidewire was in the right side of the heart.  We then removed the needle and clipped the guidewire to the drapes.     We then injected local anesthesia over the right chest and neck.  Incision was made accommodate the port hub.  Using cautery and blunt dissection we made a pocket in the right chest.  The tunneler with the port tubing was tunneled up through that incision towards the guidewire insertion site.  The dilator and peel-away sheath were inserted over the guidewire.  The guidewire and dilator were then removed.  The port tubing was pushed through the  peel-away sheath.  The peel-away sheath was then removed.  Then under fluoroscopic guidance we pulled the port tubing back to sit at the atriocaval junction.  The port tubing was then cut to the level of the chest and the port hub was attached with the plastic securing device.  The port was accessed and flushed without difficulty.     The port was then secured in the pocket that was made.  I secured it with a interrupted Prolene suture.  We accessed the port through the skin and it aspirated and flushed without difficulty.  We then closed the skin incisions with a subcuticular 4-0 Vicryl stitch.  Incisions were dressed with Exofin skin glue.     The patient was then aroused from anesthesia, and taken off the OR table, and taken to PACU in stable condition.  Sponge, needle, and instrument counts were correct x2.  Reva MTZ CSA was present for the entire procedure and helped in all portions of the case.    Assistant: Reva Redding CSA was responsible for performing the following activities: Retraction, Suturing, Closing, and Placing Dressing and their skilled assistance was necessary for the success of this case.       Patient will receive a chest x-ray in PACU    The operative note was dictated with the help of the dragon dictation system.

## 2024-10-30 NOTE — INTERVAL H&P NOTE
H&P reviewed. The patient was examined and there are no changes to the H&P.      Covering procedure for Dr. Pichardo  H&P reviewed and agree  Pt seen in preop- all questions answered- R/B reviewed      
TACHYCARDIC

## 2024-10-30 NOTE — ANESTHESIA PREPROCEDURE EVALUATION
Anesthesia Evaluation     Patient summary reviewed and Nursing notes reviewed   no history of anesthetic complications:   NPO Solid Status: > 8 hours  NPO Liquid Status: > 2 hours           Airway   Mallampati: II  TM distance: >3 FB  Neck ROM: full  Dental - normal exam     Pulmonary    (+) a smoker (daily marijuana) Current,  Cardiovascular - negative cardio ROS  Exercise tolerance: good (4-7 METS)    Rhythm: regular  Rate: normal        Neuro/Psych  (+) headaches, numbness, psychiatric history Anxiety  GI/Hepatic/Renal/Endo    (+) obesity, renal disease- stones    Musculoskeletal (-) negative ROS    Abdominal   (+) obese   Substance History - negative use     OB/GYN negative ob/gyn ROS         Other      history of cancer    ROS/Med Hx Other: PAT Nursing Notes unavailable.                   Anesthesia Plan    ASA 2     general and MAC     (Patient understands anesthesia not responsible for dental damage.)  intravenous induction     Anesthetic plan, risks, benefits, and alternatives have been provided, discussed and informed consent has been obtained with: patient.    Use of blood products discussed with patient .    Plan discussed with CRNA.      CODE STATUS:

## 2024-10-30 NOTE — PROGRESS NOTES
Chief Complaint  Follow-up (39 year old female her today for a repeat PAP due to unsatisfactory specimen on the last one. )    History of Present Illness  SUBJECTIVE  Annita Johnson presents to [facility] for repeat pap smear.    She did recently get her port placed.      Past Medical History:   Diagnosis Date    Anemia     Breast cancer     RIGHT    Kidney stone     Metastasis to bone 2023      Family History   Problem Relation Age of Onset    Mental illness Mother     Hypertension Father     Alcohol abuse Father     Ovarian cancer Paternal Aunt     Breast cancer Maternal Great-Grandmother     Malig Hyperthermia Neg Hx       Past Surgical History:   Procedure Laterality Date    BREAST BIOPSY      CERVICAL FUSION  2023     SECTION N/A 2021    Procedure:  SECTION PRIMARY;  Surgeon: Zoe Dawson MD;  Location: [facility] LABOR DELIVERY;  Service: Obstetrics/Gynecology;  Laterality: N/A;    CYSTOSCOPY BLADDER STONE LITHOTRIPSY      SALPINGO OOPHORECTOMY Bilateral 2024    Procedure: SALPINGO OOPHORECTOMY LAPAROSCOPIC WITH DAVINCI ROBOT;  Surgeon: Ashlyn Avelar MD;  Location: [facility] OR;  Service: Robotics - DaVinci;  Laterality: Bilateral;    VENOUS ACCESS DEVICE (PORT) INSERTION Right 10/30/2024    Procedure: INSERTION VENOUS ACCESS DEVICE : Placement of a port in the tissue under the skin at your upper chest with a tube attached to it that goes into your vein;  Surgeon: Jitendra García MD;  Location: [facility];  Service: General;  Laterality: Right;        Current Outpatient Medications:     clonazePAM (KlonoPIN) 0.5 MG tablet, TAKE 1 TABLET BY MOUTH 2 TIMES A DAY AS NEEDED FOR ANXIETY., Disp: 30 tablet, Rfl: 1    cyclobenzaprine (FLEXERIL) 10 MG tablet, TAKE 1 TABLET BY MOUTH 3 TIMES A DAY AS NEEDED FOR MUSCLE SPASMS., Disp: 90 tablet, Rfl: 5    Fezolinetant (Veozah) 45 MG tablet, Take 1 tablet by mouth Daily., Disp: 30  "tablet, Rfl: 2    folic acid (FOLVITE) 1 MG tablet, TAKE 1 TABLET BY MOUTH EVERY DAY (Patient taking differently: Take 1 tablet by mouth Daily.  INSTRUCTED PER ANESTHESIA PROTOCOL), Disp: 90 tablet, Rfl: 1    HYDROcodone-acetaminophen (NORCO) 5-325 MG per tablet, Take 1 tablet by mouth Every 6 (Six) Hours As Needed for Moderate Pain (Pain)., Disp: 4 tablet, Rfl: 0    hydrOXYzine (ATARAX) 50 MG tablet, TAKE 1 TABLET BY MOUTH EVERYDAY AT BEDTIME (Patient taking differently: Take 1 tablet by mouth As Needed.), Disp: 90 tablet, Rfl: 1    letrozole (FEMARA) 2.5 MG tablet, Take 1 tablet by mouth Daily., Disp: 30 tablet, Rfl: 5    ondansetron (ZOFRAN) 8 MG tablet, Take 1 tablet by mouth 3 (Three) Times a Day As Needed for Nausea or Vomiting., Disp: 30 tablet, Rfl: 5    Rimegepant Sulfate (Nurtec) 75 MG tablet dispersible tablet, Take 1 tablet by mouth Daily As Needed (migraines)., Disp: 8 tablet, Rfl: 5    Sodium Fluoride 5000 PPM 1.1 % gel, Take 1 Application by mouth 2 (Two) Times a Day. TOOTHPASTE, Disp: , Rfl:     Trastuzumab (HERCEPTIN IV), Infuse  into a venous catheter. EVERY 21 DAYS-NEXT ON 10/31/24, Disp: , Rfl:     vitamin D (ERGOCALCIFEROL) 1.25 MG (24651 UT) capsule capsule, Take 1 capsule by mouth 1 (One) Time Per Week. (Patient taking differently: Take 1 capsule by mouth 1 (One) Time Per Week.  INSTRUCTED PER ANESTHESIA PROTOCOL), Disp: 12 capsule, Rfl: 1  No current facility-administered medications for this visit.    OBJECTIVE  Vital Signs:   /84 (BP Location: Left arm, Patient Position: Sitting)   Pulse 112   Temp 98.4 °F (36.9 °C)   Resp 18   Ht 157.5 cm (62\")   Wt 95 kg (209 lb 6.4 oz)   SpO2 97%   BMI 38.30 kg/m²    Estimated body mass index is 38.3 kg/m² as calculated from the following:    Height as of this encounter: 157.5 cm (62\").    Weight as of this encounter: 95 kg (209 lb 6.4 oz).     Wt Readings from Last 3 Encounters:   10/31/24 95 kg (209 lb 6.4 oz)   10/30/24 93.9 kg (207 lb " 0.2 oz)   10/28/24 95.3 kg (210 lb)     BP Readings from Last 3 Encounters:   10/31/24 134/84   10/30/24 125/67   10/10/24 113/89       Physical Exam  Vitals and nursing note reviewed. Exam conducted with a chaperone present.   Constitutional:       Appearance: Normal appearance.   HENT:      Head: Normocephalic.   Eyes:      Extraocular Movements: Extraocular movements intact.      Conjunctiva/sclera: Conjunctivae normal.   Cardiovascular:      Rate and Rhythm: Normal rate and regular rhythm.      Heart sounds: Normal heart sounds. No murmur heard.  Pulmonary:      Effort: Pulmonary effort is normal.      Breath sounds: Normal breath sounds. No wheezing or rales.   Abdominal:      General: Bowel sounds are normal.      Palpations: Abdomen is soft.      Tenderness: There is no abdominal tenderness. There is no guarding.   Genitourinary:     Exam position: Lithotomy position.      Labia:         Right: No rash, tenderness, lesion or injury.         Left: No rash, tenderness, lesion or injury.       Vagina: Normal.      Cervix: Normal.      Uterus: Normal.       Adnexa: Right adnexa normal and left adnexa normal.   Musculoskeletal:         General: No swelling. Normal range of motion.   Lymphadenopathy:      Lower Body: No right inguinal adenopathy. No left inguinal adenopathy.   Skin:     General: Skin is warm and dry.   Neurological:      General: No focal deficit present.      Mental Status: She is alert and oriented to person, place, and time. Mental status is at baseline.   Psychiatric:         Mood and Affect: Mood normal.         Behavior: Behavior normal.         Thought Content: Thought content normal.         Judgment: Judgment normal.          Result Review        NM Bone Scan Whole Body    Result Date: 10/8/2024  Accounting for differences in technique no significant change in skeletal metastatic disease Electronically Signed: Mango Elliott MD  10/8/2024 8:33 AM EDT  Workstation ID: [id number]    CT  Abdomen Pelvis With Contrast    Result Date: 10/8/2024  1. Findings of the chest are reported separately. 2. Stable sclerotic metastasis within the visualized skeletal system 3. Diffuse hepatic steatosis 4. No definite intra-abdominal or intrapelvic metastasis otherwise noted. 5. Probable bilateral nonobstructing renal calculi 6. Other findings as above Electronically Signed: Mango Elliott MD  10/8/2024 8:29 AM EDT  Workstation ID: [id number]    CT Chest With Contrast Diagnostic    Result Date: 10/7/2024  1. No significant progression of skeletal metastases given differences in technique. Postsurgical changes again noted of the cervical thoracic spine. 2. Groundglass opacity within the right upper lobe suggesting scarring or posttreatment change. No evidence of progression of disease 3. Findings of the abdomen and pelvis are reported separately Electronically Signed: Mango Elliott MD  10/7/2024 1:59 PM EDT  Workstation ID: [id number]    CT Abdomen Pelvis With Contrast    Result Date: 7/17/2024  Impression: 1.Groundglass changes right apical area unchanged that could reflect some underlying fibrosis sequela to treatment for breast cancer 2.Hepatic steatosis. 3.Nonobstructing intrarenal calculi. There is a right renal cyst. 4.Small umbilical hernia which appears to contain fat 5.Sclerotic areas involving the axial skeleton/pelvis and proximal left femur which have been noted and could reflect sequela of metastatic disease Electronically Signed: Sonu Mauro MD  7/17/2024 3:19 PM EDT  Workstation ID: [id number]    CT Chest With Contrast Diagnostic    Result Date: 7/17/2024  Impression: 1.Groundglass changes right apical area unchanged that could reflect some underlying fibrosis sequela to treatment for breast cancer 2.Hepatic steatosis. 3.Nonobstructing intrarenal calculi. There is a right renal cyst. 4.Small umbilical hernia which appears to contain fat 5.Sclerotic areas involving the axial skeleton/pelvis and  proximal left femur which have been noted and could reflect sequela of metastatic disease Electronically Signed: Sonu Mauro MD  7/17/2024 3:19 PM EDT  Workstation ID: [id number]    NM Bone Scan Whole Body    Result Date: 7/17/2024  1.Stable osseous metastatic disease involving the lower cervical and upper thoracic spine as well as multiple bilateral ribs. Electronically Signed: Aayush Tuttle MD  7/17/2024 12:49 PM EDT  Workstation ID: [id number]       The above data has been reviewed by LÁZARO Fonseca 10/31/2024 10:56 EDT.          Patient Care Team:  Lela Vanegas APRN as PCP - General (Internal Medicine)  Luna Gaitan RN as Nurse Navigator            ASSESSMENT & PLAN    Diagnoses and all orders for this visit:    1. Encounter for repeat Pap smear due to previous insufficient cervical cells (Primary)  -     Pap IG, Ct-Ng TV Rfx HPV All; Future  -     Pap IG, Ct-Ng TV Rfx HPV All         Tobacco Use: Medium Risk (10/31/2024)    Patient History     Smoking Tobacco Use: Former     Smokeless Tobacco Use: Never     Passive Exposure: Current       Follow Up     Return for Next scheduled follow up.    Please note that portions of this note were completed with a voice recognition program.    Patient was given instructions and counseling regarding her condition or for health maintenance advice. Please see specific information pulled into the AVS if appropriate.   I have reviewed information obtained and documented by others and I have confirmed the accuracy of this documented note.    LÁZARO Fonseca

## 2024-10-30 NOTE — DISCHARGE INSTRUCTIONS
DISCHARGE INSTRUCTIONS  INSERTION OF   PORT-A-CATH      For your surgery you had:  IV sedation  Local anesthesia  Monitored Anesthesia Care  You may experience dizziness, drowsiness, or light-headedness for several hours following surgery/procedure.  You received a medicated patch for nausea prevention today (behind your ear). It is recommended that you remove it 24-48 hours post-operatively. It must be removed within 72 hours.   Do not stay alone today or tonight.  Limit your activity for 24 hours.  You should not drive, operate machinery, drink alcohol, or sign legally binding documents for 24 hours or while you are taking pain medication.  Resume your diet slowly.  Follow whatever special dietary instructions you may have been given by your doctor.  Last dose of pain medication was given at:   .  NOTIFY YOUR DOCTOR IF YOU EXPERIENCE ANY OF THE FOLLOWING:  Temperature greater than 101 degrees Fahrenheit  Shaking Chills  Redness or excessive drainage from incision  Nausea, vomiting and/opr pain that is not controlled by prescribed medications  Increase in bleeding or bleeding that is excessive  Unable to urinate in 6 hours after surgery  If unable to reach your doctor, please go to the closest Emergency Room [facility]  Wash your hands and cleanse the incision daily with   Alcohol  Soap and Water  You may shower tomorrow   Apply an ice pack intermittently for 20 minutes for a total of 24-48 hours.  Do not apply directly to the skin.       Port should be flushed/heparinized once a month (even when not being used).  Keep Port-A-Cath ID Card in your purse of wallet.  Medications per physician instructions as indicated on Discharge Medication Information Sheet.    on SPECIAL INSTRUCTIONS:

## 2024-10-30 NOTE — ANESTHESIA POSTPROCEDURE EVALUATION
Patient: Annita Johnson    Procedure Summary       Date: 10/30/24 Room / Location: [facility] OR  / [facility]    Anesthesia Start: 1213 Anesthesia Stop: 1305    Procedure: INSERTION VENOUS ACCESS DEVICE : Placement of a port in the tissue under the skin at your upper chest with a tube attached to it that goes into your vein (Right) Diagnosis:       Malignant neoplasm of upper-inner quadrant of right breast in female, estrogen receptor positive      (Malignant neoplasm of upper-inner quadrant of right breast in female, estrogen receptor positive [C50.211, Z17.0])    Surgeons: Jitendra García MD Provider: Elizabeth Castelan MD    Anesthesia Type: general, MAC ASA Status: 2            Anesthesia Type: general, MAC    Vitals  Vitals Value Taken Time   /67 10/30/24 1340   Temp 36.2 °C (97.1 °F) 10/30/24 1345   Pulse 82 10/30/24 1345   Resp 17 10/30/24 1345   SpO2 97 % 10/30/24 1345           Post Anesthesia Care and Evaluation    Patient location during evaluation: bedside  Patient participation: complete - patient participated  Level of consciousness: awake  Pain management: adequate    Airway patency: patent  PONV Status: none  Cardiovascular status: acceptable and stable  Respiratory status: acceptable  Hydration status: acceptable

## 2024-10-31 ENCOUNTER — OFFICE VISIT (OUTPATIENT)
Dept: ONCOLOGY | Facility: HOSPITAL | Age: 39
End: 2024-10-31
Payer: COMMERCIAL

## 2024-10-31 ENCOUNTER — OFFICE VISIT (OUTPATIENT)
Dept: INTERNAL MEDICINE | Age: 39
End: 2024-10-31
Payer: COMMERCIAL

## 2024-10-31 ENCOUNTER — HOSPITAL ENCOUNTER (OUTPATIENT)
Dept: ONCOLOGY | Facility: HOSPITAL | Age: 39
Discharge: HOME | End: 2024-10-31
Payer: COMMERCIAL

## 2024-10-31 VITALS
HEART RATE: 112 BPM | TEMPERATURE: 98.4 F | DIASTOLIC BLOOD PRESSURE: 84 MMHG | RESPIRATION RATE: 18 BRPM | BODY MASS INDEX: 38.53 KG/M2 | OXYGEN SATURATION: 97 % | HEIGHT: 62 IN | WEIGHT: 209.4 LBS | SYSTOLIC BLOOD PRESSURE: 134 MMHG

## 2024-10-31 VITALS
HEIGHT: 62 IN | WEIGHT: 210 LBS | BODY MASS INDEX: 38.64 KG/M2 | DIASTOLIC BLOOD PRESSURE: 79 MMHG | HEART RATE: 98 BPM | SYSTOLIC BLOOD PRESSURE: 127 MMHG | RESPIRATION RATE: 18 BRPM | OXYGEN SATURATION: 100 % | TEMPERATURE: 98 F

## 2024-10-31 VITALS
TEMPERATURE: 98 F | HEIGHT: 62 IN | OXYGEN SATURATION: 100 % | HEART RATE: 98 BPM | WEIGHT: 210.5 LBS | SYSTOLIC BLOOD PRESSURE: 127 MMHG | BODY MASS INDEX: 38.74 KG/M2 | RESPIRATION RATE: 18 BRPM | DIASTOLIC BLOOD PRESSURE: 79 MMHG

## 2024-10-31 DIAGNOSIS — Z45.2 ENCOUNTER FOR ADJUSTMENT OR MANAGEMENT OF VASCULAR ACCESS DEVICE: ICD-10-CM

## 2024-10-31 DIAGNOSIS — C50.211 MALIGNANT NEOPLASM OF UPPER-INNER QUADRANT OF RIGHT BREAST IN FEMALE, ESTROGEN RECEPTOR POSITIVE: Primary | ICD-10-CM

## 2024-10-31 DIAGNOSIS — Z17.0 MALIGNANT NEOPLASM OF UPPER-INNER QUADRANT OF RIGHT BREAST IN FEMALE, ESTROGEN RECEPTOR POSITIVE: Primary | ICD-10-CM

## 2024-10-31 DIAGNOSIS — C79.51 METASTASIS TO BONE: ICD-10-CM

## 2024-10-31 DIAGNOSIS — R87.615 ENCOUNTER FOR REPEAT PAP SMEAR DUE TO PREVIOUS INSUFFICIENT CERVICAL CELLS: Primary | ICD-10-CM

## 2024-10-31 LAB
ALBUMIN SERPL-MCNC: 4.3 G/DL (ref 3.5–5.2)
ALBUMIN/GLOB SERPL: 1.3 G/DL
ALP SERPL-CCNC: 104 U/L (ref 39–117)
ALT SERPL W P-5'-P-CCNC: 79 U/L (ref 1–33)
ANION GAP SERPL CALCULATED.3IONS-SCNC: 6.9 MMOL/L (ref 5–15)
AST SERPL-CCNC: 48 U/L (ref 1–32)
BASOPHILS # BLD AUTO: 0.02 10*3/MM3 (ref 0–0.2)
BASOPHILS NFR BLD AUTO: 0.2 % (ref 0–1.5)
BILIRUB SERPL-MCNC: 0.4 MG/DL (ref 0–1.2)
BUN SERPL-MCNC: 12 MG/DL (ref 6–20)
BUN/CREAT SERPL: 17.4 (ref 7–25)
CALCIUM SPEC-SCNC: 9.4 MG/DL (ref 8.6–10.5)
CHLORIDE SERPL-SCNC: 103 MMOL/L (ref 98–107)
CO2 SERPL-SCNC: 28.1 MMOL/L (ref 22–29)
CREAT SERPL-MCNC: 0.69 MG/DL (ref 0.57–1)
DEPRECATED RDW RBC AUTO: 46.9 FL (ref 37–54)
EGFRCR SERPLBLD CKD-EPI 2021: 114.1 ML/MIN/1.73
EOSINOPHIL # BLD AUTO: 0.11 10*3/MM3 (ref 0–0.4)
EOSINOPHIL NFR BLD AUTO: 1.4 % (ref 0.3–6.2)
ERYTHROCYTE [DISTWIDTH] IN BLOOD BY AUTOMATED COUNT: 13.9 % (ref 12.3–15.4)
GLOBULIN UR ELPH-MCNC: 3.3 GM/DL
GLUCOSE SERPL-MCNC: 103 MG/DL (ref 65–99)
HCT VFR BLD AUTO: 38.8 % (ref 34–46.6)
HGB BLD-MCNC: 12.5 G/DL (ref 12–15.9)
IMM GRANULOCYTES # BLD AUTO: 0.01 10*3/MM3 (ref 0–0.05)
IMM GRANULOCYTES NFR BLD AUTO: 0.1 % (ref 0–0.5)
LYMPHOCYTES # BLD AUTO: 1.8 10*3/MM3 (ref 0.7–3.1)
LYMPHOCYTES NFR BLD AUTO: 22.2 % (ref 19.6–45.3)
MAGNESIUM SERPL-MCNC: 2 MG/DL (ref 1.6–2.6)
MCH RBC QN AUTO: 29.3 PG (ref 26.6–33)
MCHC RBC AUTO-ENTMCNC: 32.2 G/DL (ref 31.5–35.7)
MCV RBC AUTO: 90.9 FL (ref 79–97)
MONOCYTES # BLD AUTO: 0.87 10*3/MM3 (ref 0.1–0.9)
MONOCYTES NFR BLD AUTO: 10.7 % (ref 5–12)
NEUTROPHILS NFR BLD AUTO: 5.31 10*3/MM3 (ref 1.7–7)
NEUTROPHILS NFR BLD AUTO: 65.4 % (ref 42.7–76)
PHOSPHATE SERPL-MCNC: 3.5 MG/DL (ref 2.5–4.5)
PLATELET # BLD AUTO: 275 10*3/MM3 (ref 140–450)
PMV BLD AUTO: 9.5 FL (ref 6–12)
POTASSIUM SERPL-SCNC: 4.4 MMOL/L (ref 3.5–5.2)
PROT SERPL-MCNC: 7.6 G/DL (ref 6–8.5)
RBC # BLD AUTO: 4.27 10*6/MM3 (ref 3.77–5.28)
SODIUM SERPL-SCNC: 138 MMOL/L (ref 136–145)
WBC NRBC COR # BLD AUTO: 8.12 10*3/MM3 (ref 3.4–10.8)

## 2024-10-31 PROCEDURE — 99214 OFFICE O/P EST MOD 30 MIN: CPT | Performed by: INTERNAL MEDICINE

## 2024-10-31 PROCEDURE — 96413 CHEMO IV INFUSION 1 HR: CPT

## 2024-10-31 PROCEDURE — 25010000002 TRASTUZUMAB PER 10 MG: Performed by: INTERNAL MEDICINE

## 2024-10-31 PROCEDURE — 25810000003 SODIUM CHLORIDE 0.9 % SOLUTION 250 ML FLEX CONT: Performed by: INTERNAL MEDICINE

## 2024-10-31 PROCEDURE — 96372 THER/PROPH/DIAG INJ SC/IM: CPT

## 2024-10-31 PROCEDURE — 84100 ASSAY OF PHOSPHORUS: CPT | Performed by: INTERNAL MEDICINE

## 2024-10-31 PROCEDURE — 85025 COMPLETE CBC W/AUTO DIFF WBC: CPT | Performed by: INTERNAL MEDICINE

## 2024-10-31 PROCEDURE — 25810000003 SODIUM CHLORIDE 0.9 % SOLUTION: Performed by: INTERNAL MEDICINE

## 2024-10-31 PROCEDURE — 25010000002 DENOSUMAB 120 MG/1.7ML SOLUTION: Performed by: INTERNAL MEDICINE

## 2024-10-31 PROCEDURE — 83735 ASSAY OF MAGNESIUM: CPT | Performed by: INTERNAL MEDICINE

## 2024-10-31 PROCEDURE — 87491 CHLMYD TRACH DNA AMP PROBE: CPT

## 2024-10-31 PROCEDURE — 80053 COMPREHEN METABOLIC PANEL: CPT | Performed by: INTERNAL MEDICINE

## 2024-10-31 PROCEDURE — 25010000002 HEPARIN LOCK FLUSH PER 10 UNITS: Performed by: INTERNAL MEDICINE

## 2024-10-31 PROCEDURE — 99213 OFFICE O/P EST LOW 20 MIN: CPT

## 2024-10-31 PROCEDURE — 88175 CYTOPATH C/V AUTO FLUID REDO: CPT

## 2024-10-31 PROCEDURE — 87591 N.GONORRHOEAE DNA AMP PROB: CPT

## 2024-10-31 RX ORDER — SODIUM CHLORIDE 9 MG/ML
250 INJECTION, SOLUTION INTRAVENOUS ONCE
Status: COMPLETED | OUTPATIENT
Start: 2024-10-31 | End: 2024-10-31

## 2024-10-31 RX ORDER — HEPARIN SODIUM (PORCINE) LOCK FLUSH IV SOLN 100 UNIT/ML 100 UNIT/ML
500 SOLUTION INTRAVENOUS AS NEEDED
Status: CANCELLED | OUTPATIENT
Start: 2024-10-31

## 2024-10-31 RX ORDER — SODIUM CHLORIDE 0.9 % (FLUSH) 0.9 %
20 SYRINGE (ML) INJECTION AS NEEDED
Status: CANCELLED | OUTPATIENT
Start: 2024-11-01

## 2024-10-31 RX ORDER — SODIUM CHLORIDE 0.9 % (FLUSH) 0.9 %
20 SYRINGE (ML) INJECTION AS NEEDED
Status: DISCONTINUED | OUTPATIENT
Start: 2024-10-31 | End: 2024-11-01 | Stop reason: HOSPADM

## 2024-10-31 RX ORDER — LETROZOLE 2.5 MG/1
2.5 TABLET, FILM COATED ORAL DAILY
Qty: 30 TABLET | Refills: 5
Start: 2024-10-31

## 2024-10-31 RX ORDER — SODIUM CHLORIDE 0.9 % (FLUSH) 0.9 %
20 SYRINGE (ML) INJECTION AS NEEDED
OUTPATIENT
Start: 2024-10-31

## 2024-10-31 RX ORDER — HEPARIN SODIUM (PORCINE) LOCK FLUSH IV SOLN 100 UNIT/ML 100 UNIT/ML
500 SOLUTION INTRAVENOUS AS NEEDED
OUTPATIENT
Start: 2024-10-31

## 2024-10-31 RX ORDER — HEPARIN SODIUM (PORCINE) LOCK FLUSH IV SOLN 100 UNIT/ML 100 UNIT/ML
500 SOLUTION INTRAVENOUS AS NEEDED
Status: DISCONTINUED | OUTPATIENT
Start: 2024-10-31 | End: 2024-11-01 | Stop reason: HOSPADM

## 2024-10-31 RX ORDER — SODIUM CHLORIDE 9 MG/ML
250 INJECTION, SOLUTION INTRAVENOUS ONCE
Status: CANCELLED | OUTPATIENT
Start: 2024-10-31

## 2024-10-31 RX ORDER — ACETAMINOPHEN 325 MG/1
650 TABLET ORAL ONCE
Status: DISCONTINUED | OUTPATIENT
Start: 2024-10-31 | End: 2024-11-01 | Stop reason: HOSPADM

## 2024-10-31 RX ORDER — ACETAMINOPHEN 325 MG/1
650 TABLET ORAL ONCE
Status: CANCELLED | OUTPATIENT
Start: 2024-10-31

## 2024-10-31 RX ADMIN — TRASTUZUMAB 530 MG: 150 INJECTION, POWDER, LYOPHILIZED, FOR SOLUTION INTRAVENOUS at 15:14

## 2024-10-31 RX ADMIN — HEPARIN 500 UNITS: 100 SYRINGE at 15:49

## 2024-10-31 RX ADMIN — Medication 20 ML: at 15:49

## 2024-10-31 RX ADMIN — DENOSUMAB 120 MG: 120 INJECTION SUBCUTANEOUS at 15:47

## 2024-10-31 RX ADMIN — SODIUM CHLORIDE 250 ML: 9 INJECTION, SOLUTION INTRAVENOUS at 15:01

## 2024-10-31 NOTE — ASSESSMENT & PLAN NOTE
Metastatic.  Triple positive.  Patient is on letrozole, trastuzumab and denosumab for bony involvement.  Tolerating well.  She had Port-A-Cath placed for infusion purposes as she was having more issues from IV access.  The procedure went well.  Proceed with trastuzumab today as planned.  Continue letrozole by mouth daily.  I will see her back in 3 weeks for OV, trastuzumab with lab work prior to monitor for toxicities.  She is up-to-date on cardiac monitoring.

## 2024-10-31 NOTE — ASSESSMENT & PLAN NOTE
Patient is on monthly denosumab.  Tolerating well.  No dental or jaw pain.  Continue current treatment.

## 2024-10-31 NOTE — PROGRESS NOTES
Chief Complaint  Follow-up ( Right-Breast Cancer---TRT/)    Lela Vanegas, Lela Reveles APRN    Subjective          Annita Johsnon presents to [facility] for for ongoing treatment of her triple positive breast cancer.  She is on letrozole, trastuzumab and denosumab for bony involvement.  Since her last visit, she has had Port-A-Cath placed for infusion purposes.  She states that her treatments are going well.  She has new masses, adenopathy, unusual aches or pains.  She has good appetite and energy level.  She denies chest pain, shortness of breath, lower extremity swelling.    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, estrogen receptor positive   6/8/2023 Initial Diagnosis    Malignant neoplasm of upper-inner quadrant of right breast in female, estrogen receptor positive     6/8/2023 Cancer Staged    Staging form: Breast, AJCC 8th Edition  - Clinical: Stage IV (cT2, cN0, cM1, ER+, MA+, HER2-) - Signed by Perry Garcia MD on 6/8/2023 6/9/2023 - 4/4/2024 Chemotherapy    OP SUPPORTIVE BREAST Leuprolide 3.75 MG Q28D     6/15/2023 - 6/15/2023 Chemotherapy    OP BREAST Letrozole / Ribociclib     7/13/2023 -  Chemotherapy    OP BREAST Letrozole / Trastuzumab     8/24/2023 -  Chemotherapy    OP SUPPORTIVE Denosumab (Xgeva) Q42D     Metastasis to bone   6/8/2023 Initial Diagnosis    Metastasis to bone     8/24/2023 -  Chemotherapy    OP SUPPORTIVE Denosumab (Xgeva) Q42D         Review of Systems   Constitutional: Negative.    HENT: Negative.     Eyes: Negative.    Respiratory: Negative.     Cardiovascular: Negative.    Gastrointestinal: Negative.    Endocrine: Negative.    Genitourinary: Negative.    Musculoskeletal: Negative.    Skin: Negative.    Allergic/Immunologic: Negative.    Neurological: Negative.    Hematological: Negative.    Psychiatric/Behavioral: Negative.     All other systems reviewed and are  negative.    Current Outpatient Medications on File Prior to Visit   Medication Sig Dispense Refill    clonazePAM (KlonoPIN) 0.5 MG tablet TAKE 1 TABLET BY MOUTH 2 TIMES A DAY AS NEEDED FOR ANXIETY. 30 tablet 1    cyclobenzaprine (FLEXERIL) 10 MG tablet TAKE 1 TABLET BY MOUTH 3 TIMES A DAY AS NEEDED FOR MUSCLE SPASMS. 90 tablet 5    Fezolinetant (Veozah) 45 MG tablet Take 1 tablet by mouth Daily. 30 tablet 2    folic acid (FOLVITE) 1 MG tablet TAKE 1 TABLET BY MOUTH EVERY DAY (Patient taking differently: Take 1 tablet by mouth Daily.  INSTRUCTED PER ANESTHESIA PROTOCOL) 90 tablet 1    hydrOXYzine (ATARAX) 50 MG tablet TAKE 1 TABLET BY MOUTH EVERYDAY AT BEDTIME (Patient taking differently: Take 1 tablet by mouth As Needed.) 90 tablet 1    letrozole (FEMARA) 2.5 MG tablet Take 1 tablet by mouth Daily. 30 tablet 5    ondansetron (ZOFRAN) 8 MG tablet Take 1 tablet by mouth 3 (Three) Times a Day As Needed for Nausea or Vomiting. 30 tablet 5    Rimegepant Sulfate (Nurtec) 75 MG tablet dispersible tablet Take 1 tablet by mouth Daily As Needed (migraines). 8 tablet 5    Sodium Fluoride 5000 PPM 1.1 % gel Take 1 Application by mouth 2 (Two) Times a Day. TOOTHPASTE      Trastuzumab (HERCEPTIN IV) Infuse  into a venous catheter. EVERY 21 DAYS-NEXT ON 10/31/24      vitamin D (ERGOCALCIFEROL) 1.25 MG (07252 UT) capsule capsule Take 1 capsule by mouth 1 (One) Time Per Week. (Patient taking differently: Take 1 capsule by mouth 1 (One) Time Per Week.  INSTRUCTED PER ANESTHESIA PROTOCOL) 12 capsule 1    HYDROcodone-acetaminophen (NORCO) 5-325 MG per tablet Take 1 tablet by mouth Every 6 (Six) Hours As Needed for Moderate Pain (Pain). (Patient not taking: Reported on 10/31/2024) 4 tablet 0    letrozole (FEMARA) 2.5 MG tablet Take 1 tablet by mouth Daily. 30 tablet 5     Current Facility-Administered Medications on File Prior to Visit   Medication Dose Route Frequency Provider Last Rate Last Admin    acetaminophen (TYLENOL) tablet 650  mg  650 mg Oral Once Perry Garcia MD        denosumab (XGEVA) injection 120 mg  120 mg Subcutaneous Once Perry Garcia MD        heparin injection 500 Units  500 Units Intravenous PRN Perry Garcia MD        sodium chloride 0.9 % flush 20 mL  20 mL Intravenous PRN Perry Garcia MD        [COMPLETED] sodium chloride 0.9 % infusion 250 mL  250 mL Intravenous Once Perry Garcia MD 20 mL/hr at 10/31/24 1501 250 mL at 10/31/24 1501    Trastuzumab (HERCEPTIN) 530 mg in sodium chloride 0.9 % 300.2 mL chemo IVPB  6 mg/kg (Treatment Plan Recorded) Intravenous Once Perry Garcia MD   530 mg at 10/31/24 1514    [DISCONTINUED] HYDROcodone-acetaminophen (NORCO) 5-325 MG per tablet 1 tablet  1 tablet Oral Once PRN Jitendra García MD        [DISCONTINUED] HYDROmorphone (DILAUDID) injection 0.25 mg  0.25 mg Intravenous Q5 Min PRN Nataliya Alberto CRNA        [DISCONTINUED] HYDROmorphone (DILAUDID) injection 0.5 mg  0.5 mg Intravenous Q5 Min PRN Nataliya Alberto CRNA        [DISCONTINUED] HYDROmorphone (DILAUDID) injection 0.5 mg  0.5 mg Intravenous Q30 Min PRN Jitendra García MD        [DISCONTINUED] lactated ringers infusion  9 mL/hr Intravenous Continuous PRN Elizabeth Castelan MD 9 mL/hr at 10/30/24 1151 Restarted at 10/30/24 1213    [DISCONTINUED] meperidine (DEMEROL) injection 12.5 mg  12.5 mg Intravenous Q15 Min PRN Nataliya Alberto CRNA        [DISCONTINUED] ondansetron (ZOFRAN) injection 4 mg  4 mg Intravenous Once PRN Nataliya Alberto CRNA        [DISCONTINUED] oxyCODONE (ROXICODONE) immediate release tablet 5 mg  5 mg Oral Q15 Min PRN Nataliya Alberto CRNA        [DISCONTINUED] promethazine (PHENERGAN) suppository 25 mg  25 mg Rectal Once PRN Nataliya Alberto CRNA        [DISCONTINUED] promethazine (PHENERGAN) tablet 25 mg  25 mg Oral Once PRN Nataliya Alberto CRNA        [DISCONTINUED] scopolamine patch 1 mg/72 hr  1 patch Transdermal Once Elizabeth Castelan MD   1 patch at 10/30/24 1151       No Known  Allergies  Past Medical History:   Diagnosis Date    Anemia     Breast cancer     RIGHT    Kidney stone     Metastasis to bone 2023     Past Surgical History:   Procedure Laterality Date    BREAST BIOPSY      CERVICAL FUSION  2023     SECTION N/A 2021    Procedure:  SECTION PRIMARY;  Surgeon: Zoe Dawson MD;  Location: [facility] LABOR DELIVERY;  Service: Obstetrics/Gynecology;  Laterality: N/A;    CYSTOSCOPY BLADDER STONE LITHOTRIPSY      SALPINGO OOPHORECTOMY Bilateral 2024    Procedure: SALPINGO OOPHORECTOMY LAPAROSCOPIC WITH DAVINCI ROBOT;  Surgeon: Ashlyn Avelar MD;  Location: [facility] MAIN OR;  Service: Robotics - DaVinci;  Laterality: Bilateral;    VENOUS ACCESS DEVICE (PORT) INSERTION Right 10/30/2024    Procedure: INSERTION VENOUS ACCESS DEVICE : Placement of a port in the tissue under the skin at your upper chest with a tube attached to it that goes into your vein;  Surgeon: Jitendra García MD;  Location: [facility];  Service: General;  Laterality: Right;     Social History     Socioeconomic History    Marital status:      Spouse name: Janet   Tobacco Use    Smoking status: Former     Current packs/day: 0.00     Average packs/day: 0.3 packs/day for 19.6 years (4.9 ttl pk-yrs)     Types: Cigarettes     Start date: 2005     Quit date: 2024     Years since quittin.1     Passive exposure: Current    Smokeless tobacco: Never   Vaping Use    Vaping status: Never Used   Substance and Sexual Activity    Alcohol use: Not Currently     Alcohol/week: 6.0 standard drinks of alcohol    Drug use: Yes     Types: Marijuana     Comment: for pain control/DAILY last used 10-30-24    Sexual activity: Defer     Family History   Problem Relation Age of Onset    Mental illness Mother     Hypertension Father     Alcohol abuse Father     Ovarian cancer Paternal Aunt     Breast cancer Maternal Great-Grandmother     Malig Hyperthermia Neg Hx        Objective  "  Physical Exam  Vitals reviewed. Exam conducted with a chaperone present.   Cardiovascular:      Rate and Rhythm: Normal rate and regular rhythm.      Heart sounds: Normal heart sounds. No murmur heard.     No gallop.   Pulmonary:      Effort: Pulmonary effort is normal.      Breath sounds: Normal breath sounds.      Comments: Port-A-Cath  Abdominal:      General: Bowel sounds are normal.   Musculoskeletal:      Right lower leg: No edema.      Left lower leg: No edema.   Lymphadenopathy:      Cervical: No cervical adenopathy.   Psychiatric:         Mood and Affect: Mood normal.         Behavior: Behavior normal.         Vitals:    10/31/24 1356   BP: 127/79   Pulse: 98   Resp: 18   Temp: 98 °F (36.7 °C)   TempSrc: Temporal   SpO2: 100%   Weight: 95.3 kg (210 lb)   Height: 157.5 cm (62\")   PainSc: 0-No pain     ECOG score: 0         PHQ-9 Total Score:                    Result Review :   The following data was reviewed by: Perry Garcia MD on 10/31/2024:  Lab Results   Component Value Date    HGB 12.5 10/31/2024    HCT 38.8 10/31/2024    MCV 90.9 10/31/2024     10/31/2024    WBC 8.12 10/31/2024    NEUTROABS 5.31 10/31/2024    LYMPHSABS 1.80 10/31/2024    MONOSABS 0.87 10/31/2024    EOSABS 0.11 10/31/2024    BASOSABS 0.02 10/31/2024     Lab Results   Component Value Date    GLUCOSE 103 (H) 10/31/2024    BUN 12 10/31/2024    CREATININE 0.69 10/31/2024     10/31/2024    K 4.4 10/31/2024     10/31/2024    CO2 28.1 10/31/2024    CALCIUM 9.4 10/31/2024    PROTEINTOT 7.6 10/31/2024    ALBUMIN 4.3 10/31/2024    BILITOT 0.4 10/31/2024    ALKPHOS 104 10/31/2024    AST 48 (H) 10/31/2024    ALT 79 (H) 10/31/2024     Lab Results   Component Value Date    MG 2.0 10/31/2024    PHOS 3.5 10/31/2024    TSH 1.320 09/19/2024     No results found for: \"IRON\", \"LABIRON\", \"TRANSFERRIN\", \"TIBC\"  Lab Results   Component Value Date    SKPJAHKL93 577 02/22/2024    FOLATE >20.00 02/22/2024     No results found for: " "\"PSA\", \"CEA\", \"AFP\", \"\", \"\"    Dr. Redmond notes reviewed from Port-A-Cath placement     Assessment and Plan    Diagnoses and all orders for this visit:    1. 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  Triple positive.  Patient is on letrozole, trastuzumab and denosumab for bony involvement.  Tolerating well.  She had Port-A-Cath placed for infusion purposes as she was having more issues from IV access.  The procedure went well.  Proceed with trastuzumab today as planned.  Continue letrozole by mouth daily.  I will see her back in 3 weeks for OV, trastuzumab with lab work prior to monitor for toxicities.  She is up-to-date on cardiac monitoring.    Orders:  -     Magnesium; Future  -     Phosphorus; Future  -     CBC and Differential; Future  -     Comprehensive metabolic panel; Future    2. Metastasis to bone  Assessment & Plan:  Patient is on monthly denosumab.  Tolerating well.  No dental or jaw pain.  Continue current treatment.    Orders:  -     Magnesium; Future  -     Phosphorus; Future    Other orders  -     Cancel: sodium chloride 0.9 % flush 20 mL  -     Cancel: heparin injection 500 Units  -     Cancel: sodium chloride 0.9 % infusion 250 mL  -     Cancel: acetaminophen (TYLENOL) tablet 650 mg  -     Cancel: Trastuzumab (HERCEPTIN) 530 mg in sodium chloride 0.9 % 250 mL chemo IVPB  -     Cancel: denosumab (XGEVA) injection 120 mg            Patient Follow Up: 3 weeks    Patient was given instructions and counseling regarding her condition or for health maintenance advice. Please see specific information pulled into the AVS if appropriate.     Perry Garcia MD    10/31/2024            "

## 2024-11-08 LAB
C TRACH RRNA CVX QL NAA+PROBE: NEGATIVE
CONV .: NORMAL
CYTOLOGIST CVX/VAG CYTO: NORMAL
CYTOLOGY CVX/VAG DOC CYTO: NORMAL
CYTOLOGY CVX/VAG DOC THIN PREP: NORMAL
DX ICD CODE: NORMAL
Lab: NORMAL
N GONORRHOEA RRNA CVX QL NAA+PROBE: NEGATIVE
OTHER STN SPEC: NORMAL
PATHOLOGIST CVX/VAG CYTO: NORMAL
STAT OF ADQ CVX/VAG CYTO-IMP: NORMAL

## 2024-11-20 NOTE — PROGRESS NOTES
Chief Complaint/Reason for Referral:  Follow-up    Lela Vanegas APRN Oldham, Tara, APRN    Records Obtained:  Records of the patients history including those obtained from  [facility] and patient  were reviewed and summarized in detail.    Subjective    History of Present Illness    Ms. Annita Johnson presents for every 21 day Herceptin  ( cycle 18)treatment. Recent echo done on 7/9/2024 with EF of 56 to 60%. Tolerating Herceptin well with no adverse side effects. Taking Letrozole as prescribed. Xgeva every 28 days.     Reviewed CT scans of the CAP today which is stable results.       Cancer Staging   Malignant neoplasm of upper-inner quadrant of right breast in female, estrogen receptor positive  Staging form: Breast, AJCC 8th Edition  - Clinical: Stage IV (cT2, cN0, cM1, ER+, SD+, HER2-) - Signed by Perry Garcia MD on 6/8/2023       Treatment intent: palliative    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, estrogen receptor positive   6/8/2023 Initial Diagnosis    Malignant neoplasm of upper-inner quadrant of right breast in female, estrogen receptor positive     6/8/2023 Cancer Staged    Staging form: Breast, AJCC 8th Edition  - Clinical: Stage IV (cT2, cN0, cM1, ER+, SD+, HER2-) - Signed by Perry Garcia MD on 6/8/2023 6/9/2023 - 4/4/2024 Chemotherapy    OP SUPPORTIVE BREAST Leuprolide 3.75 MG Q28D     6/15/2023 - 6/15/2023 Chemotherapy    OP BREAST Letrozole / Ribociclib     7/13/2023 -  Chemotherapy    OP BREAST Letrozole / Trastuzumab     8/24/2023 -  Chemotherapy    OP SUPPORTIVE Denosumab (Xgeva) Q28D     Metastasis to bone   6/8/2023 Initial Diagnosis    Metastasis to bone     8/24/2023 -  Chemotherapy    OP SUPPORTIVE Denosumab (Xgeva) Q28D         Review of Systems   Constitutional: Negative.    HENT: Negative.     Eyes: Negative.    Respiratory: Negative.     Cardiovascular: Negative.    Gastrointestinal: Negative.    Endocrine:  Negative.    Genitourinary: Negative.    Musculoskeletal: Negative.    Skin: Negative.    Allergic/Immunologic: Negative.    Neurological: Negative.    Hematological: Negative.    Psychiatric/Behavioral: Negative.         Current Outpatient Medications on File Prior to Visit   Medication Sig Dispense Refill    Calcium Carbonate-Vitamin D 600-10 MG-MCG per tablet Take 1 tablet by mouth 2 (Two) Times a Day. 60 tablet 5    clonazePAM (KlonoPIN) 0.5 MG tablet Take 1 tablet by mouth 2 (Two) Times a Day As Needed for Anxiety. 30 tablet 1    cyclobenzaprine (FLEXERIL) 10 MG tablet Take 1 tablet by mouth 3 (Three) Times a Day As Needed for Muscle Spasms. 90 tablet 5    folic acid (FOLVITE) 1 MG tablet TAKE 1 TABLET BY MOUTH EVERY DAY 90 tablet 1    hydrOXYzine (ATARAX) 50 MG tablet TAKE 1 TABLET BY MOUTH EVERYDAY AT BEDTIME 90 tablet 1    letrozole (FEMARA) 2.5 MG tablet Take 1 tablet by mouth Daily. 30 tablet 5    letrozole (FEMARA) 2.5 MG tablet Take 1 tablet by mouth Daily. 30 tablet 5    letrozole (FEMARA) 2.5 MG tablet Take 1 tablet by mouth Daily. 30 tablet 5    letrozole (FEMARA) 2.5 MG tablet Take 1 tablet by mouth Daily. 30 tablet 5    letrozole (FEMARA) 2.5 MG tablet Take 1 tablet by mouth Daily. 30 tablet 5    ondansetron (ZOFRAN) 8 MG tablet Take 1 tablet by mouth 3 (Three) Times a Day As Needed for Nausea or Vomiting. 30 tablet 5    oxyCODONE-acetaminophen (PERCOCET) 5-325 MG per tablet Take 1 tablet by mouth Every 6 (Six) Hours As Needed (Pain). 10 tablet 0    Rimegepant Sulfate (Nurtec) 75 MG tablet dispersible tablet Take 1 tablet by mouth Daily As Needed (migraines). 8 tablet 5    Sodium Fluoride 5000 PPM 1.1 % gel See Admin Instructions.      Trastuzumab (HERCEPTIN IV) Infuse  into a venous catheter. EVERY 21 DAYS-NEXT ON 4/4/24      vitamin D (ERGOCALCIFEROL) 1.25 MG (99821 UT) capsule capsule Take 1 capsule by mouth 1 (One) Time Per Week. 12 capsule 1    escitalopram (LEXAPRO) 10 MG tablet TAKE 1 TABLET  BY MOUTH EVERY DAY (Patient not taking: Reported on 2024) 90 tablet 1    letrozole (FEMARA) 2.5 MG tablet Take 1 tablet by mouth Daily. 30 tablet 5     Current Facility-Administered Medications on File Prior to Visit   Medication Dose Route Frequency Provider Last Rate Last Admin    acetaminophen (TYLENOL) tablet 650 mg  650 mg Oral Once Garry Moore MD        [COMPLETED] sodium chloride 0.9 % infusion 250 mL  250 mL Intravenous Once Garry Moore MD 20 mL/hr at 24 1412 250 mL at 24 1412    Trastuzumab (HERCEPTIN) 530 mg in sodium chloride 0.9 % 250 mL chemo IVPB  6 mg/kg (Treatment Plan Recorded) Intravenous Once Garry Moore MD           No Known Allergies  Past Medical History:   Diagnosis Date    Anemia     Breast cancer     Cancer, metastatic to bone     Kidney stone     Kidney stones     Metastasis to bone 2023     Past Surgical History:   Procedure Laterality Date    BREAST BIOPSY      CERVICAL FUSION  2023     SECTION N/A 2021    Procedure:  SECTION PRIMARY;  Surgeon: Zoe Dawson MD;  Location: [facility] LABOR DELIVERY;  Service: Obstetrics/Gynecology;  Laterality: N/A;    CYSTOSCOPY BLADDER STONE LITHOTRIPSY      SALPINGO OOPHORECTOMY Bilateral 2024    Procedure: SALPINGO OOPHORECTOMY LAPAROSCOPIC WITH DAVINCI ROBOT;  Surgeon: Ashlyn Avelar MD;  Location: [facility];  Service: Robotics - DaVinci;  Laterality: Bilateral;     Social History     Socioeconomic History    Marital status:      Spouse name: Janet   Tobacco Use    Smoking status: Every Day     Current packs/day: 0.25     Average packs/day: 0.3 packs/day for 3.5 years (0.9 ttl pk-yrs)     Types: Cigarettes     Start date: 2021     Passive exposure: Current    Smokeless tobacco: Never   Vaping Use    Vaping status: Never Used   Substance and Sexual Activity    Alcohol use: Yes     Alcohol/week: 6.0 standard drinks of alcohol     Types: 3  Glasses of wine, 3 Drinks containing 0.5 oz of alcohol per week     Comment: OCC    Drug use: Yes     Types: Marijuana     Comment: for pain control/DAILY    Sexual activity: Yes     Partners: Male     Birth control/protection: Same-sex partner     Family History   Problem Relation Age of Onset    Mental illness Mother     Hypertension Father     Alcohol abuse Father     Ovarian cancer Paternal Aunt     Breast cancer Maternal Great-Grandmother     Malig Hyperthermia Neg Hx      Immunization History   Administered Date(s) Administered    MMR 07/04/2021    PPD Test 07/24/2014, 07/21/2015, 07/11/2016    Tdap 05/04/2021       Tobacco Use: High Risk (7/18/2024)    Patient History     Smoking Tobacco Use: Every Day     Smokeless Tobacco Use: Never     Passive Exposure: Current       Objective     Physical Exam  Vitals and nursing note reviewed.   Constitutional:       Appearance: Normal appearance.   HENT:      Head: Normocephalic.      Nose: Nose normal.      Mouth/Throat:      Mouth: Mucous membranes are moist.   Eyes:      Pupils: Pupils are equal, round, and reactive to light.   Cardiovascular:      Rate and Rhythm: Normal rate and regular rhythm.      Pulses: Normal pulses.      Heart sounds: Normal heart sounds.   Pulmonary:      Effort: Pulmonary effort is normal. No respiratory distress.      Breath sounds: Normal breath sounds.   Abdominal:      General: Bowel sounds are normal. There is no distension.      Palpations: Abdomen is soft.   Musculoskeletal:      Cervical back: Normal range of motion and neck supple.   Skin:     General: Skin is warm.      Capillary Refill: Capillary refill takes less than 2 seconds.   Neurological:      General: No focal deficit present.      Mental Status: She is alert and oriented to person, place, and time.   Psychiatric:         Mood and Affect: Mood normal.         Behavior: Behavior normal.         Thought Content: Thought content normal.         Judgment: Judgment normal.  "        Vitals:    07/18/24 1342   BP: 118/77   Pulse: 98   Resp: 18   Temp: 98.6 °F (37 °C)   TempSrc: Temporal   SpO2: 98%   Weight: 93.6 kg (206 lb 5.6 oz)   PainSc: 0-No pain       Wt Readings from Last 3 Encounters:   07/18/24 93.6 kg (206 lb 5.6 oz)   07/18/24 93.6 kg (206 lb 5.6 oz)   06/27/24 91 kg (200 lb 11.2 oz)               ECOG score: 0         ECOG: (0) Fully Active - Able to Carry On All Pre-disease Performance Without Restriction  Fall Risk Assessment was completed, and patient is at low risk for falls.  PHQ-9 Total Score:         The patient is  experiencing fatigue. Fatigue score: 1    PT/OT Functional Screening: PT fx screen : No needs identified  Speech Functional Screening: Speech fx screen : No needs identified  Rehab to be ordered: Rehab to be ordered : No needs identified        Result Review :  The following data was reviewed by: LÁZARO Mcdonnell on 07/18/2024:  Lab Results   Component Value Date    HGB 12.4 07/18/2024    HCT 38.8 07/18/2024    MCV 90.9 07/18/2024     07/18/2024    WBC 6.94 07/18/2024    NEUTROABS 4.47 07/18/2024    LYMPHSABS 1.62 07/18/2024    MONOSABS 0.65 07/18/2024    EOSABS 0.15 07/18/2024    BASOSABS 0.03 07/18/2024     Lab Results   Component Value Date    GLUCOSE 79 07/18/2024    BUN 11 07/18/2024    CREATININE 0.71 07/18/2024     07/18/2024    K 4.4 07/18/2024     07/18/2024    CO2 26.2 07/18/2024    CALCIUM 10.1 07/18/2024    PROTEINTOT 7.8 07/18/2024    ALBUMIN 4.6 07/18/2024    BILITOT 0.2 07/18/2024    ALKPHOS 104 07/18/2024    AST 54 (H) 07/18/2024    ALT 93 (H) 07/18/2024     Lab Results   Component Value Date    LWMLJWWD15 577 02/22/2024    FOLATE >20.00 02/22/2024     No results found for: \"IRON\", \"LABIRON\", \"TRANSFERRIN\", \"TIBC\"  Lab Results   Component Value Date    [id number] 577 02/22/2024    FOLATE >20.00 02/22/2024     No results found for: \"PSA\", \"CEA\", \"AFP\", \"\", \"\"    CT Abdomen Pelvis With Contrast    Result " Date: 7/17/2024  Impression: 1.Groundglass changes right apical area unchanged that could reflect some underlying fibrosis sequela to treatment for breast cancer 2.Hepatic steatosis. 3.Nonobstructing intrarenal calculi. There is a right renal cyst. 4.Small umbilical hernia which appears to contain fat 5.Sclerotic areas involving the axial skeleton/pelvis and proximal left femur which have been noted and could reflect sequela of metastatic disease Electronically Signed: Sonu Mauro MD  7/17/2024 3:19 PM EDT  Workstation ID: [id number]    CT Chest With Contrast Diagnostic    Result Date: 7/17/2024  Impression: 1.Groundglass changes right apical area unchanged that could reflect some underlying fibrosis sequela to treatment for breast cancer 2.Hepatic steatosis. 3.Nonobstructing intrarenal calculi. There is a right renal cyst. 4.Small umbilical hernia which appears to contain fat 5.Sclerotic areas involving the axial skeleton/pelvis and proximal left femur which have been noted and could reflect sequela of metastatic disease Electronically Signed: Sonu Mauro MD  7/17/2024 3:19 PM EDT  Workstation ID: [id number]    NM Bone Scan Whole Body    Result Date: 7/17/2024  1.Stable osseous metastatic disease involving the lower cervical and upper thoracic spine as well as multiple bilateral ribs. Electronically Signed: Aayush Tuttle MD  7/17/2024 12:49 PM EDT  Workstation ID: [id number]    NM Bone Scan Whole Body    Result Date: 4/2/2024  Stable exam. Multifocal osseous metastatic disease.   Electronically Signed By-AZUL VALERIO MD On:4/2/2024 3:18 PM      CT Chest With Contrast Diagnostic    Result Date: 4/2/2024  Impression:  1. No evidence of intrathoracic or intraabdominal/pelvic metastatic disease 2. Osteoblastic metastatic disease. The metastatic lesions are stable in distribution, although some of the lesions demonstrate increased sclerosis which is likely related to treatment. No new bony metastases are demonstrated     Electronically Signed ByHammad Bradshaw On:4/2/2024 2:41 PM      CT Abdomen Pelvis With Contrast    Result Date: 4/2/2024  Impression:  1. No evidence of intrathoracic or intraabdominal/pelvic metastatic disease 2. Osteoblastic metastatic disease. The metastatic lesions are stable in distribution, although some of the lesions demonstrate increased sclerosis which is likely related to treatment. No new bony metastases are demonstrated    Electronically Signed ByHammad Bradshaw On:4/2/2024 2:41 PM             Assessment and Plan:  Diagnoses and all orders for this visit:    1. Malignant neoplasm of upper-inner quadrant of right breast in female, estrogen receptor positive (Primary)    2. Metastasis to bone    3. Long-term use of high-risk medication    4. Elevated LFTs    Other orders  -     Cancel: sodium chloride 0.9 % infusion 250 mL  -     Cancel: acetaminophen (TYLENOL) tablet 650 mg  -     Cancel: Trastuzumab (HERCEPTIN) 530 mg in sodium chloride 0.9 % 250 mL chemo IVPB    Stage IV breast cancer with mets to the bone. Current treatment regimen with Trastuzumab every 21 days, daily oral Letrozole and every 28 day Xgeva and tolerating well without any adverse side effects. Echo is up to date and within acceptable limits. Repeat in October 2024. Scans reviewed and are stable with no recurrent or worsening mets. Next Xgeva is around 7/26.       Labs reviewed and are acceptable for treatment today. CBC is normal. CMP is normal except for mild elevation of the AST/ ALT of 54 / 93. Elevated liver enzymes likely secondary to underlying fatty liver.       I spent 30 minutes caring for Annita on this date of service. This time includes time spent by me in the following activities:preparing for the visit, reviewing tests, obtaining and/or reviewing a separately obtained history, performing a medically appropriate examination and/or evaluation , counseling and educating the patient/family/caregiver, ordering medications,  tests, or procedures, referring and communicating with other health care professionals , documenting information in the medical record, and independently interpreting results and communicating that information with the patient/family/caregiver    Patient Follow Up: 3 weeks with MD with next cycle of Trastuzumab    Patient was given instructions and counseling regarding her condition or for health maintenance advice. Please see specific information pulled into the AVS if appropriate.     Marylin Morillo, APRN    7/18/2024    .tob     Orthopedic

## 2024-11-21 ENCOUNTER — HOSPITAL ENCOUNTER (OUTPATIENT)
Dept: ONCOLOGY | Facility: HOSPITAL | Age: 39
Discharge: HOME | End: 2024-11-21
Payer: COMMERCIAL

## 2024-11-21 ENCOUNTER — OFFICE VISIT (OUTPATIENT)
Dept: ONCOLOGY | Facility: HOSPITAL | Age: 39
End: 2024-11-21
Payer: COMMERCIAL

## 2024-11-21 VITALS
HEART RATE: 101 BPM | RESPIRATION RATE: 18 BRPM | DIASTOLIC BLOOD PRESSURE: 95 MMHG | TEMPERATURE: 99.1 F | WEIGHT: 205.25 LBS | BODY MASS INDEX: 37.54 KG/M2 | OXYGEN SATURATION: 96 % | SYSTOLIC BLOOD PRESSURE: 146 MMHG

## 2024-11-21 VITALS
RESPIRATION RATE: 18 BRPM | TEMPERATURE: 99.1 F | BODY MASS INDEX: 37.49 KG/M2 | OXYGEN SATURATION: 96 % | SYSTOLIC BLOOD PRESSURE: 146 MMHG | HEART RATE: 101 BPM | WEIGHT: 205 LBS | DIASTOLIC BLOOD PRESSURE: 95 MMHG

## 2024-11-21 DIAGNOSIS — Z45.2 ENCOUNTER FOR ADJUSTMENT OR MANAGEMENT OF VASCULAR ACCESS DEVICE: ICD-10-CM

## 2024-11-21 DIAGNOSIS — C50.211 MALIGNANT NEOPLASM OF UPPER-INNER QUADRANT OF RIGHT BREAST IN FEMALE, ESTROGEN RECEPTOR POSITIVE: Primary | ICD-10-CM

## 2024-11-21 DIAGNOSIS — Z17.0 MALIGNANT NEOPLASM OF UPPER-INNER QUADRANT OF RIGHT BREAST IN FEMALE, ESTROGEN RECEPTOR POSITIVE: Primary | ICD-10-CM

## 2024-11-21 LAB
ALBUMIN SERPL-MCNC: 4.3 G/DL (ref 3.5–5.2)
ALBUMIN/GLOB SERPL: 1.2 G/DL
ALP SERPL-CCNC: 95 U/L (ref 39–117)
ALT SERPL W P-5'-P-CCNC: 89 U/L (ref 1–33)
ANION GAP SERPL CALCULATED.3IONS-SCNC: 12.1 MMOL/L (ref 5–15)
AST SERPL-CCNC: 59 U/L (ref 1–32)
BASOPHILS # BLD AUTO: 0.03 10*3/MM3 (ref 0–0.2)
BASOPHILS NFR BLD AUTO: 0.3 % (ref 0–1.5)
BILIRUB SERPL-MCNC: 0.3 MG/DL (ref 0–1.2)
BUN SERPL-MCNC: 11 MG/DL (ref 6–20)
BUN/CREAT SERPL: 14.5 (ref 7–25)
CALCIUM SPEC-SCNC: 9.4 MG/DL (ref 8.6–10.5)
CHLORIDE SERPL-SCNC: 105 MMOL/L (ref 98–107)
CO2 SERPL-SCNC: 22.9 MMOL/L (ref 22–29)
CREAT SERPL-MCNC: 0.76 MG/DL (ref 0.57–1)
DEPRECATED RDW RBC AUTO: 47.3 FL (ref 37–54)
EGFRCR SERPLBLD CKD-EPI 2021: 103 ML/MIN/1.73
EOSINOPHIL # BLD AUTO: 0.08 10*3/MM3 (ref 0–0.4)
EOSINOPHIL NFR BLD AUTO: 0.9 % (ref 0.3–6.2)
ERYTHROCYTE [DISTWIDTH] IN BLOOD BY AUTOMATED COUNT: 14.2 % (ref 12.3–15.4)
GLOBULIN UR ELPH-MCNC: 3.6 GM/DL
GLUCOSE SERPL-MCNC: 119 MG/DL (ref 65–99)
HCT VFR BLD AUTO: 39.1 % (ref 34–46.6)
HGB BLD-MCNC: 12.4 G/DL (ref 12–15.9)
IMM GRANULOCYTES # BLD AUTO: 0.02 10*3/MM3 (ref 0–0.05)
IMM GRANULOCYTES NFR BLD AUTO: 0.2 % (ref 0–0.5)
LYMPHOCYTES # BLD AUTO: 2.08 10*3/MM3 (ref 0.7–3.1)
LYMPHOCYTES NFR BLD AUTO: 23.4 % (ref 19.6–45.3)
MCH RBC QN AUTO: 28.6 PG (ref 26.6–33)
MCHC RBC AUTO-ENTMCNC: 31.7 G/DL (ref 31.5–35.7)
MCV RBC AUTO: 90.3 FL (ref 79–97)
MONOCYTES # BLD AUTO: 0.74 10*3/MM3 (ref 0.1–0.9)
MONOCYTES NFR BLD AUTO: 8.3 % (ref 5–12)
NEUTROPHILS NFR BLD AUTO: 5.94 10*3/MM3 (ref 1.7–7)
NEUTROPHILS NFR BLD AUTO: 66.9 % (ref 42.7–76)
NRBC BLD AUTO-RTO: 0 /100 WBC (ref 0–0.2)
PLATELET # BLD AUTO: 311 10*3/MM3 (ref 140–450)
PMV BLD AUTO: 9.4 FL (ref 6–12)
POTASSIUM SERPL-SCNC: 4 MMOL/L (ref 3.5–5.2)
PROT SERPL-MCNC: 7.9 G/DL (ref 6–8.5)
RBC # BLD AUTO: 4.33 10*6/MM3 (ref 3.77–5.28)
SODIUM SERPL-SCNC: 140 MMOL/L (ref 136–145)
WBC NRBC COR # BLD AUTO: 8.89 10*3/MM3 (ref 3.4–10.8)

## 2024-11-21 PROCEDURE — 99214 OFFICE O/P EST MOD 30 MIN: CPT | Performed by: INTERNAL MEDICINE

## 2024-11-21 PROCEDURE — 80053 COMPREHEN METABOLIC PANEL: CPT | Performed by: INTERNAL MEDICINE

## 2024-11-21 PROCEDURE — 96413 CHEMO IV INFUSION 1 HR: CPT

## 2024-11-21 PROCEDURE — 25010000002 HEPARIN LOCK FLUSH PER 10 UNITS: Performed by: INTERNAL MEDICINE

## 2024-11-21 PROCEDURE — 85025 COMPLETE CBC W/AUTO DIFF WBC: CPT | Performed by: INTERNAL MEDICINE

## 2024-11-21 PROCEDURE — 25810000003 SODIUM CHLORIDE 0.9 % SOLUTION 250 ML FLEX CONT: Performed by: INTERNAL MEDICINE

## 2024-11-21 PROCEDURE — 25010000002 TRASTUZUMAB PER 10 MG: Performed by: INTERNAL MEDICINE

## 2024-11-21 RX ORDER — SODIUM CHLORIDE 0.9 % (FLUSH) 0.9 %
20 SYRINGE (ML) INJECTION AS NEEDED
OUTPATIENT
Start: 2024-11-21

## 2024-11-21 RX ORDER — SODIUM CHLORIDE 0.9 % (FLUSH) 0.9 %
20 SYRINGE (ML) INJECTION AS NEEDED
Status: DISCONTINUED | OUTPATIENT
Start: 2024-11-21 | End: 2024-11-22 | Stop reason: HOSPADM

## 2024-11-21 RX ORDER — SODIUM CHLORIDE 9 MG/ML
250 INJECTION, SOLUTION INTRAVENOUS ONCE
Status: CANCELLED | OUTPATIENT
Start: 2024-11-21

## 2024-11-21 RX ORDER — HEPARIN SODIUM (PORCINE) LOCK FLUSH IV SOLN 100 UNIT/ML 100 UNIT/ML
500 SOLUTION INTRAVENOUS AS NEEDED
OUTPATIENT
Start: 2024-11-21

## 2024-11-21 RX ORDER — ACETAMINOPHEN 325 MG/1
650 TABLET ORAL ONCE
Status: CANCELLED | OUTPATIENT
Start: 2024-11-21

## 2024-11-21 RX ORDER — SODIUM CHLORIDE 9 MG/ML
250 INJECTION, SOLUTION INTRAVENOUS ONCE
Status: COMPLETED | OUTPATIENT
Start: 2024-11-21 | End: 2024-11-21

## 2024-11-21 RX ORDER — ACETAMINOPHEN 325 MG/1
650 TABLET ORAL ONCE
Status: DISCONTINUED | OUTPATIENT
Start: 2024-11-21 | End: 2024-11-22 | Stop reason: HOSPADM

## 2024-11-21 RX ORDER — LETROZOLE 2.5 MG/1
2.5 TABLET, FILM COATED ORAL DAILY
Qty: 30 TABLET | Refills: 5
Start: 2024-11-21

## 2024-11-21 RX ORDER — HEPARIN SODIUM (PORCINE) LOCK FLUSH IV SOLN 100 UNIT/ML 100 UNIT/ML
500 SOLUTION INTRAVENOUS AS NEEDED
Status: DISCONTINUED | OUTPATIENT
Start: 2024-11-21 | End: 2024-11-22 | Stop reason: HOSPADM

## 2024-11-21 RX ADMIN — Medication 20 ML: at 15:00

## 2024-11-21 RX ADMIN — TRASTUZUMAB 530 MG: 150 INJECTION, POWDER, LYOPHILIZED, FOR SOLUTION INTRAVENOUS at 14:24

## 2024-11-21 RX ADMIN — HEPARIN 500 UNITS: 100 SYRINGE at 15:00

## 2024-11-21 RX ADMIN — SODIUM CHLORIDE 50 ML: 900 INJECTION, SOLUTION INTRAVENOUS at 14:12

## 2024-11-21 NOTE — ASSESSMENT & PLAN NOTE
Metastatic.  Triple positive.  Patient is on treatment with letrozole, trastuzumab and denosumab for bony involvement.  Tolerating well.  Excellent response to therapy thus far.  She does have vasomotor symptoms from the letrozole but is using Veozah to excellent effect.  She is up-to-date on cardiac monitoring.  Lab work today looks good.  Proceed with trastuzumab today as planned.  Continue letrozole daily.  Continue denosumab every 6 weeks to align with her trastuzumab infusions.  She will return in 3 weeks for trastuzumab, RTC 6 weeks for OV, trastuzumab with lab work prior to monitor for toxicities.

## 2024-11-21 NOTE — PROGRESS NOTES
Chief Complaint  Malignant neoplasm of upper-inner quadrant of right breast     Jason Vanegasa, APRN  Romina, Lela, APRN    Subjective          Annita Johnson presents to [facility] for ongoing treatment of her breast cancer.  She is triple positive.  She is on letrozole, trastuzumab and denosumab for bony involvement.  She is tolerating her regimen well.  She has any new masses, adenopathy, unusual aches or pains.  She has good appetite and energy level.  No issues from her Port-A-Cath.  She denies chest pain, shortness of breath, lower extremity swelling.    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, estrogen receptor positive   6/8/2023 Initial Diagnosis    Malignant neoplasm of upper-inner quadrant of right breast in female, estrogen receptor positive     6/8/2023 Cancer Staged    Staging form: Breast, AJCC 8th Edition  - Clinical: Stage IV (cT2, cN0, cM1, ER+, VA+, HER2-) - Signed by Perry Garcia MD on 6/8/2023 6/9/2023 - 4/4/2024 Chemotherapy    OP SUPPORTIVE BREAST Leuprolide 3.75 MG Q28D     6/15/2023 - 6/15/2023 Chemotherapy    OP BREAST Letrozole / Ribociclib     7/13/2023 -  Chemotherapy    OP BREAST Letrozole / Trastuzumab     8/24/2023 -  Chemotherapy    OP SUPPORTIVE Denosumab (Xgeva) Q42D     Metastasis to bone   6/8/2023 Initial Diagnosis    Metastasis to bone     8/24/2023 -  Chemotherapy    OP SUPPORTIVE Denosumab (Xgeva) Q42D           Current Outpatient Medications on File Prior to Visit   Medication Sig Dispense Refill    clonazePAM (KlonoPIN) 0.5 MG tablet TAKE 1 TABLET BY MOUTH 2 TIMES A DAY AS NEEDED FOR ANXIETY. 30 tablet 1    cyclobenzaprine (FLEXERIL) 10 MG tablet TAKE 1 TABLET BY MOUTH 3 TIMES A DAY AS NEEDED FOR MUSCLE SPASMS. 90 tablet 5    Fezolinetant (Veozah) 45 MG tablet Take 1 tablet by mouth Daily. 30 tablet 2    folic acid (FOLVITE) 1 MG tablet TAKE 1 TABLET BY MOUTH EVERY  DAY (Patient taking differently: Take 1 tablet by mouth Daily.  INSTRUCTED PER ANESTHESIA PROTOCOL) 90 tablet 1    HYDROcodone-acetaminophen (NORCO) 5-325 MG per tablet Take 1 tablet by mouth Every 6 (Six) Hours As Needed for Moderate Pain (Pain). 4 tablet 0    hydrOXYzine (ATARAX) 50 MG tablet TAKE 1 TABLET BY MOUTH EVERYDAY AT BEDTIME (Patient taking differently: Take 1 tablet by mouth As Needed.) 90 tablet 1    letrozole (FEMARA) 2.5 MG tablet Take 1 tablet by mouth Daily. 30 tablet 5    letrozole (FEMARA) 2.5 MG tablet Take 1 tablet by mouth Daily. 30 tablet 5    ondansetron (ZOFRAN) 8 MG tablet Take 1 tablet by mouth 3 (Three) Times a Day As Needed for Nausea or Vomiting. 30 tablet 5    Rimegepant Sulfate (Nurtec) 75 MG tablet dispersible tablet Take 1 tablet by mouth Daily As Needed (migraines). 8 tablet 5    Sodium Fluoride 5000 PPM 1.1 % gel Take 1 Application by mouth 2 (Two) Times a Day. TOOTHPASTE      Trastuzumab (HERCEPTIN IV) Infuse  into a venous catheter. EVERY 21 DAYS-NEXT ON 10/31/24      vitamin D (ERGOCALCIFEROL) 1.25 MG (75977 UT) capsule capsule Take 1 capsule by mouth 1 (One) Time Per Week. (Patient taking differently: Take 1 capsule by mouth 1 (One) Time Per Week.  INSTRUCTED PER ANESTHESIA PROTOCOL) 12 capsule 1    letrozole (FEMARA) 2.5 MG tablet Take 1 tablet by mouth Daily. 30 tablet 5     Current Facility-Administered Medications on File Prior to Visit   Medication Dose Route Frequency Provider Last Rate Last Admin    acetaminophen (TYLENOL) tablet 650 mg  650 mg Oral Once Perry Garcia MD        heparin injection 500 Units  500 Units Intravenous PRN Perry Garcia MD   500 Units at 11/21/24 1500    sodium chloride 0.9 % flush 20 mL  20 mL Intravenous PRN Perry Garcia MD   20 mL at 11/21/24 1500    [COMPLETED] sodium chloride 0.9 % infusion 250 mL  250 mL Intravenous Once Perry Garcia MD   Stopped at 11/21/24 1500    [COMPLETED] Trastuzumab (HERCEPTIN) 530 mg in sodium  chloride 0.9 % 300.2 mL chemo IVPB  6 mg/kg (Treatment Plan Recorded) Intravenous Once Perry Garcia MD   Stopped at [id number]       No Known Allergies  Past Medical History:   Diagnosis Date    Anemia     Breast cancer     RIGHT    Kidney stone     Metastasis to bone 2023     Past Surgical History:   Procedure Laterality Date    BREAST BIOPSY      CERVICAL FUSION  2023     SECTION N/A 2021    Procedure:  SECTION PRIMARY;  Surgeon: Zoe Dawson MD;  Location: [facility] LABOR DELIVERY;  Service: Obstetrics/Gynecology;  Laterality: N/A;    CYSTOSCOPY BLADDER STONE LITHOTRIPSY      SALPINGO OOPHORECTOMY Bilateral 2024    Procedure: SALPINGO OOPHORECTOMY LAPAROSCOPIC WITH DAVINCI ROBOT;  Surgeon: Ashlyn Avelar MD;  Location: [facility] OR;  Service: Robotics - DaVinci;  Laterality: Bilateral;    VENOUS ACCESS DEVICE (PORT) INSERTION Right 10/30/2024    Procedure: INSERTION VENOUS ACCESS DEVICE : Placement of a port in the tissue under the skin at your upper chest with a tube attached to it that goes into your vein;  Surgeon: Jitendra García MD;  Location: [facility] OR [facility];  Service: General;  Laterality: Right;     Social History     Socioeconomic History    Marital status:      Spouse name: Janet   Tobacco Use    Smoking status: Former     Current packs/day: 0.00     Average packs/day: 0.3 packs/day for 19.6 years (4.9 ttl pk-yrs)     Types: Cigarettes     Start date: 2005     Quit date: 2024     Years since quittin.2     Passive exposure: Current    Smokeless tobacco: Never   Vaping Use    Vaping status: Never Used   Substance and Sexual Activity    Alcohol use: Not Currently     Alcohol/week: 6.0 standard drinks of alcohol    Drug use: Yes     Types: Marijuana     Comment: for pain control/DAILY last used 10-30-24    Sexual activity: Defer     Family History   Problem Relation Age of Onset    Mental illness Mother     Hypertension Father      "Alcohol abuse Father     Ovarian cancer Paternal Aunt     Breast cancer Maternal Great-Grandmother     Malig Hyperthermia Neg Hx        Objective   Physical Exam  Vitals reviewed. Exam conducted with a chaperone present.   Cardiovascular:      Rate and Rhythm: Normal rate and regular rhythm.      Heart sounds: Normal heart sounds. No murmur heard.     No gallop.   Pulmonary:      Effort: Pulmonary effort is normal.      Breath sounds: Normal breath sounds.      Comments: Port-A-Cath  Abdominal:      General: Bowel sounds are normal.   Musculoskeletal:      Right lower leg: No edema.      Left lower leg: No edema.   Lymphadenopathy:      Cervical: No cervical adenopathy.   Psychiatric:         Mood and Affect: Mood normal.         Behavior: Behavior normal.         Vitals:    11/21/24 1350   BP: 146/95   Pulse: 101   Resp: 18   Temp: 99.1 °F (37.3 °C)   TempSrc: Temporal   SpO2: 96%   Weight: 93 kg (205 lb)   PainSc: 0-No pain     ECOG score: 0         PHQ-9 Total Score:                    Result Review :   The following data was reviewed by: Perry Garcia MD on 11/21/2024:  Lab Results   Component Value Date    HGB 12.4 11/21/2024    HCT 39.1 11/21/2024    MCV 90.3 11/21/2024     11/21/2024    WBC 8.89 11/21/2024    NEUTROABS 5.94 11/21/2024    LYMPHSABS 2.08 11/21/2024    MONOSABS 0.74 11/21/2024    EOSABS 0.08 11/21/2024    BASOSABS 0.03 11/21/2024     Lab Results   Component Value Date    GLUCOSE 119 (H) 11/21/2024    BUN 11 11/21/2024    CREATININE 0.76 11/21/2024     11/21/2024    K 4.0 11/21/2024     11/21/2024    CO2 22.9 11/21/2024    CALCIUM 9.4 11/21/2024    PROTEINTOT 7.9 11/21/2024    ALBUMIN 4.3 11/21/2024    BILITOT 0.3 11/21/2024    ALKPHOS 95 11/21/2024    AST 59 (H) 11/21/2024    ALT 89 (H) 11/21/2024     Lab Results   Component Value Date    MG 2.0 10/31/2024    PHOS 3.5 10/31/2024    TSH 1.320 09/19/2024     No results found for: \"IRON\", \"LABIRON\", \"TRANSFERRIN\", \"TIBC\"  Lab " "Results   Component Value Date    ZFKICYHJ15 577 02/22/2024    FOLATE >20.00 02/22/2024     No results found for: \"PSA\", \"CEA\", \"AFP\", \"\", \"\"          Assessment and Plan    Diagnoses and all orders for this visit:    1. 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  Triple positive.  Patient is on treatment with letrozole, trastuzumab and denosumab for bony involvement.  Tolerating well.  Excellent response to therapy thus far.  She does have vasomotor symptoms from the letrozole but is using Veozah to excellent effect.  She is up-to-date on cardiac monitoring.  Lab work today looks good.  Proceed with trastuzumab today as planned.  Continue letrozole daily.  Continue denosumab every 6 weeks to align with her trastuzumab infusions.  She will return in 3 weeks for trastuzumab, RTC 6 weeks for OV, trastuzumab with lab work prior to monitor for toxicities.      Other orders  -     Cancel: sodium chloride 0.9 % infusion 250 mL  -     Cancel: acetaminophen (TYLENOL) tablet 650 mg  -     Cancel: Trastuzumab (HERCEPTIN) 530 mg in sodium chloride 0.9 % 250 mL chemo IVPB            Patient Follow Up: 6 weeks    Patient was given instructions and counseling regarding her condition or for health maintenance advice. Please see specific information pulled into the AVS if appropriate.     Perry Garcia MD    11/21/2024            "

## 2024-12-12 ENCOUNTER — HOSPITAL ENCOUNTER (OUTPATIENT)
Dept: ONCOLOGY | Facility: HOSPITAL | Age: 39
Discharge: HOME | End: 2024-12-12
Payer: COMMERCIAL

## 2024-12-12 VITALS
DIASTOLIC BLOOD PRESSURE: 75 MMHG | TEMPERATURE: 99 F | HEART RATE: 113 BPM | BODY MASS INDEX: 37.54 KG/M2 | SYSTOLIC BLOOD PRESSURE: 139 MMHG | RESPIRATION RATE: 18 BRPM | OXYGEN SATURATION: 100 % | WEIGHT: 205.25 LBS

## 2024-12-12 DIAGNOSIS — C50.211 MALIGNANT NEOPLASM OF UPPER-INNER QUADRANT OF RIGHT BREAST IN FEMALE, ESTROGEN RECEPTOR POSITIVE: ICD-10-CM

## 2024-12-12 DIAGNOSIS — Z45.2 ENCOUNTER FOR ADJUSTMENT OR MANAGEMENT OF VASCULAR ACCESS DEVICE: ICD-10-CM

## 2024-12-12 DIAGNOSIS — Z17.0 MALIGNANT NEOPLASM OF UPPER-INNER QUADRANT OF RIGHT BREAST IN FEMALE, ESTROGEN RECEPTOR POSITIVE: ICD-10-CM

## 2024-12-12 DIAGNOSIS — C79.51 METASTASIS TO BONE: Primary | ICD-10-CM

## 2024-12-12 LAB
ALBUMIN SERPL-MCNC: 4.2 G/DL (ref 3.5–5.2)
ALBUMIN/GLOB SERPL: 1.2 G/DL
ALP SERPL-CCNC: 94 U/L (ref 39–117)
ALT SERPL W P-5'-P-CCNC: 113 U/L (ref 1–33)
ANION GAP SERPL CALCULATED.3IONS-SCNC: 13.9 MMOL/L (ref 5–15)
AST SERPL-CCNC: 66 U/L (ref 1–32)
BASOPHILS # BLD AUTO: 0.04 10*3/MM3 (ref 0–0.2)
BASOPHILS NFR BLD AUTO: 0.5 % (ref 0–1.5)
BILIRUB SERPL-MCNC: 0.4 MG/DL (ref 0–1.2)
BUN SERPL-MCNC: 9 MG/DL (ref 6–20)
BUN/CREAT SERPL: 12.5 (ref 7–25)
CALCIUM SPEC-SCNC: 9.2 MG/DL (ref 8.6–10.5)
CHLORIDE SERPL-SCNC: 106 MMOL/L (ref 98–107)
CO2 SERPL-SCNC: 20.1 MMOL/L (ref 22–29)
CREAT SERPL-MCNC: 0.72 MG/DL (ref 0.57–1)
DEPRECATED RDW RBC AUTO: 47.6 FL (ref 37–54)
EGFRCR SERPLBLD CKD-EPI 2021: 109.9 ML/MIN/1.73
EOSINOPHIL # BLD AUTO: 0.13 10*3/MM3 (ref 0–0.4)
EOSINOPHIL NFR BLD AUTO: 1.5 % (ref 0.3–6.2)
ERYTHROCYTE [DISTWIDTH] IN BLOOD BY AUTOMATED COUNT: 14.4 % (ref 12.3–15.4)
GLOBULIN UR ELPH-MCNC: 3.5 GM/DL
GLUCOSE SERPL-MCNC: 83 MG/DL (ref 65–99)
HCT VFR BLD AUTO: 38.5 % (ref 34–46.6)
HGB BLD-MCNC: 12.1 G/DL (ref 12–15.9)
IMM GRANULOCYTES # BLD AUTO: 0.02 10*3/MM3 (ref 0–0.05)
IMM GRANULOCYTES NFR BLD AUTO: 0.2 % (ref 0–0.5)
LYMPHOCYTES # BLD AUTO: 2.43 10*3/MM3 (ref 0.7–3.1)
LYMPHOCYTES NFR BLD AUTO: 29 % (ref 19.6–45.3)
MAGNESIUM SERPL-MCNC: 1.9 MG/DL (ref 1.6–2.6)
MCH RBC QN AUTO: 28.4 PG (ref 26.6–33)
MCHC RBC AUTO-ENTMCNC: 31.4 G/DL (ref 31.5–35.7)
MCV RBC AUTO: 90.4 FL (ref 79–97)
MONOCYTES # BLD AUTO: 0.79 10*3/MM3 (ref 0.1–0.9)
MONOCYTES NFR BLD AUTO: 9.4 % (ref 5–12)
NEUTROPHILS NFR BLD AUTO: 4.98 10*3/MM3 (ref 1.7–7)
NEUTROPHILS NFR BLD AUTO: 59.4 % (ref 42.7–76)
NRBC BLD AUTO-RTO: 0 /100 WBC (ref 0–0.2)
PHOSPHATE SERPL-MCNC: 3.3 MG/DL (ref 2.5–4.5)
PLATELET # BLD AUTO: 300 10*3/MM3 (ref 140–450)
PMV BLD AUTO: 9.6 FL (ref 6–12)
POTASSIUM SERPL-SCNC: 4 MMOL/L (ref 3.5–5.2)
PROT SERPL-MCNC: 7.7 G/DL (ref 6–8.5)
RBC # BLD AUTO: 4.26 10*6/MM3 (ref 3.77–5.28)
SODIUM SERPL-SCNC: 140 MMOL/L (ref 136–145)
WBC NRBC COR # BLD AUTO: 8.39 10*3/MM3 (ref 3.4–10.8)

## 2024-12-12 PROCEDURE — 84100 ASSAY OF PHOSPHORUS: CPT | Performed by: INTERNAL MEDICINE

## 2024-12-12 PROCEDURE — 25010000002 DENOSUMAB 120 MG/1.7ML SOLUTION: Performed by: INTERNAL MEDICINE

## 2024-12-12 PROCEDURE — 25010000002 TRASTUZUMAB PER 10 MG: Performed by: INTERNAL MEDICINE

## 2024-12-12 PROCEDURE — 25810000003 SODIUM CHLORIDE 0.9 % SOLUTION: Performed by: INTERNAL MEDICINE

## 2024-12-12 PROCEDURE — 96372 THER/PROPH/DIAG INJ SC/IM: CPT

## 2024-12-12 PROCEDURE — 85025 COMPLETE CBC W/AUTO DIFF WBC: CPT | Performed by: INTERNAL MEDICINE

## 2024-12-12 PROCEDURE — 83735 ASSAY OF MAGNESIUM: CPT | Performed by: INTERNAL MEDICINE

## 2024-12-12 PROCEDURE — 25010000002 HEPARIN LOCK FLUSH PER 10 UNITS: Performed by: INTERNAL MEDICINE

## 2024-12-12 PROCEDURE — 25810000003 SODIUM CHLORIDE 0.9 % SOLUTION 250 ML FLEX CONT: Performed by: INTERNAL MEDICINE

## 2024-12-12 PROCEDURE — 96413 CHEMO IV INFUSION 1 HR: CPT

## 2024-12-12 PROCEDURE — 80053 COMPREHEN METABOLIC PANEL: CPT | Performed by: INTERNAL MEDICINE

## 2024-12-12 RX ORDER — ACETAMINOPHEN 325 MG/1
650 TABLET ORAL ONCE
Status: CANCELLED | OUTPATIENT
Start: 2024-12-12

## 2024-12-12 RX ORDER — SODIUM CHLORIDE 9 MG/ML
250 INJECTION, SOLUTION INTRAVENOUS ONCE
Status: CANCELLED | OUTPATIENT
Start: 2024-12-12

## 2024-12-12 RX ORDER — SODIUM CHLORIDE 0.9 % (FLUSH) 0.9 %
20 SYRINGE (ML) INJECTION AS NEEDED
Status: DISCONTINUED | OUTPATIENT
Start: 2024-12-12 | End: 2024-12-13 | Stop reason: HOSPADM

## 2024-12-12 RX ORDER — LETROZOLE 2.5 MG/1
2.5 TABLET, FILM COATED ORAL DAILY
Qty: 30 TABLET | Refills: 5
Start: 2024-12-12

## 2024-12-12 RX ORDER — SODIUM CHLORIDE 9 MG/ML
250 INJECTION, SOLUTION INTRAVENOUS ONCE
Status: COMPLETED | OUTPATIENT
Start: 2024-12-12 | End: 2024-12-12

## 2024-12-12 RX ORDER — HEPARIN SODIUM (PORCINE) LOCK FLUSH IV SOLN 100 UNIT/ML 100 UNIT/ML
500 SOLUTION INTRAVENOUS AS NEEDED
OUTPATIENT
Start: 2024-12-12

## 2024-12-12 RX ORDER — ACETAMINOPHEN 325 MG/1
650 TABLET ORAL ONCE
Status: DISCONTINUED | OUTPATIENT
Start: 2024-12-12 | End: 2024-12-13 | Stop reason: HOSPADM

## 2024-12-12 RX ORDER — HEPARIN SODIUM (PORCINE) LOCK FLUSH IV SOLN 100 UNIT/ML 100 UNIT/ML
500 SOLUTION INTRAVENOUS AS NEEDED
Status: DISCONTINUED | OUTPATIENT
Start: 2024-12-12 | End: 2024-12-13 | Stop reason: HOSPADM

## 2024-12-12 RX ORDER — SODIUM CHLORIDE 0.9 % (FLUSH) 0.9 %
20 SYRINGE (ML) INJECTION AS NEEDED
OUTPATIENT
Start: 2024-12-12

## 2024-12-12 RX ADMIN — TRASTUZUMAB 530 MG: 150 INJECTION, POWDER, LYOPHILIZED, FOR SOLUTION INTRAVENOUS at 14:27

## 2024-12-12 RX ADMIN — HEPARIN 500 UNITS: 100 SYRINGE at 15:03

## 2024-12-12 RX ADMIN — Medication 20 ML: at 15:04

## 2024-12-12 RX ADMIN — SODIUM CHLORIDE 250 ML: 9 INJECTION, SOLUTION INTRAVENOUS at 14:26

## 2024-12-12 RX ADMIN — DENOSUMAB 120 MG: 120 INJECTION SUBCUTANEOUS at 15:02

## 2024-12-16 RX ORDER — ERGOCALCIFEROL 1.25 MG/1
50000 CAPSULE, LIQUID FILLED ORAL
Qty: 12 CAPSULE | Refills: 1 | Status: SHIPPED | OUTPATIENT
Start: 2024-12-16

## 2024-12-19 ENCOUNTER — TELEMEDICINE (OUTPATIENT)
Dept: INTERNAL MEDICINE | Age: 39
End: 2024-12-19
Payer: COMMERCIAL

## 2024-12-19 DIAGNOSIS — U07.1 COVID-19: Primary | ICD-10-CM

## 2024-12-19 LAB
EXPIRATION DATE: ABNORMAL
FLUAV AG UPPER RESP QL IA.RAPID: NOT DETECTED
FLUBV AG UPPER RESP QL IA.RAPID: NOT DETECTED
INTERNAL CONTROL: ABNORMAL
Lab: ABNORMAL
SARS-COV-2 AG UPPER RESP QL IA.RAPID: DETECTED

## 2024-12-19 PROCEDURE — 87428 SARSCOV & INF VIR A&B AG IA: CPT

## 2024-12-19 PROCEDURE — 99213 OFFICE O/P EST LOW 20 MIN: CPT

## 2024-12-19 RX ORDER — BROMPHENIRAMINE MALEATE, PSEUDOEPHEDRINE HYDROCHLORIDE, AND DEXTROMETHORPHAN HYDROBROMIDE 2; 30; 10 MG/5ML; MG/5ML; MG/5ML
5 SYRUP ORAL 4 TIMES DAILY PRN
Qty: 240 ML | Refills: 0 | Status: SHIPPED | OUTPATIENT
Start: 2024-12-19

## 2024-12-19 NOTE — PROGRESS NOTES
Mode of Visit: Video  Location of patient: home  You have chosen to receive care through a telehealth visit.  Does the patient consent to use a video/audio connection for your medical care today? Yes  The visit included audio and video interaction. No technical issues occurred during this visit.     Chief Complaint  Nasal Congestion (Started two days ago with congestion and sore throat, productive cough with greenish yellow phlegm, headaches, diarrhea. She states she has not been around anyone sick. )    SUBJECTIVE  Annita Johnson presents to [facility]    History of Present Illness  The patient is a 39-year-old female who presents via virtual visit for evaluation of cough.    She has been experiencing symptoms for the past 2 days, initially attributing them to overexertion. She reports a mild cough and significant nasal discharge, which is greenish-yellow in color. She also reports mild fever, chills, body aches, and a sore throat. She has found relief with Sudafed.        History of Present Illness  Past Medical History:   Diagnosis Date    Anemia     Breast cancer     RIGHT    Kidney stone     Metastasis to bone 2023      Family History   Problem Relation Age of Onset    Mental illness Mother     Hypertension Father     Alcohol abuse Father     Ovarian cancer Paternal Aunt     Breast cancer Maternal Great-Grandmother     Malig Hyperthermia Neg Hx       Past Surgical History:   Procedure Laterality Date    BREAST BIOPSY      CERVICAL FUSION  2023     SECTION N/A 2021    Procedure:  SECTION PRIMARY;  Surgeon: Zoe Dawson MD;  Location: [facility] LABOR DELIVERY;  Service: Obstetrics/Gynecology;  Laterality: N/A;    CYSTOSCOPY BLADDER STONE LITHOTRIPSY      SALPINGO OOPHORECTOMY Bilateral 2024    Procedure: SALPINGO OOPHORECTOMY LAPAROSCOPIC WITH DAVINCI ROBOT;  Surgeon: Ashlyn Avelar MD;  [facility] OR;  Service: Robotics -  [place];  Laterality: Bilateral;    VENOUS ACCESS DEVICE (PORT) INSERTION Right 10/30/2024    Procedure: INSERTION VENOUS ACCESS DEVICE : Placement of a port in the tissue under the skin at your upper chest with a tube attached to it that goes into your vein;  Surgeon: Jitendra García MD;  Location: [facility] OR [facility];  Service: General;  Laterality: Right;        Current Outpatient Medications:     clonazePAM (KlonoPIN) 0.5 MG tablet, TAKE 1 TABLET BY MOUTH 2 TIMES A DAY AS NEEDED FOR ANXIETY., Disp: 30 tablet, Rfl: 1    cyclobenzaprine (FLEXERIL) 10 MG tablet, TAKE 1 TABLET BY MOUTH 3 TIMES A DAY AS NEEDED FOR MUSCLE SPASMS., Disp: 90 tablet, Rfl: 5    Fezolinetant (Veozah) 45 MG tablet, Take 1 tablet by mouth Daily., Disp: 30 tablet, Rfl: 2    folic acid (FOLVITE) 1 MG tablet, TAKE 1 TABLET BY MOUTH EVERY DAY (Patient taking differently: Take 1 tablet by mouth Daily.  INSTRUCTED PER ANESTHESIA PROTOCOL), Disp: 90 tablet, Rfl: 1    HYDROcodone-acetaminophen (NORCO) 5-325 MG per tablet, Take 1 tablet by mouth Every 6 (Six) Hours As Needed for Moderate Pain (Pain)., Disp: 4 tablet, Rfl: 0    hydrOXYzine (ATARAX) 50 MG tablet, TAKE 1 TABLET BY MOUTH EVERYDAY AT BEDTIME (Patient taking differently: Take 1 tablet by mouth As Needed.), Disp: 90 tablet, Rfl: 1    letrozole (FEMARA) 2.5 MG tablet, Take 1 tablet by mouth Daily., Disp: 30 tablet, Rfl: 5    letrozole (FEMARA) 2.5 MG tablet, Take 1 tablet by mouth Daily., Disp: 30 tablet, Rfl: 5    letrozole (FEMARA) 2.5 MG tablet, Take 1 tablet by mouth Daily., Disp: 30 tablet, Rfl: 5    letrozole (FEMARA) 2.5 MG tablet, Take 1 tablet by mouth Daily., Disp: 30 tablet, Rfl: 5    ondansetron (ZOFRAN) 8 MG tablet, Take 1 tablet by mouth 3 (Three) Times a Day As Needed for Nausea or Vomiting., Disp: 30 tablet, Rfl: 5    Rimegepant Sulfate (Nurtec) 75 MG tablet dispersible tablet, Take 1 tablet by mouth Daily As Needed (migraines)., Disp: 8 tablet, Rfl: 5    Sodium Fluoride 5000  PPM 1.1 % gel, Take 1 Application by mouth 2 (Two) Times a Day. TOOTHPASTE, Disp: , Rfl:     Trastuzumab (HERCEPTIN IV), Infuse  into a venous catheter. EVERY 21 DAYS-NEXT ON 10/31/24, Disp: , Rfl:     vitamin D (ERGOCALCIFEROL) 1.25 MG (60912 UT) capsule capsule, TAKE 1 CAPSULE BY MOUTH 1 TIME PER WEEK., Disp: 12 capsule, Rfl: 1    brompheniramine-pseudoephedrine-DM 30-2-10 MG/5ML syrup, Take 5 mL by mouth 4 (Four) Times a Day As Needed for Allergies, Cough or Congestion., Disp: 240 mL, Rfl: 0    Nirmatrelvir & Ritonavir, 300mg/100mg, (PAXLOVID), Take 3 tablets by mouth 2 (Two) Times a Day for 5 days., Disp: 30 tablet, Rfl: 0            Objective   There were no vitals taken for this visit.    Virtual Visit Physical Exam    Physical Exam  Patient is alert and oriented.  She does sound congested.  No visible signs of distress noted.  She denies any shortness of breath or wheezing.      Result Review :                 Assessment and Plan    Diagnoses and all orders for this visit:    1. COVID-19 (Primary)  -     brompheniramine-pseudoephedrine-DM 30-2-10 MG/5ML syrup; Take 5 mL by mouth 4 (Four) Times a Day As Needed for Allergies, Cough or Congestion.  Dispense: 240 mL; Refill: 0  -     Nirmatrelvir & Ritonavir, 300mg/100mg, (PAXLOVID); Take 3 tablets by mouth 2 (Two) Times a Day for 5 days.  Dispense: 30 tablet; Refill: 0         Assessment & Plan  1.  COVID-19.  She reports a mild cough with greenish-yellow nasal discharge, slight fever, chills, body aches, and a sore throat. Sudafed has been effective in managing her symptoms. Tests for COVID-19 and influenza will be conducted. If the results are negative, a prescription for Z-Constantino will be provided. Additionally, Bromfed will be prescribed to help manage her symptoms.  Unfortunately, COVID test was positive.  Patient is being treated for metastatic breast cancer at this time.  Will go ahead and treat her with Paxlovid.  Recent benefits of medication were discussed  with the patient as well as side effects and adverse effects.  Patient will continue to monitor symptoms and report any worsening or persistent symptoms.        Follow Up   Return if symptoms worsen or fail to improve.     LÁZARO Fonseca    Patient was given instructions and counseling regarding his condition or for health maintenance advice. Please see specific information pulled into the AVS if appropriate.     Patient or patient representative verbalized consent for the use of Ambient Listening during the visit with  LÁZARO Fonseca for chart documentation. 12/19/2024  11:17 EST

## 2024-12-29 DIAGNOSIS — R45.89 ANXIETY ABOUT HEALTH: ICD-10-CM

## 2024-12-30 RX ORDER — CLONAZEPAM 0.5 MG/1
0.5 TABLET ORAL 2 TIMES DAILY PRN
Qty: 30 TABLET | Refills: 1 | Status: SHIPPED | OUTPATIENT
Start: 2024-12-30

## 2024-12-30 RX ORDER — FEZOLINETANT 45 MG/1
45 TABLET, FILM COATED ORAL DAILY
Qty: 30 TABLET | Refills: 2 | Status: SHIPPED | OUTPATIENT
Start: 2024-12-30

## 2025-01-02 ENCOUNTER — OFFICE VISIT (OUTPATIENT)
Dept: ONCOLOGY | Facility: HOSPITAL | Age: 40
End: 2025-01-02
Payer: COMMERCIAL

## 2025-01-02 ENCOUNTER — HOSPITAL ENCOUNTER (OUTPATIENT)
Dept: ONCOLOGY | Facility: HOSPITAL | Age: 40
Discharge: HOME | End: 2025-01-02
Payer: COMMERCIAL

## 2025-01-02 VITALS
HEART RATE: 120 BPM | TEMPERATURE: 99.6 F | SYSTOLIC BLOOD PRESSURE: 143 MMHG | RESPIRATION RATE: 18 BRPM | WEIGHT: 206 LBS | DIASTOLIC BLOOD PRESSURE: 95 MMHG | OXYGEN SATURATION: 99 % | BODY MASS INDEX: 37.68 KG/M2

## 2025-01-02 VITALS
DIASTOLIC BLOOD PRESSURE: 87 MMHG | RESPIRATION RATE: 18 BRPM | BODY MASS INDEX: 37.73 KG/M2 | SYSTOLIC BLOOD PRESSURE: 129 MMHG | OXYGEN SATURATION: 99 % | HEART RATE: 101 BPM | WEIGHT: 206.3 LBS | TEMPERATURE: 99.6 F

## 2025-01-02 DIAGNOSIS — C50.211 MALIGNANT NEOPLASM OF UPPER-INNER QUADRANT OF RIGHT BREAST IN FEMALE, ESTROGEN RECEPTOR POSITIVE: Primary | ICD-10-CM

## 2025-01-02 DIAGNOSIS — Z45.2 ENCOUNTER FOR ADJUSTMENT OR MANAGEMENT OF VASCULAR ACCESS DEVICE: ICD-10-CM

## 2025-01-02 DIAGNOSIS — C79.51 METASTASIS TO BONE: ICD-10-CM

## 2025-01-02 DIAGNOSIS — Z17.0 MALIGNANT NEOPLASM OF UPPER-INNER QUADRANT OF RIGHT BREAST IN FEMALE, ESTROGEN RECEPTOR POSITIVE: Primary | ICD-10-CM

## 2025-01-02 LAB
ALBUMIN SERPL-MCNC: 4 G/DL (ref 3.5–5.2)
ALBUMIN/GLOB SERPL: 1.1 G/DL
ALP SERPL-CCNC: 81 U/L (ref 39–117)
ALT SERPL W P-5'-P-CCNC: 142 U/L (ref 1–33)
ANION GAP SERPL CALCULATED.3IONS-SCNC: 8.7 MMOL/L (ref 5–15)
AST SERPL-CCNC: 88 U/L (ref 1–32)
BASOPHILS # BLD AUTO: 0.04 10*3/MM3 (ref 0–0.2)
BASOPHILS NFR BLD AUTO: 0.4 % (ref 0–1.5)
BILIRUB SERPL-MCNC: 0.3 MG/DL (ref 0–1.2)
BUN SERPL-MCNC: 10 MG/DL (ref 6–20)
BUN/CREAT SERPL: 14.7 (ref 7–25)
CALCIUM SPEC-SCNC: 9 MG/DL (ref 8.6–10.5)
CHLORIDE SERPL-SCNC: 107 MMOL/L (ref 98–107)
CO2 SERPL-SCNC: 22.3 MMOL/L (ref 22–29)
CREAT SERPL-MCNC: 0.68 MG/DL (ref 0.57–1)
DEPRECATED RDW RBC AUTO: 47.8 FL (ref 37–54)
EGFRCR SERPLBLD CKD-EPI 2021: 114.5 ML/MIN/1.73
EOSINOPHIL # BLD AUTO: 0.14 10*3/MM3 (ref 0–0.4)
EOSINOPHIL NFR BLD AUTO: 1.4 % (ref 0.3–6.2)
ERYTHROCYTE [DISTWIDTH] IN BLOOD BY AUTOMATED COUNT: 14.8 % (ref 12.3–15.4)
GLOBULIN UR ELPH-MCNC: 3.6 GM/DL
GLUCOSE SERPL-MCNC: 150 MG/DL (ref 65–99)
HCT VFR BLD AUTO: 37 % (ref 34–46.6)
HGB BLD-MCNC: 11.8 G/DL (ref 12–15.9)
IMM GRANULOCYTES # BLD AUTO: 0.04 10*3/MM3 (ref 0–0.05)
IMM GRANULOCYTES NFR BLD AUTO: 0.4 % (ref 0–0.5)
LYMPHOCYTES # BLD AUTO: 2.35 10*3/MM3 (ref 0.7–3.1)
LYMPHOCYTES NFR BLD AUTO: 23.4 % (ref 19.6–45.3)
MCH RBC QN AUTO: 28.6 PG (ref 26.6–33)
MCHC RBC AUTO-ENTMCNC: 31.9 G/DL (ref 31.5–35.7)
MCV RBC AUTO: 89.6 FL (ref 79–97)
MONOCYTES # BLD AUTO: 0.73 10*3/MM3 (ref 0.1–0.9)
MONOCYTES NFR BLD AUTO: 7.3 % (ref 5–12)
NEUTROPHILS NFR BLD AUTO: 6.76 10*3/MM3 (ref 1.7–7)
NEUTROPHILS NFR BLD AUTO: 67.1 % (ref 42.7–76)
NRBC BLD AUTO-RTO: 0 /100 WBC (ref 0–0.2)
PLATELET # BLD AUTO: 318 10*3/MM3 (ref 140–450)
PMV BLD AUTO: 9.4 FL (ref 6–12)
POTASSIUM SERPL-SCNC: 4.1 MMOL/L (ref 3.5–5.2)
PROT SERPL-MCNC: 7.6 G/DL (ref 6–8.5)
RBC # BLD AUTO: 4.13 10*6/MM3 (ref 3.77–5.28)
SODIUM SERPL-SCNC: 138 MMOL/L (ref 136–145)
WBC NRBC COR # BLD AUTO: 10.06 10*3/MM3 (ref 3.4–10.8)

## 2025-01-02 PROCEDURE — 96413 CHEMO IV INFUSION 1 HR: CPT

## 2025-01-02 PROCEDURE — 25010000002 TRASTUZUMAB PER 10 MG: Performed by: INTERNAL MEDICINE

## 2025-01-02 PROCEDURE — 99214 OFFICE O/P EST MOD 30 MIN: CPT | Performed by: INTERNAL MEDICINE

## 2025-01-02 PROCEDURE — 85025 COMPLETE CBC W/AUTO DIFF WBC: CPT | Performed by: INTERNAL MEDICINE

## 2025-01-02 PROCEDURE — 25010000002 HEPARIN LOCK FLUSH PER 10 UNITS: Performed by: INTERNAL MEDICINE

## 2025-01-02 PROCEDURE — 25810000003 SODIUM CHLORIDE 0.9 % SOLUTION: Performed by: INTERNAL MEDICINE

## 2025-01-02 PROCEDURE — 25810000003 SODIUM CHLORIDE 0.9 % SOLUTION 250 ML FLEX CONT: Performed by: INTERNAL MEDICINE

## 2025-01-02 PROCEDURE — 80053 COMPREHEN METABOLIC PANEL: CPT | Performed by: INTERNAL MEDICINE

## 2025-01-02 RX ORDER — SODIUM CHLORIDE 9 MG/ML
250 INJECTION, SOLUTION INTRAVENOUS ONCE
Status: COMPLETED | OUTPATIENT
Start: 2025-01-02 | End: 2025-01-02

## 2025-01-02 RX ORDER — SODIUM CHLORIDE 0.9 % (FLUSH) 0.9 %
20 SYRINGE (ML) INJECTION AS NEEDED
Status: DISCONTINUED | OUTPATIENT
Start: 2025-01-02 | End: 2025-01-03 | Stop reason: HOSPADM

## 2025-01-02 RX ORDER — HEPARIN SODIUM (PORCINE) LOCK FLUSH IV SOLN 100 UNIT/ML 100 UNIT/ML
500 SOLUTION INTRAVENOUS AS NEEDED
OUTPATIENT
Start: 2025-01-02

## 2025-01-02 RX ORDER — ACETAMINOPHEN 325 MG/1
650 TABLET ORAL ONCE
Status: CANCELLED | OUTPATIENT
Start: 2025-01-02

## 2025-01-02 RX ORDER — SODIUM CHLORIDE 9 MG/ML
250 INJECTION, SOLUTION INTRAVENOUS ONCE
Status: CANCELLED | OUTPATIENT
Start: 2025-01-02

## 2025-01-02 RX ORDER — LETROZOLE 2.5 MG/1
2.5 TABLET, FILM COATED ORAL DAILY
Qty: 30 TABLET | Refills: 5
Start: 2025-01-02

## 2025-01-02 RX ORDER — ACETAMINOPHEN 325 MG/1
650 TABLET ORAL ONCE
Status: DISCONTINUED | OUTPATIENT
Start: 2025-01-02 | End: 2025-01-03 | Stop reason: HOSPADM

## 2025-01-02 RX ORDER — SODIUM CHLORIDE 0.9 % (FLUSH) 0.9 %
20 SYRINGE (ML) INJECTION AS NEEDED
OUTPATIENT
Start: 2025-01-02

## 2025-01-02 RX ORDER — HEPARIN SODIUM (PORCINE) LOCK FLUSH IV SOLN 100 UNIT/ML 100 UNIT/ML
500 SOLUTION INTRAVENOUS AS NEEDED
Status: DISCONTINUED | OUTPATIENT
Start: 2025-01-02 | End: 2025-01-03 | Stop reason: HOSPADM

## 2025-01-02 RX ADMIN — Medication 20 ML: at 15:19

## 2025-01-02 RX ADMIN — HEPARIN 500 UNITS: 100 SYRINGE at 15:19

## 2025-01-02 RX ADMIN — SODIUM CHLORIDE 250 ML: 9 INJECTION, SOLUTION INTRAVENOUS at 14:16

## 2025-01-02 RX ADMIN — SODIUM CHLORIDE 530 MG: 9 INJECTION, SOLUTION INTRAVENOUS at 14:45

## 2025-01-02 NOTE — PROGRESS NOTES
Chief Complaint  Breast Cancer    Lela Vanegas, APRN  Romina, Lela, APRN    Subjective          Annita Johnson presents to [facility]EMAT[facility]Y for ongoing treatment of breast cancer.  She is on letrozole, trastuzumab and denosumab.  Tolerating her treatments well.  She denies any issues or side effects.  No chest pain, shortness breath, lower extremity swelling.  She denies any masses, adenopathy, unusual aches or pains.  She reports adequate energy level.    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, estrogen receptor positive   6/8/2023 Initial Diagnosis    Malignant neoplasm of upper-inner quadrant of right breast in female, estrogen receptor positive     6/8/2023 Cancer Staged    Staging form: Breast, AJCC 8th Edition  - Clinical: Stage IV (cT2, cN0, cM1, ER+, VA+, HER2-) - Signed by Perry Garcia MD on 6/8/2023 6/9/2023 - 4/4/2024 Chemotherapy    OP SUPPORTIVE BREAST Leuprolide 3.75 MG Q28D     6/15/2023 - 6/15/2023 Chemotherapy    OP BREAST Letrozole / Ribociclib     7/13/2023 -  Chemotherapy    OP BREAST Letrozole / Trastuzumab     8/24/2023 -  Chemotherapy    OP SUPPORTIVE Denosumab (Xgeva) Q42D     Metastasis to bone   6/8/2023 Initial Diagnosis    Metastasis to bone     8/24/2023 -  Chemotherapy    OP SUPPORTIVE Denosumab (Xgeva) Q42D           Current Outpatient Medications on File Prior to Visit   Medication Sig Dispense Refill    brompheniramine-pseudoephedrine-DM 30-2-10 MG/5ML syrup Take 5 mL by mouth 4 (Four) Times a Day As Needed for Allergies, Cough or Congestion. 240 mL 0    clonazePAM (KlonoPIN) 0.5 MG tablet TAKE 1 TABLET BY MOUTH TWICE A DAY AS NEEDED FOR ANXIETY 30 tablet 1    cyclobenzaprine (FLEXERIL) 10 MG tablet TAKE 1 TABLET BY MOUTH 3 TIMES A DAY AS NEEDED FOR MUSCLE SPASMS. 90 tablet 5    folic acid (FOLVITE) 1 MG tablet TAKE 1 TABLET BY MOUTH EVERY DAY (Patient taking differently: Take 1  tablet by mouth Daily.  INSTRUCTED PER ANESTHESIA PROTOCOL) 90 tablet 1    HYDROcodone-acetaminophen (NORCO) 5-325 MG per tablet Take 1 tablet by mouth Every 6 (Six) Hours As Needed for Moderate Pain (Pain). 4 tablet 0    hydrOXYzine (ATARAX) 50 MG tablet TAKE 1 TABLET BY MOUTH EVERYDAY AT BEDTIME (Patient taking differently: Take 1 tablet by mouth As Needed.) 90 tablet 1    letrozole (FEMARA) 2.5 MG tablet Take 1 tablet by mouth Daily. 30 tablet 5    letrozole (FEMARA) 2.5 MG tablet Take 1 tablet by mouth Daily. 30 tablet 5    letrozole (FEMARA) 2.5 MG tablet Take 1 tablet by mouth Daily. 30 tablet 5    letrozole (FEMARA) 2.5 MG tablet Take 1 tablet by mouth Daily. 30 tablet 5    letrozole (FEMARA) 2.5 MG tablet Take 1 tablet by mouth Daily. 30 tablet 5    ondansetron (ZOFRAN) 8 MG tablet Take 1 tablet by mouth 3 (Three) Times a Day As Needed for Nausea or Vomiting. 30 tablet 5    Rimegepant Sulfate (Nurtec) 75 MG tablet dispersible tablet Take 1 tablet by mouth Daily As Needed (migraines). 8 tablet 5    Sodium Fluoride 5000 PPM 1.1 % gel Take 1 Application by mouth 2 (Two) Times a Day. TOOTHPASTE      Trastuzumab (HERCEPTIN IV) Infuse  into a venous catheter. EVERY 21 DAYS-NEXT ON 10/31/24      Veozah 45 MG tablet TAKE 1 TABLET BY MOUTH EVERY DAY 30 tablet 2    vitamin D (ERGOCALCIFEROL) 1.25 MG (24729 UT) capsule capsule TAKE 1 CAPSULE BY MOUTH 1 TIME PER WEEK. 12 capsule 1     Current Facility-Administered Medications on File Prior to Visit   Medication Dose Route Frequency Provider Last Rate Last Admin    acetaminophen (TYLENOL) tablet 650 mg  650 mg Oral Once Perry Garcia MD        heparin injection 500 Units  500 Units Intravenous PRN Perry Garcia MD   500 Units at 01/02/25 1519    sodium chloride 0.9 % flush 20 mL  20 mL Intravenous PRN Perry Garcia MD   20 mL at 01/02/25 1519    [COMPLETED] sodium chloride 0.9 % infusion 250 mL  250 mL Intravenous Once Perry Garcia MD   Stopped at  25 1519    [COMPLETED] Trastuzumab (HERCEPTIN) 530 mg in sodium chloride 0.9 % 300.2 mL chemo IVPB  6 mg/kg (Treatment Plan Recorded) Intravenous Once Perry Garcia MD   Stopped at 25 1515       No Known Allergies  Past Medical History:   Diagnosis Date    Anemia     Breast cancer     RIGHT    Kidney stone     Metastasis to bone 2023     Past Surgical History:   Procedure Laterality Date    BREAST BIOPSY      CERVICAL FUSION  2023     SECTION N/A 2021    Procedure:  SECTION PRIMARY;  Surgeon: Zoe Dawson MD;  Location: [facility] LABOR DELIVERY;  Service: Obstetrics/Gynecology;  Laterality: N/A;    CYSTOSCOPY BLADDER STONE LITHOTRIPSY      SALPINGO OOPHORECTOMY Bilateral 2024    Procedure: SALPINGO OOPHORECTOMY LAPAROSCOPIC WITH DAVINCI ROBOT;  Surgeon: Ashlyn Avelar MD;  Location: [facility] MAIN OR;  Service: Robotics - DaVinci;  Laterality: Bilateral;    VENOUS ACCESS DEVICE (PORT) INSERTION Right 10/30/2024    Procedure: INSERTION VENOUS ACCESS DEVICE : Placement of a port in the tissue under the skin at your upper chest with a tube attached to it that goes into your vein;  Surgeon: Jitendra García MD;  [facility] OR [facility];  Service: General;  Laterality: Right;     Social History     Socioeconomic History    Marital status:      Spouse name: Janet   Tobacco Use    Smoking status: Former     Current packs/day: 0.00     Average packs/day: 0.3 packs/day for 19.6 years (4.9 ttl pk-yrs)     Types: Cigarettes     Start date: 2005     Quit date: 2024     Years since quittin.3     Passive exposure: Current    Smokeless tobacco: Never   Vaping Use    Vaping status: Never Used   Substance and Sexual Activity    Alcohol use: Not Currently     Alcohol/week: 6.0 standard drinks of alcohol    Drug use: Yes     Types: Marijuana     Comment: for pain control/DAILY last used 10-30-24    Sexual activity: Defer     Family History   Problem  Relation Age of Onset    Mental illness Mother     Hypertension Father     Alcohol abuse Father     Ovarian cancer Paternal Aunt     Breast cancer Maternal Great-Grandmother     Malig Hyperthermia Neg Hx        Objective   Physical Exam  Vitals reviewed. Exam conducted with a chaperone present.   Cardiovascular:      Rate and Rhythm: Normal rate and regular rhythm.      Heart sounds: Normal heart sounds. No murmur heard.     No gallop.   Pulmonary:      Effort: Pulmonary effort is normal.      Breath sounds: Normal breath sounds.   Abdominal:      General: Bowel sounds are normal.   Musculoskeletal:      Right lower leg: No edema.      Left lower leg: No edema.   Lymphadenopathy:      Cervical: No cervical adenopathy.   Psychiatric:         Mood and Affect: Mood normal.         Behavior: Behavior normal.         Vitals:    01/02/25 1339   BP: 143/95   Pulse: 120   Resp: 18   Temp: 99.6 °F (37.6 °C)   TempSrc: Temporal   SpO2: 99%   Weight: 93.4 kg (206 lb)   PainSc: 0-No pain               PHQ-9 Total Score:                    Result Review :   The following data was reviewed by: ePrry Garcia MD on 01/02/2025:  Lab Results   Component Value Date    HGB 11.8 (L) 01/02/2025    HCT 37.0 01/02/2025    MCV 89.6 01/02/2025     01/02/2025    WBC 10.06 01/02/2025    NEUTROABS 6.76 01/02/2025    LYMPHSABS 2.35 01/02/2025    MONOSABS 0.73 01/02/2025    EOSABS 0.14 01/02/2025    BASOSABS 0.04 01/02/2025     Lab Results   Component Value Date    GLUCOSE 150 (H) 01/02/2025    BUN 10 01/02/2025    CREATININE 0.68 01/02/2025     01/02/2025    K 4.1 01/02/2025     01/02/2025    CO2 22.3 01/02/2025    CALCIUM 9.0 01/02/2025    PROTEINTOT 7.6 01/02/2025    ALBUMIN 4.0 01/02/2025    BILITOT 0.3 01/02/2025    ALKPHOS 81 01/02/2025    AST 88 (H) 01/02/2025     (H) 01/02/2025     Lab Results   Component Value Date    MG 1.9 12/12/2024    PHOS 3.3 12/12/2024    TSH 1.320 09/19/2024     No results found for:  "\"IRON\", \"LABIRON\", \"TRANSFERRIN\", \"TIBC\"  Lab Results   Component Value Date    HJKVGOXZ85 577 02/22/2024    FOLATE >20.00 02/22/2024     No results found for: \"PSA\", \"CEA\", \"AFP\", \"\", \"\"          Assessment and Plan    Diagnoses and all orders for this visit:    1. 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  Hormone receptor positive.  HER2 positive.  Patient is on letrozole, trastuzumab and denosumab.  Tolerating her regimen well.  Lab work is adequate for treatment.  Proceed with cycle 26 as planned.  I will see her back for cycle 27-day 1 with lab work prior to monitor for toxicities, echocardiogram for cardiac monitoring and restaging scans to assess response to therapy.    Orders:  -     NM Bone Scan Whole Body; Future  -     CT chest w contrast; Future  -     CT abdomen pelvis w contrast; Future  -     Adult Transthoracic Echo Complete W/ Cont if Necessary Per Protocol; Future    2. Metastasis to bone  Assessment & Plan:  Patient is receiving monthly denosumab.  Tolerating well.  No dental or jaw pain.  Electrolytes are good.  Bone scan before next visit      Other orders  -     Cancel: sodium chloride 0.9 % infusion 250 mL  -     Cancel: acetaminophen (TYLENOL) tablet 650 mg  -     Cancel: Trastuzumab (HERCEPTIN) 530 mg in sodium chloride 0.9 % 250 mL chemo IVPB            Patient Follow Up: 3 weeks    Patient was given instructions and counseling regarding her condition or for health maintenance advice. Please see specific information pulled into the AVS if appropriate.     Perry Garcia MD    1/2/2025            "

## 2025-01-02 NOTE — ASSESSMENT & PLAN NOTE
Metastatic.  Hormone receptor positive.  HER2 positive.  Patient is on letrozole, trastuzumab and denosumab.  Tolerating her regimen well.  Lab work is adequate for treatment.  Proceed with cycle 26 as planned.  I will see her back for cycle 27-day 1 with lab work prior to monitor for toxicities, echocardiogram for cardiac monitoring and restaging scans to assess response to therapy.

## 2025-01-02 NOTE — ASSESSMENT & PLAN NOTE
Patient is receiving monthly denosumab.  Tolerating well.  No dental or jaw pain.  Electrolytes are good.  Bone scan before next visit

## 2025-01-09 RX ORDER — PREDNISONE 20 MG/1
20 TABLET ORAL 2 TIMES DAILY
Qty: 10 TABLET | Refills: 0 | Status: SHIPPED | OUTPATIENT
Start: 2025-01-09 | End: 2025-01-14

## 2025-01-09 RX ORDER — AZITHROMYCIN 250 MG/1
TABLET, FILM COATED ORAL
Qty: 6 TABLET | Refills: 0 | Status: SHIPPED | OUTPATIENT
Start: 2025-01-09

## 2025-01-16 ENCOUNTER — HOSPITAL ENCOUNTER (OUTPATIENT)
Dept: NUCLEAR MEDICINE | Facility: HOSPITAL | Age: 40
Discharge: HOME | End: 2025-01-16
Payer: COMMERCIAL

## 2025-01-16 ENCOUNTER — HOSPITAL ENCOUNTER (OUTPATIENT)
Facility: HOSPITAL | Age: 40
Discharge: HOME | End: 2025-01-16
Payer: COMMERCIAL

## 2025-01-16 ENCOUNTER — HOSPITAL ENCOUNTER (OUTPATIENT)
Dept: CT IMAGING | Facility: HOSPITAL | Age: 40
Discharge: HOME | End: 2025-01-16
Payer: COMMERCIAL

## 2025-01-16 DIAGNOSIS — Z17.0 MALIGNANT NEOPLASM OF UPPER-INNER QUADRANT OF RIGHT BREAST IN FEMALE, ESTROGEN RECEPTOR POSITIVE: ICD-10-CM

## 2025-01-16 DIAGNOSIS — C50.211 MALIGNANT NEOPLASM OF UPPER-INNER QUADRANT OF RIGHT BREAST IN FEMALE, ESTROGEN RECEPTOR POSITIVE: ICD-10-CM

## 2025-01-16 PROCEDURE — 25510000001 IOPAMIDOL PER 1 ML: Performed by: INTERNAL MEDICINE

## 2025-01-16 PROCEDURE — 93306 TTE W/DOPPLER COMPLETE: CPT

## 2025-01-16 PROCEDURE — 93356 MYOCRD STRAIN IMG SPCKL TRCK: CPT

## 2025-01-16 PROCEDURE — 34310000005 TECHNETIUM MEDRONATE KIT: Performed by: INTERNAL MEDICINE

## 2025-01-16 PROCEDURE — 74177 CT ABD & PELVIS W/CONTRAST: CPT

## 2025-01-16 PROCEDURE — A9503 TC99M MEDRONATE: HCPCS | Performed by: INTERNAL MEDICINE

## 2025-01-16 PROCEDURE — 78306 BONE IMAGING WHOLE BODY: CPT

## 2025-01-16 RX ORDER — TC 99M MEDRONATE 20 MG/10ML
20.5 INJECTION, POWDER, LYOPHILIZED, FOR SOLUTION INTRAVENOUS
Status: COMPLETED | OUTPATIENT
Start: 2025-01-16 | End: 2025-01-16

## 2025-01-16 RX ORDER — IOPAMIDOL 755 MG/ML
100 INJECTION, SOLUTION INTRAVASCULAR
Status: COMPLETED | OUTPATIENT
Start: 2025-01-16 | End: 2025-01-16

## 2025-01-16 RX ADMIN — TC 99M MEDRONATE 20.5 MILLICURIE: 20 INJECTION, POWDER, LYOPHILIZED, FOR SOLUTION INTRAVENOUS at 08:33

## 2025-01-16 RX ADMIN — IOPAMIDOL 100 ML: 755 INJECTION, SOLUTION INTRAVENOUS at 09:03

## 2025-01-17 LAB
ASCENDING AORTA: 3 CM
AV MEAN PRESS GRAD SYS DOP V1V2: 3.9 MMHG
AV VMAX SYS DOP: 137 CM/SEC
BH CV ECHO LEFT VENTRICLE GLOBAL LONGITUDINAL STRAIN: -19.4 %
BH CV ECHO MEAS - AO MAX PG: 7.5 MMHG
BH CV ECHO MEAS - AO V2 VTI: 23.3 CM
BH CV ECHO MEAS - EDV(MOD-SP2): 112 ML
BH CV ECHO MEAS - EDV(MOD-SP4): 112.3 ML
BH CV ECHO MEAS - EF(MOD-SP2): 67.5 %
BH CV ECHO MEAS - EF(MOD-SP4): 64.6 %
BH CV ECHO MEAS - ESV(MOD-SP2): 36.4 ML
BH CV ECHO MEAS - ESV(MOD-SP4): 39.7 ML
BH CV ECHO MEAS - IVS/LVPW: 1.25 CM
BH CV ECHO MEAS - IVSD: 1 CM
BH CV ECHO MEAS - LA DIMENSION: 3.5 CM
BH CV ECHO MEAS - LAT PEAK E' VEL: 13.6 CM/SEC
BH CV ECHO MEAS - LV MAX PG: 4.8 MMHG
BH CV ECHO MEAS - LV MEAN PG: 2.3 MMHG
BH CV ECHO MEAS - LV V1 MAX: 110 CM/SEC
BH CV ECHO MEAS - LV V1 VTI: 18.7 CM
BH CV ECHO MEAS - LVIDD: 4.5 CM
BH CV ECHO MEAS - LVIDS: 2.5 CM
BH CV ECHO MEAS - LVOT DIAM: 2.2 CM
BH CV ECHO MEAS - LVPWD: 0.8 CM
BH CV ECHO MEAS - MED PEAK E' VEL: 14 CM/SEC
BH CV ECHO MEAS - MV A MAX VEL: 75.9 CM/SEC
BH CV ECHO MEAS - MV E MAX VEL: 78.7 CM/SEC
BH CV ECHO MEAS - MV E/A: 1.04
BH CV ECHO MEAS - SV(MOD-SP2): 75.6 ML
BH CV ECHO MEAS - SV(MOD-SP4): 72.6 ML
BH CV ECHO MEAS - TAPSE (>1.6): 2.16 CM
BH CV ECHO MEASUREMENTS AVERAGE E/E' RATIO: 5.7
BH CV XLRA - RV BASE: 3.7 CM
BH CV XLRA - TDI S': 16 CM/SEC
LEFT ATRIUM VOLUME INDEX: 17 ML/M2
SINUS: 2.9 CM

## 2025-01-23 ENCOUNTER — HOSPITAL ENCOUNTER (OUTPATIENT)
Dept: ONCOLOGY | Facility: HOSPITAL | Age: 40
Discharge: HOME | End: 2025-01-23
Payer: COMMERCIAL

## 2025-01-23 ENCOUNTER — OFFICE VISIT (OUTPATIENT)
Dept: ONCOLOGY | Facility: HOSPITAL | Age: 40
End: 2025-01-23
Payer: COMMERCIAL

## 2025-01-23 VITALS
HEART RATE: 106 BPM | RESPIRATION RATE: 18 BRPM | DIASTOLIC BLOOD PRESSURE: 96 MMHG | SYSTOLIC BLOOD PRESSURE: 124 MMHG | TEMPERATURE: 97 F | BODY MASS INDEX: 37.31 KG/M2 | WEIGHT: 204 LBS | OXYGEN SATURATION: 97 %

## 2025-01-23 VITALS
OXYGEN SATURATION: 97 % | RESPIRATION RATE: 18 BRPM | DIASTOLIC BLOOD PRESSURE: 96 MMHG | TEMPERATURE: 97 F | WEIGHT: 204.2 LBS | BODY MASS INDEX: 37.35 KG/M2 | SYSTOLIC BLOOD PRESSURE: 124 MMHG | HEART RATE: 106 BPM

## 2025-01-23 DIAGNOSIS — Z17.0 MALIGNANT NEOPLASM OF UPPER-INNER QUADRANT OF RIGHT BREAST IN FEMALE, ESTROGEN RECEPTOR POSITIVE: ICD-10-CM

## 2025-01-23 DIAGNOSIS — C50.211 MALIGNANT NEOPLASM OF UPPER-INNER QUADRANT OF RIGHT BREAST IN FEMALE, ESTROGEN RECEPTOR POSITIVE: ICD-10-CM

## 2025-01-23 DIAGNOSIS — Z45.2 ENCOUNTER FOR ADJUSTMENT OR MANAGEMENT OF VASCULAR ACCESS DEVICE: ICD-10-CM

## 2025-01-23 DIAGNOSIS — Z17.0 MALIGNANT NEOPLASM OF UPPER-INNER QUADRANT OF RIGHT BREAST IN FEMALE, ESTROGEN RECEPTOR POSITIVE: Primary | ICD-10-CM

## 2025-01-23 DIAGNOSIS — C50.211 MALIGNANT NEOPLASM OF UPPER-INNER QUADRANT OF RIGHT BREAST IN FEMALE, ESTROGEN RECEPTOR POSITIVE: Primary | ICD-10-CM

## 2025-01-23 DIAGNOSIS — C79.51 METASTASIS TO BONE: ICD-10-CM

## 2025-01-23 DIAGNOSIS — C79.51 METASTASIS TO BONE: Primary | ICD-10-CM

## 2025-01-23 LAB
ALBUMIN SERPL-MCNC: 4.4 G/DL (ref 3.5–5.2)
ALBUMIN/GLOB SERPL: 1.2 G/DL
ALP SERPL-CCNC: 92 U/L (ref 39–117)
ALT SERPL W P-5'-P-CCNC: 127 U/L (ref 1–33)
ANION GAP SERPL CALCULATED.3IONS-SCNC: 13.3 MMOL/L (ref 5–15)
AST SERPL-CCNC: 77 U/L (ref 1–32)
BASOPHILS # BLD AUTO: 0.04 10*3/MM3 (ref 0–0.2)
BASOPHILS NFR BLD AUTO: 0.4 % (ref 0–1.5)
BILIRUB SERPL-MCNC: 0.4 MG/DL (ref 0–1.2)
BUN SERPL-MCNC: 14 MG/DL (ref 6–20)
BUN/CREAT SERPL: 19.2 (ref 7–25)
CALCIUM SPEC-SCNC: 9.5 MG/DL (ref 8.6–10.5)
CHLORIDE SERPL-SCNC: 103 MMOL/L (ref 98–107)
CO2 SERPL-SCNC: 21.7 MMOL/L (ref 22–29)
CREAT SERPL-MCNC: 0.73 MG/DL (ref 0.57–1)
DEPRECATED RDW RBC AUTO: 50.5 FL (ref 37–54)
EGFRCR SERPLBLD CKD-EPI 2021: 108.1 ML/MIN/1.73
EOSINOPHIL # BLD AUTO: 0.15 10*3/MM3 (ref 0–0.4)
EOSINOPHIL NFR BLD AUTO: 1.3 % (ref 0.3–6.2)
ERYTHROCYTE [DISTWIDTH] IN BLOOD BY AUTOMATED COUNT: 15.2 % (ref 12.3–15.4)
GLOBULIN UR ELPH-MCNC: 3.6 GM/DL
GLUCOSE SERPL-MCNC: 97 MG/DL (ref 65–99)
HCT VFR BLD AUTO: 39.3 % (ref 34–46.6)
HGB BLD-MCNC: 12.4 G/DL (ref 12–15.9)
HOLD SPECIMEN: NORMAL
IMM GRANULOCYTES # BLD AUTO: 0.05 10*3/MM3 (ref 0–0.05)
IMM GRANULOCYTES NFR BLD AUTO: 0.4 % (ref 0–0.5)
LYMPHOCYTES # BLD AUTO: 2.34 10*3/MM3 (ref 0.7–3.1)
LYMPHOCYTES NFR BLD AUTO: 20.9 % (ref 19.6–45.3)
MAGNESIUM SERPL-MCNC: 1.8 MG/DL (ref 1.6–2.6)
MCH RBC QN AUTO: 28.5 PG (ref 26.6–33)
MCHC RBC AUTO-ENTMCNC: 31.6 G/DL (ref 31.5–35.7)
MCV RBC AUTO: 90.3 FL (ref 79–97)
MONOCYTES # BLD AUTO: 0.98 10*3/MM3 (ref 0.1–0.9)
MONOCYTES NFR BLD AUTO: 8.8 % (ref 5–12)
NEUTROPHILS NFR BLD AUTO: 68.2 % (ref 42.7–76)
NEUTROPHILS NFR BLD AUTO: 7.63 10*3/MM3 (ref 1.7–7)
NRBC BLD AUTO-RTO: 0 /100 WBC (ref 0–0.2)
PHOSPHATE SERPL-MCNC: 3.7 MG/DL (ref 2.5–4.5)
PLATELET # BLD AUTO: 287 10*3/MM3 (ref 140–450)
PMV BLD AUTO: 9.9 FL (ref 6–12)
POTASSIUM SERPL-SCNC: 4.3 MMOL/L (ref 3.5–5.2)
PROT SERPL-MCNC: 8 G/DL (ref 6–8.5)
RBC # BLD AUTO: 4.35 10*6/MM3 (ref 3.77–5.28)
SODIUM SERPL-SCNC: 138 MMOL/L (ref 136–145)
WBC NRBC COR # BLD AUTO: 11.19 10*3/MM3 (ref 3.4–10.8)

## 2025-01-23 PROCEDURE — 25810000003 SODIUM CHLORIDE 0.9 % SOLUTION: Performed by: INTERNAL MEDICINE

## 2025-01-23 PROCEDURE — 25010000002 TRASTUZUMAB PER 10 MG: Performed by: INTERNAL MEDICINE

## 2025-01-23 PROCEDURE — 96413 CHEMO IV INFUSION 1 HR: CPT

## 2025-01-23 PROCEDURE — 36416 COLLJ CAPILLARY BLOOD SPEC: CPT

## 2025-01-23 PROCEDURE — 83735 ASSAY OF MAGNESIUM: CPT | Performed by: INTERNAL MEDICINE

## 2025-01-23 PROCEDURE — 25010000002 DENOSUMAB 120 MG/1.7ML SOLUTION: Performed by: INTERNAL MEDICINE

## 2025-01-23 PROCEDURE — 85025 COMPLETE CBC W/AUTO DIFF WBC: CPT | Performed by: INTERNAL MEDICINE

## 2025-01-23 PROCEDURE — 99214 OFFICE O/P EST MOD 30 MIN: CPT | Performed by: INTERNAL MEDICINE

## 2025-01-23 PROCEDURE — 25810000003 SODIUM CHLORIDE 0.9 % SOLUTION 250 ML FLEX CONT: Performed by: INTERNAL MEDICINE

## 2025-01-23 PROCEDURE — 96372 THER/PROPH/DIAG INJ SC/IM: CPT

## 2025-01-23 PROCEDURE — 25010000002 HEPARIN LOCK FLUSH PER 10 UNITS: Performed by: INTERNAL MEDICINE

## 2025-01-23 PROCEDURE — 84100 ASSAY OF PHOSPHORUS: CPT | Performed by: INTERNAL MEDICINE

## 2025-01-23 PROCEDURE — 80053 COMPREHEN METABOLIC PANEL: CPT | Performed by: INTERNAL MEDICINE

## 2025-01-23 RX ORDER — SODIUM CHLORIDE 0.9 % (FLUSH) 0.9 %
20 SYRINGE (ML) INJECTION AS NEEDED
OUTPATIENT
Start: 2025-01-23

## 2025-01-23 RX ORDER — LETROZOLE 2.5 MG/1
2.5 TABLET, FILM COATED ORAL DAILY
Qty: 30 TABLET | Refills: 5
Start: 2025-01-23

## 2025-01-23 RX ORDER — HEPARIN SODIUM (PORCINE) LOCK FLUSH IV SOLN 100 UNIT/ML 100 UNIT/ML
500 SOLUTION INTRAVENOUS AS NEEDED
Status: DISCONTINUED | OUTPATIENT
Start: 2025-01-23 | End: 2025-01-24 | Stop reason: HOSPADM

## 2025-01-23 RX ORDER — ACETAMINOPHEN 325 MG/1
650 TABLET ORAL ONCE
Status: DISCONTINUED | OUTPATIENT
Start: 2025-01-23 | End: 2025-01-24 | Stop reason: HOSPADM

## 2025-01-23 RX ORDER — ACETAMINOPHEN 325 MG/1
650 TABLET ORAL ONCE
Status: CANCELLED | OUTPATIENT
Start: 2025-01-23

## 2025-01-23 RX ORDER — HEPARIN SODIUM (PORCINE) LOCK FLUSH IV SOLN 100 UNIT/ML 100 UNIT/ML
500 SOLUTION INTRAVENOUS AS NEEDED
OUTPATIENT
Start: 2025-01-23

## 2025-01-23 RX ORDER — SODIUM CHLORIDE 9 MG/ML
250 INJECTION, SOLUTION INTRAVENOUS ONCE
Status: CANCELLED | OUTPATIENT
Start: 2025-01-23

## 2025-01-23 RX ORDER — SODIUM CHLORIDE 9 MG/ML
250 INJECTION, SOLUTION INTRAVENOUS ONCE
Status: COMPLETED | OUTPATIENT
Start: 2025-01-23 | End: 2025-01-23

## 2025-01-23 RX ORDER — SODIUM CHLORIDE 0.9 % (FLUSH) 0.9 %
20 SYRINGE (ML) INJECTION AS NEEDED
Status: DISCONTINUED | OUTPATIENT
Start: 2025-01-23 | End: 2025-01-24 | Stop reason: HOSPADM

## 2025-01-23 RX ADMIN — SODIUM CHLORIDE 250 ML: 9 INJECTION, SOLUTION INTRAVENOUS at 14:40

## 2025-01-23 RX ADMIN — TRASTUZUMAB 530 MG: 150 INJECTION, POWDER, LYOPHILIZED, FOR SOLUTION INTRAVENOUS at 14:40

## 2025-01-23 RX ADMIN — DENOSUMAB 120 MG: 120 INJECTION SUBCUTANEOUS at 15:14

## 2025-01-23 RX ADMIN — HEPARIN 500 UNITS: 100 SYRINGE at 13:27

## 2025-01-23 NOTE — ASSESSMENT & PLAN NOTE
Patient is on Xgeva every 6 weeks to align with her trastuzumab therapy.  Tolerating well.  No dental or jaw pain.  Electrolytes are normal.  Bone scan shows stable bony metastatic disease with no new or worsening findings.

## 2025-01-23 NOTE — PROGRESS NOTES
Chief Complaint  Malignant neoplasm of upper-inner quadrant of right breast     Lela Vanegas, APRN  Romina, Lela, APRN    Subjective          Annita Johnson presents to [facility]EMATOLOGY [facility]COLOGY for ongoing treatment of her breast cancer.  She is on letrozole, trastuzumab, denosumab for bony involvement.  Tolerating her treatments well.  She denies new masses, adenopathy, unusual aches or pains.  She notes adequate appetite and energy level.  Port-A-Cath did not get blood today but she denies pain, swelling or redness of the area.  She denies fever or chills.  No chest pain, shortness of breath, lower extremity swelling.    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, estrogen receptor positive   6/8/2023 Initial Diagnosis    Malignant neoplasm of upper-inner quadrant of right breast in female, estrogen receptor positive     6/8/2023 Cancer Staged    Staging form: Breast, AJCC 8th Edition  - Clinical: Stage IV (cT2, cN0, cM1, ER+, KS+, HER2-) - Signed by Perry Garcia MD on 6/8/2023 6/9/2023 - 4/4/2024 Chemotherapy    OP SUPPORTIVE BREAST Leuprolide 3.75 MG Q28D     6/15/2023 - 6/15/2023 Chemotherapy    OP BREAST Letrozole / Ribociclib     7/13/2023 -  Chemotherapy    OP BREAST Letrozole / Trastuzumab     8/24/2023 -  Chemotherapy    OP SUPPORTIVE Denosumab (Xgeva) Q42D     Metastasis to bone   6/8/2023 Initial Diagnosis    Metastasis to bone     8/24/2023 -  Chemotherapy    OP SUPPORTIVE Denosumab (Xgeva) Q42D           Current Outpatient Medications on File Prior to Visit   Medication Sig Dispense Refill    azithromycin (Zithromax Z-Constantino) 250 MG tablet Take 2 tablets by mouth on day 1, then 1 tablet daily on days 2-5 6 tablet 0    brompheniramine-pseudoephedrine-DM 30-2-10 MG/5ML syrup Take 5 mL by mouth 4 (Four) Times a Day As Needed for Allergies, Cough or Congestion. 240 mL 0    clonazePAM (KlonoPIN) 0.5 MG tablet TAKE 1  TABLET BY MOUTH TWICE A DAY AS NEEDED FOR ANXIETY 30 tablet 1    cyclobenzaprine (FLEXERIL) 10 MG tablet TAKE 1 TABLET BY MOUTH 3 TIMES A DAY AS NEEDED FOR MUSCLE SPASMS. 90 tablet 5    folic acid (FOLVITE) 1 MG tablet TAKE 1 TABLET BY MOUTH EVERY DAY (Patient taking differently: Take 1 tablet by mouth Daily.  INSTRUCTED PER ANESTHESIA PROTOCOL) 90 tablet 1    HYDROcodone-acetaminophen (NORCO) 5-325 MG per tablet Take 1 tablet by mouth Every 6 (Six) Hours As Needed for Moderate Pain (Pain). 4 tablet 0    hydrOXYzine (ATARAX) 50 MG tablet TAKE 1 TABLET BY MOUTH EVERYDAY AT BEDTIME (Patient taking differently: Take 1 tablet by mouth As Needed.) 90 tablet 1    letrozole (FEMARA) 2.5 MG tablet Take 1 tablet by mouth Daily. 30 tablet 5    letrozole (FEMARA) 2.5 MG tablet Take 1 tablet by mouth Daily. 30 tablet 5    letrozole (FEMARA) 2.5 MG tablet Take 1 tablet by mouth Daily. 30 tablet 5    letrozole (FEMARA) 2.5 MG tablet Take 1 tablet by mouth Daily. 30 tablet 5    letrozole (FEMARA) 2.5 MG tablet Take 1 tablet by mouth Daily. 30 tablet 5    ondansetron (ZOFRAN) 8 MG tablet Take 1 tablet by mouth 3 (Three) Times a Day As Needed for Nausea or Vomiting. 30 tablet 5    Rimegepant Sulfate (Nurtec) 75 MG tablet dispersible tablet Take 1 tablet by mouth Daily As Needed (migraines). 8 tablet 5    Sodium Fluoride 5000 PPM 1.1 % gel Take 1 Application by mouth 2 (Two) Times a Day. TOOTHPASTE      Trastuzumab (HERCEPTIN IV) Infuse  into a venous catheter. EVERY 21 DAYS-NEXT ON 10/31/24      Veozah 45 MG tablet TAKE 1 TABLET BY MOUTH EVERY DAY 30 tablet 2    vitamin D (ERGOCALCIFEROL) 1.25 MG (84891 UT) capsule capsule TAKE 1 CAPSULE BY MOUTH 1 TIME PER WEEK. 12 capsule 1    letrozole (FEMARA) 2.5 MG tablet Take 1 tablet by mouth Daily. 30 tablet 5     Current Facility-Administered Medications on File Prior to Visit   Medication Dose Route Frequency Provider Last Rate Last Admin    acetaminophen (TYLENOL) tablet 650 mg  650 mg  Oral Once Perry Garcia MD        [COMPLETED] denosumab (XGEVA) injection 120 mg  120 mg Subcutaneous Once Perry Garcia MD   120 mg at 25 1514    heparin injection 500 Units  500 Units Intravenous PRN Perry Garcia MD   500 Units at 25 1327    sodium chloride 0.9 % flush 20 mL  20 mL Intravenous PRN Perry Garcia MD        [COMPLETED] sodium chloride 0.9 % infusion 250 mL  250 mL Intravenous Once Perry Garcia MD   Stopped at 25 1515    [COMPLETED] Trastuzumab (HERCEPTIN) 530 mg in sodium chloride 0.9 % 300.2 mL chemo IVPB  6 mg/kg (Treatment Plan Recorded) Intravenous Once Perry Garcia MD   Stopped at 25 1510       No Known Allergies  Past Medical History:   Diagnosis Date    Anemia     Breast cancer     RIGHT    Kidney stone     Metastasis to bone 2023     Past Surgical History:   Procedure Laterality Date    BREAST BIOPSY      CERVICAL FUSION  2023     SECTION N/A 2021    Procedure:  SECTION PRIMARY;  Surgeon: Zoe Dawson MD;  Location: [facility] LABOR DELIVERY;  Service: Obstetrics/Gynecology;  Laterality: N/A;    CYSTOSCOPY BLADDER STONE LITHOTRIPSY      SALPINGO OOPHORECTOMY Bilateral 2024    Procedure: SALPINGO OOPHORECTOMY LAPAROSCOPIC WITH DAVINCI ROBOT;  Surgeon: Ashlyn Avelar MD;  Location: [facility];  Service: Robotics - DaVinci;  Laterality: Bilateral;    VENOUS ACCESS DEVICE (PORT) INSERTION Right 10/30/2024    Procedure: INSERTION VENOUS ACCESS DEVICE : Placement of a port in the tissue under the skin at your upper chest with a tube attached to it that goes into your vein;  Surgeon: Jitendra García MD;  Location: [facility];  Service: General;  Laterality: Right;     Social History     Socioeconomic History    Marital status:      Spouse name: Janet   Tobacco Use    Smoking status: Former     Current packs/day: 0.00     Average packs/day: 0.3 packs/day for 19.6 years (4.9 ttl pk-yrs)      Types: Cigarettes     Start date: 2005     Quit date: 2024     Years since quittin.3     Passive exposure: Current    Smokeless tobacco: Never   Vaping Use    Vaping status: Never Used   Substance and Sexual Activity    Alcohol use: Not Currently     Alcohol/week: 6.0 standard drinks of alcohol    Drug use: Yes     Types: Marijuana     Comment: for pain control/DAILY last used 10-30-24    Sexual activity: Defer     Family History   Problem Relation Age of Onset    Mental illness Mother     Hypertension Father     Alcohol abuse Father     Ovarian cancer Paternal Aunt     Breast cancer Maternal Great-Grandmother     Malig Hyperthermia Neg Hx        Objective   Physical Exam  Vitals reviewed. Exam conducted with a chaperone present.   Cardiovascular:      Rate and Rhythm: Normal rate and regular rhythm.      Heart sounds: Normal heart sounds. No murmur heard.     No gallop.   Pulmonary:      Effort: Pulmonary effort is normal.      Breath sounds: Normal breath sounds.      Comments: Port-A-Cath  Abdominal:      General: Bowel sounds are normal.   Musculoskeletal:      Right lower leg: No edema.      Left lower leg: No edema.   Psychiatric:         Mood and Affect: Mood normal.         Behavior: Behavior normal.         Vitals:    25 1347   BP: 124/96   Pulse: 106   Resp: 18   Temp: 97 °F (36.1 °C)   TempSrc: Temporal   SpO2: 97%   Weight: 92.5 kg (204 lb)   PainSc: 0-No pain     ECOG score: 0         PHQ-9 Total Score:                    Result Review :   The following data was reviewed by: Perry Garcia MD on 2025:  Lab Results   Component Value Date    HGB 12.4 2025    HCT 39.3 2025    MCV 90.3 2025     2025    WBC 11.19 (H) 2025    NEUTROABS 7.63 (H) 2025    LYMPHSABS 2.34 2025    MONOSABS 0.98 (H) 2025    EOSABS 0.15 2025    BASOSABS 0.04 2025     Lab Results   Component Value Date    GLUCOSE 97 2025    BUN 14  "01/23/2025    CREATININE 0.73 01/23/2025     01/23/2025    K 4.3 01/23/2025     01/23/2025    CO2 21.7 (L) 01/23/2025    CALCIUM 9.5 01/23/2025    PROTEINTOT 8.0 01/23/2025    ALBUMIN 4.4 01/23/2025    BILITOT 0.4 01/23/2025    ALKPHOS 92 01/23/2025    AST 77 (H) 01/23/2025     (H) 01/23/2025     Lab Results   Component Value Date    MG 1.8 01/23/2025    PHOS 3.7 01/23/2025    TSH 1.320 09/19/2024     No results found for: \"IRON\", \"LABIRON\", \"TRANSFERRIN\", \"TIBC\"  Lab Results   Component Value Date    [id number] 577 02/22/2024    FOLATE >20.00 02/22/2024     No results found for: \"PSA\", \"CEA\", \"AFP\", \"\", \"\"    Data reviewed : Radiologic studies CT abdomen, pelvis and bone scan reviewed .  Echocardiogram reviewed     Assessment and Plan    Diagnoses and all orders for this visit:    1. 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  Hormone receptor positive.  HER2 positive.  Patient is on treatment with letrozole, trastuzumab and denosumab for bony involvement.  Tolerating well.  Restaging CT and bone scan shows stable disease with no new or worsening findings-radiology did not perform the CT chest but clinically she is feeling well and I will make her go back for chest imaging at this time.  Will plan to get that with her next interim scans.  Lab work today is adequate for treatment.  Echocardiogram shows normal ejection fraction.  Proceed with trastuzumab today as planned.  Continue letrozole daily.  RTC 3 weeks for trastuzumab, RTC 6 weeks for OV, trastuzumab with lab work prior to monitor for toxicities.      2. Metastasis to bone  Assessment & Plan:  Patient is on Xgeva every 6 weeks to align with her trastuzumab therapy.  Tolerating well.  No dental or jaw pain.  Electrolytes are normal.  Bone scan shows stable bony metastatic disease with no new or worsening findings.      Other orders  -  "    Cancel: denosumab (XGEVA) injection 120 mg  -     Cancel: sodium chloride 0.9 % infusion 250 mL  -     Cancel: acetaminophen (TYLENOL) tablet 650 mg  -     Cancel: Trastuzumab (HERCEPTIN) 530 mg in sodium chloride 0.9 % 250 mL chemo IVPB            Patient Follow Up: 6 weeks    Patient was given instructions and counseling regarding her condition or for health maintenance advice. Please see specific information pulled into the AVS if appropriate.     Perry Garcia MD    1/23/2025

## 2025-01-23 NOTE — ASSESSMENT & PLAN NOTE
Metastatic.  Hormone receptor positive.  HER2 positive.  Patient is on treatment with letrozole, trastuzumab and denosumab for bony involvement.  Tolerating well.  Restaging CT and bone scan shows stable disease with no new or worsening findings-radiology did not perform the CT chest but clinically she is feeling well and I will make her go back for chest imaging at this time.  Will plan to get that with her next interim scans.  Lab work today is adequate for treatment.  Echocardiogram shows normal ejection fraction.  Proceed with trastuzumab today as planned.  Continue letrozole daily.  RTC 3 weeks for trastuzumab, RTC 6 weeks for OV, trastuzumab with lab work prior to monitor for toxicities.

## 2025-01-25 DIAGNOSIS — R51.9 UNILATERAL OCCIPITAL HEADACHE: ICD-10-CM

## 2025-01-27 RX ORDER — RIMEGEPANT SULFATE 75 MG/75MG
75 TABLET, ORALLY DISINTEGRATING ORAL DAILY PRN
Qty: 8 TABLET | Refills: 5 | Status: SHIPPED | OUTPATIENT
Start: 2025-01-27

## 2025-01-29 DIAGNOSIS — C50.211 MALIGNANT NEOPLASM OF UPPER-INNER QUADRANT OF RIGHT BREAST IN FEMALE, ESTROGEN RECEPTOR POSITIVE: ICD-10-CM

## 2025-01-29 DIAGNOSIS — Z17.0 MALIGNANT NEOPLASM OF UPPER-INNER QUADRANT OF RIGHT BREAST IN FEMALE, ESTROGEN RECEPTOR POSITIVE: ICD-10-CM

## 2025-01-29 RX ORDER — ONDANSETRON 8 MG/1
8 TABLET, FILM COATED ORAL 3 TIMES DAILY PRN
Qty: 30 TABLET | Refills: 5 | Status: SHIPPED | OUTPATIENT
Start: 2025-01-29

## 2025-02-13 ENCOUNTER — HOSPITAL ENCOUNTER (OUTPATIENT)
Dept: ONCOLOGY | Facility: HOSPITAL | Age: 40
Discharge: HOME | End: 2025-02-13
Admitting: INTERNAL MEDICINE
Payer: COMMERCIAL

## 2025-02-13 VITALS
HEART RATE: 104 BPM | TEMPERATURE: 97.3 F | RESPIRATION RATE: 18 BRPM | BODY MASS INDEX: 37.62 KG/M2 | OXYGEN SATURATION: 97 % | DIASTOLIC BLOOD PRESSURE: 83 MMHG | WEIGHT: 205.69 LBS | SYSTOLIC BLOOD PRESSURE: 133 MMHG

## 2025-02-13 DIAGNOSIS — Z45.2 ENCOUNTER FOR ADJUSTMENT OR MANAGEMENT OF VASCULAR ACCESS DEVICE: ICD-10-CM

## 2025-02-13 DIAGNOSIS — Z17.0 MALIGNANT NEOPLASM OF UPPER-INNER QUADRANT OF RIGHT BREAST IN FEMALE, ESTROGEN RECEPTOR POSITIVE: Primary | ICD-10-CM

## 2025-02-13 DIAGNOSIS — C50.211 MALIGNANT NEOPLASM OF UPPER-INNER QUADRANT OF RIGHT BREAST IN FEMALE, ESTROGEN RECEPTOR POSITIVE: Primary | ICD-10-CM

## 2025-02-13 LAB
ALBUMIN SERPL-MCNC: 4.1 G/DL (ref 3.5–5.2)
ALBUMIN/GLOB SERPL: 1.1 G/DL
ALP SERPL-CCNC: 85 U/L (ref 39–117)
ALT SERPL W P-5'-P-CCNC: 137 U/L (ref 1–33)
ANION GAP SERPL CALCULATED.3IONS-SCNC: 13.3 MMOL/L (ref 5–15)
AST SERPL-CCNC: 82 U/L (ref 1–32)
BASOPHILS # BLD AUTO: 0.04 10*3/MM3 (ref 0–0.2)
BASOPHILS NFR BLD AUTO: 0.5 % (ref 0–1.5)
BILIRUB SERPL-MCNC: 0.4 MG/DL (ref 0–1.2)
BUN SERPL-MCNC: 12 MG/DL (ref 6–20)
BUN/CREAT SERPL: 16.9 (ref 7–25)
CALCIUM SPEC-SCNC: 9 MG/DL (ref 8.6–10.5)
CHLORIDE SERPL-SCNC: 104 MMOL/L (ref 98–107)
CO2 SERPL-SCNC: 23.7 MMOL/L (ref 22–29)
CREAT SERPL-MCNC: 0.71 MG/DL (ref 0.57–1)
DEPRECATED RDW RBC AUTO: 50.1 FL (ref 37–54)
EGFRCR SERPLBLD CKD-EPI 2021: 111.8 ML/MIN/1.73
EOSINOPHIL # BLD AUTO: 0.16 10*3/MM3 (ref 0–0.4)
EOSINOPHIL NFR BLD AUTO: 2.1 % (ref 0.3–6.2)
ERYTHROCYTE [DISTWIDTH] IN BLOOD BY AUTOMATED COUNT: 15.1 % (ref 12.3–15.4)
GLOBULIN UR ELPH-MCNC: 3.7 GM/DL
GLUCOSE SERPL-MCNC: 104 MG/DL (ref 65–99)
HCT VFR BLD AUTO: 38.6 % (ref 34–46.6)
HGB BLD-MCNC: 12.3 G/DL (ref 12–15.9)
IMM GRANULOCYTES # BLD AUTO: 0.02 10*3/MM3 (ref 0–0.05)
IMM GRANULOCYTES NFR BLD AUTO: 0.3 % (ref 0–0.5)
LYMPHOCYTES # BLD AUTO: 1.99 10*3/MM3 (ref 0.7–3.1)
LYMPHOCYTES NFR BLD AUTO: 25.9 % (ref 19.6–45.3)
MCH RBC QN AUTO: 28.9 PG (ref 26.6–33)
MCHC RBC AUTO-ENTMCNC: 31.9 G/DL (ref 31.5–35.7)
MCV RBC AUTO: 90.6 FL (ref 79–97)
MONOCYTES # BLD AUTO: 0.77 10*3/MM3 (ref 0.1–0.9)
MONOCYTES NFR BLD AUTO: 10 % (ref 5–12)
NEUTROPHILS NFR BLD AUTO: 4.71 10*3/MM3 (ref 1.7–7)
NEUTROPHILS NFR BLD AUTO: 61.2 % (ref 42.7–76)
NRBC BLD AUTO-RTO: 0 /100 WBC (ref 0–0.2)
PLATELET # BLD AUTO: 274 10*3/MM3 (ref 140–450)
PMV BLD AUTO: 9.7 FL (ref 6–12)
POTASSIUM SERPL-SCNC: 4.2 MMOL/L (ref 3.5–5.2)
PROT SERPL-MCNC: 7.8 G/DL (ref 6–8.5)
RBC # BLD AUTO: 4.26 10*6/MM3 (ref 3.77–5.28)
SODIUM SERPL-SCNC: 141 MMOL/L (ref 136–145)
WBC NRBC COR # BLD AUTO: 7.69 10*3/MM3 (ref 3.4–10.8)

## 2025-02-13 PROCEDURE — 25010000002 HEPARIN LOCK FLUSH PER 10 UNITS: Performed by: INTERNAL MEDICINE

## 2025-02-13 PROCEDURE — 25810000003 SODIUM CHLORIDE 0.9 % SOLUTION: Performed by: INTERNAL MEDICINE

## 2025-02-13 PROCEDURE — 25810000003 SODIUM CHLORIDE 0.9 % SOLUTION 250 ML FLEX CONT: Performed by: INTERNAL MEDICINE

## 2025-02-13 PROCEDURE — 25010000002 TRASTUZUMAB PER 10 MG: Performed by: INTERNAL MEDICINE

## 2025-02-13 PROCEDURE — 80053 COMPREHEN METABOLIC PANEL: CPT | Performed by: INTERNAL MEDICINE

## 2025-02-13 PROCEDURE — 85025 COMPLETE CBC W/AUTO DIFF WBC: CPT | Performed by: INTERNAL MEDICINE

## 2025-02-13 PROCEDURE — 96413 CHEMO IV INFUSION 1 HR: CPT

## 2025-02-13 RX ORDER — ACETAMINOPHEN 325 MG/1
650 TABLET ORAL ONCE
Status: CANCELLED | OUTPATIENT
Start: 2025-02-13

## 2025-02-13 RX ORDER — SODIUM CHLORIDE 9 MG/ML
250 INJECTION, SOLUTION INTRAVENOUS ONCE
Status: COMPLETED | OUTPATIENT
Start: 2025-02-13 | End: 2025-02-13

## 2025-02-13 RX ORDER — SODIUM CHLORIDE 0.9 % (FLUSH) 0.9 %
20 SYRINGE (ML) INJECTION AS NEEDED
OUTPATIENT
Start: 2025-02-13

## 2025-02-13 RX ORDER — HEPARIN SODIUM (PORCINE) LOCK FLUSH IV SOLN 100 UNIT/ML 100 UNIT/ML
500 SOLUTION INTRAVENOUS AS NEEDED
OUTPATIENT
Start: 2025-02-13

## 2025-02-13 RX ORDER — LETROZOLE 2.5 MG/1
2.5 TABLET, FILM COATED ORAL DAILY
Qty: 30 TABLET | Refills: 5
Start: 2025-02-13

## 2025-02-13 RX ORDER — ACETAMINOPHEN 325 MG/1
650 TABLET ORAL ONCE
Status: DISCONTINUED | OUTPATIENT
Start: 2025-02-13 | End: 2025-02-14 | Stop reason: HOSPADM

## 2025-02-13 RX ORDER — SODIUM CHLORIDE 0.9 % (FLUSH) 0.9 %
20 SYRINGE (ML) INJECTION AS NEEDED
Status: DISCONTINUED | OUTPATIENT
Start: 2025-02-13 | End: 2025-02-14 | Stop reason: HOSPADM

## 2025-02-13 RX ORDER — SODIUM CHLORIDE 9 MG/ML
250 INJECTION, SOLUTION INTRAVENOUS ONCE
Status: CANCELLED | OUTPATIENT
Start: 2025-02-13

## 2025-02-13 RX ORDER — HEPARIN SODIUM (PORCINE) LOCK FLUSH IV SOLN 100 UNIT/ML 100 UNIT/ML
500 SOLUTION INTRAVENOUS AS NEEDED
Status: DISCONTINUED | OUTPATIENT
Start: 2025-02-13 | End: 2025-02-14 | Stop reason: HOSPADM

## 2025-02-13 RX ADMIN — HEPARIN 500 UNITS: 100 SYRINGE at 11:28

## 2025-02-13 RX ADMIN — SODIUM CHLORIDE 250 ML: 9 INJECTION, SOLUTION INTRAVENOUS at 10:49

## 2025-02-13 RX ADMIN — TRASTUZUMAB 530 MG: 150 INJECTION, POWDER, LYOPHILIZED, FOR SOLUTION INTRAVENOUS at 10:50

## 2025-02-13 RX ADMIN — Medication 20 ML: at 11:28

## 2025-02-27 DIAGNOSIS — C50.211 MALIGNANT NEOPLASM OF UPPER-INNER QUADRANT OF RIGHT BREAST IN FEMALE, ESTROGEN RECEPTOR POSITIVE: ICD-10-CM

## 2025-02-27 DIAGNOSIS — Z17.0 MALIGNANT NEOPLASM OF UPPER-INNER QUADRANT OF RIGHT BREAST IN FEMALE, ESTROGEN RECEPTOR POSITIVE: ICD-10-CM

## 2025-02-27 RX ORDER — LETROZOLE 2.5 MG/1
2.5 TABLET, FILM COATED ORAL DAILY
Qty: 90 TABLET | Refills: 1 | Status: SHIPPED | OUTPATIENT
Start: 2025-02-27

## 2025-03-06 ENCOUNTER — HOSPITAL ENCOUNTER (OUTPATIENT)
Dept: ONCOLOGY | Facility: HOSPITAL | Age: 40
Discharge: HOME | End: 2025-03-06
Payer: COMMERCIAL

## 2025-03-06 ENCOUNTER — OFFICE VISIT (OUTPATIENT)
Dept: ONCOLOGY | Facility: HOSPITAL | Age: 40
End: 2025-03-06
Payer: COMMERCIAL

## 2025-03-06 VITALS
OXYGEN SATURATION: 100 % | HEART RATE: 118 BPM | RESPIRATION RATE: 18 BRPM | WEIGHT: 206 LBS | DIASTOLIC BLOOD PRESSURE: 87 MMHG | BODY MASS INDEX: 37.68 KG/M2 | SYSTOLIC BLOOD PRESSURE: 151 MMHG | TEMPERATURE: 99 F

## 2025-03-06 VITALS
RESPIRATION RATE: 18 BRPM | HEART RATE: 118 BPM | SYSTOLIC BLOOD PRESSURE: 151 MMHG | OXYGEN SATURATION: 100 % | DIASTOLIC BLOOD PRESSURE: 87 MMHG | BODY MASS INDEX: 37.68 KG/M2 | WEIGHT: 206 LBS | TEMPERATURE: 99 F

## 2025-03-06 DIAGNOSIS — Z17.0 MALIGNANT NEOPLASM OF UPPER-INNER QUADRANT OF RIGHT BREAST IN FEMALE, ESTROGEN RECEPTOR POSITIVE: Primary | ICD-10-CM

## 2025-03-06 DIAGNOSIS — C79.51 METASTASIS TO BONE: Primary | ICD-10-CM

## 2025-03-06 DIAGNOSIS — C50.211 MALIGNANT NEOPLASM OF UPPER-INNER QUADRANT OF RIGHT BREAST IN FEMALE, ESTROGEN RECEPTOR POSITIVE: Primary | ICD-10-CM

## 2025-03-06 DIAGNOSIS — Z45.2 ENCOUNTER FOR ADJUSTMENT OR MANAGEMENT OF VASCULAR ACCESS DEVICE: ICD-10-CM

## 2025-03-06 DIAGNOSIS — R79.89 ELEVATED LFTS: ICD-10-CM

## 2025-03-06 DIAGNOSIS — C50.211 MALIGNANT NEOPLASM OF UPPER-INNER QUADRANT OF RIGHT BREAST IN FEMALE, ESTROGEN RECEPTOR POSITIVE: ICD-10-CM

## 2025-03-06 DIAGNOSIS — Z79.899 HIGH RISK MEDICATION USE: ICD-10-CM

## 2025-03-06 DIAGNOSIS — R79.89 ELEVATED LFTS: Primary | ICD-10-CM

## 2025-03-06 DIAGNOSIS — Z17.0 MALIGNANT NEOPLASM OF UPPER-INNER QUADRANT OF RIGHT BREAST IN FEMALE, ESTROGEN RECEPTOR POSITIVE: ICD-10-CM

## 2025-03-06 LAB
ALBUMIN SERPL-MCNC: 4.4 G/DL (ref 3.5–5.2)
ALBUMIN/GLOB SERPL: 1.2 G/DL
ALP SERPL-CCNC: 88 U/L (ref 39–117)
ALT SERPL W P-5'-P-CCNC: 202 U/L (ref 1–33)
ANION GAP SERPL CALCULATED.3IONS-SCNC: 13.9 MMOL/L (ref 5–15)
AST SERPL-CCNC: 145 U/L (ref 1–32)
BASOPHILS # BLD AUTO: 0.04 10*3/MM3 (ref 0–0.2)
BASOPHILS NFR BLD AUTO: 0.4 % (ref 0–1.5)
BILIRUB SERPL-MCNC: 0.7 MG/DL (ref 0–1.2)
BUN SERPL-MCNC: 9 MG/DL (ref 6–20)
BUN/CREAT SERPL: 12 (ref 7–25)
CALCIUM SPEC-SCNC: 9.8 MG/DL (ref 8.6–10.5)
CHLORIDE SERPL-SCNC: 104 MMOL/L (ref 98–107)
CO2 SERPL-SCNC: 20.1 MMOL/L (ref 22–29)
CREAT SERPL-MCNC: 0.75 MG/DL (ref 0.57–1)
DEPRECATED RDW RBC AUTO: 48.8 FL (ref 37–54)
EGFRCR SERPLBLD CKD-EPI 2021: 104.7 ML/MIN/1.73
EOSINOPHIL # BLD AUTO: 0.18 10*3/MM3 (ref 0–0.4)
EOSINOPHIL NFR BLD AUTO: 1.7 % (ref 0.3–6.2)
ERYTHROCYTE [DISTWIDTH] IN BLOOD BY AUTOMATED COUNT: 14.8 % (ref 12.3–15.4)
GLOBULIN UR ELPH-MCNC: 3.7 GM/DL
GLUCOSE SERPL-MCNC: 92 MG/DL (ref 65–99)
HCT VFR BLD AUTO: 38.6 % (ref 34–46.6)
HGB BLD-MCNC: 12.3 G/DL (ref 12–15.9)
IMM GRANULOCYTES # BLD AUTO: 0.03 10*3/MM3 (ref 0–0.05)
IMM GRANULOCYTES NFR BLD AUTO: 0.3 % (ref 0–0.5)
LYMPHOCYTES # BLD AUTO: 4.06 10*3/MM3 (ref 0.7–3.1)
LYMPHOCYTES NFR BLD AUTO: 37.6 % (ref 19.6–45.3)
MAGNESIUM SERPL-MCNC: 1.9 MG/DL (ref 1.6–2.6)
MCH RBC QN AUTO: 28.7 PG (ref 26.6–33)
MCHC RBC AUTO-ENTMCNC: 31.9 G/DL (ref 31.5–35.7)
MCV RBC AUTO: 90 FL (ref 79–97)
MONOCYTES # BLD AUTO: 1.08 10*3/MM3 (ref 0.1–0.9)
MONOCYTES NFR BLD AUTO: 10 % (ref 5–12)
NEUTROPHILS NFR BLD AUTO: 5.4 10*3/MM3 (ref 1.7–7)
NEUTROPHILS NFR BLD AUTO: 50 % (ref 42.7–76)
NRBC BLD AUTO-RTO: 0 /100 WBC (ref 0–0.2)
PHOSPHATE SERPL-MCNC: 3.4 MG/DL (ref 2.5–4.5)
PLATELET # BLD AUTO: 294 10*3/MM3 (ref 140–450)
PMV BLD AUTO: 9.7 FL (ref 6–12)
POTASSIUM SERPL-SCNC: 3.6 MMOL/L (ref 3.5–5.2)
PROT SERPL-MCNC: 8.1 G/DL (ref 6–8.5)
RBC # BLD AUTO: 4.29 10*6/MM3 (ref 3.77–5.28)
SODIUM SERPL-SCNC: 138 MMOL/L (ref 136–145)
WBC NRBC COR # BLD AUTO: 10.79 10*3/MM3 (ref 3.4–10.8)

## 2025-03-06 PROCEDURE — 80053 COMPREHEN METABOLIC PANEL: CPT | Performed by: INTERNAL MEDICINE

## 2025-03-06 PROCEDURE — 25010000002 DENOSUMAB 120 MG/1.7ML SOLUTION: Performed by: INTERNAL MEDICINE

## 2025-03-06 PROCEDURE — 25010000002 TRASTUZUMAB PER 10 MG: Performed by: INTERNAL MEDICINE

## 2025-03-06 PROCEDURE — 84100 ASSAY OF PHOSPHORUS: CPT | Performed by: INTERNAL MEDICINE

## 2025-03-06 PROCEDURE — 25810000003 SODIUM CHLORIDE 0.9 % SOLUTION 250 ML FLEX CONT: Performed by: INTERNAL MEDICINE

## 2025-03-06 PROCEDURE — 96413 CHEMO IV INFUSION 1 HR: CPT

## 2025-03-06 PROCEDURE — 96372 THER/PROPH/DIAG INJ SC/IM: CPT

## 2025-03-06 PROCEDURE — 25810000003 SODIUM CHLORIDE 0.9 % SOLUTION: Performed by: INTERNAL MEDICINE

## 2025-03-06 PROCEDURE — 85025 COMPLETE CBC W/AUTO DIFF WBC: CPT | Performed by: INTERNAL MEDICINE

## 2025-03-06 PROCEDURE — 83735 ASSAY OF MAGNESIUM: CPT | Performed by: INTERNAL MEDICINE

## 2025-03-06 PROCEDURE — 25010000002 HEPARIN LOCK FLUSH PER 10 UNITS: Performed by: INTERNAL MEDICINE

## 2025-03-06 RX ORDER — SODIUM CHLORIDE 0.9 % (FLUSH) 0.9 %
20 SYRINGE (ML) INJECTION AS NEEDED
Status: DISCONTINUED | OUTPATIENT
Start: 2025-03-06 | End: 2025-03-07 | Stop reason: HOSPADM

## 2025-03-06 RX ORDER — SODIUM CHLORIDE 0.9 % (FLUSH) 0.9 %
20 SYRINGE (ML) INJECTION AS NEEDED
OUTPATIENT
Start: 2025-03-06

## 2025-03-06 RX ORDER — HEPARIN SODIUM (PORCINE) LOCK FLUSH IV SOLN 100 UNIT/ML 100 UNIT/ML
500 SOLUTION INTRAVENOUS AS NEEDED
Status: DISCONTINUED | OUTPATIENT
Start: 2025-03-06 | End: 2025-03-07 | Stop reason: HOSPADM

## 2025-03-06 RX ORDER — ACETAMINOPHEN 325 MG/1
650 TABLET ORAL ONCE
Status: CANCELLED | OUTPATIENT
Start: 2025-03-06

## 2025-03-06 RX ORDER — SODIUM CHLORIDE 9 MG/ML
250 INJECTION, SOLUTION INTRAVENOUS ONCE
Status: COMPLETED | OUTPATIENT
Start: 2025-03-06 | End: 2025-03-06

## 2025-03-06 RX ORDER — HEPARIN SODIUM (PORCINE) LOCK FLUSH IV SOLN 100 UNIT/ML 100 UNIT/ML
500 SOLUTION INTRAVENOUS AS NEEDED
OUTPATIENT
Start: 2025-03-06

## 2025-03-06 RX ORDER — LETROZOLE 2.5 MG/1
2.5 TABLET, FILM COATED ORAL DAILY
Qty: 30 TABLET | Refills: 5
Start: 2025-03-06

## 2025-03-06 RX ORDER — ACETAMINOPHEN 325 MG/1
650 TABLET ORAL ONCE
Status: DISCONTINUED | OUTPATIENT
Start: 2025-03-06 | End: 2025-03-07 | Stop reason: HOSPADM

## 2025-03-06 RX ORDER — SODIUM CHLORIDE 9 MG/ML
250 INJECTION, SOLUTION INTRAVENOUS ONCE
Status: CANCELLED | OUTPATIENT
Start: 2025-03-06

## 2025-03-06 RX ADMIN — HEPARIN 500 UNITS: 100 SYRINGE at 15:20

## 2025-03-06 RX ADMIN — DENOSUMAB 120 MG: 120 INJECTION SUBCUTANEOUS at 15:18

## 2025-03-06 RX ADMIN — SODIUM CHLORIDE 250 ML: 0.9 INJECTION, SOLUTION INTRAVENOUS at 14:47

## 2025-03-06 RX ADMIN — TRASTUZUMAB 530 MG: 150 INJECTION, POWDER, LYOPHILIZED, FOR SOLUTION INTRAVENOUS at 14:46

## 2025-03-06 RX ADMIN — Medication 20 ML: at 15:20

## 2025-03-06 NOTE — PROGRESS NOTES
Chief Complaint  Malignant neoplasm of upper-inner quadrant of right breast     Jason Vanegasa, LÁZARO  Romina, Lela, LÁZARO    Subjective          Annita Johnson presents to [facility] for ongoing treatment of her breast cancer.  She is on letrozole, trastuzumab, denosumab for bony involvement.  Tolerating her regimen well.  Denies new masses, adenopathy, unusual aches or pains.  No chest pain, shortness breath, lower extremity swelling.  She has good appetite and energy level.  No issues from her Port-A-Cath.    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, estrogen receptor positive   6/8/2023 Initial Diagnosis    Malignant neoplasm of upper-inner quadrant of right breast in female, estrogen receptor positive     6/8/2023 Cancer Staged    Staging form: Breast, AJCC 8th Edition  - Clinical: Stage IV (cT2, cN0, cM1, ER+, NV+, HER2-) - Signed by Perry Garcia MD on 6/8/2023 6/9/2023 - 4/4/2024 Chemotherapy    OP SUPPORTIVE BREAST Leuprolide 3.75 MG Q28D     6/15/2023 - 6/15/2023 Chemotherapy    OP BREAST Letrozole / Ribociclib     7/13/2023 -  Chemotherapy    OP BREAST Letrozole / Trastuzumab     8/24/2023 -  Chemotherapy    OP SUPPORTIVE Denosumab (Xgeva) Q42D     Metastasis to bone   6/8/2023 Initial Diagnosis    Metastasis to bone     8/24/2023 -  Chemotherapy    OP SUPPORTIVE Denosumab (Xgeva) Q42D           Current Outpatient Medications on File Prior to Visit   Medication Sig Dispense Refill    azithromycin (Zithromax Z-Constantino) 250 MG tablet Take 2 tablets by mouth on day 1, then 1 tablet daily on days 2-5 6 tablet 0    brompheniramine-pseudoephedrine-DM 30-2-10 MG/5ML syrup Take 5 mL by mouth 4 (Four) Times a Day As Needed for Allergies, Cough or Congestion. 240 mL 0    clonazePAM (KlonoPIN) 0.5 MG tablet TAKE 1 TABLET BY MOUTH TWICE A DAY AS NEEDED FOR ANXIETY 30 tablet 1    cyclobenzaprine (FLEXERIL) 10 MG tablet  TAKE 1 TABLET BY MOUTH 3 TIMES A DAY AS NEEDED FOR MUSCLE SPASMS. 90 tablet 5    folic acid (FOLVITE) 1 MG tablet TAKE 1 TABLET BY MOUTH EVERY DAY (Patient taking differently: Take 1 tablet by mouth Daily.  INSTRUCTED PER ANESTHESIA PROTOCOL) 90 tablet 1    HYDROcodone-acetaminophen (NORCO) 5-325 MG per tablet Take 1 tablet by mouth Every 6 (Six) Hours As Needed for Moderate Pain (Pain). 4 tablet 0    hydrOXYzine (ATARAX) 50 MG tablet TAKE 1 TABLET BY MOUTH EVERYDAY AT BEDTIME (Patient taking differently: Take 1 tablet by mouth As Needed.) 90 tablet 1    letrozole (FEMARA) 2.5 MG tablet Take 1 tablet by mouth Daily. 30 tablet 5    letrozole (FEMARA) 2.5 MG tablet Take 1 tablet by mouth Daily. 30 tablet 5    letrozole (FEMARA) 2.5 MG tablet Take 1 tablet by mouth Daily. 30 tablet 5    letrozole (FEMARA) 2.5 MG tablet Take 1 tablet by mouth Daily. 30 tablet 5    letrozole (FEMARA) 2.5 MG tablet Take 1 tablet by mouth Daily. 30 tablet 5    letrozole (FEMARA) 2.5 MG tablet Take 1 tablet by mouth Daily. 30 tablet 5    letrozole (FEMARA) 2.5 MG tablet Take 1 tablet by mouth Daily. 90 tablet 1    Nurtec 75 MG dispersible tablet TAKE 1 TABLET BY MOUTH DAILY AS NEEDED (MIGRAINES). 8 tablet 5    ondansetron (ZOFRAN) 8 MG tablet Take 1 tablet by mouth 3 (Three) Times a Day As Needed for Nausea or Vomiting. 30 tablet 5    Sodium Fluoride 5000 PPM 1.1 % gel Take 1 Application by mouth 2 (Two) Times a Day. TOOTHPASTE      Trastuzumab (HERCEPTIN IV) Infuse  into a venous catheter. EVERY 21 DAYS-NEXT ON 10/31/24      Veozah 45 MG tablet TAKE 1 TABLET BY MOUTH EVERY DAY 30 tablet 2    vitamin D (ERGOCALCIFEROL) 1.25 MG (30760 UT) capsule capsule TAKE 1 CAPSULE BY MOUTH 1 TIME PER WEEK. 12 capsule 1    letrozole (FEMARA) 2.5 MG tablet Take 1 tablet by mouth Daily. 30 tablet 5     Current Facility-Administered Medications on File Prior to Visit   Medication Dose Route Frequency Provider Last Rate Last Admin    acetaminophen (TYLENOL)  tablet 650 mg  650 mg Oral Once Perry Garcia MD        [COMPLETED] denosumab (XGEVA) injection 120 mg  120 mg Subcutaneous Once Perry Garcia MD   120 mg at 25 1518    heparin injection 500 Units  500 Units Intravenous PRN Perry Garcia MD   500 Units at 25 1520    sodium chloride 0.9 % flush 20 mL  20 mL Intravenous PRN Perry Garcia MD   20 mL at 25 1520    [COMPLETED] sodium chloride 0.9 % infusion 250 mL  250 mL Intravenous Once Perry Garcia MD   Stopped at 25 1516    [COMPLETED] Trastuzumab (HERCEPTIN) 530 mg in sodium chloride 0.9 % 300.2 mL chemo IVPB  6 mg/kg (Treatment Plan Recorded) Intravenous Once Perry Garcia MD   Stopped at 25 1516       No Known Allergies  Past Medical History:   Diagnosis Date    Anemia     Breast cancer     RIGHT    Kidney stone     Metastasis to bone 2023     Past Surgical History:   Procedure Laterality Date    BREAST BIOPSY      CERVICAL FUSION  2023     SECTION N/A 2021    Procedure:  SECTION PRIMARY;  Surgeon: Zoe Dawson MD;  Location: [facility] LABOR DELIVERY;  Service: Obstetrics/Gynecology;  Laterality: N/A;    CYSTOSCOPY BLADDER STONE LITHOTRIPSY      SALPINGO OOPHORECTOMY Bilateral 2024    Procedure: SALPINGO OOPHORECTOMY LAPAROSCOPIC WITH DAVINCI ROBOT;  Surgeon: Ashlyn Avelar MD;  Location: [facility];  Service: Robotics - DaVinci;  Laterality: Bilateral;    VENOUS ACCESS DEVICE (PORT) INSERTION Right 10/30/2024    Procedure: INSERTION VENOUS ACCESS DEVICE : Placement of a port in the tissue under the skin at your upper chest with a tube attached to it that goes into your vein;  Surgeon: Jitendra García MD;  Location: [facility];  Service: General;  Laterality: Right;     Social History     Socioeconomic History    Marital status:      Spouse name: Janet   Tobacco Use    Smoking status: Former     Current packs/day: 0.00     Average packs/day: 0.3  packs/day for 19.6 years (4.9 ttl pk-yrs)     Types: Cigarettes     Start date: 2005     Quit date: 2024     Years since quittin.5     Passive exposure: Current    Smokeless tobacco: Never   Vaping Use    Vaping status: Never Used   Substance and Sexual Activity    Alcohol use: Not Currently     Alcohol/week: 6.0 standard drinks of alcohol    Drug use: Yes     Types: Marijuana     Comment: for pain control/DAILY last used 10-30-24    Sexual activity: Defer     Family History   Problem Relation Age of Onset    Mental illness Mother     Hypertension Father     Alcohol abuse Father     Ovarian cancer Paternal Aunt     Breast cancer Maternal Great-Grandmother     Malig Hyperthermia Neg Hx        Objective   Physical Exam  Vitals reviewed. Exam conducted with a chaperone present.   Cardiovascular:      Rate and Rhythm: Normal rate and regular rhythm.      Heart sounds: Normal heart sounds. No murmur heard.     No gallop.   Pulmonary:      Effort: Pulmonary effort is normal.      Breath sounds: Normal breath sounds.      Comments: Port-A-Cath  Abdominal:      General: Bowel sounds are normal.   Lymphadenopathy:      Cervical: No cervical adenopathy.   Psychiatric:         Mood and Affect: Mood normal.         Behavior: Behavior normal.         Vitals:    25 1332   BP: 151/87   Pulse: 118   Resp: 18   Temp: 99 °F (37.2 °C)   TempSrc: Temporal   SpO2: 100%   Weight: 93.4 kg (206 lb)   PainSc: 0-No pain     ECOG score: 0         PHQ-9 Total Score:                    Result Review :   The following data was reviewed by: Perry Garcia MD on 2025:  Lab Results   Component Value Date    HGB 12.3 2025    HCT 38.6 2025    MCV 90.0 2025     2025    WBC 10.79 2025    NEUTROABS 5.40 2025    LYMPHSABS 4.06 (H) 2025    MONOSABS 1.08 (H) 2025    EOSABS 0.18 2025    BASOSABS 0.04 2025     Lab Results   Component Value Date    GLUCOSE 92  "03/06/2025    BUN 9 03/06/2025    CREATININE 0.75 03/06/2025     03/06/2025    K 3.6 03/06/2025     03/06/2025    CO2 20.1 (L) 03/06/2025    CALCIUM 9.8 03/06/2025    PROTEINTOT 8.1 03/06/2025    ALBUMIN 4.4 03/06/2025    BILITOT 0.7 03/06/2025    ALKPHOS 88 03/06/2025     (H) 03/06/2025     (H) 03/06/2025     Lab Results   Component Value Date    MG 1.9 03/06/2025    PHOS 3.4 03/06/2025    TSH 1.320 09/19/2024     No results found for: \"IRON\", \"LABIRON\", \"TRANSFERRIN\", \"TIBC\"  Lab Results   Component Value Date    OFAZCAVO32 577 02/22/2024    FOLATE >20.00 02/22/2024     No results found for: \"PSA\", \"CEA\", \"AFP\", \"\", \"\"          Assessment and Plan    Diagnoses and all orders for this visit:    1. 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  Triple positive.  Patient is on treatment with letrozole, trastuzumab, denosumab for bony involvement.  Tolerating well.  Continue letrozole daily.  Proceed with trastuzumab today as planned.  Continue denosumab every 6 weeks to align with her cycles.  I will see her back in 6 weeks for continued treatment with lab work prior to monitor for toxicities, echocardiogram and restaging CT/bone scan to assess response to therapy.    Orders:  -     NM Bone Scan Whole Body; Future  -     CT chest w contrast; Future  -     CT abdomen pelvis w contrast; Future    2. High risk medication use  -     Adult Transthoracic Echo Limited W/ Cont if Necessary Per Protocol; Future    3. Elevated LFTs  Assessment & Plan:  Recent CT scan shows fatty liver.  No evidence of metastatic disease in the liver.  She should follow-up with her PCP for further treatment of the fatty liver.      Other orders  -     Cancel: sodium chloride 0.9 % infusion 250 mL  -     Cancel: acetaminophen (TYLENOL) tablet 650 mg  -     Cancel: Trastuzumab (HERCEPTIN) 530 mg in sodium chloride 0.9 " % 250 mL chemo IVPB  -     Cancel: denosumab (XGEVA) injection 120 mg            Patient Follow Up: 6 weeks    Patient was given instructions and counseling regarding her condition or for health maintenance advice. Please see specific information pulled into the AVS if appropriate.     Perry Garcia MD    3/6/2025

## 2025-03-06 NOTE — ASSESSMENT & PLAN NOTE
Metastatic.  Triple positive.  Patient is on treatment with letrozole, trastuzumab, denosumab for bony involvement.  Tolerating well.  Continue letrozole daily.  Proceed with trastuzumab today as planned.  Continue denosumab every 6 weeks to align with her cycles.  I will see her back in 6 weeks for continued treatment with lab work prior to monitor for toxicities, echocardiogram and restaging CT/bone scan to assess response to therapy.

## 2025-03-06 NOTE — ASSESSMENT & PLAN NOTE
Recent CT scan shows fatty liver.  No evidence of metastatic disease in the liver.  She should follow-up with her PCP for further treatment of the fatty liver.

## 2025-03-07 ENCOUNTER — OFFICE VISIT (OUTPATIENT)
Dept: INTERNAL MEDICINE | Age: 40
End: 2025-03-07
Payer: COMMERCIAL

## 2025-03-07 VITALS
HEART RATE: 83 BPM | OXYGEN SATURATION: 99 % | TEMPERATURE: 98.4 F | HEIGHT: 62 IN | WEIGHT: 205.4 LBS | SYSTOLIC BLOOD PRESSURE: 120 MMHG | DIASTOLIC BLOOD PRESSURE: 82 MMHG | BODY MASS INDEX: 37.8 KG/M2

## 2025-03-07 DIAGNOSIS — J02.9 SORE THROAT: Primary | ICD-10-CM

## 2025-03-07 DIAGNOSIS — J06.9 VIRAL URI: ICD-10-CM

## 2025-03-07 DIAGNOSIS — R79.89 ELEVATED LFTS: ICD-10-CM

## 2025-03-07 PROCEDURE — 87428 SARSCOV & INF VIR A&B AG IA: CPT

## 2025-03-07 PROCEDURE — 87880 STREP A ASSAY W/OPTIC: CPT

## 2025-03-07 PROCEDURE — 99213 OFFICE O/P EST LOW 20 MIN: CPT

## 2025-03-07 NOTE — PROGRESS NOTES
Chief Complaint  Sore Throat, Nasal Congestion, and Cough (Little cough at night )    History of Present Illness  SUBJECTIVE  Annita Johnson presents to [facility]   History of Present Illness  The patient presents for evaluation of sore throat, nasal congestion, elevated liver enzymes, and stress.    She reports experiencing a sore throat and nasal congestion, which she attributes to allergies and weather conditions. She has been tested negative for COVID-19, influenza, and strep. The onset of these symptoms was approximately 24 hours ago. She is not experiencing any fever, chills, body aches, nausea, or vomiting.    She also reports pruritus on her palms and feet, with the latter being notably dry.    Additionally, she expresses a need for psychological support due to high levels of stress related to her mother's legal issues, cancer diagnosis.         Past Medical History:   Diagnosis Date    Anemia     Breast cancer     RIGHT    Kidney stone     Metastasis to bone 2023      Family History   Problem Relation Age of Onset    Mental illness Mother     Hypertension Father     Alcohol abuse Father     Ovarian cancer Paternal Aunt     Breast cancer Maternal Great-Grandmother     Malig Hyperthermia Neg Hx       Past Surgical History:   Procedure Laterality Date    BREAST BIOPSY      CERVICAL FUSION  2023     SECTION N/A 2021    Procedure:  SECTION PRIMARY;  Surgeon: Zoe Dawson MD;  Location: [facility] LABOR DELIVERY;  Service: Obstetrics/Gynecology;  Laterality: N/A;    CYSTOSCOPY BLADDER STONE LITHOTRIPSY      SALPINGO OOPHORECTOMY Bilateral 2024    Procedure: SALPINGO OOPHORECTOMY LAPAROSCOPIC WITH DAVINCI ROBOT;  Surgeon: Ashlyn Avelar MD;  Location: [facility];  Service: Robotics - DaVinci;  Laterality: Bilateral;    VENOUS ACCESS DEVICE (PORT) INSERTION Right 10/30/2024    Procedure: INSERTION VENOUS ACCESS DEVICE : Placement  of a port in the tissue under the skin at your upper chest with a tube attached to it that goes into your vein;  Surgeon: Jitendra García MD;  [place] [facility] [place] [facility]  Service: General;  Laterality: Right;        Current Outpatient Medications:     clonazePAM (KlonoPIN) 0.5 MG tablet, TAKE 1 TABLET BY MOUTH TWICE A DAY AS NEEDED FOR ANXIETY, Disp: 30 tablet, Rfl: 1    cyclobenzaprine (FLEXERIL) 10 MG tablet, TAKE 1 TABLET BY MOUTH 3 TIMES A DAY AS NEEDED FOR MUSCLE SPASMS., Disp: 90 tablet, Rfl: 5    folic acid (FOLVITE) 1 MG tablet, TAKE 1 TABLET BY MOUTH EVERY DAY (Patient taking differently: Take 1 tablet by mouth Daily.  INSTRUCTED PER ANESTHESIA PROTOCOL), Disp: 90 tablet, Rfl: 1    HYDROcodone-acetaminophen (NORCO) 5-325 MG per tablet, Take 1 tablet by mouth Every 6 (Six) Hours As Needed for Moderate Pain (Pain)., Disp: 4 tablet, Rfl: 0    hydrOXYzine (ATARAX) 50 MG tablet, TAKE 1 TABLET BY MOUTH EVERYDAY AT BEDTIME (Patient taking differently: Take 1 tablet by mouth As Needed.), Disp: 90 tablet, Rfl: 1    letrozole (FEMARA) 2.5 MG tablet, Take 1 tablet by mouth Daily., Disp: 30 tablet, Rfl: 5    Nurtec 75 MG dispersible tablet, TAKE 1 TABLET BY MOUTH DAILY AS NEEDED (MIGRAINES)., Disp: 8 tablet, Rfl: 5    ondansetron (ZOFRAN) 8 MG tablet, Take 1 tablet by mouth 3 (Three) Times a Day As Needed for Nausea or Vomiting., Disp: 30 tablet, Rfl: 5    Sodium Fluoride 5000 PPM 1.1 % gel, Take 1 Application by mouth 2 (Two) Times a Day. TOOTHPASTE, Disp: , Rfl:     Trastuzumab (HERCEPTIN IV), Infuse  into a venous catheter. EVERY 21 DAYS-NEXT ON 10/31/24, Disp: , Rfl:     Veozah 45 MG tablet, TAKE 1 TABLET BY MOUTH EVERY DAY, Disp: 30 tablet, Rfl: 2    vitamin D (ERGOCALCIFEROL) 1.25 MG (47256 UT) capsule capsule, TAKE 1 CAPSULE BY MOUTH 1 TIME PER WEEK., Disp: 12 capsule, Rfl: 1    azithromycin (Zithromax Z-Constantino) 250 MG tablet, Take 2 tablets by mouth on day 1, then 1 tablet daily on days 2-5 (Patient not taking:  "Reported on 3/7/2025), Disp: 6 tablet, Rfl: 0    brompheniramine-pseudoephedrine-DM 30-2-10 MG/5ML syrup, Take 5 mL by mouth 4 (Four) Times a Day As Needed for Allergies, Cough or Congestion. (Patient not taking: Reported on 3/7/2025), Disp: 240 mL, Rfl: 0  No current facility-administered medications for this visit.    OBJECTIVE  Vital Signs:   /82 (BP Location: Left arm, Patient Position: Sitting, Cuff Size: Small Adult)   Pulse 83   Temp 98.4 °F (36.9 °C)   Ht 157.5 cm (62\")   Wt 93.2 kg (205 lb 6.4 oz)   SpO2 99%   BMI 37.57 kg/m²    Estimated body mass index is 37.57 kg/m² as calculated from the following:    Height as of this encounter: 157.5 cm (62\").    Weight as of this encounter: 93.2 kg (205 lb 6.4 oz).     Wt Readings from Last 3 Encounters:   03/07/25 93.2 kg (205 lb 6.4 oz)   03/06/25 93.4 kg (206 lb)   03/06/25 93.4 kg (206 lb)     BP Readings from Last 3 Encounters:   03/07/25 120/82   03/06/25 151/87   03/06/25 151/87       Physical Exam  Vitals and nursing note reviewed.   Constitutional:       Appearance: Normal appearance.   HENT:      Head: Normocephalic.      Right Ear: A middle ear effusion is present.      Left Ear: A middle ear effusion is present.      Nose: Congestion present.   Eyes:      Extraocular Movements: Extraocular movements intact.      Conjunctiva/sclera: Conjunctivae normal.   Cardiovascular:      Rate and Rhythm: Normal rate and regular rhythm.      Heart sounds: Normal heart sounds. No murmur heard.  Pulmonary:      Effort: Pulmonary effort is normal.      Breath sounds: Normal breath sounds. No wheezing or rales.   Abdominal:      General: Bowel sounds are normal.      Palpations: Abdomen is soft.      Tenderness: There is no abdominal tenderness. There is no guarding.   Musculoskeletal:         General: No swelling. Normal range of motion.      Cervical back: Normal range of motion and neck supple.   Skin:     General: Skin is warm and dry.   Neurological:      " General: No focal deficit present.      Mental Status: She is alert and oriented to person, place, and time. Mental status is at baseline.   Psychiatric:         Mood and Affect: Mood normal.         Behavior: Behavior normal.         Thought Content: Thought content normal.         Judgment: Judgment normal.          Result Review        CT Abdomen Pelvis With Contrast    Result Date: 1/17/2025  Impression: Stable exam. No evidence of new or worsening disease. Electronically Signed: David Pagan MD  1/17/2025 10:05 AM EST  Workstation ID: [id number]    NM Bone Scan Whole Body    Result Date: 1/17/2025  Stable scintigraphic appearance of skeletal metastatic disease Electronically Signed: Clinton Bradshaw  1/17/2025 9:26 AM EST  Workstation ID: [id number]       The above data has been reviewed by LÁZARO Fonseca 03/07/2025 10:56 EST.          Patient Care Team:  Lela Vanegas APRN as PCP - General (Internal Medicine)  Luna Gaitan RN as Nurse Navigator            ASSESSMENT & PLAN    Diagnoses and all orders for this visit:    1. Sore throat (Primary)  -     POCT rapid strep A  -     POCT SARS-CoV-2 Antigen CL[place]ON + Flu    2. Viral URI  -     POCT rapid strep A  -     POCT SARS-CoV-2 Antigen CL[place]ON + Flu    3. Elevated LFTs  Comments:  pending liver ultrasound         Assessment & Plan  1. URI.  Test results for COVID-19, influenza, and strep have returned negative. She has been advised to maintain adequate hydration. The use of antihistamines such as Claritin or Zyrtec during the day and Benadryl at night has been recommended. Additionally, Flonase may be used as needed. If symptoms worsen, she should notify the clinic.    2. Elevated liver enzymes.  A liver ultrasound has been ordered to further investigate the cause of the elevated liver enzymes. If the ultrasound results are concerning, a referral to a gastroenterologist will be considered.    3. Stress.  She has been advised to seek counseling services at [facility] or [facility]. She has been encouraged to contact her insurance provider to explore available options for telehealth consultations.      Tobacco Use: Medium Risk (3/7/2025)    Patient History     Smoking Tobacco Use: Former     Smokeless Tobacco Use: Never     Passive Exposure: Current       Follow Up     Return if symptoms worsen or fail to improve.    Please note that portions of this note were completed with a voice recognition program.    Patient was given instructions and counseling regarding her condition or for health maintenance advice. Please see specific information pulled into the AVS if appropriate.   I have reviewed information obtained and documented by others and I have confirmed the accuracy of this documented note.    LÁZARO Fonseca    Patient or patient representative verbalized consent for the use of Ambient Listening during the visit with  LÁZARO Fonseca for chart documentation. 3/7/2025  11:16 EST

## 2025-03-27 ENCOUNTER — HOSPITAL ENCOUNTER (OUTPATIENT)
Dept: ONCOLOGY | Facility: HOSPITAL | Age: 40
Discharge: HOME | End: 2025-03-27
Payer: COMMERCIAL

## 2025-03-27 ENCOUNTER — TELEPHONE (OUTPATIENT)
Dept: ONCOLOGY | Facility: HOSPITAL | Age: 40
End: 2025-03-27

## 2025-03-27 VITALS
HEART RATE: 89 BPM | OXYGEN SATURATION: 98 % | WEIGHT: 205.2 LBS | DIASTOLIC BLOOD PRESSURE: 79 MMHG | TEMPERATURE: 98.6 F | BODY MASS INDEX: 37.53 KG/M2 | RESPIRATION RATE: 16 BRPM | SYSTOLIC BLOOD PRESSURE: 123 MMHG

## 2025-03-27 DIAGNOSIS — Z17.0 MALIGNANT NEOPLASM OF UPPER-INNER QUADRANT OF RIGHT BREAST IN FEMALE, ESTROGEN RECEPTOR POSITIVE: Primary | ICD-10-CM

## 2025-03-27 DIAGNOSIS — C50.211 MALIGNANT NEOPLASM OF UPPER-INNER QUADRANT OF RIGHT BREAST IN FEMALE, ESTROGEN RECEPTOR POSITIVE: Primary | ICD-10-CM

## 2025-03-27 DIAGNOSIS — Z45.2 ENCOUNTER FOR ADJUSTMENT OR MANAGEMENT OF VASCULAR ACCESS DEVICE: ICD-10-CM

## 2025-03-27 LAB
ALBUMIN SERPL-MCNC: 4.3 G/DL (ref 3.5–5.2)
ALBUMIN/GLOB SERPL: 1.3 G/DL
ALP SERPL-CCNC: 80 U/L (ref 39–117)
ALT SERPL W P-5'-P-CCNC: 216 U/L (ref 1–33)
ANION GAP SERPL CALCULATED.3IONS-SCNC: 12.4 MMOL/L (ref 5–15)
AST SERPL-CCNC: 131 U/L (ref 1–32)
BASOPHILS # BLD AUTO: 0.04 10*3/MM3 (ref 0–0.2)
BASOPHILS NFR BLD AUTO: 0.5 % (ref 0–1.5)
BILIRUB SERPL-MCNC: 0.3 MG/DL (ref 0–1.2)
BUN SERPL-MCNC: 10 MG/DL (ref 6–20)
BUN/CREAT SERPL: 14.5 (ref 7–25)
CALCIUM SPEC-SCNC: 8.7 MG/DL (ref 8.6–10.5)
CHLORIDE SERPL-SCNC: 103 MMOL/L (ref 98–107)
CO2 SERPL-SCNC: 21.6 MMOL/L (ref 22–29)
CREAT SERPL-MCNC: 0.69 MG/DL (ref 0.57–1)
DEPRECATED RDW RBC AUTO: 48.6 FL (ref 37–54)
EGFRCR SERPLBLD CKD-EPI 2021: 114.1 ML/MIN/1.73
EOSINOPHIL # BLD AUTO: 0.19 10*3/MM3 (ref 0–0.4)
EOSINOPHIL NFR BLD AUTO: 2.2 % (ref 0.3–6.2)
ERYTHROCYTE [DISTWIDTH] IN BLOOD BY AUTOMATED COUNT: 14.4 % (ref 12.3–15.4)
GLOBULIN UR ELPH-MCNC: 3.4 GM/DL
GLUCOSE SERPL-MCNC: 89 MG/DL (ref 65–99)
HCT VFR BLD AUTO: 39.9 % (ref 34–46.6)
HGB BLD-MCNC: 12.5 G/DL (ref 12–15.9)
IMM GRANULOCYTES # BLD AUTO: 0.02 10*3/MM3 (ref 0–0.05)
IMM GRANULOCYTES NFR BLD AUTO: 0.2 % (ref 0–0.5)
LYMPHOCYTES # BLD AUTO: 3 10*3/MM3 (ref 0.7–3.1)
LYMPHOCYTES NFR BLD AUTO: 34.1 % (ref 19.6–45.3)
MCH RBC QN AUTO: 28.5 PG (ref 26.6–33)
MCHC RBC AUTO-ENTMCNC: 31.3 G/DL (ref 31.5–35.7)
MCV RBC AUTO: 91.1 FL (ref 79–97)
MONOCYTES # BLD AUTO: 0.77 10*3/MM3 (ref 0.1–0.9)
MONOCYTES NFR BLD AUTO: 8.7 % (ref 5–12)
NEUTROPHILS NFR BLD AUTO: 4.79 10*3/MM3 (ref 1.7–7)
NEUTROPHILS NFR BLD AUTO: 54.3 % (ref 42.7–76)
NRBC BLD AUTO-RTO: 0 /100 WBC (ref 0–0.2)
PLATELET # BLD AUTO: 301 10*3/MM3 (ref 140–450)
PMV BLD AUTO: 9.9 FL (ref 6–12)
POTASSIUM SERPL-SCNC: 4.1 MMOL/L (ref 3.5–5.2)
PROT SERPL-MCNC: 7.7 G/DL (ref 6–8.5)
RBC # BLD AUTO: 4.38 10*6/MM3 (ref 3.77–5.28)
SODIUM SERPL-SCNC: 137 MMOL/L (ref 136–145)
WBC NRBC COR # BLD AUTO: 8.81 10*3/MM3 (ref 3.4–10.8)

## 2025-03-27 PROCEDURE — 80053 COMPREHEN METABOLIC PANEL: CPT | Performed by: INTERNAL MEDICINE

## 2025-03-27 PROCEDURE — 25810000003 SODIUM CHLORIDE 0.9 % SOLUTION: Performed by: INTERNAL MEDICINE

## 2025-03-27 PROCEDURE — 25010000002 TRASTUZUMAB PER 10 MG: Performed by: INTERNAL MEDICINE

## 2025-03-27 PROCEDURE — 85025 COMPLETE CBC W/AUTO DIFF WBC: CPT | Performed by: INTERNAL MEDICINE

## 2025-03-27 PROCEDURE — 25010000002 HEPARIN LOCK FLUSH PER 10 UNITS: Performed by: INTERNAL MEDICINE

## 2025-03-27 PROCEDURE — 25810000003 SODIUM CHLORIDE 0.9 % SOLUTION 250 ML FLEX CONT: Performed by: INTERNAL MEDICINE

## 2025-03-27 PROCEDURE — 96413 CHEMO IV INFUSION 1 HR: CPT

## 2025-03-27 RX ORDER — LETROZOLE 2.5 MG/1
2.5 TABLET, FILM COATED ORAL DAILY
Qty: 30 TABLET | Refills: 5
Start: 2025-03-27

## 2025-03-27 RX ORDER — SODIUM CHLORIDE 9 MG/ML
250 INJECTION, SOLUTION INTRAVENOUS ONCE
Status: COMPLETED | OUTPATIENT
Start: 2025-03-27 | End: 2025-03-27

## 2025-03-27 RX ORDER — SODIUM CHLORIDE 9 MG/ML
250 INJECTION, SOLUTION INTRAVENOUS ONCE
Status: CANCELLED | OUTPATIENT
Start: 2025-03-27

## 2025-03-27 RX ORDER — HEPARIN SODIUM (PORCINE) LOCK FLUSH IV SOLN 100 UNIT/ML 100 UNIT/ML
500 SOLUTION INTRAVENOUS AS NEEDED
Status: DISCONTINUED | OUTPATIENT
Start: 2025-03-27 | End: 2025-03-29 | Stop reason: HOSPADM

## 2025-03-27 RX ORDER — SODIUM CHLORIDE 0.9 % (FLUSH) 0.9 %
20 SYRINGE (ML) INJECTION AS NEEDED
Status: DISCONTINUED | OUTPATIENT
Start: 2025-03-27 | End: 2025-03-29 | Stop reason: HOSPADM

## 2025-03-27 RX ORDER — FEZOLINETANT 45 MG/1
45 TABLET, FILM COATED ORAL DAILY
Qty: 30 TABLET | Refills: 2 | Status: SHIPPED | OUTPATIENT
Start: 2025-03-27

## 2025-03-27 RX ORDER — ACETAMINOPHEN 325 MG/1
650 TABLET ORAL ONCE
Status: DISCONTINUED | OUTPATIENT
Start: 2025-03-27 | End: 2025-03-29 | Stop reason: HOSPADM

## 2025-03-27 RX ORDER — ACETAMINOPHEN 325 MG/1
650 TABLET ORAL ONCE
Status: CANCELLED | OUTPATIENT
Start: 2025-03-27

## 2025-03-27 RX ORDER — SODIUM CHLORIDE 0.9 % (FLUSH) 0.9 %
20 SYRINGE (ML) INJECTION AS NEEDED
OUTPATIENT
Start: 2025-03-27

## 2025-03-27 RX ORDER — HEPARIN SODIUM (PORCINE) LOCK FLUSH IV SOLN 100 UNIT/ML 100 UNIT/ML
500 SOLUTION INTRAVENOUS AS NEEDED
OUTPATIENT
Start: 2025-03-27

## 2025-03-27 RX ADMIN — HEPARIN 500 UNITS: 100 SYRINGE at 15:20

## 2025-03-27 RX ADMIN — SODIUM CHLORIDE 250 ML: 0.9 INJECTION, SOLUTION INTRAVENOUS at 14:23

## 2025-03-27 RX ADMIN — SODIUM CHLORIDE 530 MG: 9 INJECTION, SOLUTION INTRAVENOUS at 14:39

## 2025-03-27 RX ADMIN — Medication 20 ML: at 15:19

## 2025-03-27 NOTE — TELEPHONE ENCOUNTER
Caller: Annita Johnson    Relationship to patient: Self    Best call back number: [phone]    Type of visit: LAB, FU     Requested date: ONLY AVAIL ON THURSDAYS    If rescheduling, when is the original appointment: 4/16

## 2025-04-09 ENCOUNTER — HOSPITAL ENCOUNTER (OUTPATIENT)
Dept: ULTRASOUND IMAGING | Facility: HOSPITAL | Age: 40
Discharge: HOME | End: 2025-04-09
Payer: COMMERCIAL

## 2025-04-09 DIAGNOSIS — R79.89 ELEVATED LFTS: ICD-10-CM

## 2025-04-09 PROCEDURE — 76705 ECHO EXAM OF ABDOMEN: CPT

## 2025-04-10 ENCOUNTER — RESULTS FOLLOW-UP (OUTPATIENT)
Dept: INTERNAL MEDICINE | Age: 40
End: 2025-04-10
Payer: COMMERCIAL

## 2025-04-10 DIAGNOSIS — K76.0 FATTY LIVER: Primary | ICD-10-CM

## 2025-04-10 NOTE — PROGRESS NOTES
Please call patient and let her know that she does have suspected fatty liver disease.  Recommend weight loss, healthy diet and regular activity. We may need to set her up with gastroenterology as well to keep a close eye on liver. Please place orders for that. Thank you

## 2025-04-14 ENCOUNTER — APPOINTMENT (OUTPATIENT)
Dept: CT IMAGING | Facility: HOSPITAL | Age: 40
End: 2025-04-14
Payer: COMMERCIAL

## 2025-04-14 ENCOUNTER — HOSPITAL ENCOUNTER (OUTPATIENT)
Facility: HOSPITAL | Age: 40
Discharge: HOME | End: 2025-04-14
Payer: COMMERCIAL

## 2025-04-14 ENCOUNTER — HOSPITAL ENCOUNTER (OUTPATIENT)
Dept: CT IMAGING | Facility: HOSPITAL | Age: 40
Discharge: HOME | End: 2025-04-14
Payer: COMMERCIAL

## 2025-04-14 ENCOUNTER — HOSPITAL ENCOUNTER (OUTPATIENT)
Dept: NUCLEAR MEDICINE | Facility: HOSPITAL | Age: 40
Discharge: HOME | End: 2025-04-14
Payer: COMMERCIAL

## 2025-04-14 DIAGNOSIS — Z79.899 HIGH RISK MEDICATION USE: ICD-10-CM

## 2025-04-14 DIAGNOSIS — C50.211 MALIGNANT NEOPLASM OF UPPER-INNER QUADRANT OF RIGHT BREAST IN FEMALE, ESTROGEN RECEPTOR POSITIVE: ICD-10-CM

## 2025-04-14 DIAGNOSIS — Z17.0 MALIGNANT NEOPLASM OF UPPER-INNER QUADRANT OF RIGHT BREAST IN FEMALE, ESTROGEN RECEPTOR POSITIVE: ICD-10-CM

## 2025-04-14 PROCEDURE — 71260 CT THORAX DX C+: CPT

## 2025-04-14 PROCEDURE — 93325 DOPPLER ECHO COLOR FLOW MAPG: CPT

## 2025-04-14 PROCEDURE — 74177 CT ABD & PELVIS W/CONTRAST: CPT

## 2025-04-14 PROCEDURE — 93308 TTE F-UP OR LMTD: CPT | Performed by: INTERNAL MEDICINE

## 2025-04-14 PROCEDURE — 93325 DOPPLER ECHO COLOR FLOW MAPG: CPT | Performed by: INTERNAL MEDICINE

## 2025-04-14 PROCEDURE — 93356 MYOCRD STRAIN IMG SPCKL TRCK: CPT | Performed by: INTERNAL MEDICINE

## 2025-04-14 PROCEDURE — 93356 MYOCRD STRAIN IMG SPCKL TRCK: CPT

## 2025-04-14 PROCEDURE — A9503 TC99M MEDRONATE: HCPCS | Performed by: INTERNAL MEDICINE

## 2025-04-14 PROCEDURE — 93308 TTE F-UP OR LMTD: CPT

## 2025-04-14 PROCEDURE — 78306 BONE IMAGING WHOLE BODY: CPT

## 2025-04-14 PROCEDURE — 34310000005 TECHNETIUM MEDRONATE KIT: Performed by: INTERNAL MEDICINE

## 2025-04-14 PROCEDURE — 25510000001 IOPAMIDOL PER 1 ML: Performed by: INTERNAL MEDICINE

## 2025-04-14 RX ORDER — TC 99M MEDRONATE 20 MG/10ML
22 INJECTION, POWDER, LYOPHILIZED, FOR SOLUTION INTRAVENOUS
Status: COMPLETED | OUTPATIENT
Start: 2025-04-14 | End: 2025-04-14

## 2025-04-14 RX ORDER — IOPAMIDOL 755 MG/ML
100 INJECTION, SOLUTION INTRAVASCULAR
Status: COMPLETED | OUTPATIENT
Start: 2025-04-14 | End: 2025-04-14

## 2025-04-14 RX ADMIN — TC 99M MEDRONATE 22 MILLICURIE: 20 INJECTION, POWDER, LYOPHILIZED, FOR SOLUTION INTRAVENOUS at 09:27

## 2025-04-14 RX ADMIN — IOPAMIDOL 100 ML: 755 INJECTION, SOLUTION INTRAVENOUS at 09:54

## 2025-04-15 LAB
BH CV ECHO LEFT VENTRICLE GLOBAL LONGITUDINAL STRAIN: -21.8 %
BH CV ECHO MEAS - 2D AUTO EF SIEMENS: 60.9 %
BH CV ECHO MEAS - EDV(MOD-SP2): 98.6 ML
BH CV ECHO MEAS - EDV(MOD-SP4): 110.5 ML
BH CV ECHO MEAS - EF(MOD-SP2): 58.6 %
BH CV ECHO MEAS - EF(MOD-SP4): 60.1 %
BH CV ECHO MEAS - ESV(MOD-SP2): 40.8 ML
BH CV ECHO MEAS - ESV(MOD-SP4): 44.1 ML
BH CV ECHO MEAS - IVS/LVPW: 1 CM
BH CV ECHO MEAS - IVSD: 0.9 CM
BH CV ECHO MEAS - LA DIMENSION: 3.4 CM
BH CV ECHO MEAS - LV DIASTOLIC VOL/BSA (35-75): 57.3 CM2
BH CV ECHO MEAS - LV SYSTOLIC VOL/BSA (12-30): 22.9 CM2
BH CV ECHO MEAS - LVIDD: 4.8 CM
BH CV ECHO MEAS - LVIDS: 3.1 CM
BH CV ECHO MEAS - LVPWD: 0.9 CM
BH CV ECHO MEAS - SV(MOD-SP2): 57.8 ML
BH CV ECHO MEAS - SV(MOD-SP4): 66.4 ML
BH CV ECHO MEAS - SVI(MOD-SP2): 30 ML/M2
BH CV ECHO MEAS - SVI(MOD-SP4): 34.4 ML/M2

## 2025-04-17 ENCOUNTER — OFFICE VISIT (OUTPATIENT)
Dept: ONCOLOGY | Facility: HOSPITAL | Age: 40
End: 2025-04-17
Payer: COMMERCIAL

## 2025-04-17 ENCOUNTER — HOSPITAL ENCOUNTER (OUTPATIENT)
Dept: ONCOLOGY | Facility: HOSPITAL | Age: 40
Discharge: HOME | End: 2025-04-17
Payer: COMMERCIAL

## 2025-04-17 ENCOUNTER — APPOINTMENT (OUTPATIENT)
Dept: ONCOLOGY | Facility: HOSPITAL | Age: 40
End: 2025-04-17
Payer: COMMERCIAL

## 2025-04-17 VITALS
OXYGEN SATURATION: 100 % | TEMPERATURE: 97.5 F | DIASTOLIC BLOOD PRESSURE: 92 MMHG | SYSTOLIC BLOOD PRESSURE: 118 MMHG | RESPIRATION RATE: 18 BRPM | WEIGHT: 205.91 LBS | HEART RATE: 72 BPM | HEIGHT: 62 IN | BODY MASS INDEX: 37.89 KG/M2

## 2025-04-17 VITALS
DIASTOLIC BLOOD PRESSURE: 92 MMHG | OXYGEN SATURATION: 100 % | HEIGHT: 62 IN | SYSTOLIC BLOOD PRESSURE: 118 MMHG | WEIGHT: 205.91 LBS | HEART RATE: 72 BPM | RESPIRATION RATE: 18 BRPM | TEMPERATURE: 97.5 F | BODY MASS INDEX: 37.89 KG/M2

## 2025-04-17 DIAGNOSIS — C79.51 METASTASIS TO BONE: Primary | ICD-10-CM

## 2025-04-17 DIAGNOSIS — C79.51 METASTASIS TO BONE: ICD-10-CM

## 2025-04-17 DIAGNOSIS — Z17.0 MALIGNANT NEOPLASM OF UPPER-INNER QUADRANT OF RIGHT BREAST IN FEMALE, ESTROGEN RECEPTOR POSITIVE: ICD-10-CM

## 2025-04-17 DIAGNOSIS — C50.211 MALIGNANT NEOPLASM OF UPPER-INNER QUADRANT OF RIGHT BREAST IN FEMALE, ESTROGEN RECEPTOR POSITIVE: ICD-10-CM

## 2025-04-17 DIAGNOSIS — Z45.2 ENCOUNTER FOR ADJUSTMENT OR MANAGEMENT OF VASCULAR ACCESS DEVICE: Primary | ICD-10-CM

## 2025-04-17 DIAGNOSIS — Z17.0 MALIGNANT NEOPLASM OF UPPER-INNER QUADRANT OF RIGHT BREAST IN FEMALE, ESTROGEN RECEPTOR POSITIVE: Primary | ICD-10-CM

## 2025-04-17 DIAGNOSIS — C50.211 MALIGNANT NEOPLASM OF UPPER-INNER QUADRANT OF RIGHT BREAST IN FEMALE, ESTROGEN RECEPTOR POSITIVE: Primary | ICD-10-CM

## 2025-04-17 LAB
ALBUMIN SERPL-MCNC: 4.5 G/DL (ref 3.5–5.2)
ALBUMIN/GLOB SERPL: 1.3 G/DL
ALP SERPL-CCNC: 72 U/L (ref 39–117)
ALT SERPL W P-5'-P-CCNC: 257 U/L (ref 1–33)
ANION GAP SERPL CALCULATED.3IONS-SCNC: 13.6 MMOL/L (ref 5–15)
AST SERPL-CCNC: 156 U/L (ref 1–32)
BASOPHILS # BLD AUTO: 0.03 10*3/MM3 (ref 0–0.2)
BASOPHILS NFR BLD AUTO: 0.3 % (ref 0–1.5)
BILIRUB SERPL-MCNC: 0.5 MG/DL (ref 0–1.2)
BUN SERPL-MCNC: 11 MG/DL (ref 6–20)
BUN/CREAT SERPL: 15.9 (ref 7–25)
CALCIUM SPEC-SCNC: 9.3 MG/DL (ref 8.6–10.5)
CHLORIDE SERPL-SCNC: 103 MMOL/L (ref 98–107)
CO2 SERPL-SCNC: 23.4 MMOL/L (ref 22–29)
CREAT SERPL-MCNC: 0.69 MG/DL (ref 0.57–1)
DEPRECATED RDW RBC AUTO: 48.4 FL (ref 37–54)
EGFRCR SERPLBLD CKD-EPI 2021: 114.1 ML/MIN/1.73
EOSINOPHIL # BLD AUTO: 0.28 10*3/MM3 (ref 0–0.4)
EOSINOPHIL NFR BLD AUTO: 3.2 % (ref 0.3–6.2)
ERYTHROCYTE [DISTWIDTH] IN BLOOD BY AUTOMATED COUNT: 14.4 % (ref 12.3–15.4)
GLOBULIN UR ELPH-MCNC: 3.6 GM/DL
GLUCOSE SERPL-MCNC: 115 MG/DL (ref 65–99)
HCT VFR BLD AUTO: 40.3 % (ref 34–46.6)
HGB BLD-MCNC: 12.9 G/DL (ref 12–15.9)
IMM GRANULOCYTES # BLD AUTO: 0.01 10*3/MM3 (ref 0–0.05)
IMM GRANULOCYTES NFR BLD AUTO: 0.1 % (ref 0–0.5)
LYMPHOCYTES # BLD AUTO: 2.64 10*3/MM3 (ref 0.7–3.1)
LYMPHOCYTES NFR BLD AUTO: 30.3 % (ref 19.6–45.3)
MAGNESIUM SERPL-MCNC: 1.9 MG/DL (ref 1.6–2.6)
MCH RBC QN AUTO: 29.5 PG (ref 26.6–33)
MCHC RBC AUTO-ENTMCNC: 32 G/DL (ref 31.5–35.7)
MCV RBC AUTO: 92.2 FL (ref 79–97)
MONOCYTES # BLD AUTO: 0.94 10*3/MM3 (ref 0.1–0.9)
MONOCYTES NFR BLD AUTO: 10.8 % (ref 5–12)
NEUTROPHILS NFR BLD AUTO: 4.8 10*3/MM3 (ref 1.7–7)
NEUTROPHILS NFR BLD AUTO: 55.3 % (ref 42.7–76)
NRBC BLD AUTO-RTO: 0 /100 WBC (ref 0–0.2)
PHOSPHATE SERPL-MCNC: 3 MG/DL (ref 2.5–4.5)
PLATELET # BLD AUTO: 292 10*3/MM3 (ref 140–450)
PMV BLD AUTO: 9.7 FL (ref 6–12)
POTASSIUM SERPL-SCNC: 4.3 MMOL/L (ref 3.5–5.2)
PROT SERPL-MCNC: 8.1 G/DL (ref 6–8.5)
RBC # BLD AUTO: 4.37 10*6/MM3 (ref 3.77–5.28)
SODIUM SERPL-SCNC: 140 MMOL/L (ref 136–145)
WBC NRBC COR # BLD AUTO: 8.7 10*3/MM3 (ref 3.4–10.8)

## 2025-04-17 PROCEDURE — 36591 DRAW BLOOD OFF VENOUS DEVICE: CPT

## 2025-04-17 PROCEDURE — 80053 COMPREHEN METABOLIC PANEL: CPT | Performed by: INTERNAL MEDICINE

## 2025-04-17 PROCEDURE — 25810000003 SODIUM CHLORIDE 0.9 % SOLUTION: Performed by: INTERNAL MEDICINE

## 2025-04-17 PROCEDURE — 83735 ASSAY OF MAGNESIUM: CPT | Performed by: INTERNAL MEDICINE

## 2025-04-17 PROCEDURE — 84100 ASSAY OF PHOSPHORUS: CPT | Performed by: INTERNAL MEDICINE

## 2025-04-17 PROCEDURE — 96413 CHEMO IV INFUSION 1 HR: CPT

## 2025-04-17 PROCEDURE — 85025 COMPLETE CBC W/AUTO DIFF WBC: CPT | Performed by: INTERNAL MEDICINE

## 2025-04-17 PROCEDURE — 25010000002 HEPARIN LOCK FLUSH PER 10 UNITS: Performed by: INTERNAL MEDICINE

## 2025-04-17 PROCEDURE — 25810000003 SODIUM CHLORIDE 0.9 % SOLUTION 250 ML FLEX CONT: Performed by: INTERNAL MEDICINE

## 2025-04-17 PROCEDURE — 25010000002 TRASTUZUMAB PER 10 MG: Performed by: INTERNAL MEDICINE

## 2025-04-17 RX ORDER — ACETAMINOPHEN 325 MG/1
650 TABLET ORAL ONCE
Status: DISCONTINUED | OUTPATIENT
Start: 2025-04-17 | End: 2025-04-18 | Stop reason: HOSPADM

## 2025-04-17 RX ORDER — HEPARIN SODIUM (PORCINE) LOCK FLUSH IV SOLN 100 UNIT/ML 100 UNIT/ML
500 SOLUTION INTRAVENOUS AS NEEDED
OUTPATIENT
Start: 2025-04-17

## 2025-04-17 RX ORDER — SODIUM CHLORIDE 9 MG/ML
250 INJECTION, SOLUTION INTRAVENOUS ONCE
Status: CANCELLED | OUTPATIENT
Start: 2025-04-17

## 2025-04-17 RX ORDER — SODIUM CHLORIDE 0.9 % (FLUSH) 0.9 %
20 SYRINGE (ML) INJECTION AS NEEDED
Status: DISCONTINUED | OUTPATIENT
Start: 2025-04-17 | End: 2025-04-18 | Stop reason: HOSPADM

## 2025-04-17 RX ORDER — SODIUM CHLORIDE 0.9 % (FLUSH) 0.9 %
20 SYRINGE (ML) INJECTION AS NEEDED
OUTPATIENT
Start: 2025-04-17

## 2025-04-17 RX ORDER — ACETAMINOPHEN 325 MG/1
650 TABLET ORAL ONCE
Status: CANCELLED | OUTPATIENT
Start: 2025-04-17

## 2025-04-17 RX ORDER — HEPARIN SODIUM (PORCINE) LOCK FLUSH IV SOLN 100 UNIT/ML 100 UNIT/ML
500 SOLUTION INTRAVENOUS AS NEEDED
Status: DISCONTINUED | OUTPATIENT
Start: 2025-04-17 | End: 2025-04-18 | Stop reason: HOSPADM

## 2025-04-17 RX ORDER — SODIUM CHLORIDE 9 MG/ML
250 INJECTION, SOLUTION INTRAVENOUS ONCE
Status: COMPLETED | OUTPATIENT
Start: 2025-04-17 | End: 2025-04-17

## 2025-04-17 RX ADMIN — Medication 20 ML: at 10:54

## 2025-04-17 RX ADMIN — SODIUM CHLORIDE 250 ML: 9 INJECTION, SOLUTION INTRAVENOUS at 09:56

## 2025-04-17 RX ADMIN — SODIUM CHLORIDE 530 MG: 9 INJECTION, SOLUTION INTRAVENOUS at 10:17

## 2025-04-17 RX ADMIN — Medication 20 ML: at 08:59

## 2025-04-17 RX ADMIN — HEPARIN 500 UNITS: 100 SYRINGE at 10:54

## 2025-04-17 NOTE — ADDENDUM NOTE
Encounter addended by: Katrin Edwards RN on: 4/17/2025 11:47 AM   Actions taken: Charge Capture section accepted

## 2025-04-17 NOTE — PROGRESS NOTES
Chief Complaint  Malignant neoplasm of upper-inner quadrant of right breast     Lela Vanegas, LÁZARO  AristesLela cronin, LÁZARO    Subjective          Annita Johnson presents to [facility] for treatment of her breast cancer.  She is on therapy as outlined below.  She states she is tolerating well.  No issues or side effects from her regimen.  She has mouth sores dental or jaw pain.  No new masses or adenopathy.  She notes that she is trying exercise more.  She has been watching her diet.  No issues for Port-A-Cath.  She denies shortness of breath, chest pain, lower extremity swelling.    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, estrogen receptor positive   6/8/2023 Initial Diagnosis    Malignant neoplasm of upper-inner quadrant of right breast in female, estrogen receptor positive     6/8/2023 Cancer Staged    Staging form: Breast, AJCC 8th Edition  - Clinical: Stage IV (cT2, cN0, cM1, ER+, VT+, HER2-) - Signed by Perry Garcia MD on 6/8/2023 6/9/2023 - 4/4/2024 Chemotherapy    OP SUPPORTIVE BREAST Leuprolide 3.75 MG Q28D     6/15/2023 - 6/15/2023 Chemotherapy    OP BREAST Letrozole / Ribociclib     7/13/2023 -  Chemotherapy    OP BREAST Letrozole / Trastuzumab     8/24/2023 -  Chemotherapy    OP SUPPORTIVE Denosumab (Xgeva) Q42D     Metastasis to bone   6/8/2023 Initial Diagnosis    Metastasis to bone     8/24/2023 -  Chemotherapy    OP SUPPORTIVE Denosumab (Xgeva) Q42D           Current Outpatient Medications on File Prior to Visit   Medication Sig Dispense Refill    clonazePAM (KlonoPIN) 0.5 MG tablet TAKE 1 TABLET BY MOUTH TWICE A DAY AS NEEDED FOR ANXIETY 30 tablet 1    cyclobenzaprine (FLEXERIL) 10 MG tablet TAKE 1 TABLET BY MOUTH 3 TIMES A DAY AS NEEDED FOR MUSCLE SPASMS. 90 tablet 5    folic acid (FOLVITE) 1 MG tablet TAKE 1 TABLET BY MOUTH EVERY DAY (Patient taking differently: Take 1 tablet by mouth Daily.   INSTRUCTED PER ANESTHESIA PROTOCOL) 90 tablet 1    HYDROcodone-acetaminophen (NORCO) 5-325 MG per tablet Take 1 tablet by mouth Every 6 (Six) Hours As Needed for Moderate Pain (Pain). 4 tablet 0    hydrOXYzine (ATARAX) 50 MG tablet TAKE 1 TABLET BY MOUTH EVERYDAY AT BEDTIME (Patient taking differently: Take 1 tablet by mouth As Needed.) 90 tablet 1    letrozole (FEMARA) 2.5 MG tablet Take 1 tablet by mouth Daily. 30 tablet 5    letrozole (FEMARA) 2.5 MG tablet Take 1 tablet by mouth Daily. 30 tablet 5    Nurtec 75 MG dispersible tablet TAKE 1 TABLET BY MOUTH DAILY AS NEEDED (MIGRAINES). 8 tablet 5    ondansetron (ZOFRAN) 8 MG tablet Take 1 tablet by mouth 3 (Three) Times a Day As Needed for Nausea or Vomiting. 30 tablet 5    Sodium Fluoride 5000 PPM 1.1 % gel Take 1 Application by mouth 2 (Two) Times a Day. TOOTHPASTE      Trastuzumab (HERCEPTIN IV) Infuse  into a venous catheter. EVERY 21 DAYS-NEXT ON 10/31/24      Veozah 45 MG tablet TAKE 1 TABLET BY MOUTH EVERY DAY (Patient not taking: Reported on 4/17/2025) 30 tablet 2    vitamin D (ERGOCALCIFEROL) 1.25 MG (67539 UT) capsule capsule TAKE 1 CAPSULE BY MOUTH 1 TIME PER WEEK. 12 capsule 1    azithromycin (Zithromax Z-Constantino) 250 MG tablet Take 2 tablets by mouth on day 1, then 1 tablet daily on days 2-5 (Patient not taking: Reported on 3/7/2025) 6 tablet 0    brompheniramine-pseudoephedrine-DM 30-2-10 MG/5ML syrup Take 5 mL by mouth 4 (Four) Times a Day As Needed for Allergies, Cough or Congestion. (Patient not taking: Reported on 3/7/2025) 240 mL 0     Current Facility-Administered Medications on File Prior to Visit   Medication Dose Route Frequency Provider Last Rate Last Admin    [COMPLETED] sodium chloride 0.9 % infusion 250 mL  250 mL Intravenous Once Perry Garcia MD   Stopped at 04/17/25 1050    [COMPLETED] Trastuzumab (HERCEPTIN) 530 mg in sodium chloride 0.9 % 300.2 mL chemo IVPB  6 mg/kg (Treatment Plan Recorded) Intravenous Once Perry Garcia MD    Stopped at 25 1047    [DISCONTINUED] acetaminophen (TYLENOL) tablet 650 mg  650 mg Oral Once Perry Garcia MD        [DISCONTINUED] heparin injection 500 Units  500 Units Intravenous PRN Perry Garcia MD   500 Units at 25 1054    [DISCONTINUED] sodium chloride 0.9 % flush 20 mL  20 mL Intravenous PRN Perry Garcia MD   20 mL at 25 1054       No Known Allergies  Past Medical History:   Diagnosis Date    Anemia     Breast cancer     RIGHT    Kidney stone     Metastasis to bone 2023     Past Surgical History:   Procedure Laterality Date    BREAST BIOPSY      CERVICAL FUSION  2023     SECTION N/A 2021    Procedure:  SECTION PRIMARY;  Surgeon: Zoe Dawson MD;  Location: [facility] LABOR DELIVERY;  Service: Obstetrics/Gynecology;  Laterality: N/A;    CYSTOSCOPY BLADDER STONE LITHOTRIPSY      SALPINGO OOPHORECTOMY Bilateral 2024    Procedure: SALPINGO OOPHORECTOMY LAPAROSCOPIC WITH DAVINCI ROBOT;  Surgeon: Ashlyn Avelar MD;  Location: [facility] OR;  Service: Robotics - DaVinci;  Laterality: Bilateral;    VENOUS ACCESS DEVICE (PORT) INSERTION Right 10/30/2024    Procedure: INSERTION VENOUS ACCESS DEVICE : Placement of a port in the tissue under the skin at your upper chest with a tube attached to it that goes into your vein;  Surgeon: Jitendra García MD;  Location: [facility];  Service: General;  Laterality: Right;     Social History     Socioeconomic History    Marital status:      Spouse name: Janet   Tobacco Use    Smoking status: Former     Current packs/day: 0.00     Average packs/day: 0.3 packs/day for 19.6 years (4.9 ttl pk-yrs)     Types: Cigarettes     Start date: 2005     Quit date: 2024     Years since quittin.6     Passive exposure: Current    Smokeless tobacco: Never   Vaping Use    Vaping status: Never Used   Substance and Sexual Activity    Alcohol use: Not Currently     Alcohol/week: 6.0 standard drinks of  "alcohol    Drug use: Yes     Types: Marijuana     Comment: for pain control/DAILY last used 10-30-24    Sexual activity: Defer     Family History   Problem Relation Age of Onset    Mental illness Mother     Hypertension Father     Alcohol abuse Father     Ovarian cancer Paternal Aunt     Breast cancer Maternal Great-Grandmother     Malig Hyperthermia Neg Hx        Objective   Physical Exam  Vitals reviewed. Exam conducted with a chaperone present.   Cardiovascular:      Rate and Rhythm: Normal rate and regular rhythm.      Heart sounds: Normal heart sounds. No murmur heard.     No gallop.   Pulmonary:      Effort: Pulmonary effort is normal.      Breath sounds: Normal breath sounds.      Comments: Port-A-Cath  Abdominal:      General: Bowel sounds are normal.   Musculoskeletal:      Right lower leg: No edema.      Left lower leg: No edema.   Lymphadenopathy:      Cervical: No cervical adenopathy.   Psychiatric:         Mood and Affect: Mood normal.         Behavior: Behavior normal.         Vitals:    04/17/25 0920   BP: 118/92   Pulse: 72   Resp: 18   Temp: 97.5 °F (36.4 °C)   TempSrc: Temporal   SpO2: 100%   Weight: 93.4 kg (205 lb 14.6 oz)   Height: 157.5 cm (62.01\")   PainSc: 0-No pain     ECOG score: 0         PHQ-9 Total Score:                    Result Review :   The following data was reviewed by: Perry Garcia MD on 04/17/2025:  Lab Results   Component Value Date    HGB 12.9 04/17/2025    HCT 40.3 04/17/2025    MCV 92.2 04/17/2025     04/17/2025    WBC 8.70 04/17/2025    NEUTROABS 4.80 04/17/2025    LYMPHSABS 2.64 04/17/2025    MONOSABS 0.94 (H) 04/17/2025    EOSABS 0.28 04/17/2025    BASOSABS 0.03 04/17/2025     Lab Results   Component Value Date    GLUCOSE 115 (H) 04/17/2025    BUN 11 04/17/2025    CREATININE 0.69 04/17/2025     04/17/2025    K 4.3 04/17/2025     04/17/2025    CO2 23.4 04/17/2025    CALCIUM 9.3 04/17/2025    PROTEINTOT 8.1 04/17/2025    ALBUMIN 4.5 04/17/2025    " "BILITOT 0.5 04/17/2025    ALKPHOS 72 04/17/2025     (H) 04/17/2025     (H) 04/17/2025     Lab Results   Component Value Date    MG 1.9 04/17/2025    PHOS 3.0 04/17/2025    TSH 1.320 09/19/2024     No results found for: \"IRON\", \"LABIRON\", \"TRANSFERRIN\", \"TIBC\"  Lab Results   Component Value Date    DZKSPEIV75 577 02/22/2024    FOLATE >20.00 02/22/2024     No results found for: \"PSA\", \"CEA\", \"AFP\", \"\", \"\"    Data reviewed : Cardiology studies echocardiogram shows normal ejection fraction    CT abdomen, pelvis reviewed.  Bone scan reviewed.  Ultrasound liver reviewed.     Assessment and Plan    Diagnoses and all orders for this visit:    1. 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  Triple positive.  Patient is on treatment with letrozole, trastuzumab and denosumab for bony involvement.  Tolerating well.  Echocardiogram is normal.  Restaging scans show no evidence of new or worsening disease.  Lab work is notable for elevated transaminases but imaging shows fatty liver.  The remainder of her lab work is adequate for treatment.  Proceed with trastuzumab as planned.  Continue letrozole daily.      2. Metastasis to bone  Assessment & Plan:  She is on denosumab every 6 weeks along with her trastuzumab.  Tolerating well.  No dental or jaw pain.  Electrolytes are adequate for treatment.  CT and bone scans show stable disease.  Proceed with denosumab      Other orders  -     Cancel: denosumab (XGEVA) injection 120 mg  -     Cancel: sodium chloride 0.9 % infusion 250 mL  -     Cancel: acetaminophen (TYLENOL) tablet 650 mg  -     Cancel: Trastuzumab (HERCEPTIN) 530 mg in sodium chloride 0.9 % 250 mL chemo IVPB            Patient Follow Up: 6 weeks    Patient was given instructions and counseling regarding her condition or for health maintenance advice. Please see specific information pulled into the AVS if " appropriate.     Perry Garcia MD    4/18/2025

## 2025-04-18 NOTE — ASSESSMENT & PLAN NOTE
Metastatic.  Triple positive.  Patient is on treatment with letrozole, trastuzumab and denosumab for bony involvement.  Tolerating well.  Echocardiogram is normal.  Restaging scans show no evidence of new or worsening disease.  Lab work is notable for elevated transaminases but imaging shows fatty liver.  The remainder of her lab work is adequate for treatment.  Proceed with trastuzumab as planned.  Continue letrozole daily.

## 2025-04-18 NOTE — ASSESSMENT & PLAN NOTE
She is on denosumab every 6 weeks along with her trastuzumab.  Tolerating well.  No dental or jaw pain.  Electrolytes are adequate for treatment.  CT and bone scans show stable disease.  Proceed with denosumab

## 2025-05-08 ENCOUNTER — APPOINTMENT (OUTPATIENT)
Dept: ONCOLOGY | Facility: HOSPITAL | Age: 40
End: 2025-05-08
Payer: COMMERCIAL

## 2025-05-08 ENCOUNTER — HOSPITAL ENCOUNTER (OUTPATIENT)
Dept: ONCOLOGY | Facility: HOSPITAL | Age: 40
Discharge: HOME | End: 2025-05-08
Payer: COMMERCIAL

## 2025-05-08 VITALS
OXYGEN SATURATION: 99 % | WEIGHT: 205.03 LBS | HEART RATE: 101 BPM | SYSTOLIC BLOOD PRESSURE: 120 MMHG | TEMPERATURE: 97.9 F | RESPIRATION RATE: 18 BRPM | DIASTOLIC BLOOD PRESSURE: 92 MMHG | BODY MASS INDEX: 37.49 KG/M2

## 2025-05-08 DIAGNOSIS — Z17.0 MALIGNANT NEOPLASM OF UPPER-INNER QUADRANT OF RIGHT BREAST IN FEMALE, ESTROGEN RECEPTOR POSITIVE: Primary | ICD-10-CM

## 2025-05-08 DIAGNOSIS — C50.211 MALIGNANT NEOPLASM OF UPPER-INNER QUADRANT OF RIGHT BREAST IN FEMALE, ESTROGEN RECEPTOR POSITIVE: Primary | ICD-10-CM

## 2025-05-08 DIAGNOSIS — Z45.2 ENCOUNTER FOR ADJUSTMENT OR MANAGEMENT OF VASCULAR ACCESS DEVICE: ICD-10-CM

## 2025-05-08 LAB
ALBUMIN SERPL-MCNC: 4.5 G/DL (ref 3.5–5.2)
ALBUMIN/GLOB SERPL: 1.3 G/DL
ALP SERPL-CCNC: 87 U/L (ref 39–117)
ALT SERPL W P-5'-P-CCNC: 236 U/L (ref 1–33)
ANION GAP SERPL CALCULATED.3IONS-SCNC: 15.8 MMOL/L (ref 5–15)
AST SERPL-CCNC: 164 U/L (ref 1–32)
BASOPHILS # BLD AUTO: 0.06 10*3/MM3 (ref 0–0.2)
BASOPHILS NFR BLD AUTO: 0.5 % (ref 0–1.5)
BILIRUB SERPL-MCNC: 0.5 MG/DL (ref 0–1.2)
BUN SERPL-MCNC: 9 MG/DL (ref 6–20)
BUN/CREAT SERPL: 11.7 (ref 7–25)
CALCIUM SPEC-SCNC: 9.4 MG/DL (ref 8.6–10.5)
CHLORIDE SERPL-SCNC: 103 MMOL/L (ref 98–107)
CO2 SERPL-SCNC: 20.2 MMOL/L (ref 22–29)
CREAT SERPL-MCNC: 0.77 MG/DL (ref 0.57–1)
DEPRECATED RDW RBC AUTO: 46.8 FL (ref 37–54)
EGFRCR SERPLBLD CKD-EPI 2021: 101.4 ML/MIN/1.73
EOSINOPHIL # BLD AUTO: 0.32 10*3/MM3 (ref 0–0.4)
EOSINOPHIL NFR BLD AUTO: 2.8 % (ref 0.3–6.2)
ERYTHROCYTE [DISTWIDTH] IN BLOOD BY AUTOMATED COUNT: 13.7 % (ref 12.3–15.4)
GLOBULIN UR ELPH-MCNC: 3.6 GM/DL
GLUCOSE SERPL-MCNC: 105 MG/DL (ref 65–99)
HCT VFR BLD AUTO: 41.4 % (ref 34–46.6)
HGB BLD-MCNC: 13.2 G/DL (ref 12–15.9)
IMM GRANULOCYTES # BLD AUTO: 0.03 10*3/MM3 (ref 0–0.05)
IMM GRANULOCYTES NFR BLD AUTO: 0.3 % (ref 0–0.5)
LYMPHOCYTES # BLD AUTO: 3.5 10*3/MM3 (ref 0.7–3.1)
LYMPHOCYTES NFR BLD AUTO: 30.5 % (ref 19.6–45.3)
MCH RBC QN AUTO: 29.4 PG (ref 26.6–33)
MCHC RBC AUTO-ENTMCNC: 31.9 G/DL (ref 31.5–35.7)
MCV RBC AUTO: 92.2 FL (ref 79–97)
MONOCYTES # BLD AUTO: 1.19 10*3/MM3 (ref 0.1–0.9)
MONOCYTES NFR BLD AUTO: 10.4 % (ref 5–12)
NEUTROPHILS NFR BLD AUTO: 55.5 % (ref 42.7–76)
NEUTROPHILS NFR BLD AUTO: 6.36 10*3/MM3 (ref 1.7–7)
NRBC BLD AUTO-RTO: 0 /100 WBC (ref 0–0.2)
PLATELET # BLD AUTO: 325 10*3/MM3 (ref 140–450)
PMV BLD AUTO: 9.5 FL (ref 6–12)
POTASSIUM SERPL-SCNC: 4.5 MMOL/L (ref 3.5–5.2)
PROT SERPL-MCNC: 8.1 G/DL (ref 6–8.5)
RBC # BLD AUTO: 4.49 10*6/MM3 (ref 3.77–5.28)
SODIUM SERPL-SCNC: 139 MMOL/L (ref 136–145)
WBC NRBC COR # BLD AUTO: 11.46 10*3/MM3 (ref 3.4–10.8)

## 2025-05-08 PROCEDURE — 25810000003 SODIUM CHLORIDE 0.9 % SOLUTION 250 ML FLEX CONT: Performed by: INTERNAL MEDICINE

## 2025-05-08 PROCEDURE — 25810000003 SODIUM CHLORIDE 0.9 % SOLUTION: Performed by: INTERNAL MEDICINE

## 2025-05-08 PROCEDURE — 80053 COMPREHEN METABOLIC PANEL: CPT | Performed by: INTERNAL MEDICINE

## 2025-05-08 PROCEDURE — 25010000002 HEPARIN LOCK FLUSH PER 10 UNITS: Performed by: INTERNAL MEDICINE

## 2025-05-08 PROCEDURE — 96413 CHEMO IV INFUSION 1 HR: CPT

## 2025-05-08 PROCEDURE — 25010000002 TRASTUZUMAB PER 10 MG: Performed by: INTERNAL MEDICINE

## 2025-05-08 PROCEDURE — 85025 COMPLETE CBC W/AUTO DIFF WBC: CPT | Performed by: INTERNAL MEDICINE

## 2025-05-08 RX ORDER — SODIUM CHLORIDE 9 MG/ML
250 INJECTION, SOLUTION INTRAVENOUS ONCE
Status: CANCELLED | OUTPATIENT
Start: 2025-05-08

## 2025-05-08 RX ORDER — HEPARIN SODIUM (PORCINE) LOCK FLUSH IV SOLN 100 UNIT/ML 100 UNIT/ML
500 SOLUTION INTRAVENOUS AS NEEDED
Status: DISCONTINUED | OUTPATIENT
Start: 2025-05-08 | End: 2025-05-09 | Stop reason: HOSPADM

## 2025-05-08 RX ORDER — HEPARIN SODIUM (PORCINE) LOCK FLUSH IV SOLN 100 UNIT/ML 100 UNIT/ML
500 SOLUTION INTRAVENOUS AS NEEDED
OUTPATIENT
Start: 2025-05-08

## 2025-05-08 RX ORDER — ACETAMINOPHEN 325 MG/1
650 TABLET ORAL ONCE
Status: CANCELLED | OUTPATIENT
Start: 2025-05-08

## 2025-05-08 RX ORDER — SODIUM CHLORIDE 9 MG/ML
250 INJECTION, SOLUTION INTRAVENOUS ONCE
Status: COMPLETED | OUTPATIENT
Start: 2025-05-08 | End: 2025-05-08

## 2025-05-08 RX ORDER — SODIUM CHLORIDE 0.9 % (FLUSH) 0.9 %
20 SYRINGE (ML) INJECTION AS NEEDED
Status: DISCONTINUED | OUTPATIENT
Start: 2025-05-08 | End: 2025-05-09 | Stop reason: HOSPADM

## 2025-05-08 RX ORDER — ACETAMINOPHEN 325 MG/1
650 TABLET ORAL ONCE
Status: DISCONTINUED | OUTPATIENT
Start: 2025-05-08 | End: 2025-05-09 | Stop reason: HOSPADM

## 2025-05-08 RX ORDER — SODIUM CHLORIDE 0.9 % (FLUSH) 0.9 %
20 SYRINGE (ML) INJECTION AS NEEDED
OUTPATIENT
Start: 2025-05-08

## 2025-05-08 RX ORDER — LETROZOLE 2.5 MG/1
2.5 TABLET, FILM COATED ORAL DAILY
Qty: 30 TABLET | Refills: 5
Start: 2025-05-08

## 2025-05-08 RX ADMIN — HEPARIN 500 UNITS: 100 SYRINGE at 12:15

## 2025-05-08 RX ADMIN — TRASTUZUMAB 530 MG: 150 INJECTION, POWDER, LYOPHILIZED, FOR SOLUTION INTRAVENOUS at 11:32

## 2025-05-08 RX ADMIN — SODIUM CHLORIDE 250 ML: 9 INJECTION, SOLUTION INTRAVENOUS at 11:32

## 2025-05-08 RX ADMIN — Medication 20 ML: at 12:15

## 2025-05-08 RX ADMIN — Medication 20 ML: at 09:52

## 2025-05-29 ENCOUNTER — HOSPITAL ENCOUNTER (OUTPATIENT)
Dept: ONCOLOGY | Facility: HOSPITAL | Age: 40
Discharge: HOME | End: 2025-05-29
Payer: COMMERCIAL

## 2025-05-29 ENCOUNTER — OFFICE VISIT (OUTPATIENT)
Dept: ONCOLOGY | Facility: HOSPITAL | Age: 40
End: 2025-05-29
Payer: COMMERCIAL

## 2025-05-29 VITALS
BODY MASS INDEX: 36.31 KG/M2 | SYSTOLIC BLOOD PRESSURE: 104 MMHG | WEIGHT: 197.31 LBS | RESPIRATION RATE: 18 BRPM | HEIGHT: 62 IN | TEMPERATURE: 97.5 F | HEART RATE: 89 BPM | DIASTOLIC BLOOD PRESSURE: 76 MMHG | OXYGEN SATURATION: 99 %

## 2025-05-29 DIAGNOSIS — Z45.2 ENCOUNTER FOR ADJUSTMENT OR MANAGEMENT OF VASCULAR ACCESS DEVICE: ICD-10-CM

## 2025-05-29 DIAGNOSIS — C50.211 MALIGNANT NEOPLASM OF UPPER-INNER QUADRANT OF RIGHT BREAST IN FEMALE, ESTROGEN RECEPTOR POSITIVE: Primary | ICD-10-CM

## 2025-05-29 DIAGNOSIS — C79.51 METASTASIS TO BONE: Primary | ICD-10-CM

## 2025-05-29 DIAGNOSIS — C50.211 MALIGNANT NEOPLASM OF UPPER-INNER QUADRANT OF RIGHT BREAST IN FEMALE, ESTROGEN RECEPTOR POSITIVE: ICD-10-CM

## 2025-05-29 DIAGNOSIS — Z17.0 MALIGNANT NEOPLASM OF UPPER-INNER QUADRANT OF RIGHT BREAST IN FEMALE, ESTROGEN RECEPTOR POSITIVE: Primary | ICD-10-CM

## 2025-05-29 DIAGNOSIS — C79.51 METASTASIS TO BONE: ICD-10-CM

## 2025-05-29 DIAGNOSIS — Z17.0 MALIGNANT NEOPLASM OF UPPER-INNER QUADRANT OF RIGHT BREAST IN FEMALE, ESTROGEN RECEPTOR POSITIVE: ICD-10-CM

## 2025-05-29 LAB
ALBUMIN SERPL-MCNC: 4.7 G/DL (ref 3.5–5.2)
ALBUMIN/GLOB SERPL: 1.5 G/DL
ALP SERPL-CCNC: 86 U/L (ref 39–117)
ALT SERPL W P-5'-P-CCNC: 240 U/L (ref 1–33)
ANION GAP SERPL CALCULATED.3IONS-SCNC: 15.9 MMOL/L (ref 5–15)
AST SERPL-CCNC: 142 U/L (ref 1–32)
BASOPHILS # BLD AUTO: 0.03 10*3/MM3 (ref 0–0.2)
BASOPHILS NFR BLD AUTO: 0.5 % (ref 0–1.5)
BILIRUB SERPL-MCNC: 0.9 MG/DL (ref 0–1.2)
BUN SERPL-MCNC: 11.9 MG/DL (ref 6–20)
BUN/CREAT SERPL: 15.7 (ref 7–25)
CALCIUM SPEC-SCNC: 9.7 MG/DL (ref 8.6–10.5)
CHLORIDE SERPL-SCNC: 102 MMOL/L (ref 98–107)
CO2 SERPL-SCNC: 21.1 MMOL/L (ref 22–29)
CREAT SERPL-MCNC: 0.76 MG/DL (ref 0.57–1)
DEPRECATED RDW RBC AUTO: 46 FL (ref 37–54)
EGFRCR SERPLBLD CKD-EPI 2021: 103 ML/MIN/1.73
EOSINOPHIL # BLD AUTO: 0.09 10*3/MM3 (ref 0–0.4)
EOSINOPHIL NFR BLD AUTO: 1.4 % (ref 0.3–6.2)
ERYTHROCYTE [DISTWIDTH] IN BLOOD BY AUTOMATED COUNT: 13.7 % (ref 12.3–15.4)
GLOBULIN UR ELPH-MCNC: 3.2 GM/DL
GLUCOSE SERPL-MCNC: 84 MG/DL (ref 65–99)
HCT VFR BLD AUTO: 39.2 % (ref 34–46.6)
HGB BLD-MCNC: 12.7 G/DL (ref 12–15.9)
IMM GRANULOCYTES # BLD AUTO: 0.01 10*3/MM3 (ref 0–0.05)
IMM GRANULOCYTES NFR BLD AUTO: 0.2 % (ref 0–0.5)
LYMPHOCYTES # BLD AUTO: 2.22 10*3/MM3 (ref 0.7–3.1)
LYMPHOCYTES NFR BLD AUTO: 35 % (ref 19.6–45.3)
MAGNESIUM SERPL-MCNC: 1.8 MG/DL (ref 1.6–2.6)
MCH RBC QN AUTO: 29.5 PG (ref 26.6–33)
MCHC RBC AUTO-ENTMCNC: 32.4 G/DL (ref 31.5–35.7)
MCV RBC AUTO: 91 FL (ref 79–97)
MONOCYTES # BLD AUTO: 0.61 10*3/MM3 (ref 0.1–0.9)
MONOCYTES NFR BLD AUTO: 9.6 % (ref 5–12)
NEUTROPHILS NFR BLD AUTO: 3.38 10*3/MM3 (ref 1.7–7)
NEUTROPHILS NFR BLD AUTO: 53.3 % (ref 42.7–76)
NRBC BLD AUTO-RTO: 0 /100 WBC (ref 0–0.2)
PHOSPHATE SERPL-MCNC: 3.7 MG/DL (ref 2.5–4.5)
PLATELET # BLD AUTO: 272 10*3/MM3 (ref 140–450)
PMV BLD AUTO: 10.1 FL (ref 6–12)
POTASSIUM SERPL-SCNC: 4.7 MMOL/L (ref 3.5–5.2)
PROT SERPL-MCNC: 7.9 G/DL (ref 6–8.5)
RBC # BLD AUTO: 4.31 10*6/MM3 (ref 3.77–5.28)
SODIUM SERPL-SCNC: 139 MMOL/L (ref 136–145)
WBC NRBC COR # BLD AUTO: 6.34 10*3/MM3 (ref 3.4–10.8)

## 2025-05-29 PROCEDURE — 25010000002 TRASTUZUMAB PER 10 MG: Performed by: INTERNAL MEDICINE

## 2025-05-29 PROCEDURE — 96413 CHEMO IV INFUSION 1 HR: CPT

## 2025-05-29 PROCEDURE — 85025 COMPLETE CBC W/AUTO DIFF WBC: CPT | Performed by: INTERNAL MEDICINE

## 2025-05-29 PROCEDURE — 80053 COMPREHEN METABOLIC PANEL: CPT | Performed by: INTERNAL MEDICINE

## 2025-05-29 PROCEDURE — 84100 ASSAY OF PHOSPHORUS: CPT | Performed by: INTERNAL MEDICINE

## 2025-05-29 PROCEDURE — 25010000002 HEPARIN LOCK FLUSH PER 10 UNITS: Performed by: INTERNAL MEDICINE

## 2025-05-29 PROCEDURE — 25810000003 SODIUM CHLORIDE 0.9 % SOLUTION 250 ML FLEX CONT: Performed by: INTERNAL MEDICINE

## 2025-05-29 PROCEDURE — 25810000003 SODIUM CHLORIDE 0.9 % SOLUTION: Performed by: INTERNAL MEDICINE

## 2025-05-29 PROCEDURE — 83735 ASSAY OF MAGNESIUM: CPT | Performed by: INTERNAL MEDICINE

## 2025-05-29 RX ORDER — HEPARIN SODIUM (PORCINE) LOCK FLUSH IV SOLN 100 UNIT/ML 100 UNIT/ML
500 SOLUTION INTRAVENOUS AS NEEDED
Status: DISCONTINUED | OUTPATIENT
Start: 2025-05-29 | End: 2025-05-31 | Stop reason: HOSPADM

## 2025-05-29 RX ORDER — LETROZOLE 2.5 MG/1
2.5 TABLET, FILM COATED ORAL DAILY
Qty: 30 TABLET | Refills: 5
Start: 2025-05-29

## 2025-05-29 RX ORDER — SODIUM CHLORIDE 9 MG/ML
250 INJECTION, SOLUTION INTRAVENOUS ONCE
Status: CANCELLED | OUTPATIENT
Start: 2025-05-29

## 2025-05-29 RX ORDER — SODIUM CHLORIDE 0.9 % (FLUSH) 0.9 %
20 SYRINGE (ML) INJECTION AS NEEDED
Status: DISCONTINUED | OUTPATIENT
Start: 2025-05-29 | End: 2025-05-31 | Stop reason: HOSPADM

## 2025-05-29 RX ORDER — SODIUM CHLORIDE 9 MG/ML
250 INJECTION, SOLUTION INTRAVENOUS ONCE
Status: COMPLETED | OUTPATIENT
Start: 2025-05-29 | End: 2025-05-29

## 2025-05-29 RX ORDER — SODIUM CHLORIDE 0.9 % (FLUSH) 0.9 %
20 SYRINGE (ML) INJECTION AS NEEDED
OUTPATIENT
Start: 2025-05-29

## 2025-05-29 RX ORDER — ACETAMINOPHEN 325 MG/1
650 TABLET ORAL ONCE
Status: CANCELLED | OUTPATIENT
Start: 2025-05-29

## 2025-05-29 RX ORDER — ACETAMINOPHEN 325 MG/1
650 TABLET ORAL ONCE
Status: DISCONTINUED | OUTPATIENT
Start: 2025-05-29 | End: 2025-05-31 | Stop reason: HOSPADM

## 2025-05-29 RX ORDER — HEPARIN SODIUM (PORCINE) LOCK FLUSH IV SOLN 100 UNIT/ML 100 UNIT/ML
500 SOLUTION INTRAVENOUS AS NEEDED
OUTPATIENT
Start: 2025-05-29

## 2025-05-29 RX ADMIN — Medication 20 ML: at 12:43

## 2025-05-29 RX ADMIN — HEPARIN 500 UNITS: 100 SYRINGE at 14:44

## 2025-05-29 RX ADMIN — Medication 20 ML: at 14:44

## 2025-05-29 RX ADMIN — TRASTUZUMAB 530 MG: 150 INJECTION, POWDER, LYOPHILIZED, FOR SOLUTION INTRAVENOUS at 14:10

## 2025-05-29 RX ADMIN — SODIUM CHLORIDE 250 ML: 9 INJECTION, SOLUTION INTRAVENOUS at 14:10

## 2025-05-29 NOTE — PROGRESS NOTES
Chief Complaint/Reason for Referral:  Breast Cancer    Lela Vanegas APRN Oldham, Tara, APRN    Records Obtained:  Records of the patients history including those obtained from [facility] and patient information were reviewed and summarized in detail.    Subjective    History of Present Illness  The patient is a very pleasant 39-year-old  female who presents for follow-up for breast cancer treatment. She currently follows every 6 weeks for Xgeva and trastuzumab treatment.      Xgeva is currently on hold at this time and was given in march. The last cycle of trastuzumab was administered on 05/08/2025, and she receives this treatment every 21 days.  She takes letrozole daily and is tolerating well. She reports she is tolerating her treatments well without any adverse side effects.     An echocardiogram performed on 04/15/2025 showed an EF of 56 to 60%. Imaging studies in April 2025 included a bone scan that demonstrated a stable appearance of the osseous metastatic disease. Additionally, a CT of the abdomen revealed no evidence of new or progressive metastatic disease in the chest, abdomen, or pelvis. Lab work today indicates that the CBC is within acceptable limits, while the chemistry panel is pending.    Results  Labs   - CBC: 05/29/2025, Within acceptable limits    Imaging   - Echocardiogram: 04/15/2025, EF to be 56 to 60%   - Bone scan: 04/2025, Stable appearance of the osseous metastatic disease   - CT of the abdomen: 04/2025, No evidence of new or progressive metastatic disease in the chest, abdomen or pelvis         Cancer Staging   Malignant neoplasm of upper-inner quadrant of right breast in female, estrogen receptor positive  Staging form: Breast, AJCC 8th Edition  - Clinical: Stage IV (cT2, cN0, cM1, ER+, WA+, HER2-) - Signed by Perry Garcia MD on 6/8/2023      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, estrogen receptor positive   6/8/2023  Initial Diagnosis    Malignant neoplasm of upper-inner quadrant of right breast in female, estrogen receptor positive     6/8/2023 Cancer Staged    Staging form: Breast, AJCC 8th Edition  - Clinical: Stage IV (cT2, cN0, cM1, ER+, PA+, HER2-) - Signed by Perry Garcia MD on 6/8/2023 6/9/2023 - 4/4/2024 Chemotherapy    OP SUPPORTIVE BREAST Leuprolide 3.75 MG Q28D     6/15/2023 - 6/15/2023 Chemotherapy    OP BREAST Letrozole / Ribociclib     7/13/2023 -  Chemotherapy    OP BREAST Letrozole / Trastuzumab     8/24/2023 -  Chemotherapy    OP SUPPORTIVE Denosumab (Xgeva) Q42D     Metastasis to bone   6/8/2023 Initial Diagnosis    Metastasis to bone     8/24/2023 -  Chemotherapy    OP SUPPORTIVE Denosumab (Xgeva) Q42D         Review of Systems   All other systems reviewed and are negative.      Current Outpatient Medications on File Prior to Visit   Medication Sig Dispense Refill    letrozole (FEMARA) 2.5 MG tablet Take 1 tablet by mouth Daily. 30 tablet 5    letrozole (FEMARA) 2.5 MG tablet Take 1 tablet by mouth Daily. 30 tablet 5    azithromycin (Zithromax Z-Constantino) 250 MG tablet Take 2 tablets by mouth on day 1, then 1 tablet daily on days 2-5 (Patient not taking: Reported on 5/29/2025) 6 tablet 0    brompheniramine-pseudoephedrine-DM 30-2-10 MG/5ML syrup Take 5 mL by mouth 4 (Four) Times a Day As Needed for Allergies, Cough or Congestion. (Patient not taking: Reported on 5/29/2025) 240 mL 0    clonazePAM (KlonoPIN) 0.5 MG tablet TAKE 1 TABLET BY MOUTH TWICE A DAY AS NEEDED FOR ANXIETY (Patient not taking: Reported on 5/29/2025) 30 tablet 1    cyclobenzaprine (FLEXERIL) 10 MG tablet TAKE 1 TABLET BY MOUTH 3 TIMES A DAY AS NEEDED FOR MUSCLE SPASMS. (Patient not taking: Reported on 5/29/2025) 90 tablet 5    folic acid (FOLVITE) 1 MG tablet TAKE 1 TABLET BY MOUTH EVERY DAY (Patient not taking: Reported on 5/29/2025) 90 tablet 1    HYDROcodone-acetaminophen (NORCO) 5-325 MG per tablet Take 1 tablet by mouth Every 6  (Six) Hours As Needed for Moderate Pain (Pain). (Patient not taking: Reported on 2025) 4 tablet 0    hydrOXYzine (ATARAX) 50 MG tablet TAKE 1 TABLET BY MOUTH EVERYDAY AT BEDTIME (Patient not taking: Reported on 2025) 90 tablet 1    letrozole (FEMARA) 2.5 MG tablet Take 1 tablet by mouth Daily. (Patient not taking: Reported on 2025) 30 tablet 5    Nurtec 75 MG dispersible tablet TAKE 1 TABLET BY MOUTH DAILY AS NEEDED (MIGRAINES). (Patient not taking: Reported on 2025) 8 tablet 5    ondansetron (ZOFRAN) 8 MG tablet Take 1 tablet by mouth 3 (Three) Times a Day As Needed for Nausea or Vomiting. (Patient not taking: Reported on 2025) 30 tablet 5    Sodium Fluoride 5000 PPM 1.1 % gel Take 1 Application by mouth 2 (Two) Times a Day. TOOTHPASTE (Patient not taking: Reported on 2025)      Trastuzumab (HERCEPTIN IV) Infuse  into a venous catheter. EVERY 21 DAYS-NEXT ON 10/31/24 (Patient not taking: Reported on 2025)      Veozah 45 MG tablet TAKE 1 TABLET BY MOUTH EVERY DAY (Patient not taking: Reported on 2025) 30 tablet 2    vitamin D (ERGOCALCIFEROL) 1.25 MG (14341 UT) capsule capsule TAKE 1 CAPSULE BY MOUTH 1 TIME PER WEEK. (Patient not taking: Reported on 2025) 12 capsule 1     Current Facility-Administered Medications on File Prior to Visit   Medication Dose Route Frequency Provider Last Rate Last Admin    heparin injection 500 Units  500 Units Intravenous PRN Perry Garcia MD   500 Units at 25 1444    sodium chloride 0.9 % flush 20 mL  20 mL Intravenous PRN Perry Garcia MD   20 mL at 25 1444       No Known Allergies  Past Medical History:   Diagnosis Date    Anemia     Breast cancer     RIGHT    Kidney stone     Metastasis to bone 2023     Past Surgical History:   Procedure Laterality Date    BREAST BIOPSY      CERVICAL FUSION  2023     SECTION N/A 2021    Procedure:  SECTION PRIMARY;  Surgeon: Zoe Dawson MD;  Location:   [facility] DELIVERY;  Service: Obstetrics/Gynecology;  Laterality: N/A;    CYSTOSCOPY BLADDER STONE LITHOTRIPSY      SALPINGO OOPHORECTOMY Bilateral 2024    Procedure: SALPINGO OOPHORECTOMY LAPAROSCOPIC WITH DAVINCI ROBOT;  Surgeon: Ashlyn Avelar MD;  Location: [facility] OR;  Service: Robotics - DaVinci;  Laterality: Bilateral;    VENOUS ACCESS DEVICE (PORT) INSERTION Right 10/30/2024    Procedure: INSERTION VENOUS ACCESS DEVICE : Placement of a port in the tissue under the skin at your upper chest with a tube attached to it that goes into your vein;  Surgeon: Jitendra García MD;  Location: [facility];  Service: General;  Laterality: Right;     Social History     Socioeconomic History    Marital status:      Spouse name: Janet   Tobacco Use    Smoking status: Former     Current packs/day: 0.00     Average packs/day: 0.3 packs/day for 19.6 years (4.9 ttl pk-yrs)     Types: Cigarettes     Start date: 2005     Quit date: 2024     Years since quittin.7     Passive exposure: Current    Smokeless tobacco: Never   Vaping Use    Vaping status: Never Used   Substance and Sexual Activity    Alcohol use: Not Currently     Alcohol/week: 6.0 standard drinks of alcohol    Drug use: Yes     Types: Marijuana     Comment: for pain control/DAILY last used 10-30-24    Sexual activity: Defer     Family History   Problem Relation Age of Onset    Mental illness Mother     Hypertension Father     Alcohol abuse Father     Ovarian cancer Paternal Aunt     Breast cancer Maternal Great-Grandmother     Malig Hyperthermia Neg Hx      Immunization History   Administered Date(s) Administered    MMR 2021    PPD Test 2014, 2015, 2016    Tdap 2021       Tobacco Use: Medium Risk (2025)    Patient History     Smoking Tobacco Use: Former     Smokeless Tobacco Use: Never     Passive Exposure: Current       Objective     Physical Exam  Vitals and nursing note reviewed. Exam  "conducted with a chaperone present.   Constitutional:       Appearance: Normal appearance.   HENT:      Mouth/Throat:      Mouth: Mucous membranes are moist.   Eyes:      Extraocular Movements: Extraocular movements intact.   Cardiovascular:      Rate and Rhythm: Normal rate.   Pulmonary:      Effort: Pulmonary effort is normal. No respiratory distress.   Musculoskeletal:         General: Normal range of motion.      Cervical back: Normal range of motion and neck supple.   Skin:     General: Skin is warm and dry.   Neurological:      General: No focal deficit present.      Mental Status: She is alert and oriented to person, place, and time.   Psychiatric:         Mood and Affect: Mood normal.         Behavior: Behavior normal.         Thought Content: Thought content normal.         Judgment: Judgment normal.         Vitals:    05/29/25 1310   BP: 104/76   Pulse: 89   Resp: 18   Temp: 97.5 °F (36.4 °C)   TempSrc: Oral   SpO2: 99%   Weight: 89.5 kg (197 lb 5 oz)   Height: 157.5 cm (62.01\")   PainSc: 0-No pain       Wt Readings from Last 3 Encounters:   05/29/25 89.5 kg (197 lb 5 oz)   05/08/25 93 kg (205 lb 0.4 oz)   04/17/25 93.4 kg (205 lb 14.6 oz)       ECOG score: 0         ECOG: (0) Fully Active - Able to Carry On All Pre-disease Performance Without Restriction  Fall Risk Assessment was completed, and patient is at low risk for falls.  PHQ-9 Total Score:         The patient is  experiencing fatigue. Fatigue score: 1    PT/OT Functional Screening: PT fx screen : No needs identified  Speech Functional Screening: Speech fx screen : No needs identified  Rehab to be ordered:         Result Review :  The following data was reviewed by: LÁZARO Mcdonnell on 05/29/2025:  Lab Results   Component Value Date    HGB 12.7 05/29/2025    HCT 39.2 05/29/2025    MCV 91.0 05/29/2025     05/29/2025    WBC 6.34 05/29/2025    NEUTROABS 3.38 05/29/2025    LYMPHSABS 2.22 05/29/2025    MONOSABS 0.61 05/29/2025    " "EOSABS 0.09 05/29/2025    BASOSABS 0.03 05/29/2025     Lab Results   Component Value Date    GLUCOSE 84 05/29/2025    BUN 11.9 05/29/2025    CREATININE 0.76 05/29/2025     05/29/2025    K 4.7 05/29/2025     05/29/2025    CO2 21.1 (L) 05/29/2025    CALCIUM 9.7 05/29/2025    PROTEINTOT 7.9 05/29/2025    ALBUMIN 4.7 05/29/2025    BILITOT 0.9 05/29/2025    ALKPHOS 86 05/29/2025     (H) 05/29/2025     (H) 05/29/2025     Lab Results   Component Value Date    Folate >20.00 02/22/2024     No results found for: \"IRON\", \"LABIRON\", \"TRANSFERRIN\", \"TIBC\"  Lab Results   Component Value Date    UWHAMVQO87 577 02/22/2024    FOLATE >20.00 02/22/2024     No results found for: \"PSA\", \"CEA\", \"AFP\", \"\", \"\"         Assessment and Plan:  Diagnoses and all orders for this visit:    1. Malignant neoplasm of upper-inner quadrant of right breast in female, estrogen receptor positive (Primary)    2. Metastasis to bone    Other orders  -     Cancel: sodium chloride 0.9 % infusion 250 mL  -     Cancel: acetaminophen (TYLENOL) tablet 650 mg  -     Cancel: Trastuzumab (HERCEPTIN) 530 mg in sodium chloride 0.9 % 250 mL chemo IVPB        Assessment & Plan  1. 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  Triple positive.  Patient is on treatment with letrozole, trastuzumab and denosumab for bony involvement. Denosumab is hold currently due to previously elevated LFT's.      Tolerating well.  Echocardiogram is normal.  Restaging scans show no evidence of new or worsening disease.  Lab work is notable for elevated transaminases but imaging shows fatty liver.  The remainder of her lab work is adequate for treatment.  Proceed with trastuzumab as planned.  Continue letrozole daily. OK for Trastuzumab treatment today. Return in 21 days for next cycle and again in 6 weeks and follow up with MD in 6 weeks. Next echocardiogram " is due in July 15, 2025.         2. Metastasis to bone  Assessment & Plan:  She is on denosumab every 6 weeks along with her trastuzumab.  Tolerating well.  No dental or jaw pain.  Electrolytes are adequate for treatment.  CT and bone scans show stable disease.  Proceed with denosumab    3.  Transaminitis: Elevated AST / ALT. Not worsening but no improvement. Abdominal US shows mild hepatomegaly and suspected fatty liver. Continue to monitor.            I spent 30 minutes caring for Annita on this date of service. This time includes time spent by me in the following activities:preparing for the visit, reviewing tests, obtaining and/or reviewing a separately obtained history, performing a medically appropriate examination and/or evaluation , counseling and educating the patient/family/caregiver, ordering medications, tests, or procedures, referring and communicating with other health care professionals , documenting information in the medical record, and independently interpreting results and communicating that information with the patient/family/caregiver    Patient Follow Up: 3 weeks, 6 weeks treatment with OV with Dr. Garcia at 6 weeks.     Patient was given instructions and counseling regarding her condition or for health maintenance advice. Please see specific information pulled into the AVS if appropriate.     LÁZARO Mcdonnell    5/29/2025    .tob    Patient or patient representative verbalized consent for the use of Ambient Listening during the visit with  LÁZARO Mcdonnell for chart documentation. 5/29/2025  16:28 EDT

## 2025-05-31 DIAGNOSIS — C50.211 MALIGNANT NEOPLASM OF UPPER-INNER QUADRANT OF RIGHT BREAST IN FEMALE, ESTROGEN RECEPTOR POSITIVE: ICD-10-CM

## 2025-05-31 DIAGNOSIS — Z17.0 MALIGNANT NEOPLASM OF UPPER-INNER QUADRANT OF RIGHT BREAST IN FEMALE, ESTROGEN RECEPTOR POSITIVE: ICD-10-CM

## 2025-06-02 RX ORDER — LETROZOLE 2.5 MG/1
2.5 TABLET, FILM COATED ORAL DAILY
Refills: 1 | OUTPATIENT
Start: 2025-06-02

## 2025-06-02 RX ORDER — ERGOCALCIFEROL 1.25 MG/1
50000 CAPSULE, LIQUID FILLED ORAL WEEKLY
Qty: 12 CAPSULE | Refills: 1 | Status: SHIPPED | OUTPATIENT
Start: 2025-06-02

## 2025-06-17 ENCOUNTER — OFFICE VISIT (OUTPATIENT)
Dept: GASTROENTEROLOGY | Facility: CLINIC | Age: 40
End: 2025-06-17
Payer: COMMERCIAL

## 2025-06-17 ENCOUNTER — LAB (OUTPATIENT)
Dept: LAB | Facility: HOSPITAL | Age: 40
End: 2025-06-17
Payer: COMMERCIAL

## 2025-06-17 VITALS
OXYGEN SATURATION: 97 % | HEIGHT: 62 IN | DIASTOLIC BLOOD PRESSURE: 70 MMHG | BODY MASS INDEX: 37.25 KG/M2 | SYSTOLIC BLOOD PRESSURE: 105 MMHG | HEART RATE: 86 BPM | WEIGHT: 202.4 LBS

## 2025-06-17 DIAGNOSIS — C50.211 MALIGNANT NEOPLASM OF UPPER-INNER QUADRANT OF RIGHT BREAST IN FEMALE, ESTROGEN RECEPTOR POSITIVE: ICD-10-CM

## 2025-06-17 DIAGNOSIS — K76.0 HEPATIC STEATOSIS: ICD-10-CM

## 2025-06-17 DIAGNOSIS — K76.0 HEPATIC STEATOSIS: Primary | ICD-10-CM

## 2025-06-17 DIAGNOSIS — R79.89 ELEVATED LFTS: ICD-10-CM

## 2025-06-17 DIAGNOSIS — Z17.0 MALIGNANT NEOPLASM OF UPPER-INNER QUADRANT OF RIGHT BREAST IN FEMALE, ESTROGEN RECEPTOR POSITIVE: ICD-10-CM

## 2025-06-17 LAB
ALBUMIN SERPL-MCNC: 4.5 G/DL (ref 3.5–5.2)
ALP SERPL-CCNC: 91 U/L (ref 39–117)
ALPHA-FETOPROTEIN: 4.14 NG/ML (ref 0–8.3)
ALT SERPL W P-5'-P-CCNC: 147 U/L (ref 1–33)
AST SERPL-CCNC: 82 U/L (ref 1–32)
BILIRUB CONJ SERPL-MCNC: 0.1 MG/DL (ref 0–0.3)
BILIRUB INDIRECT SERPL-MCNC: 0.3 MG/DL
BILIRUB SERPL-MCNC: 0.4 MG/DL (ref 0–1.2)
CERULOPLASMIN SERPL-MCNC: 29 MG/DL (ref 19–39)
FERRITIN SERPL-MCNC: 92.4 NG/ML (ref 13–150)
HAV IGM SERPL QL IA: NORMAL
HBV CORE IGM SERPL QL IA: NORMAL
HBV SURFACE AG SERPL QL IA: NORMAL
HCV AB SER QL: NORMAL
INR PPP: 0.95 (ref 0.86–1.15)
IRON 24H UR-MRATE: 46 MCG/DL (ref 37–145)
IRON SATN MFR SERPL: 10 % (ref 20–50)
PROT SERPL-MCNC: 8 G/DL (ref 6–8.5)
PROTHROMBIN TIME: 13.1 SECONDS (ref 11.8–14.9)
TIBC SERPL-MCNC: 465 MCG/DL (ref 298–536)
TRANSFERRIN SERPL-MCNC: 312 MG/DL (ref 200–360)

## 2025-06-17 PROCEDURE — 85610 PROTHROMBIN TIME: CPT

## 2025-06-17 PROCEDURE — 86381 MITOCHONDRIAL ANTIBODY EACH: CPT

## 2025-06-17 PROCEDURE — 84466 ASSAY OF TRANSFERRIN: CPT

## 2025-06-17 PROCEDURE — 82784 ASSAY IGA/IGD/IGG/IGM EACH: CPT

## 2025-06-17 PROCEDURE — 86038 ANTINUCLEAR ANTIBODIES: CPT

## 2025-06-17 PROCEDURE — 99214 OFFICE O/P EST MOD 30 MIN: CPT

## 2025-06-17 PROCEDURE — 84155 ASSAY OF PROTEIN SERUM: CPT

## 2025-06-17 PROCEDURE — 86334 IMMUNOFIX E-PHORESIS SERUM: CPT

## 2025-06-17 PROCEDURE — 80074 ACUTE HEPATITIS PANEL: CPT

## 2025-06-17 PROCEDURE — 82103 ALPHA-1-ANTITRYPSIN TOTAL: CPT

## 2025-06-17 PROCEDURE — 83540 ASSAY OF IRON: CPT

## 2025-06-17 PROCEDURE — 82525 ASSAY OF COPPER: CPT

## 2025-06-17 PROCEDURE — 36415 COLL VENOUS BLD VENIPUNCTURE: CPT

## 2025-06-17 PROCEDURE — 84165 PROTEIN E-PHORESIS SERUM: CPT

## 2025-06-17 PROCEDURE — 82105 ALPHA-FETOPROTEIN SERUM: CPT

## 2025-06-17 PROCEDURE — 80076 HEPATIC FUNCTION PANEL: CPT

## 2025-06-17 PROCEDURE — 82728 ASSAY OF FERRITIN: CPT

## 2025-06-17 PROCEDURE — 82104 ALPHA-1-ANTITRYPSIN PHENO: CPT

## 2025-06-17 PROCEDURE — 86015 ACTIN ANTIBODY EACH: CPT

## 2025-06-17 PROCEDURE — 82390 ASSAY OF CERULOPLASMIN: CPT

## 2025-06-17 NOTE — PATIENT INSTRUCTIONS
Advised patient to maintain a healthy lifestyle: weight loss of 10%, cutting back on carbs, sugars, and fried fatty foods, limiting intake of soda and sugary drinks, and abstain from alcohol. Recommend 2 to 3 cups of black coffee a day. Advise patient to go on daily walks with 10,000 steps daily (as recommended for patients with NAFLD/CAMPBELL).

## 2025-06-17 NOTE — PROGRESS NOTES
Chief Complaint     Fatty Liver      History of Present Illness     Annita Johnson is a 39 y.o. female who presents to [facility] on referral from LÁZARO Fonseca for a gastroenterology evaluation of fatty liver.      History of Present Illness      2025 Office visit:  Patient presented to the office of Dr. Ramu Duran for evaluation of fatty liver. She was diagnosed with a malignant neoplasm of upper inner quadrant of right breast in 2023 and currently is undergoing immunotherapy infusions. On 2025 patient had an ultrasound of the liver that showed mild hepatomegaly and fatty liver.  Patient denies right upper quadrant pain.  Patient reports she would drink acute beer or zoraida about once a week but has stopped drinking alcohol since 2024.  Denies history of IV drug use, unprofessional tattoos, history of or exposure to hepatitis, history of blood transfusions, or family history of liver disease.  Patient explains her oncologist does not believe the elevation in liver function test is related to chemotherapy treatment.     No reports of upper GI complaints-denies nausea, vomiting hematemesis, acid reflux, unintentional weight loss or trouble swallowing.  No reports of lower GI complaints-denies constipation, diarrhea, hematochezia, melena.  No reports of abdominal pain.    2025 Ultrasound of the liver - mild hepatomegaly with suspected fatty liver.  Echogenic appearance of the pancreas, nonspecific on ultrasound.  Right renal cyst.       History      Past Medical History:   Diagnosis Date    Anemia     Breast cancer     RIGHT    Kidney stone     Metastasis to bone 2023       Past Surgical History:   Procedure Laterality Date    BREAST BIOPSY      CERVICAL FUSION  2023     SECTION N/A 2021    Procedure:  SECTION PRIMARY;  Surgeon: Zoe Dawson MD;  Location: [facility] LABOR DELIVERY;  Service: Obstetrics/Gynecology;   Laterality: N/A;    CYSTOSCOPY BLADDER STONE LITHOTRIPSY      SALPINGO OOPHORECTOMY Bilateral 04/17/2024    Procedure: SALPINGO OOPHORECTOMY LAPAROSCOPIC WITH DAVINCI ROBOT;  Surgeon: Ashlyn Avelar MD;  Location: [facility] MAIN OR;  Service: Robotics - DaVinci;  Laterality: Bilateral;    VENOUS ACCESS DEVICE (PORT) INSERTION Right 10/30/2024    Procedure: INSERTION VENOUS ACCESS DEVICE : Placement of a port in the tissue under the skin at your upper chest with a tube attached to it that goes into your vein;  Surgeon: Jitendra García MD;  Location:  [facility];  Service: General;  Laterality: Right;       Family History   Problem Relation Age of Onset    Mental illness Mother     Hypertension Father     Alcohol abuse Father     Ovarian cancer Paternal Aunt     Breast cancer Maternal Great-Grandmother     Malig Hyperthermia Neg Hx     Colon cancer Neg Hx         Current Medications        Current Outpatient Medications:     letrozole (FEMARA) 2.5 MG tablet, Take 1 tablet by mouth Daily., Disp: 30 tablet, Rfl: 5    vitamin D (ERGOCALCIFEROL) 1.25 MG (62628 UT) capsule capsule, TAKE 1 CAPSULE BY MOUTH 1 TIME PER WEEK., Disp: 12 capsule, Rfl: 1    azithromycin (Zithromax Z-Constantino) 250 MG tablet, Take 2 tablets by mouth on day 1, then 1 tablet daily on days 2-5 (Patient not taking: Reported on 5/29/2025), Disp: 6 tablet, Rfl: 0    brompheniramine-pseudoephedrine-DM 30-2-10 MG/5ML syrup, Take 5 mL by mouth 4 (Four) Times a Day As Needed for Allergies, Cough or Congestion. (Patient not taking: Reported on 5/29/2025), Disp: 240 mL, Rfl: 0    clonazePAM (KlonoPIN) 0.5 MG tablet, TAKE 1 TABLET BY MOUTH TWICE A DAY AS NEEDED FOR ANXIETY (Patient not taking: Reported on 5/29/2025), Disp: 30 tablet, Rfl: 1    cyclobenzaprine (FLEXERIL) 10 MG tablet, TAKE 1 TABLET BY MOUTH 3 TIMES A DAY AS NEEDED FOR MUSCLE SPASMS. (Patient not taking: Reported on 5/29/2025), Disp: 90 tablet, Rfl: 5    folic acid (FOLVITE) 1 MG tablet, TAKE  1 TABLET BY MOUTH EVERY DAY (Patient not taking: Reported on 5/29/2025), Disp: 90 tablet, Rfl: 1    HYDROcodone-acetaminophen (NORCO) 5-325 MG per tablet, Take 1 tablet by mouth Every 6 (Six) Hours As Needed for Moderate Pain (Pain). (Patient not taking: Reported on 5/29/2025), Disp: 4 tablet, Rfl: 0    hydrOXYzine (ATARAX) 50 MG tablet, TAKE 1 TABLET BY MOUTH EVERYDAY AT BEDTIME (Patient not taking: Reported on 5/29/2025), Disp: 90 tablet, Rfl: 1    letrozole (FEMARA) 2.5 MG tablet, Take 1 tablet by mouth Daily., Disp: 30 tablet, Rfl: 5    letrozole (FEMARA) 2.5 MG tablet, Take 1 tablet by mouth Daily. (Patient not taking: Reported on 5/29/2025), Disp: 30 tablet, Rfl: 5    letrozole (FEMARA) 2.5 MG tablet, Take 1 tablet by mouth Daily., Disp: 30 tablet, Rfl: 5    Nurtec 75 MG dispersible tablet, TAKE 1 TABLET BY MOUTH DAILY AS NEEDED (MIGRAINES). (Patient not taking: Reported on 5/29/2025), Disp: 8 tablet, Rfl: 5    ondansetron (ZOFRAN) 8 MG tablet, Take 1 tablet by mouth 3 (Three) Times a Day As Needed for Nausea or Vomiting. (Patient not taking: Reported on 5/29/2025), Disp: 30 tablet, Rfl: 5    Sodium Fluoride 5000 PPM 1.1 % gel, Take 1 Application by mouth 2 (Two) Times a Day. TOOTHPASTE (Patient not taking: Reported on 5/29/2025), Disp: , Rfl:     Trastuzumab (HERCEPTIN IV), Infuse  into a venous catheter. EVERY 21 DAYS-NEXT ON 10/31/24 (Patient not taking: Reported on 5/29/2025), Disp: , Rfl:     Veozah 45 MG tablet, TAKE 1 TABLET BY MOUTH EVERY DAY (Patient not taking: Reported on 5/29/2025), Disp: 30 tablet, Rfl: 2     Allergies     No Known Allergies    Social History       Social History     Social History Narrative    Not on file       Immunizations     Immunization:  Immunization History   Administered Date(s) Administered    MMR 07/04/2021    PPD Test 07/24/2014, 07/21/2015, 07/11/2016    Tdap 05/04/2021          Objective     Objective     Vital Signs:   /70 (BP Location: Left arm, Patient  "Position: Sitting, Cuff Size: Adult)   Pulse 86   Ht 157.5 cm (62.01\")   Wt 91.8 kg (202 lb 6.4 oz)   SpO2 97%   BMI 37.01 kg/m²       Physical Exam  HENT:      Head: Normocephalic.   Cardiovascular:      Rate and Rhythm: Normal rate.   Musculoskeletal:         General: Normal range of motion.   Neurological:      General: No focal deficit present.      Mental Status: She is alert.                 Results      Result Review :   The following data was reviewed by: LÁZARO Abreu on 06/17/2025:    Lab Results - Last 18 Months   Lab Units 05/29/25  1242 05/08/25  0947 04/17/25  0858   WBC 10*3/mm3 6.34 11.46* 8.70   HEMOGLOBIN g/dL 12.7 13.2 12.9   MCV fL 91.0 92.2 92.2   PLATELETS 10*3/mm3 272 325 292         Lab Results - Last 18 Months   Lab Units 05/29/25  1242 05/08/25  0947 04/17/25  0858   BUN mg/dL 11.9 9 11   CREATININE mg/dL 0.76 0.77 0.69   SODIUM mmol/L 139 139 140   POTASSIUM mmol/L 4.7 4.5 4.3   CHLORIDE mmol/L 102 103 103   CO2 mmol/L 21.1* 20.2* 23.4   GLUCOSE mg/dL 84 105* 115*      No results for input(s): \"PROTIME\", \"INR\", \"PTT\" in the last 82775 hours.  Lab Results - Last 18 Months   Lab Units 05/29/25  1242 05/08/25  0947 04/17/25  0858   AST (SGOT) U/L 142* 164* 156*   ALT (SGPT) U/L 240* 236* 257*   ALK PHOS U/L 86 87 72   BILIRUBIN mg/dL 0.9 0.5 0.5   TOTAL PROTEIN g/dL 7.9 8.1 8.1   ALBUMIN g/dL 4.7 4.5 4.5      Lab Results - Last 18 Months   Lab Units 02/22/24  1315   VITAMIN B 12 pg/mL 577   FOLATE ng/mL >20.00     No results for input(s): \"HAV\", \"HEPAIGM\", \"HEPBIGM\", \"HEPBCAB\", \"HBEAG\", \"HEPCAB\" in the last 45934 hours.    CT Chest With Contrast Diagnostic  Result Date: 4/15/2025  Impression: 1.No evidence of new or progressive metastatic disease in the chest, abdomen, or pelvis. 2.Stable appearance of the sclerotic bone metastases. 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 Electronically Signed: Demarco Gurrola MD  " 4/15/2025 12:23 PM EDT  Workstation ID: [id number]    CT Abdomen Pelvis With Contrast  Result Date: 4/15/2025  Impression: 1.No evidence of new or progressive metastatic disease in the chest, abdomen, or pelvis. 2.Stable appearance of the sclerotic bone metastases. 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 Electronically Signed: Demarco Gurrola MD  4/15/2025 12:23 PM EDT  Workstation ID: [id number]    NM Bone Scan Whole Body  Result Date: 4/15/2025  Stable appearance of osseous metastatic disease since 10/16/2025. Electronically Signed: Karina Corona MD  4/15/2025 12:09 PM EDT  Workstation ID: [id number]    US Liver  Result Date: 4/9/2025  Impression: Mild hepatomegaly with suspected fatty liver. Echogenic appearance of the pancreas, nonspecific on ultrasound. Right renal cyst. Electronically Signed: Anu Jimenez MD  4/9/2025 2:02 PM EDT  Workstation ID: [id number]      Results               Assessment and Plan        Assessment and Plan    Diagnoses and all orders for this visit:    1. Hepatic steatosis (Primary)  -     AFP Tumor Marker; Future  -     Alpha - 1 - Antitrypsin Phenotype; Future  -     LESLI; Future  -     Hepatic Function Panel; Future  -     Hepatitis Panel, Acute; Future  -     Iron Profile w/o Ferritin; Future  -     Ferritin; Future  -     Copper, Serum; Future  -     Protime-INR; Future  -     Ceruloplasmin; Future  -     Mitochondrial Antibodies, M2; Future  -     Anti-Smooth Muscle Antibody Titer; Future  -     YUDI + PE; Future  -     Liver Elastography; Future    2. Elevated LFTs    3. Malignant neoplasm of upper-inner quadrant of right breast in female, estrogen receptor positive    4. BMI 37.0-37.9, adult          Assessment & Plan        * Surgery not found *      Follow Up        Follow Up   Return in about 6 months (around 12/17/2025) for Fatty Liver Disease.    Hepatic lab workup ordered to determine cause of fatty liver. Differential  diagnosis include but are not limited to viral hepatitis, autoimmune hepatitis, NAFLD, toxic hepatitis, vascular causes such as Budd Chiari or CHF, or hereditary causes such as hemochromatosis, alpha 1 antitrypsin deficiency, or Burke disease. Follow up Liver US  and Labs in 6 months. Will order FibroScan as noninvasive testing for fibrosis and fatty liver.     Patient was given instructions and counseling regarding her condition or for health maintenance advice. Please see specific information pulled into the AVS if appropriate.

## 2025-06-18 ENCOUNTER — TELEPHONE (OUTPATIENT)
Dept: ONCOLOGY | Facility: HOSPITAL | Age: 40
End: 2025-06-18
Payer: COMMERCIAL

## 2025-06-18 NOTE — TELEPHONE ENCOUNTER
This nurse called the pt back after communicating with [facility]. This nurse informed the pt that we do not have an earlier apt. The pt voiced understanding and states that she will be at her scheduled apt tomorrow.

## 2025-06-19 ENCOUNTER — HOSPITAL ENCOUNTER (OUTPATIENT)
Dept: ONCOLOGY | Facility: HOSPITAL | Age: 40
Discharge: HOME | End: 2025-06-19
Payer: COMMERCIAL

## 2025-06-19 VITALS
RESPIRATION RATE: 18 BRPM | OXYGEN SATURATION: 100 % | DIASTOLIC BLOOD PRESSURE: 75 MMHG | HEIGHT: 62 IN | BODY MASS INDEX: 36.67 KG/M2 | TEMPERATURE: 97.4 F | WEIGHT: 199.3 LBS | HEART RATE: 73 BPM | SYSTOLIC BLOOD PRESSURE: 117 MMHG

## 2025-06-19 DIAGNOSIS — C50.211 MALIGNANT NEOPLASM OF UPPER-INNER QUADRANT OF RIGHT BREAST IN FEMALE, ESTROGEN RECEPTOR POSITIVE: Primary | ICD-10-CM

## 2025-06-19 DIAGNOSIS — Z45.2 ENCOUNTER FOR ADJUSTMENT OR MANAGEMENT OF VASCULAR ACCESS DEVICE: Primary | ICD-10-CM

## 2025-06-19 DIAGNOSIS — C50.211 MALIGNANT NEOPLASM OF UPPER-INNER QUADRANT OF RIGHT BREAST IN FEMALE, ESTROGEN RECEPTOR POSITIVE: ICD-10-CM

## 2025-06-19 DIAGNOSIS — Z17.0 MALIGNANT NEOPLASM OF UPPER-INNER QUADRANT OF RIGHT BREAST IN FEMALE, ESTROGEN RECEPTOR POSITIVE: Primary | ICD-10-CM

## 2025-06-19 DIAGNOSIS — Z17.0 MALIGNANT NEOPLASM OF UPPER-INNER QUADRANT OF RIGHT BREAST IN FEMALE, ESTROGEN RECEPTOR POSITIVE: ICD-10-CM

## 2025-06-19 LAB
ALBUMIN SERPL-MCNC: 4.4 G/DL (ref 3.5–5.2)
ALBUMIN/GLOB SERPL: 1.3 G/DL
ALP SERPL-CCNC: 85 U/L (ref 39–117)
ALT SERPL W P-5'-P-CCNC: 159 U/L (ref 1–33)
ANA SER QL: NEGATIVE
ANION GAP SERPL CALCULATED.3IONS-SCNC: 12.9 MMOL/L (ref 5–15)
AST SERPL-CCNC: 103 U/L (ref 1–32)
BASOPHILS # BLD AUTO: 0.03 10*3/MM3 (ref 0–0.2)
BASOPHILS NFR BLD AUTO: 0.4 % (ref 0–1.5)
BILIRUB SERPL-MCNC: 0.5 MG/DL (ref 0–1.2)
BUN SERPL-MCNC: 9.3 MG/DL (ref 6–20)
BUN/CREAT SERPL: 13.5 (ref 7–25)
CALCIUM SPEC-SCNC: 8.8 MG/DL (ref 8.6–10.5)
CHLORIDE SERPL-SCNC: 106 MMOL/L (ref 98–107)
CO2 SERPL-SCNC: 21.1 MMOL/L (ref 22–29)
CREAT SERPL-MCNC: 0.69 MG/DL (ref 0.57–1)
DEPRECATED RDW RBC AUTO: 46.5 FL (ref 37–54)
EGFRCR SERPLBLD CKD-EPI 2021: 114.1 ML/MIN/1.73
EOSINOPHIL # BLD AUTO: 0.19 10*3/MM3 (ref 0–0.4)
EOSINOPHIL NFR BLD AUTO: 2.6 % (ref 0.3–6.2)
ERYTHROCYTE [DISTWIDTH] IN BLOOD BY AUTOMATED COUNT: 13.6 % (ref 12.3–15.4)
GLOBULIN UR ELPH-MCNC: 3.3 GM/DL
GLUCOSE SERPL-MCNC: 103 MG/DL (ref 65–99)
HCT VFR BLD AUTO: 39.9 % (ref 34–46.6)
HGB BLD-MCNC: 12.6 G/DL (ref 12–15.9)
IMM GRANULOCYTES # BLD AUTO: 0.01 10*3/MM3 (ref 0–0.05)
IMM GRANULOCYTES NFR BLD AUTO: 0.1 % (ref 0–0.5)
LYMPHOCYTES # BLD AUTO: 2.59 10*3/MM3 (ref 0.7–3.1)
LYMPHOCYTES NFR BLD AUTO: 35 % (ref 19.6–45.3)
MCH RBC QN AUTO: 29.2 PG (ref 26.6–33)
MCHC RBC AUTO-ENTMCNC: 31.6 G/DL (ref 31.5–35.7)
MCV RBC AUTO: 92.4 FL (ref 79–97)
MITOCHONDRIA M2 IGG SER-ACNC: 161.3 UNITS (ref 0–20)
MONOCYTES # BLD AUTO: 0.81 10*3/MM3 (ref 0.1–0.9)
MONOCYTES NFR BLD AUTO: 11 % (ref 5–12)
NEUTROPHILS NFR BLD AUTO: 3.76 10*3/MM3 (ref 1.7–7)
NEUTROPHILS NFR BLD AUTO: 50.9 % (ref 42.7–76)
NRBC BLD AUTO-RTO: 0 /100 WBC (ref 0–0.2)
PLATELET # BLD AUTO: 279 10*3/MM3 (ref 140–450)
PMV BLD AUTO: 10 FL (ref 6–12)
POTASSIUM SERPL-SCNC: 4.3 MMOL/L (ref 3.5–5.2)
PROT SERPL-MCNC: 7.7 G/DL (ref 6–8.5)
RBC # BLD AUTO: 4.32 10*6/MM3 (ref 3.77–5.28)
SMA IGG SER-ACNC: 8 UNITS (ref 0–19)
SODIUM SERPL-SCNC: 140 MMOL/L (ref 136–145)
WBC NRBC COR # BLD AUTO: 7.39 10*3/MM3 (ref 3.4–10.8)

## 2025-06-19 PROCEDURE — 85025 COMPLETE CBC W/AUTO DIFF WBC: CPT | Performed by: INTERNAL MEDICINE

## 2025-06-19 PROCEDURE — 96413 CHEMO IV INFUSION 1 HR: CPT

## 2025-06-19 PROCEDURE — 25810000003 SODIUM CHLORIDE 0.9 % SOLUTION 250 ML FLEX CONT: Performed by: INTERNAL MEDICINE

## 2025-06-19 PROCEDURE — 25010000002 HEPARIN LOCK FLUSH PER 10 UNITS: Performed by: INTERNAL MEDICINE

## 2025-06-19 PROCEDURE — 80053 COMPREHEN METABOLIC PANEL: CPT | Performed by: INTERNAL MEDICINE

## 2025-06-19 PROCEDURE — 25810000003 SODIUM CHLORIDE 0.9 % SOLUTION: Performed by: INTERNAL MEDICINE

## 2025-06-19 PROCEDURE — 25010000002 TRASTUZUMAB PER 10 MG: Performed by: INTERNAL MEDICINE

## 2025-06-19 RX ORDER — HEPARIN SODIUM (PORCINE) LOCK FLUSH IV SOLN 100 UNIT/ML 100 UNIT/ML
500 SOLUTION INTRAVENOUS AS NEEDED
Status: DISCONTINUED | OUTPATIENT
Start: 2025-06-19 | End: 2025-06-20 | Stop reason: HOSPADM

## 2025-06-19 RX ORDER — HEPARIN SODIUM (PORCINE) LOCK FLUSH IV SOLN 100 UNIT/ML 100 UNIT/ML
500 SOLUTION INTRAVENOUS AS NEEDED
OUTPATIENT
Start: 2025-06-19

## 2025-06-19 RX ORDER — ACETAMINOPHEN 325 MG/1
650 TABLET ORAL ONCE
Status: DISCONTINUED | OUTPATIENT
Start: 2025-06-19 | End: 2025-06-20 | Stop reason: HOSPADM

## 2025-06-19 RX ORDER — LETROZOLE 2.5 MG/1
2.5 TABLET, FILM COATED ORAL DAILY
Qty: 30 TABLET | Refills: 5
Start: 2025-06-19

## 2025-06-19 RX ORDER — SODIUM CHLORIDE 0.9 % (FLUSH) 0.9 %
20 SYRINGE (ML) INJECTION AS NEEDED
OUTPATIENT
Start: 2025-06-19

## 2025-06-19 RX ORDER — SODIUM CHLORIDE 0.9 % (FLUSH) 0.9 %
20 SYRINGE (ML) INJECTION AS NEEDED
Status: DISCONTINUED | OUTPATIENT
Start: 2025-06-19 | End: 2025-06-20 | Stop reason: HOSPADM

## 2025-06-19 RX ORDER — SODIUM CHLORIDE 9 MG/ML
250 INJECTION, SOLUTION INTRAVENOUS ONCE
Status: CANCELLED | OUTPATIENT
Start: 2025-06-19

## 2025-06-19 RX ORDER — SODIUM CHLORIDE 9 MG/ML
250 INJECTION, SOLUTION INTRAVENOUS ONCE
Status: COMPLETED | OUTPATIENT
Start: 2025-06-19 | End: 2025-06-19

## 2025-06-19 RX ORDER — ACETAMINOPHEN 325 MG/1
650 TABLET ORAL ONCE
Status: CANCELLED | OUTPATIENT
Start: 2025-06-19

## 2025-06-19 RX ADMIN — SODIUM CHLORIDE 250 ML: 9 INJECTION, SOLUTION INTRAVENOUS at 09:25

## 2025-06-19 RX ADMIN — Medication 20 ML: at 07:58

## 2025-06-19 RX ADMIN — TRASTUZUMAB 530 MG: 150 INJECTION, POWDER, LYOPHILIZED, FOR SOLUTION INTRAVENOUS at 09:27

## 2025-06-19 RX ADMIN — Medication 20 ML: at 10:17

## 2025-06-19 RX ADMIN — HEPARIN 500 UNITS: 100 SYRINGE at 10:17

## 2025-06-20 ENCOUNTER — PATIENT ROUNDING (BHMG ONLY) (OUTPATIENT)
Dept: GASTROENTEROLOGY | Facility: CLINIC | Age: 40
End: 2025-06-20
Payer: COMMERCIAL

## 2025-06-20 ENCOUNTER — TELEPHONE (OUTPATIENT)
Dept: GASTROENTEROLOGY | Facility: CLINIC | Age: 40
End: 2025-06-20
Payer: COMMERCIAL

## 2025-06-20 LAB
ALBUMIN SERPL ELPH-MCNC: 3.6 G/DL (ref 2.9–4.4)
ALBUMIN/GLOB SERPL: 1 {RATIO} (ref 0.7–1.7)
ALPHA1 GLOB SERPL ELPH-MCNC: 0.3 G/DL (ref 0–0.4)
ALPHA2 GLOB SERPL ELPH-MCNC: 0.9 G/DL (ref 0.4–1)
B-GLOBULIN SERPL ELPH-MCNC: 1.3 G/DL (ref 0.7–1.3)
GAMMA GLOB SERPL ELPH-MCNC: 1.3 G/DL (ref 0.4–1.8)
GLOBULIN SER-MCNC: 3.8 G/DL (ref 2.2–3.9)
IGA SERPL-MCNC: 276 MG/DL (ref 87–352)
IGG SERPL-MCNC: 1185 MG/DL (ref 586–1602)
IGM SERPL-MCNC: 349 MG/DL (ref 26–217)
INTERPRETATION SERPL IEP-IMP: ABNORMAL
LABORATORY COMMENT REPORT: ABNORMAL
M PROTEIN SERPL ELPH-MCNC: ABNORMAL G/DL
PROT SERPL-MCNC: 7.4 G/DL (ref 6–8.5)

## 2025-06-20 NOTE — TELEPHONE ENCOUNTER
Hub staff attempted to follow warm transfer process and was unsuccessful     Caller: Annita Johnson    Relationship to patient: Self    Best call back number: [phone]    Patient is needing: PATIENT RETURNING MALIK CALL

## 2025-06-20 NOTE — PROGRESS NOTES
6/20/2025      Hello, may I speak with Annita Johnson     My name is Leoncio. I am calling from [facility]. I show that you had a recent visit with LÁZARO Abreu.    Before we get started may I verify your date of birth? 1985    I am calling to officially welcome you to our practice and ask about your recent visit. Is this a good time to talk? No I left pt a VM     Tell me about your visit with us. What things went well?    We strive to ensure that we protect your safety and privacy. Is there anything we could have done to improve this during your visit?        We're always looking for ways to make our patients' experiences even better. Do you have recommendations on ways we may improve?    Overall were you satisfied with your first visit to our practice?    I appreciate you taking the time to speak with me today. Is there anything else I can do for you?    I am glad to hear that you had a very good visit and I appreciate you taking the time to provide feedback on this call. We would greatly appreciate you filling out a survey if you receive one in the mail, email or text. This is a great opportunity to provide any additional feedback that you may think of after this call as well.       Thank you, and have a great day.

## 2025-06-20 NOTE — TELEPHONE ENCOUNTER
I called and spoke with patient. She was inquiring about lab results. I advised her that Selin would review whenever all results were back. Patient was wondering if her labs were abnormal due to the herceptin treatments for breast cancer. Patient is aware we will contact with results. I have added her to wait list for Fibroscan.

## 2025-06-22 NOTE — ASSESSMENT & PLAN NOTE
ER positive, MI positive, HER2 positive.  Metastatic.  Patient is on treatment with letrozole for ER positivity and trastuzumab for HER2 positivity.  Tolerating her treatment well.  She is up-to-date on cardiac monitoring.  Continue letrozole daily.  She is due for leuprolide injection for ovarian suppression.  She is considering oophorectomy.  I will see her back for cycle 8-day 1 of trastuzumab with lab work prior to monitor for toxicities.  
Patient is on monthly Xgeva.  Tolerating well.  No dental or jaw pain.  Electrolytes are adequate.  Xgeva today monthly.  
 used

## 2025-06-23 LAB
A1AT PHENOTYP SERPL IFE: NORMAL
A1AT SERPL-MCNC: 153 MG/DL (ref 100–188)
COPPER SERPL-MCNC: 134 UG/DL (ref 80–158)

## 2025-07-10 ENCOUNTER — OFFICE VISIT (OUTPATIENT)
Dept: ONCOLOGY | Facility: HOSPITAL | Age: 40
End: 2025-07-10
Payer: COMMERCIAL

## 2025-07-10 ENCOUNTER — HOSPITAL ENCOUNTER (OUTPATIENT)
Dept: ONCOLOGY | Facility: HOSPITAL | Age: 40
Discharge: HOME | End: 2025-07-10
Payer: COMMERCIAL

## 2025-07-10 VITALS
HEART RATE: 83 BPM | SYSTOLIC BLOOD PRESSURE: 120 MMHG | OXYGEN SATURATION: 99 % | RESPIRATION RATE: 18 BRPM | WEIGHT: 195.99 LBS | HEIGHT: 62 IN | DIASTOLIC BLOOD PRESSURE: 86 MMHG | BODY MASS INDEX: 36.07 KG/M2 | TEMPERATURE: 97.8 F

## 2025-07-10 DIAGNOSIS — Z45.2 ENCOUNTER FOR ADJUSTMENT OR MANAGEMENT OF VASCULAR ACCESS DEVICE: Primary | ICD-10-CM

## 2025-07-10 DIAGNOSIS — C50.211 MALIGNANT NEOPLASM OF UPPER-INNER QUADRANT OF RIGHT BREAST IN FEMALE, ESTROGEN RECEPTOR POSITIVE: ICD-10-CM

## 2025-07-10 DIAGNOSIS — C50.211 MALIGNANT NEOPLASM OF UPPER-INNER QUADRANT OF RIGHT BREAST IN FEMALE, ESTROGEN RECEPTOR POSITIVE: Primary | ICD-10-CM

## 2025-07-10 DIAGNOSIS — C79.51 METASTASIS TO BONE: ICD-10-CM

## 2025-07-10 DIAGNOSIS — Z17.0 MALIGNANT NEOPLASM OF UPPER-INNER QUADRANT OF RIGHT BREAST IN FEMALE, ESTROGEN RECEPTOR POSITIVE: Primary | ICD-10-CM

## 2025-07-10 DIAGNOSIS — Z17.0 MALIGNANT NEOPLASM OF UPPER-INNER QUADRANT OF RIGHT BREAST IN FEMALE, ESTROGEN RECEPTOR POSITIVE: ICD-10-CM

## 2025-07-10 DIAGNOSIS — Z79.899 HIGH RISK MEDICATION USE: ICD-10-CM

## 2025-07-10 LAB
ALBUMIN SERPL-MCNC: 4.6 G/DL (ref 3.5–5.2)
ALBUMIN/GLOB SERPL: 1.5 G/DL
ALP SERPL-CCNC: 80 U/L (ref 39–117)
ALT SERPL W P-5'-P-CCNC: 163 U/L (ref 1–33)
ANION GAP SERPL CALCULATED.3IONS-SCNC: 12.3 MMOL/L (ref 5–15)
AST SERPL-CCNC: 106 U/L (ref 1–32)
BASOPHILS # BLD AUTO: 0.03 10*3/MM3 (ref 0–0.2)
BASOPHILS NFR BLD AUTO: 0.3 % (ref 0–1.5)
BILIRUB SERPL-MCNC: 0.6 MG/DL (ref 0–1.2)
BUN SERPL-MCNC: 9.6 MG/DL (ref 6–20)
BUN/CREAT SERPL: 12.8 (ref 7–25)
CALCIUM SPEC-SCNC: 9.5 MG/DL (ref 8.6–10.5)
CHLORIDE SERPL-SCNC: 101 MMOL/L (ref 98–107)
CO2 SERPL-SCNC: 21.7 MMOL/L (ref 22–29)
CREAT SERPL-MCNC: 0.75 MG/DL (ref 0.57–1)
DEPRECATED RDW RBC AUTO: 45.4 FL (ref 37–54)
EGFRCR SERPLBLD CKD-EPI 2021: 104.7 ML/MIN/1.73
EOSINOPHIL # BLD AUTO: 0.14 10*3/MM3 (ref 0–0.4)
EOSINOPHIL NFR BLD AUTO: 1.6 % (ref 0.3–6.2)
ERYTHROCYTE [DISTWIDTH] IN BLOOD BY AUTOMATED COUNT: 13.7 % (ref 12.3–15.4)
GLOBULIN UR ELPH-MCNC: 3 GM/DL
GLUCOSE SERPL-MCNC: 125 MG/DL (ref 65–99)
HCT VFR BLD AUTO: 38.3 % (ref 34–46.6)
HGB BLD-MCNC: 12.2 G/DL (ref 12–15.9)
IMM GRANULOCYTES # BLD AUTO: 0.02 10*3/MM3 (ref 0–0.05)
IMM GRANULOCYTES NFR BLD AUTO: 0.2 % (ref 0–0.5)
LYMPHOCYTES # BLD AUTO: 2.53 10*3/MM3 (ref 0.7–3.1)
LYMPHOCYTES NFR BLD AUTO: 28.8 % (ref 19.6–45.3)
MAGNESIUM SERPL-MCNC: 2 MG/DL (ref 1.6–2.6)
MCH RBC QN AUTO: 29 PG (ref 26.6–33)
MCHC RBC AUTO-ENTMCNC: 31.9 G/DL (ref 31.5–35.7)
MCV RBC AUTO: 91 FL (ref 79–97)
MONOCYTES # BLD AUTO: 0.86 10*3/MM3 (ref 0.1–0.9)
MONOCYTES NFR BLD AUTO: 9.8 % (ref 5–12)
NEUTROPHILS NFR BLD AUTO: 5.19 10*3/MM3 (ref 1.7–7)
NEUTROPHILS NFR BLD AUTO: 59.3 % (ref 42.7–76)
NRBC BLD AUTO-RTO: 0 /100 WBC (ref 0–0.2)
PHOSPHATE SERPL-MCNC: 2.9 MG/DL (ref 2.5–4.5)
PLATELET # BLD AUTO: 280 10*3/MM3 (ref 140–450)
PMV BLD AUTO: 10 FL (ref 6–12)
POTASSIUM SERPL-SCNC: 3.9 MMOL/L (ref 3.5–5.2)
PROT SERPL-MCNC: 7.6 G/DL (ref 6–8.5)
RBC # BLD AUTO: 4.21 10*6/MM3 (ref 3.77–5.28)
SODIUM SERPL-SCNC: 135 MMOL/L (ref 136–145)
WBC NRBC COR # BLD AUTO: 8.77 10*3/MM3 (ref 3.4–10.8)

## 2025-07-10 PROCEDURE — 83735 ASSAY OF MAGNESIUM: CPT | Performed by: INTERNAL MEDICINE

## 2025-07-10 PROCEDURE — 25810000003 SODIUM CHLORIDE 0.9 % SOLUTION 250 ML FLEX CONT: Performed by: INTERNAL MEDICINE

## 2025-07-10 PROCEDURE — 85025 COMPLETE CBC W/AUTO DIFF WBC: CPT | Performed by: INTERNAL MEDICINE

## 2025-07-10 PROCEDURE — 25010000002 HEPARIN LOCK FLUSH PER 10 UNITS: Performed by: INTERNAL MEDICINE

## 2025-07-10 PROCEDURE — 84100 ASSAY OF PHOSPHORUS: CPT | Performed by: INTERNAL MEDICINE

## 2025-07-10 PROCEDURE — 25010000002 TRASTUZUMAB PER 10 MG: Performed by: INTERNAL MEDICINE

## 2025-07-10 PROCEDURE — 80053 COMPREHEN METABOLIC PANEL: CPT | Performed by: INTERNAL MEDICINE

## 2025-07-10 PROCEDURE — 96413 CHEMO IV INFUSION 1 HR: CPT

## 2025-07-10 RX ORDER — SODIUM CHLORIDE 9 MG/ML
250 INJECTION, SOLUTION INTRAVENOUS ONCE
Status: DISCONTINUED | OUTPATIENT
Start: 2025-07-10 | End: 2025-07-11 | Stop reason: HOSPADM

## 2025-07-10 RX ORDER — ACETAMINOPHEN 325 MG/1
650 TABLET ORAL ONCE
Status: CANCELLED | OUTPATIENT
Start: 2025-07-10

## 2025-07-10 RX ORDER — HEPARIN SODIUM (PORCINE) LOCK FLUSH IV SOLN 100 UNIT/ML 100 UNIT/ML
500 SOLUTION INTRAVENOUS AS NEEDED
Status: DISCONTINUED | OUTPATIENT
Start: 2025-07-10 | End: 2025-07-11 | Stop reason: HOSPADM

## 2025-07-10 RX ORDER — SODIUM CHLORIDE 0.9 % (FLUSH) 0.9 %
20 SYRINGE (ML) INJECTION AS NEEDED
OUTPATIENT
Start: 2025-07-10

## 2025-07-10 RX ORDER — LETROZOLE 2.5 MG/1
2.5 TABLET, FILM COATED ORAL DAILY
Qty: 30 TABLET | Refills: 5
Start: 2025-07-10

## 2025-07-10 RX ORDER — HEPARIN SODIUM (PORCINE) LOCK FLUSH IV SOLN 100 UNIT/ML 100 UNIT/ML
500 SOLUTION INTRAVENOUS AS NEEDED
OUTPATIENT
Start: 2025-07-10

## 2025-07-10 RX ORDER — SODIUM CHLORIDE 9 MG/ML
250 INJECTION, SOLUTION INTRAVENOUS ONCE
Status: CANCELLED | OUTPATIENT
Start: 2025-07-10

## 2025-07-10 RX ORDER — SODIUM CHLORIDE 0.9 % (FLUSH) 0.9 %
20 SYRINGE (ML) INJECTION AS NEEDED
Status: DISCONTINUED | OUTPATIENT
Start: 2025-07-10 | End: 2025-07-11 | Stop reason: HOSPADM

## 2025-07-10 RX ADMIN — Medication 20 ML: at 15:01

## 2025-07-10 RX ADMIN — TRASTUZUMAB 530 MG: 150 INJECTION, POWDER, LYOPHILIZED, FOR SOLUTION INTRAVENOUS at 14:23

## 2025-07-10 RX ADMIN — Medication 20 ML: at 13:08

## 2025-07-10 RX ADMIN — HEPARIN 500 UNITS: 100 SYRINGE at 15:01

## 2025-07-10 NOTE — ASSESSMENT & PLAN NOTE
Metastatic.  Triple positive.  Patient is on letrozole plus trastuzumab along with denosumab for bony involvement.  Tolerating her regimen well.  Excellent response to treatment thus far.  Continue letrozole daily.  Proceed with trastuzumab today as planned.  I will see her back in 6 weeks for continued treatment with lab work and echocardiogram prior.  I will plan repeat imaging at the 4-month interval.

## 2025-07-10 NOTE — ASSESSMENT & PLAN NOTE
Has been on denosumab.  Tolerating the injections well but she requests a break for a little while.  Her last bone scan showed stable disease.  No dental or jaw pain.  We will defer her treatment until her next visit.

## 2025-07-10 NOTE — PROGRESS NOTES
Chief Complaint  Malignant neoplasm of upper-inner quadrant of right breast     Lela Vanegas, APRN  Romina, Lela, APRN    Subjective          Annita Johnson presents to [facility] for ongoing treatment of her metastatic breast cancer.  She is on letrozole, trastuzumab and denosumab for bony involvement.  She is compliant with regimen.  Tolerating well.  Sidestepped for jaw pain.  No new masses or adenopathy.  She notes good appetite and energy level.  No issues from her Port-A-Cath.  She denies chest pain, shortness of breath, lower extremity swelling.    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, estrogen receptor positive   6/8/2023 Initial Diagnosis    Malignant neoplasm of upper-inner quadrant of right breast in female, estrogen receptor positive     6/8/2023 Cancer Staged    Staging form: Breast, AJCC 8th Edition  - Clinical: Stage IV (cT2, cN0, cM1, ER+, AZ+, HER2-) - Signed by Perry Garcia MD on 6/8/2023 6/9/2023 - 4/4/2024 Chemotherapy    OP SUPPORTIVE BREAST Leuprolide 3.75 MG Q28D     6/15/2023 - 6/15/2023 Chemotherapy    OP BREAST Letrozole / Ribociclib     7/13/2023 -  Chemotherapy    OP BREAST Letrozole / Trastuzumab     8/24/2023 -  Chemotherapy    OP SUPPORTIVE Denosumab (Xgeva) Q42D     Metastasis to bone   6/8/2023 Initial Diagnosis    Metastasis to bone     8/24/2023 -  Chemotherapy    OP SUPPORTIVE Denosumab (Xgeva) Q42D           Current Outpatient Medications on File Prior to Visit   Medication Sig Dispense Refill    azithromycin (Zithromax Z-Constantino) 250 MG tablet Take 2 tablets by mouth on day 1, then 1 tablet daily on days 2-5 (Patient not taking: Reported on 5/29/2025) 6 tablet 0    brompheniramine-pseudoephedrine-DM 30-2-10 MG/5ML syrup Take 5 mL by mouth 4 (Four) Times a Day As Needed for Allergies, Cough or Congestion. (Patient not taking: Reported on 5/29/2025) 240 mL 0    clonazePAM  (KlonoPIN) 0.5 MG tablet TAKE 1 TABLET BY MOUTH TWICE A DAY AS NEEDED FOR ANXIETY (Patient not taking: Reported on 5/29/2025) 30 tablet 1    cyclobenzaprine (FLEXERIL) 10 MG tablet TAKE 1 TABLET BY MOUTH 3 TIMES A DAY AS NEEDED FOR MUSCLE SPASMS. (Patient not taking: Reported on 5/29/2025) 90 tablet 5    folic acid (FOLVITE) 1 MG tablet TAKE 1 TABLET BY MOUTH EVERY DAY (Patient not taking: Reported on 5/29/2025) 90 tablet 1    HYDROcodone-acetaminophen (NORCO) 5-325 MG per tablet Take 1 tablet by mouth Every 6 (Six) Hours As Needed for Moderate Pain (Pain). (Patient not taking: Reported on 5/29/2025) 4 tablet 0    hydrOXYzine (ATARAX) 50 MG tablet TAKE 1 TABLET BY MOUTH EVERYDAY AT BEDTIME (Patient not taking: Reported on 5/29/2025) 90 tablet 1    letrozole (FEMARA) 2.5 MG tablet Take 1 tablet by mouth Daily. 30 tablet 5    letrozole (FEMARA) 2.5 MG tablet Take 1 tablet by mouth Daily. 30 tablet 5    letrozole (FEMARA) 2.5 MG tablet Take 1 tablet by mouth Daily. (Patient not taking: Reported on 5/29/2025) 30 tablet 5    letrozole (FEMARA) 2.5 MG tablet Take 1 tablet by mouth Daily. 30 tablet 5    letrozole (FEMARA) 2.5 MG tablet Take 1 tablet by mouth Daily. 30 tablet 5    Nurtec 75 MG dispersible tablet TAKE 1 TABLET BY MOUTH DAILY AS NEEDED (MIGRAINES). (Patient not taking: Reported on 5/29/2025) 8 tablet 5    ondansetron (ZOFRAN) 8 MG tablet Take 1 tablet by mouth 3 (Three) Times a Day As Needed for Nausea or Vomiting. (Patient not taking: Reported on 5/29/2025) 30 tablet 5    Sodium Fluoride 5000 PPM 1.1 % gel Take 1 Application by mouth 2 (Two) Times a Day. TOOTHPASTE (Patient not taking: Reported on 5/29/2025)      Trastuzumab (HERCEPTIN IV) Infuse  into a venous catheter. EVERY 21 DAYS-NEXT ON 10/31/24 (Patient not taking: Reported on 5/29/2025)      Veozah 45 MG tablet TAKE 1 TABLET BY MOUTH EVERY DAY (Patient not taking: Reported on 5/29/2025) 30 tablet 2     Current Facility-Administered Medications on  File Prior to Visit   Medication Dose Route Frequency Provider Last Rate Last Admin    heparin injection 500 Units  500 Units Intravenous PRN Perry Garcia MD        sodium chloride 0.9 % flush 20 mL  20 mL Intravenous PRN Perry Garcia MD   20 mL at 07/10/25 1308       No Known Allergies  Past Medical History:   Diagnosis Date    Anemia     Breast cancer     RIGHT    Kidney stone     Metastasis to bone 2023     Past Surgical History:   Procedure Laterality Date    BREAST BIOPSY      CERVICAL FUSION  2023     SECTION N/A 2021    Procedure:  SECTION PRIMARY;  Surgeon: Zoe Dawson MD;  Location: [facility] LABOR DELIVERY;  Service: Obstetrics/Gynecology;  Laterality: N/A;    CYSTOSCOPY BLADDER STONE LITHOTRIPSY      SALPINGO OOPHORECTOMY Bilateral 2024    Procedure: SALPINGO OOPHORECTOMY LAPAROSCOPIC WITH DAVINCI ROBOT;  Surgeon: Ashlyn Avelar MD;  Location: [facility] OR;  Service: Robotics - DaVinci;  Laterality: Bilateral;    VENOUS ACCESS DEVICE (PORT) INSERTION Right 10/30/2024    Procedure: INSERTION VENOUS ACCESS DEVICE : Placement of a port in the tissue under the skin at your upper chest with a tube attached to it that goes into your vein;  Surgeon: Jitendra García MD;  Location: [facility];  Service: General;  Laterality: Right;     Social History     Socioeconomic History    Marital status:      Spouse name: Janet   Tobacco Use    Smoking status: Former     Current packs/day: 0.00     Average packs/day: 0.3 packs/day for 19.6 years (4.9 ttl pk-yrs)     Types: Cigarettes     Start date: 2005     Quit date: 2024     Years since quittin.8     Passive exposure: Current    Smokeless tobacco: Never   Vaping Use    Vaping status: Never Used   Substance and Sexual Activity    Alcohol use: Not Currently     Alcohol/week: 6.0 standard drinks of alcohol    Drug use: Yes     Types: Marijuana     Comment: for pain control/DAILY last used  "10-30-24    Sexual activity: Defer     Family History   Problem Relation Age of Onset    Mental illness Mother     Hypertension Father     Alcohol abuse Father     Ovarian cancer Paternal Aunt     Breast cancer Maternal Great-Grandmother     Malig Hyperthermia Neg Hx     Colon cancer Neg Hx        Objective   Physical Exam  Vitals reviewed. Exam conducted with a chaperone present.   Cardiovascular:      Rate and Rhythm: Normal rate and regular rhythm.      Heart sounds: Normal heart sounds. No murmur heard.     No gallop.   Pulmonary:      Effort: Pulmonary effort is normal.      Breath sounds: Normal breath sounds.      Comments: Port-A-Cath  Abdominal:      General: Bowel sounds are normal.   Lymphadenopathy:      Cervical: No cervical adenopathy.   Psychiatric:         Mood and Affect: Mood normal.         Behavior: Behavior normal.         Vitals:    07/10/25 1336   BP: 120/86   Pulse: 83   Resp: 18   Temp: 97.8 °F (36.6 °C)   TempSrc: Temporal   SpO2: 99%   Weight: 88.9 kg (195 lb 15.8 oz)   Height: 157.5 cm (62.01\")   PainSc: 0-No pain     ECOG score: 0         PHQ-9 Total Score:                    Result Review :   The following data was reviewed by: Perry Garcia MD on 07/10/2025:  Lab Results   Component Value Date    HGB 12.2 07/10/2025    HCT 38.3 07/10/2025    MCV 91.0 07/10/2025     07/10/2025    WBC 8.77 07/10/2025    NEUTROABS 5.19 07/10/2025    LYMPHSABS 2.53 07/10/2025    MONOSABS 0.86 07/10/2025    EOSABS 0.14 07/10/2025    BASOSABS 0.03 07/10/2025     Lab Results   Component Value Date    GLUCOSE 103 (H) 06/19/2025    BUN 9.3 06/19/2025    CREATININE 0.69 06/19/2025     06/19/2025    K 4.3 06/19/2025     06/19/2025    CO2 21.1 (L) 06/19/2025    CALCIUM 8.8 06/19/2025    PROTEINTOT 7.7 06/19/2025    ALBUMIN 4.4 06/19/2025    BILITOT 0.5 06/19/2025    ALKPHOS 85 06/19/2025     (H) 06/19/2025     (H) 06/19/2025     Lab Results   Component Value Date    MG 1.8 " 05/29/2025    PHOS 3.7 05/29/2025    TSH 1.320 09/19/2024     Lab Results   Component Value Date    IRON 46 06/17/2025    LABIRON 10 (L) 06/17/2025    TRANSFERRIN 312 06/17/2025    TIBC 465 06/17/2025     Lab Results   Component Value Date    FERRITIN 92.40 06/17/2025    [id number] 577 02/22/2024    FOLATE >20.00 02/22/2024     Lab Results   Component Value Date    AFP 4.14 06/17/2025             Assessment and Plan    Diagnoses and all orders for this visit:    1. 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  Triple positive.  Patient is on letrozole plus trastuzumab along with denosumab for bony involvement.  Tolerating her regimen well.  Excellent response to treatment thus far.  Continue letrozole daily.  Proceed with trastuzumab today as planned.  I will see her back in 6 weeks for continued treatment with lab work and echocardiogram prior.  I will plan repeat imaging at the 4-month interval.    Orders:  -     CBC and Differential; Future  -     Comprehensive metabolic panel; Future  -     Magnesium; Future  -     Phosphorus; Future  -     Lab Appointment Request; Future  -     Clinic Appointment Request; Future  -     ONCBCN INFUSION APPOINTMENT REQUEST 01; Future    2. Metastasis to bone  Assessment & Plan:  Has been on denosumab.  Tolerating the injections well but she requests a break for a little while.  Her last bone scan showed stable disease.  No dental or jaw pain.  We will defer her treatment until her next visit.    Orders:  -     Magnesium; Future  -     Phosphorus; Future    3. High risk medication use  -     Adult Transthoracic Echo Limited W/ Cont if Necessary Per Protocol; Future    Other orders  -     sodium chloride 0.9 % infusion 250 mL  -     acetaminophen (TYLENOL) tablet 650 mg  -     Trastuzumab (HERCEPTIN) 530 mg in sodium chloride 0.9 % 250 mL chemo IVPB            Patient Follow Up: 6  w    Patient was given instructions and counseling regarding her condition or for health maintenance advice. Please see specific information pulled into the AVS if appropriate.     Perry Garcia MD    7/10/2025

## 2025-07-23 ENCOUNTER — TELEPHONE (OUTPATIENT)
Dept: ONCOLOGY | Facility: HOSPITAL | Age: 40
End: 2025-07-23
Payer: COMMERCIAL

## 2025-07-23 NOTE — TELEPHONE ENCOUNTER
[facility] would you please cancel on the apt with Lula tomorrow. Thanks    This nurse called the pt and informed her that she does not have to see Lula with her infusion tomorrow. The pt voiced understanding.

## 2025-07-23 NOTE — TELEPHONE ENCOUNTER
The pt called and states that she saw Dr Garcia at her last infusion on 7/10/25. The pt states that she is supposed to f/u with the MD every 6 weeks. The pt is scheduled for a infusion tomorrow but she is also scheduled to see Lula SESAY. The pt is asking if she has to see Lula or can that apt be canceled. The pt is hoping she can just come in and get her labs and infusion tomorrow.

## 2025-07-24 ENCOUNTER — HOSPITAL ENCOUNTER (OUTPATIENT)
Facility: HOSPITAL | Age: 40
Discharge: HOME | End: 2025-07-24
Admitting: INTERNAL MEDICINE
Payer: COMMERCIAL

## 2025-07-24 DIAGNOSIS — C50.211 MALIGNANT NEOPLASM OF UPPER-INNER QUADRANT OF RIGHT BREAST IN FEMALE, ESTROGEN RECEPTOR POSITIVE: Primary | ICD-10-CM

## 2025-07-24 DIAGNOSIS — Z17.0 MALIGNANT NEOPLASM OF UPPER-INNER QUADRANT OF RIGHT BREAST IN FEMALE, ESTROGEN RECEPTOR POSITIVE: Primary | ICD-10-CM

## 2025-07-24 DIAGNOSIS — Z79.899 HIGH RISK MEDICATION USE: ICD-10-CM

## 2025-07-24 PROCEDURE — 93356 MYOCRD STRAIN IMG SPCKL TRCK: CPT

## 2025-07-24 PROCEDURE — 93308 TTE F-UP OR LMTD: CPT

## 2025-07-24 PROCEDURE — 93325 DOPPLER ECHO COLOR FLOW MAPG: CPT

## 2025-07-24 RX ORDER — ACETAMINOPHEN 325 MG/1
650 TABLET ORAL ONCE
OUTPATIENT
Start: 2025-07-31

## 2025-07-24 RX ORDER — SODIUM CHLORIDE 9 MG/ML
250 INJECTION, SOLUTION INTRAVENOUS ONCE
OUTPATIENT
Start: 2025-07-31

## 2025-07-27 LAB
BH CV ECHO LEFT VENTRICLE GLOBAL LONGITUDINAL STRAIN: -15 %
BH CV ECHO MEAS - 2D AUTO EF SIEMENS: 50.6 %
BH CV ECHO MEAS - EDV(MOD-SP2): 65.9 ML
BH CV ECHO MEAS - EDV(MOD-SP4): 82.4 ML
BH CV ECHO MEAS - EF(MOD-SP2): 57.2 %
BH CV ECHO MEAS - EF(MOD-SP4): 57.8 %
BH CV ECHO MEAS - ESV(MOD-SP2): 28.2 ML
BH CV ECHO MEAS - ESV(MOD-SP4): 34.8 ML
BH CV ECHO MEAS - IVS/LVPW: 1.13 CM
BH CV ECHO MEAS - IVSD: 0.9 CM
BH CV ECHO MEAS - LA DIMENSION: 3.1 CM
BH CV ECHO MEAS - LV DIASTOLIC VOL/BSA (35-75): 43.7 CM2
BH CV ECHO MEAS - LV SYSTOLIC VOL/BSA (12-30): 18.4 CM2
BH CV ECHO MEAS - LVIDD: 4.6 CM
BH CV ECHO MEAS - LVIDS: 3.1 CM
BH CV ECHO MEAS - LVPWD: 0.8 CM
BH CV ECHO MEAS - SV(MOD-SP2): 37.7 ML
BH CV ECHO MEAS - SV(MOD-SP4): 47.6 ML
BH CV ECHO MEAS - SVI(MOD-SP2): 20 ML/M2
BH CV ECHO MEAS - SVI(MOD-SP4): 25.2 ML/M2

## 2025-07-31 ENCOUNTER — HOSPITAL ENCOUNTER (OUTPATIENT)
Dept: ONCOLOGY | Facility: HOSPITAL | Age: 40
Discharge: HOME | End: 2025-07-31
Payer: COMMERCIAL

## 2025-07-31 VITALS
WEIGHT: 193.5 LBS | TEMPERATURE: 98.1 F | HEART RATE: 106 BPM | BODY MASS INDEX: 35.61 KG/M2 | DIASTOLIC BLOOD PRESSURE: 77 MMHG | SYSTOLIC BLOOD PRESSURE: 105 MMHG | HEIGHT: 62 IN | OXYGEN SATURATION: 98 % | RESPIRATION RATE: 18 BRPM

## 2025-07-31 DIAGNOSIS — Z45.2 ENCOUNTER FOR ADJUSTMENT OR MANAGEMENT OF VASCULAR ACCESS DEVICE: Primary | ICD-10-CM

## 2025-07-31 DIAGNOSIS — C50.211 MALIGNANT NEOPLASM OF UPPER-INNER QUADRANT OF RIGHT BREAST IN FEMALE, ESTROGEN RECEPTOR POSITIVE: ICD-10-CM

## 2025-07-31 DIAGNOSIS — C50.211 MALIGNANT NEOPLASM OF UPPER-INNER QUADRANT OF RIGHT BREAST IN FEMALE, ESTROGEN RECEPTOR POSITIVE: Primary | ICD-10-CM

## 2025-07-31 DIAGNOSIS — Z17.0 MALIGNANT NEOPLASM OF UPPER-INNER QUADRANT OF RIGHT BREAST IN FEMALE, ESTROGEN RECEPTOR POSITIVE: ICD-10-CM

## 2025-07-31 DIAGNOSIS — Z17.0 MALIGNANT NEOPLASM OF UPPER-INNER QUADRANT OF RIGHT BREAST IN FEMALE, ESTROGEN RECEPTOR POSITIVE: Primary | ICD-10-CM

## 2025-07-31 LAB
ALBUMIN SERPL-MCNC: 4.7 G/DL (ref 3.5–5.2)
ALBUMIN/GLOB SERPL: 1.2 G/DL
ALP SERPL-CCNC: 106 U/L (ref 39–117)
ALT SERPL W P-5'-P-CCNC: 196 U/L (ref 1–33)
ANION GAP SERPL CALCULATED.3IONS-SCNC: 15.4 MMOL/L (ref 5–15)
AST SERPL-CCNC: 129 U/L (ref 1–32)
BASOPHILS # BLD AUTO: 0.05 10*3/MM3 (ref 0–0.2)
BASOPHILS NFR BLD AUTO: 0.4 % (ref 0–1.5)
BILIRUB SERPL-MCNC: 0.7 MG/DL (ref 0–1.2)
BUN SERPL-MCNC: 12.1 MG/DL (ref 6–20)
BUN/CREAT SERPL: 11.3 (ref 7–25)
CALCIUM SPEC-SCNC: 10.7 MG/DL (ref 8.6–10.5)
CHLORIDE SERPL-SCNC: 102 MMOL/L (ref 98–107)
CO2 SERPL-SCNC: 22.6 MMOL/L (ref 22–29)
CREAT SERPL-MCNC: 1.07 MG/DL (ref 0.57–1)
DEPRECATED RDW RBC AUTO: 45.4 FL (ref 37–54)
EGFRCR SERPLBLD CKD-EPI 2021: 67.9 ML/MIN/1.73
EOSINOPHIL # BLD AUTO: 0.13 10*3/MM3 (ref 0–0.4)
EOSINOPHIL NFR BLD AUTO: 1.1 % (ref 0.3–6.2)
ERYTHROCYTE [DISTWIDTH] IN BLOOD BY AUTOMATED COUNT: 13.6 % (ref 12.3–15.4)
GLOBULIN UR ELPH-MCNC: 3.9 GM/DL
GLUCOSE SERPL-MCNC: 70 MG/DL (ref 65–99)
HCT VFR BLD AUTO: 43.1 % (ref 34–46.6)
HGB BLD-MCNC: 13.6 G/DL (ref 12–15.9)
HOLD SPECIMEN: NORMAL
IMM GRANULOCYTES # BLD AUTO: 0.04 10*3/MM3 (ref 0–0.05)
IMM GRANULOCYTES NFR BLD AUTO: 0.3 % (ref 0–0.5)
LYMPHOCYTES # BLD AUTO: 2.18 10*3/MM3 (ref 0.7–3.1)
LYMPHOCYTES NFR BLD AUTO: 18.9 % (ref 19.6–45.3)
MCH RBC QN AUTO: 28.7 PG (ref 26.6–33)
MCHC RBC AUTO-ENTMCNC: 31.6 G/DL (ref 31.5–35.7)
MCV RBC AUTO: 90.9 FL (ref 79–97)
MONOCYTES # BLD AUTO: 0.95 10*3/MM3 (ref 0.1–0.9)
MONOCYTES NFR BLD AUTO: 8.2 % (ref 5–12)
NEUTROPHILS NFR BLD AUTO: 71.1 % (ref 42.7–76)
NEUTROPHILS NFR BLD AUTO: 8.2 10*3/MM3 (ref 1.7–7)
NRBC BLD AUTO-RTO: 0 /100 WBC (ref 0–0.2)
PLATELET # BLD AUTO: 349 10*3/MM3 (ref 140–450)
PMV BLD AUTO: 9.9 FL (ref 6–12)
POTASSIUM SERPL-SCNC: 3.8 MMOL/L (ref 3.5–5.2)
PROT SERPL-MCNC: 8.6 G/DL (ref 6–8.5)
RBC # BLD AUTO: 4.74 10*6/MM3 (ref 3.77–5.28)
SODIUM SERPL-SCNC: 140 MMOL/L (ref 136–145)
WBC NRBC COR # BLD AUTO: 11.55 10*3/MM3 (ref 3.4–10.8)

## 2025-07-31 PROCEDURE — 25010000002 TRASTUZUMAB PER 10 MG: Performed by: INTERNAL MEDICINE

## 2025-07-31 PROCEDURE — 80053 COMPREHEN METABOLIC PANEL: CPT | Performed by: INTERNAL MEDICINE

## 2025-07-31 PROCEDURE — 85025 COMPLETE CBC W/AUTO DIFF WBC: CPT | Performed by: INTERNAL MEDICINE

## 2025-07-31 PROCEDURE — 96413 CHEMO IV INFUSION 1 HR: CPT

## 2025-07-31 PROCEDURE — 25810000003 SODIUM CHLORIDE 0.9 % SOLUTION 250 ML FLEX CONT: Performed by: INTERNAL MEDICINE

## 2025-07-31 PROCEDURE — 25010000002 HEPARIN LOCK FLUSH PER 10 UNITS: Performed by: INTERNAL MEDICINE

## 2025-07-31 PROCEDURE — 25810000003 SODIUM CHLORIDE 0.9 % SOLUTION: Performed by: INTERNAL MEDICINE

## 2025-07-31 RX ORDER — HEPARIN SODIUM (PORCINE) LOCK FLUSH IV SOLN 100 UNIT/ML 100 UNIT/ML
500 SOLUTION INTRAVENOUS AS NEEDED
Status: DISCONTINUED | OUTPATIENT
Start: 2025-07-31 | End: 2025-08-01 | Stop reason: HOSPADM

## 2025-07-31 RX ORDER — HEPARIN SODIUM (PORCINE) LOCK FLUSH IV SOLN 100 UNIT/ML 100 UNIT/ML
500 SOLUTION INTRAVENOUS AS NEEDED
Status: CANCELLED | OUTPATIENT
Start: 2025-08-01

## 2025-07-31 RX ORDER — LETROZOLE 2.5 MG/1
2.5 TABLET, FILM COATED ORAL DAILY
Qty: 30 TABLET | Refills: 5
Start: 2025-07-31

## 2025-07-31 RX ORDER — ACETAMINOPHEN 325 MG/1
650 TABLET ORAL ONCE
Status: DISCONTINUED | OUTPATIENT
Start: 2025-07-31 | End: 2025-08-01 | Stop reason: HOSPADM

## 2025-07-31 RX ORDER — SODIUM CHLORIDE 9 MG/ML
250 INJECTION, SOLUTION INTRAVENOUS ONCE
Status: COMPLETED | OUTPATIENT
Start: 2025-07-31 | End: 2025-07-31

## 2025-07-31 RX ORDER — SODIUM CHLORIDE 0.9 % (FLUSH) 0.9 %
20 SYRINGE (ML) INJECTION AS NEEDED
Status: DISCONTINUED | OUTPATIENT
Start: 2025-07-31 | End: 2025-08-01 | Stop reason: HOSPADM

## 2025-07-31 RX ORDER — SODIUM CHLORIDE 0.9 % (FLUSH) 0.9 %
20 SYRINGE (ML) INJECTION AS NEEDED
Status: CANCELLED | OUTPATIENT
Start: 2025-07-31

## 2025-07-31 RX ADMIN — Medication 20 ML: at 13:15

## 2025-07-31 RX ADMIN — SODIUM CHLORIDE 530 MG: 9 INJECTION, SOLUTION INTRAVENOUS at 14:18

## 2025-07-31 RX ADMIN — HEPARIN 500 UNITS: 100 SYRINGE at 13:24

## 2025-07-31 RX ADMIN — SODIUM CHLORIDE 250 ML: 9 INJECTION, SOLUTION INTRAVENOUS at 14:07

## 2025-08-01 ENCOUNTER — HOSPITAL ENCOUNTER (OUTPATIENT)
Dept: ONCOLOGY | Facility: HOSPITAL | Age: 40
Discharge: HOME | End: 2025-08-01
Payer: COMMERCIAL

## 2025-08-01 VITALS
DIASTOLIC BLOOD PRESSURE: 75 MMHG | RESPIRATION RATE: 18 BRPM | HEIGHT: 62 IN | BODY MASS INDEX: 35.06 KG/M2 | SYSTOLIC BLOOD PRESSURE: 103 MMHG | OXYGEN SATURATION: 98 % | WEIGHT: 190.5 LBS | TEMPERATURE: 97.7 F | HEART RATE: 76 BPM

## 2025-08-01 DIAGNOSIS — Z45.2 ENCOUNTER FOR ADJUSTMENT OR MANAGEMENT OF VASCULAR ACCESS DEVICE: Primary | ICD-10-CM

## 2025-08-01 PROCEDURE — 25010000002 HEPARIN LOCK FLUSH PER 10 UNITS: Performed by: INTERNAL MEDICINE

## 2025-08-01 PROCEDURE — 96523 IRRIG DRUG DELIVERY DEVICE: CPT

## 2025-08-01 RX ORDER — HEPARIN SODIUM (PORCINE) LOCK FLUSH IV SOLN 100 UNIT/ML 100 UNIT/ML
500 SOLUTION INTRAVENOUS AS NEEDED
OUTPATIENT
Start: 2025-08-01

## 2025-08-01 RX ORDER — HEPARIN SODIUM (PORCINE) LOCK FLUSH IV SOLN 100 UNIT/ML 100 UNIT/ML
500 SOLUTION INTRAVENOUS AS NEEDED
Status: DISCONTINUED | OUTPATIENT
Start: 2025-08-01 | End: 2025-08-02 | Stop reason: HOSPADM

## 2025-08-01 RX ORDER — SODIUM CHLORIDE 0.9 % (FLUSH) 0.9 %
20 SYRINGE (ML) INJECTION AS NEEDED
Status: DISCONTINUED | OUTPATIENT
Start: 2025-08-01 | End: 2025-08-02 | Stop reason: HOSPADM

## 2025-08-01 RX ORDER — SODIUM CHLORIDE 0.9 % (FLUSH) 0.9 %
20 SYRINGE (ML) INJECTION AS NEEDED
OUTPATIENT
Start: 2025-08-01

## 2025-08-01 RX ADMIN — Medication 20 ML: at 08:18

## 2025-08-01 RX ADMIN — HEPARIN 500 UNITS: 100 SYRINGE at 08:19

## 2025-08-01 NOTE — CODE DOCUMENTATION
Pt here for activase not indicated at this time. 10 mls of blood return noted without difficulty.

## 2025-08-19 ENCOUNTER — HOSPITAL ENCOUNTER (OUTPATIENT)
Dept: NUCLEAR MEDICINE | Facility: HOSPITAL | Age: 40
Discharge: HOME | End: 2025-08-19
Payer: COMMERCIAL

## 2025-08-19 ENCOUNTER — HOSPITAL ENCOUNTER (OUTPATIENT)
Dept: CT IMAGING | Facility: HOSPITAL | Age: 40
Discharge: HOME | End: 2025-08-19
Payer: COMMERCIAL

## 2025-08-19 DIAGNOSIS — C79.51 METASTASIS TO BONE: ICD-10-CM

## 2025-08-19 DIAGNOSIS — C50.211 MALIGNANT NEOPLASM OF UPPER-INNER QUADRANT OF RIGHT BREAST IN FEMALE, ESTROGEN RECEPTOR POSITIVE: ICD-10-CM

## 2025-08-19 DIAGNOSIS — Z17.0 MALIGNANT NEOPLASM OF UPPER-INNER QUADRANT OF RIGHT BREAST IN FEMALE, ESTROGEN RECEPTOR POSITIVE: ICD-10-CM

## 2025-08-19 PROCEDURE — 74177 CT ABD & PELVIS W/CONTRAST: CPT

## 2025-08-19 PROCEDURE — 34310000005 TECHNETIUM MEDRONATE KIT: Performed by: INTERNAL MEDICINE

## 2025-08-19 PROCEDURE — 78306 BONE IMAGING WHOLE BODY: CPT

## 2025-08-19 PROCEDURE — A9503 TC99M MEDRONATE: HCPCS | Performed by: INTERNAL MEDICINE

## 2025-08-19 PROCEDURE — 25510000001 IOPAMIDOL PER 1 ML: Performed by: INTERNAL MEDICINE

## 2025-08-19 PROCEDURE — 71260 CT THORAX DX C+: CPT

## 2025-08-19 RX ORDER — IOPAMIDOL 755 MG/ML
100 INJECTION, SOLUTION INTRAVASCULAR
Status: COMPLETED | OUTPATIENT
Start: 2025-08-19 | End: 2025-08-19

## 2025-08-19 RX ORDER — TC 99M MEDRONATE 20 MG/10ML
22.4 INJECTION, POWDER, LYOPHILIZED, FOR SOLUTION INTRAVENOUS
Status: COMPLETED | OUTPATIENT
Start: 2025-08-19 | End: 2025-08-19

## 2025-08-19 RX ADMIN — TC 99M MEDRONATE 22.4 MILLICURIE: 20 INJECTION, POWDER, LYOPHILIZED, FOR SOLUTION INTRAVENOUS at 12:24

## 2025-08-19 RX ADMIN — IOPAMIDOL 94 ML: 755 INJECTION, SOLUTION INTRAVENOUS at 17:48

## 2025-08-21 ENCOUNTER — OFFICE VISIT (OUTPATIENT)
Dept: ONCOLOGY | Facility: HOSPITAL | Age: 40
End: 2025-08-21
Payer: COMMERCIAL

## 2025-08-21 ENCOUNTER — HOSPITAL ENCOUNTER (OUTPATIENT)
Facility: HOSPITAL | Age: 40
Discharge: HOME | End: 2025-08-21
Payer: COMMERCIAL

## 2025-08-21 ENCOUNTER — HOSPITAL ENCOUNTER (OUTPATIENT)
Dept: ONCOLOGY | Facility: HOSPITAL | Age: 40
Discharge: HOME | End: 2025-08-21
Payer: COMMERCIAL

## 2025-08-21 VITALS
OXYGEN SATURATION: 99 % | HEIGHT: 62 IN | WEIGHT: 195.33 LBS | HEART RATE: 107 BPM | DIASTOLIC BLOOD PRESSURE: 75 MMHG | RESPIRATION RATE: 18 BRPM | TEMPERATURE: 98.2 F | SYSTOLIC BLOOD PRESSURE: 109 MMHG | BODY MASS INDEX: 35.94 KG/M2

## 2025-08-21 DIAGNOSIS — C50.211 MALIGNANT NEOPLASM OF UPPER-INNER QUADRANT OF RIGHT BREAST IN FEMALE, ESTROGEN RECEPTOR POSITIVE: ICD-10-CM

## 2025-08-21 DIAGNOSIS — C79.51 METASTASIS TO BONE: Primary | ICD-10-CM

## 2025-08-21 DIAGNOSIS — C79.51 METASTASIS TO BONE: ICD-10-CM

## 2025-08-21 DIAGNOSIS — Z45.2 ENCOUNTER FOR ADJUSTMENT OR MANAGEMENT OF VASCULAR ACCESS DEVICE: Primary | ICD-10-CM

## 2025-08-21 DIAGNOSIS — Z17.0 MALIGNANT NEOPLASM OF UPPER-INNER QUADRANT OF RIGHT BREAST IN FEMALE, ESTROGEN RECEPTOR POSITIVE: Primary | ICD-10-CM

## 2025-08-21 DIAGNOSIS — Z17.0 MALIGNANT NEOPLASM OF UPPER-INNER QUADRANT OF RIGHT BREAST IN FEMALE, ESTROGEN RECEPTOR POSITIVE: ICD-10-CM

## 2025-08-21 DIAGNOSIS — C50.211 MALIGNANT NEOPLASM OF UPPER-INNER QUADRANT OF RIGHT BREAST IN FEMALE, ESTROGEN RECEPTOR POSITIVE: Primary | ICD-10-CM

## 2025-08-21 LAB
ALBUMIN SERPL-MCNC: 4.4 G/DL (ref 3.5–5.2)
ALBUMIN/GLOB SERPL: 1.2 G/DL
ALP SERPL-CCNC: 129 U/L (ref 39–117)
ALT SERPL W P-5'-P-CCNC: 170 U/L (ref 1–33)
ANION GAP SERPL CALCULATED.3IONS-SCNC: 11.6 MMOL/L (ref 5–15)
AST SERPL-CCNC: 89 U/L (ref 1–32)
BASOPHILS # BLD AUTO: 0.05 10*3/MM3 (ref 0–0.2)
BASOPHILS NFR BLD AUTO: 0.6 % (ref 0–1.5)
BILIRUB SERPL-MCNC: 0.4 MG/DL (ref 0–1.2)
BUN SERPL-MCNC: 8.8 MG/DL (ref 6–20)
BUN/CREAT SERPL: 11.4 (ref 7–25)
CALCIUM SPEC-SCNC: 9.9 MG/DL (ref 8.6–10.5)
CHLORIDE SERPL-SCNC: 105 MMOL/L (ref 98–107)
CO2 SERPL-SCNC: 22.4 MMOL/L (ref 22–29)
CREAT SERPL-MCNC: 0.77 MG/DL (ref 0.57–1)
DEPRECATED RDW RBC AUTO: 46.2 FL (ref 37–54)
EGFRCR SERPLBLD CKD-EPI 2021: 100.8 ML/MIN/1.73
EOSINOPHIL # BLD AUTO: 0.2 10*3/MM3 (ref 0–0.4)
EOSINOPHIL NFR BLD AUTO: 2.4 % (ref 0.3–6.2)
ERYTHROCYTE [DISTWIDTH] IN BLOOD BY AUTOMATED COUNT: 13.8 % (ref 12.3–15.4)
GLOBULIN UR ELPH-MCNC: 3.8 GM/DL
GLUCOSE SERPL-MCNC: 93 MG/DL (ref 65–99)
HCT VFR BLD AUTO: 40.7 % (ref 34–46.6)
HGB BLD-MCNC: 13.2 G/DL (ref 12–15.9)
IMM GRANULOCYTES # BLD AUTO: 0.02 10*3/MM3 (ref 0–0.05)
IMM GRANULOCYTES NFR BLD AUTO: 0.2 % (ref 0–0.5)
LYMPHOCYTES # BLD AUTO: 2.25 10*3/MM3 (ref 0.7–3.1)
LYMPHOCYTES NFR BLD AUTO: 27.2 % (ref 19.6–45.3)
MAGNESIUM SERPL-MCNC: 1.9 MG/DL (ref 1.6–2.6)
MCH RBC QN AUTO: 29.4 PG (ref 26.6–33)
MCHC RBC AUTO-ENTMCNC: 32.4 G/DL (ref 31.5–35.7)
MCV RBC AUTO: 90.6 FL (ref 79–97)
MONOCYTES # BLD AUTO: 0.85 10*3/MM3 (ref 0.1–0.9)
MONOCYTES NFR BLD AUTO: 10.3 % (ref 5–12)
NEUTROPHILS NFR BLD AUTO: 4.89 10*3/MM3 (ref 1.7–7)
NEUTROPHILS NFR BLD AUTO: 59.3 % (ref 42.7–76)
NRBC BLD AUTO-RTO: 0 /100 WBC (ref 0–0.2)
PHOSPHATE SERPL-MCNC: 3.2 MG/DL (ref 2.5–4.5)
PLATELET # BLD AUTO: 311 10*3/MM3 (ref 140–450)
PMV BLD AUTO: 9.9 FL (ref 6–12)
POTASSIUM SERPL-SCNC: 4.5 MMOL/L (ref 3.5–5.2)
PROT SERPL-MCNC: 8.2 G/DL (ref 6–8.5)
RBC # BLD AUTO: 4.49 10*6/MM3 (ref 3.77–5.28)
SODIUM SERPL-SCNC: 139 MMOL/L (ref 136–145)
WBC NRBC COR # BLD AUTO: 8.26 10*3/MM3 (ref 3.4–10.8)

## 2025-08-21 PROCEDURE — 80053 COMPREHEN METABOLIC PANEL: CPT | Performed by: INTERNAL MEDICINE

## 2025-08-21 PROCEDURE — 83735 ASSAY OF MAGNESIUM: CPT | Performed by: INTERNAL MEDICINE

## 2025-08-21 PROCEDURE — 73502 X-RAY EXAM HIP UNI 2-3 VIEWS: CPT

## 2025-08-21 PROCEDURE — 85025 COMPLETE CBC W/AUTO DIFF WBC: CPT | Performed by: INTERNAL MEDICINE

## 2025-08-21 PROCEDURE — 25010000002 HEPARIN LOCK FLUSH PER 10 UNITS: Performed by: INTERNAL MEDICINE

## 2025-08-21 PROCEDURE — 84100 ASSAY OF PHOSPHORUS: CPT | Performed by: INTERNAL MEDICINE

## 2025-08-21 PROCEDURE — 36591 DRAW BLOOD OFF VENOUS DEVICE: CPT

## 2025-08-21 RX ORDER — OLANZAPINE 5 MG/1
5 TABLET, FILM COATED ORAL NIGHTLY
Qty: 4 TABLET | Refills: 11 | Status: SHIPPED | OUTPATIENT
Start: 2025-08-21

## 2025-08-21 RX ORDER — HEPARIN SODIUM (PORCINE) LOCK FLUSH IV SOLN 100 UNIT/ML 100 UNIT/ML
500 SOLUTION INTRAVENOUS AS NEEDED
Status: DISCONTINUED | OUTPATIENT
Start: 2025-08-21 | End: 2025-08-22 | Stop reason: HOSPADM

## 2025-08-21 RX ORDER — SODIUM CHLORIDE 0.9 % (FLUSH) 0.9 %
20 SYRINGE (ML) INJECTION AS NEEDED
Status: DISCONTINUED | OUTPATIENT
Start: 2025-08-21 | End: 2025-08-22 | Stop reason: HOSPADM

## 2025-08-21 RX ORDER — HEPARIN SODIUM (PORCINE) LOCK FLUSH IV SOLN 100 UNIT/ML 100 UNIT/ML
500 SOLUTION INTRAVENOUS AS NEEDED
OUTPATIENT
Start: 2025-08-21

## 2025-08-21 RX ORDER — ONDANSETRON 8 MG/1
8 TABLET, FILM COATED ORAL 3 TIMES DAILY PRN
Qty: 30 TABLET | Refills: 5 | Status: SHIPPED | OUTPATIENT
Start: 2025-08-21

## 2025-08-21 RX ORDER — SODIUM CHLORIDE 0.9 % (FLUSH) 0.9 %
20 SYRINGE (ML) INJECTION AS NEEDED
OUTPATIENT
Start: 2025-08-21

## 2025-08-21 RX ORDER — EXEMESTANE 25 MG/1
25 TABLET ORAL DAILY
Qty: 30 TABLET | Refills: 11 | Status: SHIPPED | OUTPATIENT
Start: 2025-08-21

## 2025-08-21 RX ADMIN — Medication 20 ML: at 14:08

## 2025-08-21 RX ADMIN — HEPARIN 500 UNITS: 100 SYRINGE at 14:07

## 2025-08-22 ENCOUNTER — DOCUMENTATION (OUTPATIENT)
Dept: PHARMACY | Facility: HOSPITAL | Age: 40
End: 2025-08-22
Payer: COMMERCIAL

## 2025-08-25 ENCOUNTER — TELEPHONE (OUTPATIENT)
Dept: ONCOLOGY | Facility: HOSPITAL | Age: 40
End: 2025-08-25
Payer: COMMERCIAL

## 2025-08-26 DIAGNOSIS — C50.211 MALIGNANT NEOPLASM OF UPPER-INNER QUADRANT OF RIGHT BREAST IN FEMALE, ESTROGEN RECEPTOR POSITIVE: Primary | ICD-10-CM

## 2025-08-26 DIAGNOSIS — Z17.0 MALIGNANT NEOPLASM OF UPPER-INNER QUADRANT OF RIGHT BREAST IN FEMALE, ESTROGEN RECEPTOR POSITIVE: Primary | ICD-10-CM

## 2025-08-26 RX ORDER — PALONOSETRON 0.05 MG/ML
0.25 INJECTION, SOLUTION INTRAVENOUS ONCE
Status: CANCELLED | OUTPATIENT
Start: 2025-08-27

## 2025-08-26 RX ORDER — DIPHENHYDRAMINE HYDROCHLORIDE 50 MG/ML
50 INJECTION, SOLUTION INTRAMUSCULAR; INTRAVENOUS AS NEEDED
Status: CANCELLED | OUTPATIENT
Start: 2025-08-27

## 2025-08-26 RX ORDER — DEXTROSE MONOHYDRATE 50 MG/ML
20 INJECTION, SOLUTION INTRAVENOUS ONCE
Status: CANCELLED | OUTPATIENT
Start: 2025-08-27

## 2025-08-26 RX ORDER — HYDROCORTISONE SODIUM SUCCINATE 100 MG/2ML
100 INJECTION INTRAMUSCULAR; INTRAVENOUS AS NEEDED
Status: CANCELLED | OUTPATIENT
Start: 2025-08-27

## 2025-08-26 RX ORDER — FAMOTIDINE 10 MG/ML
20 INJECTION, SOLUTION INTRAVENOUS AS NEEDED
Status: CANCELLED | OUTPATIENT
Start: 2025-08-27

## 2025-08-28 ENCOUNTER — HOSPITAL ENCOUNTER (OUTPATIENT)
Dept: ONCOLOGY | Facility: HOSPITAL | Age: 40
Discharge: HOME | End: 2025-08-28
Admitting: INTERNAL MEDICINE
Payer: COMMERCIAL

## 2025-08-28 VITALS
TEMPERATURE: 97.9 F | HEIGHT: 62 IN | WEIGHT: 197.09 LBS | HEART RATE: 74 BPM | RESPIRATION RATE: 18 BRPM | OXYGEN SATURATION: 98 % | SYSTOLIC BLOOD PRESSURE: 116 MMHG | DIASTOLIC BLOOD PRESSURE: 77 MMHG | BODY MASS INDEX: 36.27 KG/M2

## 2025-08-28 DIAGNOSIS — C79.51 METASTASIS TO BONE: ICD-10-CM

## 2025-08-28 DIAGNOSIS — Z17.0 MALIGNANT NEOPLASM OF UPPER-INNER QUADRANT OF RIGHT BREAST IN FEMALE, ESTROGEN RECEPTOR POSITIVE: ICD-10-CM

## 2025-08-28 DIAGNOSIS — C50.211 MALIGNANT NEOPLASM OF UPPER-INNER QUADRANT OF RIGHT BREAST IN FEMALE, ESTROGEN RECEPTOR POSITIVE: ICD-10-CM

## 2025-08-28 DIAGNOSIS — E86.0 DEHYDRATION: Primary | ICD-10-CM

## 2025-08-28 DIAGNOSIS — Z45.2 ENCOUNTER FOR ADJUSTMENT OR MANAGEMENT OF VASCULAR ACCESS DEVICE: ICD-10-CM

## 2025-08-28 LAB — B-HCG UR QL: NEGATIVE

## 2025-08-28 PROCEDURE — 96367 TX/PROPH/DG ADDL SEQ IV INF: CPT

## 2025-08-28 PROCEDURE — 25010000003 DEXTROSE 5 % SOLUTION: Performed by: INTERNAL MEDICINE

## 2025-08-28 PROCEDURE — 25010000002 PALONOSETRON PER 25 MCG: Performed by: INTERNAL MEDICINE

## 2025-08-28 PROCEDURE — 96415 CHEMO IV INFUSION ADDL HR: CPT

## 2025-08-28 PROCEDURE — 25010000002 FAM-TRASTUZUMAB DERUXTEC-NXKI 100 MG RECONSTITUTED SOLUTION 1 EACH VIAL: Performed by: INTERNAL MEDICINE

## 2025-08-28 PROCEDURE — 25010000002 FOSAPREPITANT PER 1 MG: Performed by: INTERNAL MEDICINE

## 2025-08-28 PROCEDURE — 25010000002 DENOSUMAB 120 MG/1.7ML SOLUTION: Performed by: INTERNAL MEDICINE

## 2025-08-28 PROCEDURE — 25010000002 HEPARIN LOCK FLUSH PER 10 UNITS: Performed by: INTERNAL MEDICINE

## 2025-08-28 PROCEDURE — 96372 THER/PROPH/DIAG INJ SC/IM: CPT

## 2025-08-28 PROCEDURE — 81025 URINE PREGNANCY TEST: CPT | Performed by: INTERNAL MEDICINE

## 2025-08-28 PROCEDURE — 25010000002 DEXAMETHASONE SODIUM PHOSPHATE 120 MG/30ML SOLUTION: Performed by: INTERNAL MEDICINE

## 2025-08-28 PROCEDURE — 96413 CHEMO IV INFUSION 1 HR: CPT

## 2025-08-28 PROCEDURE — 25810000003 SODIUM CHLORIDE 0.9 % SOLUTION: Performed by: INTERNAL MEDICINE

## 2025-08-28 PROCEDURE — 96375 TX/PRO/DX INJ NEW DRUG ADDON: CPT

## 2025-08-28 RX ORDER — FAMOTIDINE 10 MG/ML
20 INJECTION, SOLUTION INTRAVENOUS AS NEEDED
Status: DISCONTINUED | OUTPATIENT
Start: 2025-08-28 | End: 2025-08-30 | Stop reason: HOSPADM

## 2025-08-28 RX ORDER — DEXTROSE MONOHYDRATE 50 MG/ML
20 INJECTION, SOLUTION INTRAVENOUS ONCE
Status: COMPLETED | OUTPATIENT
Start: 2025-08-28 | End: 2025-08-28

## 2025-08-28 RX ORDER — HEPARIN SODIUM (PORCINE) LOCK FLUSH IV SOLN 100 UNIT/ML 100 UNIT/ML
500 SOLUTION INTRAVENOUS AS NEEDED
OUTPATIENT
Start: 2025-08-28

## 2025-08-28 RX ORDER — HYDROCORTISONE SODIUM SUCCINATE 100 MG/2ML
100 INJECTION INTRAMUSCULAR; INTRAVENOUS AS NEEDED
Status: DISCONTINUED | OUTPATIENT
Start: 2025-08-28 | End: 2025-08-30 | Stop reason: HOSPADM

## 2025-08-28 RX ORDER — SODIUM CHLORIDE 0.9 % (FLUSH) 0.9 %
20 SYRINGE (ML) INJECTION AS NEEDED
OUTPATIENT
Start: 2025-08-28

## 2025-08-28 RX ORDER — PALONOSETRON 0.05 MG/ML
0.25 INJECTION, SOLUTION INTRAVENOUS ONCE
Status: COMPLETED | OUTPATIENT
Start: 2025-08-28 | End: 2025-08-28

## 2025-08-28 RX ORDER — SODIUM CHLORIDE 0.9 % (FLUSH) 0.9 %
20 SYRINGE (ML) INJECTION AS NEEDED
Status: DISCONTINUED | OUTPATIENT
Start: 2025-08-28 | End: 2025-08-30 | Stop reason: HOSPADM

## 2025-08-28 RX ORDER — HEPARIN SODIUM (PORCINE) LOCK FLUSH IV SOLN 100 UNIT/ML 100 UNIT/ML
500 SOLUTION INTRAVENOUS AS NEEDED
Status: DISCONTINUED | OUTPATIENT
Start: 2025-08-28 | End: 2025-08-30 | Stop reason: HOSPADM

## 2025-08-28 RX ORDER — DIPHENHYDRAMINE HYDROCHLORIDE 50 MG/ML
50 INJECTION, SOLUTION INTRAMUSCULAR; INTRAVENOUS AS NEEDED
Status: DISCONTINUED | OUTPATIENT
Start: 2025-08-28 | End: 2025-08-30 | Stop reason: HOSPADM

## 2025-08-28 RX ADMIN — DEXTROSE MONOHYDRATE 20 ML/HR: 50 INJECTION, SOLUTION INTRAVENOUS at 09:07

## 2025-08-28 RX ADMIN — SODIUM CHLORIDE 1000 ML: 9 INJECTION, SOLUTION INTRAVENOUS at 08:53

## 2025-08-28 RX ADMIN — DEXAMETHASONE SODIUM PHOSPHATE 12 MG: 4 INJECTION, SOLUTION INTRA-ARTICULAR; INTRALESIONAL; INTRAMUSCULAR; INTRAVENOUS; SOFT TISSUE at 09:11

## 2025-08-28 RX ADMIN — FAM-TRASTUZUMAB DERUXTECAN-NXKI 478 MG: 100 INJECTION, POWDER, LYOPHILIZED, FOR SOLUTION INTRAVENOUS at 10:18

## 2025-08-28 RX ADMIN — DENOSUMAB 120 MG: 120 INJECTION SUBCUTANEOUS at 10:04

## 2025-08-28 RX ADMIN — FOSAPREPITANT 150 MG: 150 INJECTION, POWDER, LYOPHILIZED, FOR SOLUTION INTRAVENOUS at 09:31

## 2025-08-28 RX ADMIN — PALONOSETRON HYDROCHLORIDE 0.25 MG: 0.25 INJECTION, SOLUTION INTRAVENOUS at 09:29

## 2025-08-28 RX ADMIN — Medication 20 ML: at 12:06

## 2025-08-28 RX ADMIN — HEPARIN 500 UNITS: 100 SYRINGE at 12:06

## (undated) DEVICE — ANTIBACTERIAL UNDYED BRAIDED (POLYGLACTIN 910), SYNTHETIC ABSORBABLE SUTURE: Brand: COATED VICRYL

## (undated) DEVICE — VASCULAR ACCESS-LF: Brand: MEDLINE INDUSTRIES, INC.

## (undated) DEVICE — CVR PROB ULTRASND CIVFLEX GEN/PURP TELESCP/FOLD 5.5X58IN LF

## (undated) DEVICE — BLADELESS OBTURATOR: Brand: WECK VISTA

## (undated) DEVICE — PATIENT RETURN ELECTRODE, SINGLE-USE, CONTACT QUALITY MONITORING, ADULT, WITH 9FT CORD, FOR PATIENTS WEIGING OVER 33LBS. (15KG): Brand: MEGADYNE

## (undated) DEVICE — INTENDED FOR TISSUE SEPARATION, AND OTHER PROCEDURES THAT REQUIRE A SHARP SURGICAL BLADE TO PUNCTURE OR CUT.: Brand: BARD-PARKER ® CARBON RIB-BACK BLADES

## (undated) DEVICE — 3M™ STERI-STRIP™ COMPOUND BENZOIN TINCTURE 40 BAGS/CARTON 4 CARTONS/CASE C1544: Brand: 3M™ STERI-STRIP™

## (undated) DEVICE — ENDOPATH XCEL BLADELESS TROCARS WITH STABILITY SLEEVES: Brand: ENDOPATH XCEL

## (undated) DEVICE — SEAL

## (undated) DEVICE — GOWN,REINFRCE,POLY,SIRUS,BREATH SLV,XXLG: Brand: MEDLINE

## (undated) DEVICE — SUT VIC 3/0 SH 27IN J416H

## (undated) DEVICE — DECANTER: Brand: UNBRANDED

## (undated) DEVICE — DRAPE,UNDERBUTTOCKS,PCH,STERILE: Brand: MEDLINE

## (undated) DEVICE — ADHS SKIN SURG TISS VISC PREMIERPRO EXOFIN HI/VISC FAST/DRY

## (undated) DEVICE — ST TBG AIRSEAL BIF FLTR W/ACT/CHARCOAL/FLTR

## (undated) DEVICE — COVER,MAYO STAND,STERILE: Brand: MEDLINE

## (undated) DEVICE — SUT PDS MONO 0 36 CT1 VIL PDP346H

## (undated) DEVICE — GLV SURG BIOGEL LTX PF 6 1/2

## (undated) DEVICE — STERILE POLYISOPRENE POWDER-FREE SURGICAL GLOVES WITH EMOLLIENT COATING: Brand: PROTEXIS

## (undated) DEVICE — SUT PROLN 2/0 CT2 30IN 8411H

## (undated) DEVICE — GLOVE,SURG,SENSICARE SLT,LF,PF,7.5: Brand: MEDLINE

## (undated) DEVICE — 3M(TM) TEGADERM(TM) TRANSPARENT FILM DRESSING FRAME STYLE 1627: Brand: 3M™ TEGADERM™

## (undated) DEVICE — SOL IRR NACL 0.9PCT BT 1000ML

## (undated) DEVICE — TROC BLADLES AIRSEAL/OPTI THRD 8X120MM 1P/U

## (undated) DEVICE — KIT,ANTI FOG,W/SPONGE & FLUID,SOFT PACK: Brand: MEDLINE

## (undated) DEVICE — GLV SURG BIOGEL LTX PF 7 1/2

## (undated) DEVICE — GLOVE,SURG,SENSICARE SLT,LF,PF,6.5: Brand: MEDLINE

## (undated) DEVICE — SUT MNCRYL 0/0 CTX 36IN Y398H

## (undated) DEVICE — SUT MNCRYL PLS ANTIB UD 4/0 PS2 18IN

## (undated) DEVICE — PENCL SMOKE/EVAC MEGADYNE TELESCP 10FT

## (undated) DEVICE — SOL NACL 0.9PCT 1000ML

## (undated) DEVICE — SOL ANTISTICK CAUTRY ELECTROLUBE LF

## (undated) DEVICE — GLV SURG BIOGEL LTX PF 7

## (undated) DEVICE — COLUMN DRAPE

## (undated) DEVICE — LOU LITHOTOMY ROBOTIC: Brand: MEDLINE INDUSTRIES, INC.

## (undated) DEVICE — ARM DRAPE

## (undated) DEVICE — ENDOPATH PNEUMONEEDLE INSUFFLATION NEEDLES WITH LUER LOCK CONNECTORS 120MM: Brand: ENDOPATH

## (undated) DEVICE — NDL BLNT 18G 1 1/2IN

## (undated) DEVICE — KT ART BLD GAS QUICK DRAW

## (undated) DEVICE — Device: Brand: TISSUE RETRIEVAL SYSTEM

## (undated) DEVICE — THE STERILE LIGHT HANDLE COVER IS USED WITH STERIS SURGICAL LIGHTING AND VISUALIZATION SYSTEMS.

## (undated) DEVICE — STRIP SKIN CLOSEURE PROXI 1X5IN